# Patient Record
Sex: MALE | Race: WHITE | Employment: OTHER | URBAN - METROPOLITAN AREA
[De-identification: names, ages, dates, MRNs, and addresses within clinical notes are randomized per-mention and may not be internally consistent; named-entity substitution may affect disease eponyms.]

---

## 2017-01-03 ENCOUNTER — OFFICE VISIT (OUTPATIENT)
Dept: CARDIOLOGY CLINIC | Age: 67
End: 2017-01-03

## 2017-01-03 ENCOUNTER — HOSPITAL ENCOUNTER (OUTPATIENT)
Dept: OTHER | Age: 67
Discharge: HOME | End: 2017-01-03
Attending: INTERNAL MEDICINE | Admitting: INTERNAL MEDICINE

## 2017-01-03 VITALS
HEART RATE: 72 BPM | HEIGHT: 71 IN | WEIGHT: 155 LBS | DIASTOLIC BLOOD PRESSURE: 60 MMHG | SYSTOLIC BLOOD PRESSURE: 124 MMHG | BODY MASS INDEX: 21.7 KG/M2

## 2017-01-03 DIAGNOSIS — I10 ESSENTIAL HYPERTENSION: ICD-10-CM

## 2017-01-03 DIAGNOSIS — I42.8 LEFT VENTRICULAR NONCOMPACTION (HCC): ICD-10-CM

## 2017-01-03 DIAGNOSIS — I42.8 NONISCHEMIC CARDIOMYOPATHY (HCC): ICD-10-CM

## 2017-01-03 DIAGNOSIS — I50.22 CHRONIC SYSTOLIC CONGESTIVE HEART FAILURE (HCC): Primary | Chronic | ICD-10-CM

## 2017-01-03 LAB
ANION GAP SERPL CALCULATED.3IONS-SCNC: 12 MMOL/L (ref 3–16)
BUN BLDV-MCNC: 15 MG/DL (ref 7–20)
CALCIUM SERPL-MCNC: 9 MG/DL (ref 8.3–10.6)
CHLORIDE BLD-SCNC: 98 MMOL/L (ref 99–110)
CO2: 28 MMOL/L (ref 21–32)
CREAT SERPL-MCNC: 1 MG/DL (ref 0.8–1.3)
GFR AFRICAN AMERICAN: >60
GFR NON-AFRICAN AMERICAN: >60
GLUCOSE BLD-MCNC: 92 MG/DL (ref 70–99)
POTASSIUM SERPL-SCNC: 4.8 MMOL/L (ref 3.5–5.1)
PRO-BNP: 1976 PG/ML (ref 0–124)
SODIUM BLD-SCNC: 138 MMOL/L (ref 136–145)

## 2017-01-03 PROCEDURE — 99214 OFFICE O/P EST MOD 30 MIN: CPT | Performed by: INTERNAL MEDICINE

## 2017-01-03 RX ORDER — LISINOPRIL 10 MG/1
10 TABLET ORAL DAILY
Qty: 180 TABLET | Refills: 3 | Status: SHIPPED | OUTPATIENT
Start: 2017-01-03 | End: 2017-05-04 | Stop reason: ALTCHOICE

## 2017-01-03 RX ORDER — FUROSEMIDE 20 MG/1
20 TABLET ORAL 2 TIMES DAILY
Qty: 90 TABLET | Refills: 3 | Status: SHIPPED | OUTPATIENT
Start: 2017-01-03 | End: 2017-05-04 | Stop reason: ALTCHOICE

## 2017-01-24 ENCOUNTER — HOSPITAL ENCOUNTER (OUTPATIENT)
Dept: OTHER | Age: 67
Discharge: HOME | End: 2017-01-24
Attending: INTERNAL MEDICINE | Admitting: INTERNAL MEDICINE

## 2017-01-24 ENCOUNTER — OFFICE VISIT (OUTPATIENT)
Dept: CARDIOLOGY CLINIC | Age: 67
End: 2017-01-24

## 2017-01-24 VITALS
DIASTOLIC BLOOD PRESSURE: 72 MMHG | SYSTOLIC BLOOD PRESSURE: 112 MMHG | WEIGHT: 152 LBS | HEART RATE: 68 BPM | BODY MASS INDEX: 21.2 KG/M2 | OXYGEN SATURATION: 94 %

## 2017-01-24 DIAGNOSIS — I10 ESSENTIAL HYPERTENSION: ICD-10-CM

## 2017-01-24 DIAGNOSIS — I42.8 NONISCHEMIC CARDIOMYOPATHY (HCC): ICD-10-CM

## 2017-01-24 DIAGNOSIS — I50.22 CHRONIC SYSTOLIC CONGESTIVE HEART FAILURE (HCC): Primary | Chronic | ICD-10-CM

## 2017-01-24 DIAGNOSIS — I50.22 CHRONIC SYSTOLIC CONGESTIVE HEART FAILURE (HCC): Chronic | ICD-10-CM

## 2017-01-24 LAB
ANION GAP SERPL CALCULATED.3IONS-SCNC: 14 MMOL/L (ref 3–16)
BUN BLDV-MCNC: 14 MG/DL (ref 7–20)
CALCIUM SERPL-MCNC: 8.9 MG/DL (ref 8.3–10.6)
CHLORIDE BLD-SCNC: 91 MMOL/L (ref 99–110)
CO2: 27 MMOL/L (ref 21–32)
CREAT SERPL-MCNC: 1 MG/DL (ref 0.8–1.3)
GFR AFRICAN AMERICAN: >60
GFR NON-AFRICAN AMERICAN: >60
GLUCOSE BLD-MCNC: 83 MG/DL (ref 70–99)
POTASSIUM SERPL-SCNC: 4.8 MMOL/L (ref 3.5–5.1)
PRO-BNP: 1386 PG/ML (ref 0–124)
SODIUM BLD-SCNC: 132 MMOL/L (ref 136–145)

## 2017-01-24 PROCEDURE — 99214 OFFICE O/P EST MOD 30 MIN: CPT | Performed by: INTERNAL MEDICINE

## 2017-01-24 RX ORDER — CARVEDILOL 6.25 MG/1
6.25 TABLET ORAL 2 TIMES DAILY WITH MEALS
Qty: 60 TABLET | Refills: 11 | Status: SHIPPED | OUTPATIENT
Start: 2017-01-24 | End: 2018-04-16 | Stop reason: SDUPTHER

## 2017-01-24 RX ORDER — SILDENAFIL CITRATE 20 MG/1
TABLET ORAL
Qty: 30 TABLET | Refills: 6 | Status: SHIPPED | OUTPATIENT
Start: 2017-01-24 | End: 2017-03-03

## 2017-02-07 ENCOUNTER — TELEPHONE (OUTPATIENT)
Dept: PULMONOLOGY | Age: 67
End: 2017-02-07

## 2017-03-03 ENCOUNTER — HOSPITAL ENCOUNTER (OUTPATIENT)
Dept: NON INVASIVE DIAGNOSTICS | Age: 67
Discharge: HOME | End: 2017-03-03
Attending: INTERNAL MEDICINE | Admitting: INTERNAL MEDICINE

## 2017-03-03 ENCOUNTER — OFFICE VISIT (OUTPATIENT)
Dept: CARDIOLOGY CLINIC | Age: 67
End: 2017-03-03

## 2017-03-03 VITALS
HEART RATE: 60 BPM | WEIGHT: 153 LBS | BODY MASS INDEX: 21.42 KG/M2 | OXYGEN SATURATION: 100 % | DIASTOLIC BLOOD PRESSURE: 50 MMHG | HEIGHT: 71 IN | SYSTOLIC BLOOD PRESSURE: 90 MMHG

## 2017-03-03 DIAGNOSIS — I10 ESSENTIAL HYPERTENSION: ICD-10-CM

## 2017-03-03 DIAGNOSIS — I50.22 CHRONIC SYSTOLIC CONGESTIVE HEART FAILURE (HCC): Primary | Chronic | ICD-10-CM

## 2017-03-03 DIAGNOSIS — I42.8 NONISCHEMIC CARDIOMYOPATHY (HCC): ICD-10-CM

## 2017-03-03 DIAGNOSIS — I50.22 CHRONIC SYSTOLIC CONGESTIVE HEART FAILURE (HCC): ICD-10-CM

## 2017-03-03 PROCEDURE — 99214 OFFICE O/P EST MOD 30 MIN: CPT | Performed by: INTERNAL MEDICINE

## 2017-03-17 ENCOUNTER — HOSPITAL ENCOUNTER (OUTPATIENT)
Dept: OTHER | Age: 67
Discharge: HOME | End: 2017-03-17
Attending: INTERNAL MEDICINE | Admitting: INTERNAL MEDICINE

## 2017-03-17 DIAGNOSIS — I50.22 CHRONIC SYSTOLIC CONGESTIVE HEART FAILURE (HCC): Chronic | ICD-10-CM

## 2017-03-17 DIAGNOSIS — I10 ESSENTIAL HYPERTENSION: ICD-10-CM

## 2017-03-17 DIAGNOSIS — I42.8 NONISCHEMIC CARDIOMYOPATHY (HCC): ICD-10-CM

## 2017-03-17 LAB
ANION GAP SERPL CALCULATED.3IONS-SCNC: 14 MMOL/L (ref 3–16)
BUN BLDV-MCNC: 16 MG/DL (ref 7–20)
CALCIUM SERPL-MCNC: 8.9 MG/DL (ref 8.3–10.6)
CHLORIDE BLD-SCNC: 94 MMOL/L (ref 99–110)
CO2: 26 MMOL/L (ref 21–32)
CREAT SERPL-MCNC: 0.9 MG/DL (ref 0.8–1.3)
GFR AFRICAN AMERICAN: >60
GFR NON-AFRICAN AMERICAN: >60
GLUCOSE BLD-MCNC: 101 MG/DL (ref 70–99)
POTASSIUM SERPL-SCNC: 4.9 MMOL/L (ref 3.5–5.1)
PRO-BNP: 687 PG/ML (ref 0–124)
SODIUM BLD-SCNC: 134 MMOL/L (ref 136–145)

## 2017-04-07 ENCOUNTER — OFFICE VISIT (OUTPATIENT)
Dept: CARDIOLOGY CLINIC | Age: 67
End: 2017-04-07

## 2017-04-07 VITALS
BODY MASS INDEX: 21.42 KG/M2 | HEART RATE: 76 BPM | HEIGHT: 71 IN | DIASTOLIC BLOOD PRESSURE: 64 MMHG | SYSTOLIC BLOOD PRESSURE: 132 MMHG | WEIGHT: 153 LBS

## 2017-04-07 DIAGNOSIS — I42.8 NONISCHEMIC CARDIOMYOPATHY (HCC): Chronic | ICD-10-CM

## 2017-04-07 DIAGNOSIS — I50.22 CHRONIC SYSTOLIC CONGESTIVE HEART FAILURE (HCC): Primary | Chronic | ICD-10-CM

## 2017-04-07 DIAGNOSIS — I42.8 LEFT VENTRICULAR NONCOMPACTION (HCC): ICD-10-CM

## 2017-04-07 PROCEDURE — 99214 OFFICE O/P EST MOD 30 MIN: CPT | Performed by: INTERNAL MEDICINE

## 2017-04-10 ENCOUNTER — TELEPHONE (OUTPATIENT)
Dept: PULMONOLOGY | Age: 67
End: 2017-04-10

## 2017-04-21 ENCOUNTER — HOSPITAL ENCOUNTER (OUTPATIENT)
Dept: OTHER | Age: 67
Discharge: HOME | End: 2017-04-21
Attending: INTERNAL MEDICINE | Admitting: INTERNAL MEDICINE

## 2017-04-21 LAB
ANION GAP SERPL CALCULATED.3IONS-SCNC: 14 MMOL/L (ref 3–16)
BUN BLDV-MCNC: 14 MG/DL (ref 7–20)
CALCIUM SERPL-MCNC: 8.8 MG/DL (ref 8.3–10.6)
CHLORIDE BLD-SCNC: 96 MMOL/L (ref 99–110)
CO2: 25 MMOL/L (ref 21–32)
CREAT SERPL-MCNC: 0.9 MG/DL (ref 0.8–1.3)
GFR AFRICAN AMERICAN: >60
GFR NON-AFRICAN AMERICAN: >60
GLUCOSE BLD-MCNC: 103 MG/DL (ref 70–99)
POTASSIUM SERPL-SCNC: 4.6 MMOL/L (ref 3.5–5.1)
PRO-BNP: 667 PG/ML (ref 0–124)
SODIUM BLD-SCNC: 135 MMOL/L (ref 136–145)

## 2017-05-01 ENCOUNTER — HOSPITAL ENCOUNTER (OUTPATIENT)
Dept: CT IMAGING | Age: 67
Discharge: HOME | End: 2017-05-01
Attending: NURSE PRACTITIONER | Admitting: NURSE PRACTITIONER

## 2017-05-01 ENCOUNTER — TELEPHONE (OUTPATIENT)
Dept: PULMONOLOGY | Age: 67
End: 2017-05-01

## 2017-05-01 DIAGNOSIS — R91.1 PULMONARY NODULE: ICD-10-CM

## 2017-05-01 DIAGNOSIS — Z87.891 PERSONAL HISTORY OF NICOTINE DEPENDENCE: ICD-10-CM

## 2017-05-01 DIAGNOSIS — Z87.891 PERSONAL HISTORY OF TOBACCO USE: ICD-10-CM

## 2017-05-04 ENCOUNTER — TELEPHONE (OUTPATIENT)
Dept: CASE MANAGEMENT | Age: 67
End: 2017-05-04

## 2017-05-04 ENCOUNTER — OFFICE VISIT (OUTPATIENT)
Dept: CARDIOLOGY CLINIC | Age: 67
End: 2017-05-04

## 2017-05-04 VITALS
HEART RATE: 64 BPM | SYSTOLIC BLOOD PRESSURE: 118 MMHG | BODY MASS INDEX: 21.42 KG/M2 | HEIGHT: 71 IN | WEIGHT: 153 LBS | DIASTOLIC BLOOD PRESSURE: 82 MMHG

## 2017-05-04 DIAGNOSIS — I10 ESSENTIAL HYPERTENSION: ICD-10-CM

## 2017-05-04 DIAGNOSIS — I50.22 CHRONIC SYSTOLIC CONGESTIVE HEART FAILURE (HCC): Primary | ICD-10-CM

## 2017-05-04 DIAGNOSIS — I42.8 NONISCHEMIC CARDIOMYOPATHY (HCC): ICD-10-CM

## 2017-05-04 DIAGNOSIS — I42.8 LEFT VENTRICULAR NONCOMPACTION (HCC): ICD-10-CM

## 2017-05-04 PROCEDURE — 99214 OFFICE O/P EST MOD 30 MIN: CPT | Performed by: INTERNAL MEDICINE

## 2017-05-19 ENCOUNTER — OFFICE VISIT (OUTPATIENT)
Dept: PULMONOLOGY | Age: 67
End: 2017-05-19

## 2017-05-19 VITALS
WEIGHT: 151.2 LBS | OXYGEN SATURATION: 97 % | HEART RATE: 67 BPM | BODY MASS INDEX: 21.09 KG/M2 | SYSTOLIC BLOOD PRESSURE: 117 MMHG | RESPIRATION RATE: 18 BRPM | DIASTOLIC BLOOD PRESSURE: 70 MMHG

## 2017-05-19 DIAGNOSIS — Z87.891 HISTORY OF TOBACCO USE: ICD-10-CM

## 2017-05-19 DIAGNOSIS — I42.8 NONISCHEMIC CARDIOMYOPATHY (HCC): ICD-10-CM

## 2017-05-19 DIAGNOSIS — R91.1 PULMONARY NODULE: ICD-10-CM

## 2017-05-19 DIAGNOSIS — J44.9 COPD, SEVERITY TO BE DETERMINED (HCC): Primary | ICD-10-CM

## 2017-05-19 PROCEDURE — 99214 OFFICE O/P EST MOD 30 MIN: CPT | Performed by: NURSE PRACTITIONER

## 2017-05-25 ENCOUNTER — HOSPITAL ENCOUNTER (OUTPATIENT)
Dept: PULMONOLOGY | Age: 67
Discharge: HOME | End: 2017-05-25
Attending: NURSE PRACTITIONER | Admitting: NURSE PRACTITIONER

## 2017-05-25 VITALS — OXYGEN SATURATION: 97 % | HEART RATE: 70 BPM

## 2017-05-25 DIAGNOSIS — J44.9 CHRONIC OBSTRUCTIVE PULMONARY DISEASE (HCC): ICD-10-CM

## 2017-05-25 RX ORDER — ALBUTEROL SULFATE 90 UG/1
4 AEROSOL, METERED RESPIRATORY (INHALATION) ONCE
Status: COMPLETED | OUTPATIENT
Start: 2017-05-25 | End: 2017-05-25

## 2017-05-25 RX ADMIN — ALBUTEROL SULFATE 4 PUFF: 90 AEROSOL, METERED RESPIRATORY (INHALATION) at 17:15

## 2017-06-09 ENCOUNTER — OFFICE VISIT (OUTPATIENT)
Dept: CARDIOLOGY CLINIC | Age: 67
End: 2017-06-09

## 2017-06-09 ENCOUNTER — HOSPITAL ENCOUNTER (OUTPATIENT)
Dept: OTHER | Age: 67
Discharge: HOME | End: 2017-06-09
Attending: NURSE PRACTITIONER | Admitting: NURSE PRACTITIONER

## 2017-06-09 VITALS
BODY MASS INDEX: 22.1 KG/M2 | HEIGHT: 70 IN | WEIGHT: 154.4 LBS | HEART RATE: 65 BPM | OXYGEN SATURATION: 98 % | SYSTOLIC BLOOD PRESSURE: 122 MMHG | RESPIRATION RATE: 16 BRPM | DIASTOLIC BLOOD PRESSURE: 64 MMHG

## 2017-06-09 DIAGNOSIS — I50.22 CHRONIC SYSTOLIC CONGESTIVE HEART FAILURE (HCC): ICD-10-CM

## 2017-06-09 DIAGNOSIS — I42.8 NONISCHEMIC CARDIOMYOPATHY (HCC): ICD-10-CM

## 2017-06-09 DIAGNOSIS — I10 ESSENTIAL HYPERTENSION: ICD-10-CM

## 2017-06-09 LAB
ANION GAP SERPL CALCULATED.3IONS-SCNC: 15 MMOL/L (ref 3–16)
BUN BLDV-MCNC: 14 MG/DL (ref 7–20)
CALCIUM SERPL-MCNC: 8.6 MG/DL (ref 8.3–10.6)
CHLORIDE BLD-SCNC: 94 MMOL/L (ref 99–110)
CO2: 27 MMOL/L (ref 21–32)
CREAT SERPL-MCNC: 0.9 MG/DL (ref 0.8–1.3)
GFR AFRICAN AMERICAN: >60
GFR NON-AFRICAN AMERICAN: >60
GLUCOSE BLD-MCNC: 89 MG/DL (ref 70–99)
POTASSIUM SERPL-SCNC: 4.6 MMOL/L (ref 3.5–5.1)
PRO-BNP: 587 PG/ML (ref 0–124)
SODIUM BLD-SCNC: 136 MMOL/L (ref 136–145)

## 2017-06-09 PROCEDURE — 99214 OFFICE O/P EST MOD 30 MIN: CPT | Performed by: NURSE PRACTITIONER

## 2017-06-09 RX ORDER — NICOTINE 21 MG/24HR
1 PATCH, TRANSDERMAL 24 HOURS TRANSDERMAL PRN
Qty: 30 PATCH | Refills: 0 | COMMUNITY
Start: 2017-06-09 | End: 2017-07-25

## 2017-06-15 ENCOUNTER — OFFICE VISIT (OUTPATIENT)
Dept: PULMONOLOGY | Age: 67
End: 2017-06-15

## 2017-06-15 VITALS
OXYGEN SATURATION: 97 % | RESPIRATION RATE: 20 BRPM | SYSTOLIC BLOOD PRESSURE: 117 MMHG | BODY MASS INDEX: 22.13 KG/M2 | WEIGHT: 154.2 LBS | HEART RATE: 72 BPM | DIASTOLIC BLOOD PRESSURE: 69 MMHG

## 2017-06-15 DIAGNOSIS — J96.01 ACUTE HYPOXEMIC RESPIRATORY FAILURE (HCC): Primary | ICD-10-CM

## 2017-06-15 DIAGNOSIS — J44.9 COPD, VERY SEVERE (HCC): ICD-10-CM

## 2017-06-15 DIAGNOSIS — I42.8 NONISCHEMIC CARDIOMYOPATHY (HCC): ICD-10-CM

## 2017-06-15 PROCEDURE — 99213 OFFICE O/P EST LOW 20 MIN: CPT | Performed by: NURSE PRACTITIONER

## 2017-07-25 ENCOUNTER — OFFICE VISIT (OUTPATIENT)
Dept: CARDIOLOGY CLINIC | Age: 67
End: 2017-07-25

## 2017-07-25 ENCOUNTER — HOSPITAL ENCOUNTER (OUTPATIENT)
Dept: OTHER | Age: 67
Discharge: HOME | End: 2017-07-25
Attending: NURSE PRACTITIONER | Admitting: NURSE PRACTITIONER

## 2017-07-25 ENCOUNTER — HOSPITAL ENCOUNTER (OUTPATIENT)
Dept: OTHER | Age: 67
Discharge: HOME | End: 2017-07-25
Attending: OPHTHALMOLOGY | Admitting: OPHTHALMOLOGY

## 2017-07-25 VITALS
HEART RATE: 72 BPM | RESPIRATION RATE: 16 BRPM | WEIGHT: 155.4 LBS | DIASTOLIC BLOOD PRESSURE: 74 MMHG | HEIGHT: 70 IN | SYSTOLIC BLOOD PRESSURE: 122 MMHG | BODY MASS INDEX: 22.25 KG/M2 | OXYGEN SATURATION: 99 %

## 2017-07-25 DIAGNOSIS — H44.009 EYE INFECTION, UNSPECIFIED LATERALITY: ICD-10-CM

## 2017-07-25 DIAGNOSIS — I50.22 CHRONIC SYSTOLIC HEART FAILURE (HCC): Primary | ICD-10-CM

## 2017-07-25 DIAGNOSIS — I42.8 NICM (NONISCHEMIC CARDIOMYOPATHY) (HCC): ICD-10-CM

## 2017-07-25 DIAGNOSIS — I50.22 CHRONIC SYSTOLIC HEART FAILURE (HCC): ICD-10-CM

## 2017-07-25 DIAGNOSIS — I10 ESSENTIAL HYPERTENSION: ICD-10-CM

## 2017-07-25 LAB
ANION GAP SERPL CALCULATED.3IONS-SCNC: 12 MMOL/L (ref 3–16)
BUN BLDV-MCNC: 13 MG/DL (ref 7–20)
CALCIUM SERPL-MCNC: 9.2 MG/DL (ref 8.3–10.6)
CHLORIDE BLD-SCNC: 96 MMOL/L (ref 99–110)
CO2: 27 MMOL/L (ref 21–32)
CREAT SERPL-MCNC: 0.8 MG/DL (ref 0.8–1.3)
GFR AFRICAN AMERICAN: >60
GFR NON-AFRICAN AMERICAN: >60
GLUCOSE BLD-MCNC: 95 MG/DL (ref 70–99)
HCT VFR BLD CALC: 45.9 % (ref 40.5–52.5)
HEMOGLOBIN: 15.4 G/DL (ref 13.5–17.5)
MCH RBC QN AUTO: 31.8 PG (ref 26–34)
MCHC RBC AUTO-ENTMCNC: 33.5 G/DL (ref 31–36)
MCV RBC AUTO: 94.9 FL (ref 80–100)
PDW BLD-RTO: 13.3 % (ref 12.4–15.4)
PLATELET # BLD: 276 K/UL (ref 135–450)
PMV BLD AUTO: 8 FL (ref 5–10.5)
POTASSIUM SERPL-SCNC: 5.3 MMOL/L (ref 3.5–5.1)
PRO-BNP: 425 PG/ML (ref 0–124)
RBC # BLD: 4.84 M/UL (ref 4.2–5.9)
SODIUM BLD-SCNC: 135 MMOL/L (ref 136–145)
WBC # BLD: 6.9 K/UL (ref 4–11)

## 2017-07-25 PROCEDURE — 99214 OFFICE O/P EST MOD 30 MIN: CPT | Performed by: NURSE PRACTITIONER

## 2017-07-26 DIAGNOSIS — E87.5 HYPERKALEMIA: Primary | ICD-10-CM

## 2017-07-26 LAB
ANA INTERPRETATION: ABNORMAL
ANA TITER: ABNORMAL
ANGIOTENSIN CONVERTING ENZYME: 48 U/L (ref 9–67)
ANTI-NUCLEAR ANTIBODY (ANA): POSITIVE
HLA B27: NEGATIVE
RPR: NORMAL

## 2017-07-27 LAB
LYME (B. BURGDORFERI) AB IGG WB: POSITIVE
LYME AB IGM BY WB:: NEGATIVE
LYSOZYME: 1.86 UG/ML (ref 0–2.75)
TREPONEMA PALLIDUM ANTIBODIES: NON REACTIVE

## 2017-07-28 ENCOUNTER — TELEPHONE (OUTPATIENT)
Dept: CARDIOLOGY CLINIC | Age: 67
End: 2017-07-28

## 2017-08-01 ENCOUNTER — HOSPITAL ENCOUNTER (OUTPATIENT)
Dept: OTHER | Age: 67
Discharge: HOME | End: 2017-08-01
Attending: OPHTHALMOLOGY | Admitting: OPHTHALMOLOGY

## 2017-08-01 ENCOUNTER — HOSPITAL ENCOUNTER (OUTPATIENT)
Dept: OTHER | Age: 67
Discharge: HOME | End: 2017-08-01
Attending: NURSE PRACTITIONER | Admitting: NURSE PRACTITIONER

## 2017-08-01 DIAGNOSIS — E87.5 HYPERKALEMIA: ICD-10-CM

## 2017-08-01 LAB — POTASSIUM SERPL-SCNC: 4.9 MMOL/L (ref 3.5–5.1)

## 2017-08-03 LAB — LYME, EIA: 0.85 LIV (ref 0–1.2)

## 2017-09-11 DIAGNOSIS — I50.22 CHRONIC SYSTOLIC CONGESTIVE HEART FAILURE (HCC): ICD-10-CM

## 2017-09-11 DIAGNOSIS — I42.8 NONISCHEMIC CARDIOMYOPATHY (HCC): ICD-10-CM

## 2017-09-11 DIAGNOSIS — I10 ESSENTIAL HYPERTENSION: ICD-10-CM

## 2017-09-14 ENCOUNTER — HOSPITAL ENCOUNTER (OUTPATIENT)
Dept: NON INVASIVE DIAGNOSTICS | Age: 67
Discharge: HOME | End: 2017-09-14
Attending: NURSE PRACTITIONER | Admitting: NURSE PRACTITIONER

## 2017-09-14 ENCOUNTER — OFFICE VISIT (OUTPATIENT)
Dept: CARDIOLOGY CLINIC | Age: 67
End: 2017-09-14

## 2017-09-14 VITALS
HEIGHT: 70 IN | SYSTOLIC BLOOD PRESSURE: 106 MMHG | HEART RATE: 66 BPM | WEIGHT: 156.12 LBS | DIASTOLIC BLOOD PRESSURE: 68 MMHG | OXYGEN SATURATION: 98 % | BODY MASS INDEX: 22.35 KG/M2

## 2017-09-14 DIAGNOSIS — I42.8 NONISCHEMIC CARDIOMYOPATHY (HCC): Chronic | ICD-10-CM

## 2017-09-14 DIAGNOSIS — I50.22 CHRONIC SYSTOLIC CONGESTIVE HEART FAILURE (HCC): ICD-10-CM

## 2017-09-14 DIAGNOSIS — I50.22 CHRONIC SYSTOLIC CONGESTIVE HEART FAILURE (HCC): Primary | Chronic | ICD-10-CM

## 2017-09-14 LAB
LEFT VENTRICULAR EJECTION FRACTION HIGH VALUE: 40 %
LEFT VENTRICULAR EJECTION FRACTION MODE: NORMAL
LV EF: 35 %
LV EF: 38 %
LVEF MODALITY: NORMAL

## 2017-09-14 PROCEDURE — 99214 OFFICE O/P EST MOD 30 MIN: CPT | Performed by: INTERNAL MEDICINE

## 2017-10-23 RX ORDER — TIOTROPIUM BROMIDE AND OLODATEROL 3.124; 2.736 UG/1; UG/1
SPRAY, METERED RESPIRATORY (INHALATION)
Qty: 1 INHALER | Refills: 2 | Status: SHIPPED | OUTPATIENT
Start: 2017-10-23 | End: 2018-10-05

## 2017-10-31 DIAGNOSIS — I50.22 CHRONIC SYSTOLIC CONGESTIVE HEART FAILURE (HCC): ICD-10-CM

## 2017-10-31 DIAGNOSIS — I10 ESSENTIAL HYPERTENSION: ICD-10-CM

## 2017-10-31 DIAGNOSIS — I42.8 NONISCHEMIC CARDIOMYOPATHY (HCC): ICD-10-CM

## 2017-12-05 ENCOUNTER — HOSPITAL ENCOUNTER (OUTPATIENT)
Dept: OTHER | Age: 67
Discharge: HOME | End: 2017-12-05
Attending: INTERNAL MEDICINE | Admitting: INTERNAL MEDICINE

## 2017-12-05 DIAGNOSIS — I42.8 NONISCHEMIC CARDIOMYOPATHY (HCC): Chronic | ICD-10-CM

## 2017-12-05 DIAGNOSIS — I50.22 CHRONIC SYSTOLIC CONGESTIVE HEART FAILURE (HCC): Chronic | ICD-10-CM

## 2017-12-05 LAB
ANION GAP SERPL CALCULATED.3IONS-SCNC: 12 MMOL/L (ref 3–16)
BUN BLDV-MCNC: 16 MG/DL (ref 7–20)
CALCIUM SERPL-MCNC: 8.8 MG/DL (ref 8.3–10.6)
CHLORIDE BLD-SCNC: 98 MMOL/L (ref 99–110)
CO2: 26 MMOL/L (ref 21–32)
CREAT SERPL-MCNC: 0.8 MG/DL (ref 0.8–1.3)
GFR AFRICAN AMERICAN: >60
GFR NON-AFRICAN AMERICAN: >60
GLUCOSE BLD-MCNC: 99 MG/DL (ref 70–99)
HCT VFR BLD CALC: 44.7 % (ref 40.5–52.5)
HEMOGLOBIN: 15 G/DL (ref 13.5–17.5)
MCH RBC QN AUTO: 31.6 PG (ref 26–34)
MCHC RBC AUTO-ENTMCNC: 33.6 G/DL (ref 31–36)
MCV RBC AUTO: 94 FL (ref 80–100)
PDW BLD-RTO: 13.4 % (ref 12.4–15.4)
PLATELET # BLD: 315 K/UL (ref 135–450)
PMV BLD AUTO: 8.3 FL (ref 5–10.5)
POTASSIUM SERPL-SCNC: 5.1 MMOL/L (ref 3.5–5.1)
PRO-BNP: 351 PG/ML (ref 0–124)
RBC # BLD: 4.76 M/UL (ref 4.2–5.9)
SODIUM BLD-SCNC: 136 MMOL/L (ref 136–145)
WBC # BLD: 9.5 K/UL (ref 4–11)

## 2017-12-18 ENCOUNTER — OFFICE VISIT (OUTPATIENT)
Dept: PULMONOLOGY | Age: 67
End: 2017-12-18

## 2017-12-18 VITALS
HEART RATE: 85 BPM | SYSTOLIC BLOOD PRESSURE: 132 MMHG | WEIGHT: 161 LBS | DIASTOLIC BLOOD PRESSURE: 81 MMHG | BODY MASS INDEX: 23.1 KG/M2

## 2017-12-18 DIAGNOSIS — R91.1 PULMONARY NODULE: ICD-10-CM

## 2017-12-18 DIAGNOSIS — J44.9 COPD, SEVERE (HCC): ICD-10-CM

## 2017-12-18 DIAGNOSIS — R06.02 SOB (SHORTNESS OF BREATH): ICD-10-CM

## 2017-12-18 DIAGNOSIS — I42.8 NONISCHEMIC CARDIOMYOPATHY (HCC): Primary | Chronic | ICD-10-CM

## 2017-12-18 DIAGNOSIS — Z72.0 TOBACCO USE: Chronic | ICD-10-CM

## 2017-12-18 PROCEDURE — 99214 OFFICE O/P EST MOD 30 MIN: CPT | Performed by: INTERNAL MEDICINE

## 2017-12-18 NOTE — LETTER
Pulmonary, Critical Care & Sleep  [address], [address] [facility],[address] [address]  Phone: [phone]  Fax: [phone]          December 18, 2017       Patient: Brennan Caraballo   MR Number: [id number]   YOB: 1950   Date of Visit: 12/18/2017       Dear Dr. Nancy Gonzales: Thank you for the request for consultation. Below are the relevant portions of my assessment and plan of care. If you have questions, please do not hesitate to call me. I look forward to following Tim Gomez along with you.     Sincerely,        Jona Jama MD    CC providers:  No Recipients

## 2018-04-16 ENCOUNTER — OFFICE VISIT (OUTPATIENT)
Dept: CARDIOLOGY CLINIC | Age: 68
End: 2018-04-16

## 2018-04-16 VITALS
WEIGHT: 159 LBS | HEIGHT: 71 IN | SYSTOLIC BLOOD PRESSURE: 94 MMHG | DIASTOLIC BLOOD PRESSURE: 66 MMHG | BODY MASS INDEX: 22.26 KG/M2 | HEART RATE: 72 BPM

## 2018-04-16 DIAGNOSIS — I10 ESSENTIAL HYPERTENSION: Chronic | ICD-10-CM

## 2018-04-16 DIAGNOSIS — I50.22 CHRONIC SYSTOLIC CONGESTIVE HEART FAILURE (HCC): Primary | Chronic | ICD-10-CM

## 2018-04-16 DIAGNOSIS — R06.02 SOB (SHORTNESS OF BREATH): ICD-10-CM

## 2018-04-16 DIAGNOSIS — I42.8 NONISCHEMIC CARDIOMYOPATHY (HCC): Chronic | ICD-10-CM

## 2018-04-16 PROCEDURE — 99214 OFFICE O/P EST MOD 30 MIN: CPT | Performed by: INTERNAL MEDICINE

## 2018-04-16 RX ORDER — CARVEDILOL 6.25 MG/1
6.25 TABLET ORAL 2 TIMES DAILY WITH MEALS
Qty: 180 TABLET | Refills: 3 | Status: SHIPPED | OUTPATIENT
Start: 2018-04-16 | End: 2018-10-05 | Stop reason: SDUPTHER

## 2018-06-21 DIAGNOSIS — I10 ESSENTIAL HYPERTENSION: ICD-10-CM

## 2018-06-21 DIAGNOSIS — I50.22 CHRONIC SYSTOLIC CONGESTIVE HEART FAILURE (HCC): ICD-10-CM

## 2018-06-21 DIAGNOSIS — I42.8 NONISCHEMIC CARDIOMYOPATHY (HCC): ICD-10-CM

## 2018-06-29 NOTE — TELEPHONE ENCOUNTER
From: Adal Lopez  Sent: 6/28/2018 11:28 AM EDT  Subject: Medication Renewal Request    Clara Eleni.  Cassie Redmond would like a refill of the following medications:     umeclidinium-vilanterol (ANORO ELLIPTA) 62.5-25 MCG/INH AEPB inhaler Laura Sarabia MD]    Preferred pharmacy: [facility], [address] [phone] - F [phone]

## 2018-10-05 ENCOUNTER — OFFICE VISIT (OUTPATIENT)
Dept: CARDIOLOGY CLINIC | Age: 68
End: 2018-10-05
Payer: COMMERCIAL

## 2018-10-05 ENCOUNTER — HOSPITAL ENCOUNTER (OUTPATIENT)
Dept: NON INVASIVE DIAGNOSTICS | Age: 68
Discharge: HOME | End: 2018-10-05
Payer: COMMERCIAL

## 2018-10-05 VITALS
BODY MASS INDEX: 21.14 KG/M2 | SYSTOLIC BLOOD PRESSURE: 100 MMHG | RESPIRATION RATE: 16 BRPM | WEIGHT: 151 LBS | HEART RATE: 65 BPM | DIASTOLIC BLOOD PRESSURE: 62 MMHG | OXYGEN SATURATION: 97 % | HEIGHT: 71 IN

## 2018-10-05 DIAGNOSIS — R06.02 SOB (SHORTNESS OF BREATH): ICD-10-CM

## 2018-10-05 DIAGNOSIS — I42.8 NONISCHEMIC CARDIOMYOPATHY (HCC): Chronic | ICD-10-CM

## 2018-10-05 DIAGNOSIS — I50.22 CHRONIC SYSTOLIC CONGESTIVE HEART FAILURE (HCC): ICD-10-CM

## 2018-10-05 DIAGNOSIS — I10 ESSENTIAL HYPERTENSION: Chronic | ICD-10-CM

## 2018-10-05 DIAGNOSIS — I10 ESSENTIAL HYPERTENSION: ICD-10-CM

## 2018-10-05 DIAGNOSIS — I42.8 NONISCHEMIC CARDIOMYOPATHY (HCC): ICD-10-CM

## 2018-10-05 DIAGNOSIS — I50.22 CHRONIC SYSTOLIC CONGESTIVE HEART FAILURE (HCC): Primary | ICD-10-CM

## 2018-10-05 LAB
LEFT VENTRICULAR EJECTION FRACTION HIGH VALUE: 45 %
LEFT VENTRICULAR EJECTION FRACTION MODE: NORMAL
LV EF: 40 %
LV EF: 43 %
LVEF MODALITY: NORMAL

## 2018-10-05 PROCEDURE — 93306 TTE W/DOPPLER COMPLETE: CPT

## 2018-10-05 PROCEDURE — 99214 OFFICE O/P EST MOD 30 MIN: CPT | Performed by: INTERNAL MEDICINE

## 2018-10-05 NOTE — PROGRESS NOTES
12/05/2017    HGB 15.0 12/05/2017    HCT 44.7 12/05/2017    MCV 94.0 12/05/2017     12/05/2017     Lab Results   Component Value Date     12/05/2017    K 5.1 12/05/2017    CL 98 12/05/2017    CO2 26 12/05/2017    BUN 16 12/05/2017    CREATININE 0.8 12/05/2017    GLUCOSE 99 12/05/2017    CALCIUM 8.8 12/05/2017          Diagnostics:    Echo 105/2018   -Left ventricular cavity size is normal.   -Normal left ventricular wall thickness.   -Overall left ventricular systolic function appears mildly to moderately   reduced with an ejection fraction on the 40-45% range.   -There is moderate diffuse global hypokinesis.   -Abnormal (paradoxical) septal motion is present .   -Normal diastolic function. .E/e\"=6.45   -Trivial mitral and tricuspid regurgitation. Echo 9/30/2016:  Normal left ventricular wall thickness. Left ventricular size is increased. The left ventricular systolic function is severely reduced with an ejection fraction of 10-20 %. There are prominent left ventricular trabeculations. This could represent non compaction. There is reversal of E/A inflow velocities across the mitral valve suggesting impaired left ventricular relaxation. Moderate mitral regurgitation is present. The aortic valve appears sclerotic but opens well. There is trivial tricuspid regurgitation with RVSP estimated at 29 mmHg.        [facility] 10/13/2016:  Coronary Findings   Vessel  Ostial  Proximal  Mid  Distal  Special    LM  0%  0%  0%  0%      LAD  0%  0%  0%  0%      RI              Cx  0%  0%  0%  0%      RCA  0%  0%  0%  0%         Ventriculography/Pressure Findings  LVEF %  LVEDP  MR    20%  8mmHg         Lab Results   Component Value Date     12/05/2017     07/25/2017     06/09/2017    K 5.1 12/05/2017    K 4.9 08/01/2017    K 5.3 07/25/2017    BUN 16 12/05/2017    BUN 13 07/25/2017    BUN 14 06/09/2017    CREATININE 0.8 12/05/2017    CREATININE 0.8 07/25/2017    CREATININE 0.9 06/09/2017    GLUCOSE 99

## 2018-10-06 RX ORDER — CARVEDILOL 6.25 MG/1
6.25 TABLET ORAL 2 TIMES DAILY WITH MEALS
Qty: 180 TABLET | Refills: 3 | Status: SHIPPED | OUTPATIENT
Start: 2018-10-06 | End: 2019-05-08 | Stop reason: SDUPTHER

## 2018-11-18 ENCOUNTER — HOSPITAL ENCOUNTER (OUTPATIENT)
Dept: CT IMAGING | Age: 68
Discharge: HOME | End: 2018-11-18
Payer: COMMERCIAL

## 2018-11-18 DIAGNOSIS — Z87.891 FORMER SMOKER: ICD-10-CM

## 2018-11-18 PROCEDURE — G0297 LDCT FOR LUNG CA SCREEN: HCPCS

## 2018-12-19 ENCOUNTER — HOSPITAL ENCOUNTER (OUTPATIENT)
Dept: CT IMAGING | Age: 68
Discharge: HOME | End: 2018-12-19
Payer: COMMERCIAL

## 2018-12-19 DIAGNOSIS — I25.10 ATHEROSCLEROSIS OF NATIVE CORONARY ARTERY OF NATIVE HEART WITHOUT ANGINA PECTORIS: ICD-10-CM

## 2018-12-19 PROCEDURE — 75571 CT HRT W/O DYE W/CA TEST: CPT

## 2018-12-28 ENCOUNTER — OFFICE VISIT (OUTPATIENT)
Dept: PULMONOLOGY | Age: 68
End: 2018-12-28
Payer: COMMERCIAL

## 2018-12-28 VITALS
OXYGEN SATURATION: 95 % | WEIGHT: 157 LBS | DIASTOLIC BLOOD PRESSURE: 79 MMHG | HEART RATE: 72 BPM | BODY MASS INDEX: 21.9 KG/M2 | SYSTOLIC BLOOD PRESSURE: 118 MMHG

## 2018-12-28 DIAGNOSIS — R06.02 SOB (SHORTNESS OF BREATH): Primary | Chronic | ICD-10-CM

## 2018-12-28 DIAGNOSIS — J44.9 COPD, VERY SEVERE (HCC): ICD-10-CM

## 2018-12-28 DIAGNOSIS — R05.3 CHRONIC COUGH: ICD-10-CM

## 2018-12-28 DIAGNOSIS — J43.2 CENTRILOBULAR EMPHYSEMA (HCC): ICD-10-CM

## 2018-12-28 DIAGNOSIS — I42.8 NONISCHEMIC CARDIOMYOPATHY (HCC): Chronic | ICD-10-CM

## 2018-12-28 PROCEDURE — 99214 OFFICE O/P EST MOD 30 MIN: CPT | Performed by: INTERNAL MEDICINE

## 2018-12-28 RX ORDER — ALBUTEROL SULFATE 2.5 MG/3ML
2.5 SOLUTION RESPIRATORY (INHALATION) EVERY 6 HOURS PRN
Qty: 120 EACH | Refills: 11 | Status: SHIPPED | OUTPATIENT
Start: 2018-12-28 | End: 2019-04-26

## 2019-04-05 DIAGNOSIS — I10 ESSENTIAL HYPERTENSION: ICD-10-CM

## 2019-04-05 DIAGNOSIS — I50.22 CHRONIC SYSTOLIC CONGESTIVE HEART FAILURE (HCC): ICD-10-CM

## 2019-04-05 DIAGNOSIS — I42.8 NONISCHEMIC CARDIOMYOPATHY (HCC): ICD-10-CM

## 2019-04-08 NOTE — TELEPHONE ENCOUNTER
Lov 10/5/2018 RTO around April   Labs 9/27/2018  Lrf 10/6/2018 Disp 180+1  Appt No appt scheduled at this time. Please advise thanks.

## 2019-04-26 ENCOUNTER — OFFICE VISIT (OUTPATIENT)
Dept: CARDIOLOGY CLINIC | Age: 69
End: 2019-04-26
Payer: COMMERCIAL

## 2019-04-26 VITALS
HEIGHT: 71 IN | DIASTOLIC BLOOD PRESSURE: 70 MMHG | OXYGEN SATURATION: 98 % | SYSTOLIC BLOOD PRESSURE: 100 MMHG | WEIGHT: 150 LBS | HEART RATE: 70 BPM | BODY MASS INDEX: 21 KG/M2

## 2019-04-26 DIAGNOSIS — I42.8 NONISCHEMIC CARDIOMYOPATHY (HCC): Chronic | ICD-10-CM

## 2019-04-26 DIAGNOSIS — I50.22 CHRONIC SYSTOLIC CONGESTIVE HEART FAILURE (HCC): Primary | ICD-10-CM

## 2019-04-26 DIAGNOSIS — I10 ESSENTIAL HYPERTENSION: Chronic | ICD-10-CM

## 2019-04-26 PROCEDURE — 99213 OFFICE O/P EST LOW 20 MIN: CPT | Performed by: NURSE PRACTITIONER

## 2019-04-26 NOTE — PROGRESS NOTES
rash or new skin lesions. Musculoskeletal: No new muscle or joint pain. Neurological: No lightheadedness, dizziness, syncope or TIA-like symptoms. Psychiatric: No anxiety, insomnia or depression    Physical Exam:  /70   Pulse 70   Ht 5' 11\" (1.803 m)   Wt 150 lb (68 kg)   SpO2 98%   BMI 20.92 kg/m²     General:  Awake, alert, oriented in NAD  Skin:  Warm and dry. No unusual bruising or rash  HEENT: Normocephalic and atraumatic. Oral mucosa moist, no cyanosis. Neck:  Supple. No JVP appreciated  Chest:  Normal effort. Clear to auscultation  Cardiovascular:  RRR, S1/S2, no murmur/gallop/rub  Abdomen:  Soft, nontender, +bowel sounds  Extremities:  No edema  Neurological: No focal deficits  Psychological: Normal mood and affect     Home Medications:  Current Outpatient Medications   Medication Sig Dispense Refill    sacubitril-valsartan (ENTRESTO)  MG per tablet Take 1 tablet by mouth 2 times daily 180 tablet 3    carvedilol (COREG) 6.25 MG tablet Take 1 tablet by mouth 2 times daily (with meals) 180 tablet 3    umeclidinium-vilanterol (ANORO ELLIPTA) 62.5-25 MCG/INH AEPB inhaler Inhale 1 puff into the lungs daily 3 each 3     No current facility-administered medications for this visit.         Labs:   Lab Results   Component Value Date    WBC 9.5 12/05/2017    HGB 15.0 12/05/2017    HCT 44.7 12/05/2017    MCV 94.0 12/05/2017     12/05/2017     Lab Results   Component Value Date     12/05/2017    K 5.1 12/05/2017    CL 98 12/05/2017    CO2 26 12/05/2017    BUN 16 12/05/2017    CREATININE 0.8 12/05/2017    GLUCOSE 99 12/05/2017    CALCIUM 8.8 12/05/2017      No results found for: TRIG, HDL, LDLCALC, LDLDIRECT, LABVLDL    Diagnostics:    Echo 10/5/2018:  Summary   -Left ventricular cavity size is normal.   -Normal left ventricular wall thickness.   -Overall left ventricular systolic function appears mildly to moderately   reduced with an ejection fraction on the 40-45% range.   -There is moderate diffuse global hypokinesis. -Abnormal (paradoxical) septal motion is present .   -Normal diastolic function. .E/e\"=6.45   -Trivial mitral and tricuspid regurgitation. [facility] 10/13/2016:  Coronary Findings   Vessel  Ostial  Proximal  Mid  Distal  Special    LM  0%  0%  0%  0%      LAD  0%  0%  0%  0%      RI              Cx  0%  0%  0%  0%      RCA  0%  0%  0%  0%         Ventriculography/Pressure Findings  LVEF %  LVEDP  MR    20%  8mmHg          Assessment:  1. Chronic systolic congestive heart failure. NYHA class II. Appears stable. 2. Nonischemic cardiomyopathy. EF 40-45%. Normal cors on [facility] (2016). On ARNI and evidence based beta blocker. 3. Essential hypertension. Controlled. With HF, goal < 130/80. Plan:  1. Continue current medications. 2. Next labs with PCP appointment, given slip for BMP. 3. Follow up in 6 months, earlier for worsening. Thank you for allowing me to participate in the care of your patient.     CHELSY Richardson-CNP

## 2019-06-06 ENCOUNTER — HOSPITAL ENCOUNTER (OUTPATIENT)
Age: 69
Discharge: HOME | End: 2019-06-06
Payer: COMMERCIAL

## 2019-06-06 ENCOUNTER — HOSPITAL ENCOUNTER (OUTPATIENT)
Dept: GENERAL RADIOLOGY | Age: 69
Discharge: HOME | End: 2019-06-06
Payer: COMMERCIAL

## 2019-06-06 DIAGNOSIS — J44.9 CHRONIC OBSTRUCTIVE PULMONARY DISEASE, UNSPECIFIED COPD TYPE (HCC): ICD-10-CM

## 2019-06-06 DIAGNOSIS — I50.22 CHRONIC SYSTOLIC CONGESTIVE HEART FAILURE (HCC): ICD-10-CM

## 2019-06-06 LAB
ANION GAP SERPL CALCULATED.3IONS-SCNC: 12 MMOL/L (ref 3–16)
BUN BLDV-MCNC: 11 MG/DL (ref 7–20)
CALCIUM SERPL-MCNC: 9.1 MG/DL (ref 8.3–10.6)
CHLORIDE BLD-SCNC: 94 MMOL/L (ref 99–110)
CO2: 25 MMOL/L (ref 21–32)
CREAT SERPL-MCNC: 0.9 MG/DL (ref 0.8–1.3)
GFR AFRICAN AMERICAN: >60
GFR NON-AFRICAN AMERICAN: >60
GLUCOSE BLD-MCNC: 122 MG/DL (ref 70–99)
POTASSIUM SERPL-SCNC: 4.3 MMOL/L (ref 3.5–5.1)
SODIUM BLD-SCNC: 131 MMOL/L (ref 136–145)

## 2019-06-06 PROCEDURE — 71046 X-RAY EXAM CHEST 2 VIEWS: CPT

## 2019-06-06 PROCEDURE — 36415 COLL VENOUS BLD VENIPUNCTURE: CPT

## 2019-06-06 PROCEDURE — 80048 BASIC METABOLIC PNL TOTAL CA: CPT

## 2019-06-07 ENCOUNTER — TELEPHONE (OUTPATIENT)
Dept: CARDIOLOGY CLINIC | Age: 69
End: 2019-06-07

## 2019-06-07 NOTE — TELEPHONE ENCOUNTER
----- Message from CHELSY Victor CNP sent at 6/7/2019  8:31 AM EDT -----  Notify patient labs show stable kidney numbers. Continue current medications.

## 2019-06-07 NOTE — TELEPHONE ENCOUNTER
Attempted to call and speak with patient regarding lab results. He was not available to speak with. [facility] for message below.

## 2019-08-10 DIAGNOSIS — R06.02 SOB (SHORTNESS OF BREATH): ICD-10-CM

## 2019-08-10 DIAGNOSIS — I50.22 CHRONIC SYSTOLIC CONGESTIVE HEART FAILURE (HCC): Chronic | ICD-10-CM

## 2019-08-10 DIAGNOSIS — I10 ESSENTIAL HYPERTENSION: Chronic | ICD-10-CM

## 2019-08-10 DIAGNOSIS — I42.8 NONISCHEMIC CARDIOMYOPATHY (HCC): Chronic | ICD-10-CM

## 2019-08-12 RX ORDER — CARVEDILOL 6.25 MG/1
TABLET ORAL
Qty: 180 TABLET | Refills: 2 | Status: SHIPPED | OUTPATIENT
Start: 2019-08-12 | End: 2020-05-15

## 2019-10-28 ENCOUNTER — OFFICE VISIT (OUTPATIENT)
Dept: CARDIOLOGY CLINIC | Age: 69
End: 2019-10-28
Payer: COMMERCIAL

## 2019-10-28 VITALS
HEART RATE: 59 BPM | HEIGHT: 71 IN | OXYGEN SATURATION: 99 % | WEIGHT: 151.1 LBS | SYSTOLIC BLOOD PRESSURE: 100 MMHG | BODY MASS INDEX: 21.15 KG/M2 | DIASTOLIC BLOOD PRESSURE: 70 MMHG

## 2019-10-28 DIAGNOSIS — I50.22 CHRONIC SYSTOLIC CONGESTIVE HEART FAILURE (HCC): Chronic | ICD-10-CM

## 2019-10-28 DIAGNOSIS — I42.8 NONISCHEMIC CARDIOMYOPATHY (HCC): Primary | ICD-10-CM

## 2019-10-28 DIAGNOSIS — I10 ESSENTIAL HYPERTENSION: Chronic | ICD-10-CM

## 2019-10-28 PROCEDURE — 99213 OFFICE O/P EST LOW 20 MIN: CPT | Performed by: NURSE PRACTITIONER

## 2019-10-28 ASSESSMENT — ENCOUNTER SYMPTOMS
COUGH: 0
GASTROINTESTINAL NEGATIVE: 1
WHEEZING: 0
SHORTNESS OF BREATH: 1

## 2020-01-11 ENCOUNTER — HOSPITAL ENCOUNTER (OUTPATIENT)
Dept: CT IMAGING | Age: 70
Discharge: HOME | End: 2020-01-11
Payer: COMMERCIAL

## 2020-01-11 PROCEDURE — 71250 CT THORAX DX C-: CPT

## 2020-04-09 RX ORDER — SACUBITRIL AND VALSARTAN 97; 103 MG/1; MG/1
1 TABLET, FILM COATED ORAL 2 TIMES DAILY
Qty: 180 TABLET | Refills: 3 | Status: SHIPPED | OUTPATIENT
Start: 2020-04-09 | End: 2020-04-09

## 2020-04-09 RX ORDER — SACUBITRIL AND VALSARTAN 97; 103 MG/1; MG/1
TABLET, FILM COATED ORAL
Qty: 180 TABLET | Refills: 2 | Status: ON HOLD | OUTPATIENT
Start: 2020-04-09 | End: 2021-05-30 | Stop reason: SDUPTHER

## 2020-11-10 ENCOUNTER — HOSPITAL ENCOUNTER (OUTPATIENT)
Dept: CT IMAGING | Age: 70
Discharge: HOME | End: 2020-11-10
Payer: COMMERCIAL

## 2020-11-10 ENCOUNTER — HOSPITAL ENCOUNTER (OUTPATIENT)
Dept: ULTRASOUND IMAGING | Age: 70
Discharge: HOME | End: 2020-11-10
Payer: COMMERCIAL

## 2020-11-10 LAB
ANION GAP SERPL CALCULATED.3IONS-SCNC: 7 MMOL/L (ref 3–16)
BUN BLDV-MCNC: 13 MG/DL (ref 7–20)
CALCIUM SERPL-MCNC: 8.9 MG/DL (ref 8.3–10.6)
CHLORIDE BLD-SCNC: 97 MMOL/L (ref 99–110)
CO2: 27 MMOL/L (ref 21–32)
CREAT SERPL-MCNC: 1 MG/DL (ref 0.8–1.3)
GFR AFRICAN AMERICAN: >60
GFR NON-AFRICAN AMERICAN: >60
GLUCOSE BLD-MCNC: 123 MG/DL (ref 70–99)
POTASSIUM SERPL-SCNC: 4.2 MMOL/L (ref 3.5–5.1)
SODIUM BLD-SCNC: 131 MMOL/L (ref 136–145)

## 2020-11-10 PROCEDURE — 80048 BASIC METABOLIC PNL TOTAL CA: CPT

## 2020-11-10 PROCEDURE — 71260 CT THORAX DX C+: CPT

## 2020-11-10 PROCEDURE — 76705 ECHO EXAM OF ABDOMEN: CPT

## 2020-11-10 PROCEDURE — 36415 COLL VENOUS BLD VENIPUNCTURE: CPT

## 2020-11-10 PROCEDURE — 6360000004 HC RX CONTRAST MEDICATION: Performed by: INTERNAL MEDICINE

## 2020-11-10 RX ADMIN — IOPAMIDOL 75 ML: 755 INJECTION, SOLUTION INTRAVENOUS at 17:16

## 2021-01-14 ENCOUNTER — OFFICE VISIT (OUTPATIENT)
Dept: SURGERY | Age: 71
End: 2021-01-14
Payer: COMMERCIAL

## 2021-01-14 VITALS
BODY MASS INDEX: 21.06 KG/M2 | DIASTOLIC BLOOD PRESSURE: 72 MMHG | TEMPERATURE: 96.6 F | SYSTOLIC BLOOD PRESSURE: 123 MMHG | WEIGHT: 151 LBS

## 2021-01-14 DIAGNOSIS — K80.20 SYMPTOMATIC CHOLELITHIASIS: Primary | ICD-10-CM

## 2021-01-14 PROCEDURE — 99203 OFFICE O/P NEW LOW 30 MIN: CPT | Performed by: SURGERY

## 2021-01-14 ASSESSMENT — ENCOUNTER SYMPTOMS
ALLERGIC/IMMUNOLOGIC NEGATIVE: 1
EYES NEGATIVE: 1
RESPIRATORY NEGATIVE: 1
GASTROINTESTINAL NEGATIVE: 1

## 2021-01-14 NOTE — PROGRESS NOTES
Constitutional: Negative. HENT: Negative. Eyes: Negative. Respiratory: Negative. Cardiovascular: Negative. Gastrointestinal: Negative. Endocrine: Negative. Genitourinary: Negative. Musculoskeletal: Negative. Skin: Negative. Allergic/Immunologic: Negative. Neurological: Negative. Hematological: Negative. Psychiatric/Behavioral: Negative.         PHYSICAL EXAM:  VITALS:  Temp 96.6 °F (35.9 °C)   Wt 151 lb (68.5 kg)   BMI 21.06 kg/m²     CONSTITUTIONAL:  alert, no apparent distress and normal weight  EYES:  sclera clear  ENT:  normocepalic, without obvious abnormality  NECK:  supple, symmetrical, trachea midline  LUNGS:  clear to auscultation  CARDIOVASCULAR:  regular rate and rhythm  ABDOMEN:  no scars, normal bowel sounds, soft, non-distended, non-tender, voluntary guarding absent, no masses palpated, no hepatosplenomegally and hernia absent  MUSCULOSKELETAL:  0+ pitting edema lower extremities  NEUROLOGIC:  Mental Status Exam:  Level of Alertness:   awake  Orientation:   person, place, time  SKIN:  no bruising or bleeding, normal skin color, texture, turgor and no redness, warmth, or swelling    DATA:      EXAMINATION:   RIGHT UPPER QUADRANT ULTRASOUND       11/10/2020 5:45 pm       COMPARISON:   Chest CT 11/10/2020       HISTORY:   ORDERING SYSTEM PROVIDED HISTORY: Abdominal pain, right upper quadrant   TECHNOLOGIST PROVIDED HISTORY:       Acuity: Acute   Type of Exam: Initial   Additional signs and symptoms: na   Relevant Medical/Surgical History: na       FINDINGS:   LIVER:  The liver demonstrates normal echogenicity without evidence of   intrahepatic biliary ductal dilatation.       BILIARY SYSTEM: Cuco Aldairts is a small mobile gallstone within the gallbladder   neck, without evidence of wall thickening or pericholecystic fluid.  There   was a negative sonographic Urena's sign.       Common bile duct is within normal limits measuring 6.0 mm.       RIGHT KIDNEY: The right kidney is normal in size and echogenicity.  The right   kidney measures 9.2 x 5.2 x 5.2 cm.  There is mild nonspecific prominence in   the region of the right renal pelvis, which appears isoechoic to the right   renal cortex.  These could represent a mildly dilated right extrarenal pelvis   or mild complex right hydronephrosis.  A focal renal pelvic mass cannot be   excluded.       PANCREAS:  Visualized portions of the pancreas are unremarkable.       OTHER: No evidence of right upper quadrant ascites.           Impression   1. Cholelithiasis, without sonographic evidence of cholecystitis. 2. Mild nonspecific prominence of the right renal pelvis, which could   represent a mildly dilated extrarenal pelvis, mildly complicated   hydronephrosis, or less likely a mass.  Consider further characterization   with a follow-up abdominal CT with IV contrast.   3. Unremarkable sonographic appearance of the liver, common bile duct, and   pancreas.                 IMPRESSION/RECOMMENDATIONS:    Symptomatic cholelithiasis    The patient has symptomatic gallbladder disease for which I have recommended a laparoscopic cholecystectomy with cholangiogram.    The plan for surgery, risks, benefits, and alternatives have been reviewed with the patient. Handouts are reviewed and given to him today. Schedule as outpt at [facility].       Edith Baeza

## 2021-02-09 ENCOUNTER — OFFICE VISIT (OUTPATIENT)
Dept: SURGERY | Age: 71
End: 2021-02-09
Payer: COMMERCIAL

## 2021-02-09 VITALS
WEIGHT: 150 LBS | TEMPERATURE: 96.7 F | SYSTOLIC BLOOD PRESSURE: 110 MMHG | DIASTOLIC BLOOD PRESSURE: 68 MMHG | BODY MASS INDEX: 20.92 KG/M2

## 2021-02-09 DIAGNOSIS — K80.20 SYMPTOMATIC CHOLELITHIASIS: Primary | ICD-10-CM

## 2021-02-09 PROCEDURE — 99212 OFFICE O/P EST SF 10 MIN: CPT | Performed by: SURGERY

## 2021-02-09 NOTE — PROGRESS NOTES
[facility] GENERAL AND LAPAROSCOPIC SURGERY          PATIENT NAME: Raquel Gayle     TODAY'S DATE: 2/9/2021    SUBJECTIVE:  CC gallbladder issues  Pt with bloating, and feels worse after eating. Was considering Gb surgery. Sx's are postprandial, no emesis or severe pain at this time.      OBJECTIVE:  VITALS:  Temp 96.7 °F (35.9 °C)   Wt 150 lb (68 kg)   BMI 20.92 kg/m²     CONSTITUTIONAL:  awake and alert  LUNGS:  Some cough, clear  HEART: RRR  ABDOMEN:  normal bowel sounds, soft, non-distended, non-tender     Data:    Radiology Review:  GS in GB neck      ASSESSMENT AND PLAN:  Symptomatic cholelithiasis  Treatment options R/B/A reviewed in detail today  At this point pt has decided to go ahead with scheduling shirin blankenship  Will do as outpt at [facility]    Philip Guthrieing

## 2021-02-16 ENCOUNTER — TELEPHONE (OUTPATIENT)
Dept: SURGERY | Age: 71
End: 2021-02-16

## 2021-03-09 ENCOUNTER — OFFICE VISIT (OUTPATIENT)
Dept: PULMONOLOGY | Age: 71
End: 2021-03-09
Payer: COMMERCIAL

## 2021-03-09 VITALS — OXYGEN SATURATION: 98 % | HEART RATE: 78 BPM

## 2021-03-09 DIAGNOSIS — R91.1 PULMONARY NODULE: ICD-10-CM

## 2021-03-09 DIAGNOSIS — J44.9 COPD, VERY SEVERE (HCC): Primary | ICD-10-CM

## 2021-03-09 DIAGNOSIS — J43.2 CENTRILOBULAR EMPHYSEMA (HCC): ICD-10-CM

## 2021-03-09 DIAGNOSIS — I42.8 NONISCHEMIC CARDIOMYOPATHY (HCC): Chronic | ICD-10-CM

## 2021-03-09 PROCEDURE — 99214 OFFICE O/P EST MOD 30 MIN: CPT | Performed by: INTERNAL MEDICINE

## 2021-03-09 RX ORDER — ALBUTEROL SULFATE 1.25 MG/3ML
1 SOLUTION RESPIRATORY (INHALATION) EVERY 6 HOURS PRN
Qty: 360 ML | Refills: 3 | Status: SHIPPED | OUTPATIENT
Start: 2021-03-09 | End: 2021-05-21

## 2021-03-09 RX ORDER — FLUTICASONE FUROATE, UMECLIDINIUM BROMIDE AND VILANTEROL TRIFENATATE 100; 62.5; 25 UG/1; UG/1; UG/1
1 POWDER RESPIRATORY (INHALATION) DAILY
Qty: 3 EACH | Refills: 3 | Status: SHIPPED | OUTPATIENT
Start: 2021-03-09 | End: 2021-06-10 | Stop reason: SDUPTHER

## 2021-03-09 NOTE — PROGRESS NOTES
[facility]  Progress Note  MD Gio Polanco   YOB: 1950    Date of Visit:  3/9/2021    Assessment/Plan:  1. SOB (shortness of breath) and Cough  Worse  Out of Anoro  Now a nonsmoker    2. COPD, severe (HCC)/Emphysema  PFT 5/17:  Spirometry reveals decreased FVC at 3.4 liters which is 72% predicted. FEV1  is decreased at 1.17 liters which is 33% predicted. FEV1/FVC ratio is reduced  at 35%. Lung volumes reveal increased total lung capacity at 129% predicted. Residual volume is increased at 212% predicted. Diffusion capacity is normal.     IMPRESSION:  Very severe obstructive lung disease with hyperinflation. Anoro ==> Out of this    Start Trelegy daily  Albuterol HHN followed by Acapella (new script)  Mucinex qhs.    3. Nonischemic cardiomyopathy (HCC)  Stable  Follows with Cardiology    4. Tobacco use  Complete nonsmoker! 5. Pulmonary nodule  CT Chest 11/20:        FINDINGS:   Mediastinum: The thyroid is unremarkable. Cecelia Teo is no pathologic   lymphadenopathy within the chest or mediastinum.  The intrathoracic aorta is   unremarkable, without evidence of aneurysm or dissection.  The  pulmonary   arteries are patent, without evidence of filling defect.  No pericardial   abnormality is identified.       Lungs/pleura: There are mild retained mucus secretions within the midthoracic   trachea.  The central airways are otherwise patent. Cecelia Teo is a background of   moderate emphysema. Cecelia Teo is chronic biapical pleural-parenchymal scarring. There is a stable focus of pleural-parenchymal scarring within the anterior   right upper lobe.  No focus of acute airspace consolidation is identified.    There is no evidence of a pneumothorax or pleural effusion.  The previously   described 5 mm nodule within the subpleural left upper lobe, image 33 of   series 2, is stable and unchanged from 01/11/2020.  The aforementioned small   6 mm cystic nodule within the medial left lower lobe is no longer evident and   has resolved.  Scattered calcified granulomata are noted.  No new suspicious   pulmonary nodule or mass is evident.       Upper Abdomen: The adrenal glands are unremarkable bilaterally.  There are   small bilateral renal cysts. He Hoop is a small sliding-type hiatal hernia. The remainder of the upper abdomen is unremarkable.       Soft Tissues/Bones: There is mild degenerative change throughout the   thoracolumbar spine.  No osteolytic or osteoblastic lesion is seen.           Impression   1. No acute intrapulmonary findings. 2. Stable chronic emphysema. 3. Stable 5 mm nodule within the left upper lobe.  The previously identified   6 mm cavitary nodule within the left lower lobe has resolved. Corena Khoa the   patient's high risk status, further follow-up of the 5 mm nodule is as   advised below.  Namely, recommend chest CT follow-up in 2 months, to document   12 month stability of this nodule.       RECOMMENDATIONS:   Fleischner Society guidelines for follow-up and management of incidentally   detected pulmonary nodules:         Recommend repeat CT chest around 11/21  Recommend COVID vaccine    6. Pre-op  He needs gallbladder surgery which is planned for next week  He should tolerate this from the pulmonary point of view    FOLLOW UP: 6 months      Chief Complaint   Patient presents with    Shortness of Breath     last seen 2018 Use to use Anoro ? if this is still good or is there something better       HPI  The patient presents with a chief complaint of shortness of breath with exertion. He is SOB walking up hills. He is not SOB dressing or showering. He ran out of Anoro and his breathing is worse. He was last seen here in 12/18. He is not smoking.        Review of Systems  No Chest pain, Nausea or vomiting reported      Physical Exam:  Well developed, well nourished  Alert and oriented  Sclera is clear  No cervical adenopathy  No JVD.  Chest examination is clear. Cardiac examination reveals regular rate and rhythm without murmur, gallop or rub. The abdomen is soft, nontender and nondistended. There is no clubbing, cyanosis or edema of the extremities. There is no obvious skin rash. No focal neuro deficicts  Normal mood and affect    No Known Allergies  Prior to Visit Medications    Medication Sig Taking? Authorizing Provider   ENTRESTO  MG per tablet TAKE ONE TABLET BY MOUTH TWICE A DAY  Greg Smith MD   carvedilol (COREG) 6.25 MG tablet Take 1 tablet by mouth 2 times daily (with meals)  CHELSY Shelley - CNP   umeclidinium-vilanterol (ANORO ELLIPTA) 62.5-25 MCG/INH AEPB inhaler Inhale 1 puff into the lungs daily  Irina Gomez MD       Vitals:    21 1147   Pulse: 78   SpO2: 98%     There is no height or weight on file to calculate BMI.      Wt Readings from Last 3 Encounters:   21 150 lb (68 kg)   21 151 lb (68.5 kg)   10/28/19 151 lb 1.6 oz (68.5 kg)     BP Readings from Last 3 Encounters:   21 110/68   21 123/72   10/28/19 100/70        Social History     Tobacco Use   Smoking Status Former Smoker    Packs/day: 1.00    Years: 30.00    Pack years: 30.00    Types: Cigarettes    Quit date: 2016    Years since quittin.5   Smokeless Tobacco Former User    Quit date: 2016

## 2021-03-10 ENCOUNTER — TELEPHONE (OUTPATIENT)
Dept: PULMONOLOGY | Age: 71
End: 2021-03-10

## 2021-03-10 NOTE — TELEPHONE ENCOUNTER
Pt called and said he is having a hard time finding the acapella. Please call him to discuss.  [phone]

## 2021-03-10 NOTE — TELEPHONE ENCOUNTER
Pt went to [facility] to obtain an acapella and was told they had no idea what he was talking about. Rupert Jointer with [facility] and she will make sure pt gets one.  She is going to contact him as well

## 2021-03-11 ENCOUNTER — OFFICE VISIT (OUTPATIENT)
Dept: PRIMARY CARE CLINIC | Age: 71
End: 2021-03-11
Payer: COMMERCIAL

## 2021-03-11 DIAGNOSIS — Z01.818 PREOP TESTING: Primary | ICD-10-CM

## 2021-03-11 PROCEDURE — 99211 OFF/OP EST MAY X REQ PHY/QHP: CPT | Performed by: NURSE PRACTITIONER

## 2021-03-12 ENCOUNTER — ANESTHESIA EVENT (OUTPATIENT)
Dept: OPERATING ROOM | Age: 71
End: 2021-03-12
Payer: COMMERCIAL

## 2021-03-12 LAB — SARS-COV-2: NOT DETECTED

## 2021-03-12 NOTE — PROGRESS NOTES
Rocky Elizalde for his upcoming procedure on 03/17/2021. After hearing that he was on Entresto and has not seen a cardiologist (Dr Romayne Dapper) for over a year, I notified Dr Uriel West of this and his COPD. We discovered his ECHO was also in 2018 and he has cardiomyopathy and has not followed up with his cardiologist for over a year. He has followed up with his Pulmonologist and was given clearance for this upcoming procedure from the Pulmonologist on 03/09/2021. Dr Uriel West informed me that he will just need to see his cardiologist before this procedure on 03/17/2021. After talking with Dr Uriel West, I called Dr Kenny Garza office to let them know. No one answered the first time so I tired back five minutes later and someone answered and told me the MA was out, but she was able to leave a message for her for Monday morning. A message was left for the MA on this matter and we will follow up on Monday 03/15/2021.

## 2021-03-12 NOTE — PROGRESS NOTES
Name_______________________________________Printed:____________________  Date and time of surgery__03/17/2021  0730______________________Arrival Time:______0600__________   1. The instructions given regarding when and if a patient needs to stop oral intake prior to surgery varies. Follow the specific instructions you were given                  __x_Nothing to eat or to drink after Midnight the night before.                   ____Carbo loading or ERAS instructions will be given to select patients-if you have been given those instructions -please do the following                           The evening before your surgery after dinner before midnight drink 40 ounces of gatorade. If you are diabetic use sugar free. The morning of surgery drink 40 ounces of water. This needs to be finished 3 hours prior to your surgery start time. 2. Take the following pills with a small sip of water on the morning of surgery_____Carvedilol, Entresto, albuterol, trelegy______________________________________________                  Do not take blood pressure medications ending in pril or sartan the nkechi prior to surgery or the morning of surgery_   3. Aspirin, Ibuprofen, Advil, Naproxen, Vitamin E and other Anti-inflammatory products and supplements should be stopped for 5 -7days before surgery or as directed by your physician. 4. Check with your Doctor regarding stopping Plavix, Coumadin,Eliquis, Lovenox,Effient,Pradaxa,Xarelto, Fragmin or other blood thinners and follow their instructions. 5. Do not smoke, and do not drink any alcoholic beverages 24 hours prior to surgery. This includes NA Beer. Refrain from the usage of any recreational drugs. 6. You may brush your teeth and gargle the morning of surgery. DO NOT SWALLOW WATER   7. You MUST make arrangements for a responsible adult to stay on site while you are here and take you home after your surgery. You will not be allowed to leave alone or drive yourself home.   It is 19.  Visit our web site for additional information:  [email or web address]              20.During flu season no children under the age of 15 are permitted in the hospital for the safety of all patients. 21. If you take a long acting insulin in the evening only  take half of your usual  dose the night  before your procedure              22. If you use a c-pap please bring DOS if staying overnight,             23.For your convenience [facility] has a pharmacy on site to fill your prescriptions. 24. If you use oxygen and have a portable tank please bring it  with you the DOS             25. Bring a complete list of all your medications with name and dose include any supplements. 26. Other__________________________________________   *Please call pre admission testing if you any further questions   Ellen Bryant   [address] [place] [address]. Marjorie Nichols  [phone]   [facility] [address] LALY SMALL   [phone]           COVID TESTING    _x_ Done-  03/11/2021 Where _mff____  __ Scheduled ___ Where ___   __ Other __________      VISITOR POLICY(subject to change)    There is a one visitor policy at [facility] for all surgeries and endoscopies. Whether the visitor can stay or will be asked to wait in the car will depend on the current policy and if social distancing can be maintained. The policy is subject to change at any time. Please make sure the visitor has a cell phone that is on,charged and able to accept calls, as this may be the way that the staff communicates with them. Pain management is NO VISITOR policyThe patients ride is expected to remain in the car with a cell phone for communication. If the ride is leaving the hospital grounds please make sure they are back in time for pickup. Have the patient inform the staff on arrival what their rides plans are while the patient is in the facility. At the MAIN there is one visitor allowed. Please note that the visitor policy is subject to change. All above information reviewed with patient in person or by phone. Patient verbalizes understanding. All questions and concerns addressed.                                                                                                  Patient/Rep____patient________________                                                                                                                                    PRE OP INSTRUCTIONS

## 2021-03-15 ENCOUNTER — OFFICE VISIT (OUTPATIENT)
Dept: CARDIOLOGY CLINIC | Age: 71
End: 2021-03-15
Payer: COMMERCIAL

## 2021-03-15 ENCOUNTER — TELEPHONE (OUTPATIENT)
Dept: SURGERY | Age: 71
End: 2021-03-15

## 2021-03-15 ENCOUNTER — TELEPHONE (OUTPATIENT)
Dept: PULMONOLOGY | Age: 71
End: 2021-03-15

## 2021-03-15 ENCOUNTER — TELEPHONE (OUTPATIENT)
Dept: CARDIOLOGY CLINIC | Age: 71
End: 2021-03-15

## 2021-03-15 VITALS
BODY MASS INDEX: 20.61 KG/M2 | HEIGHT: 71 IN | DIASTOLIC BLOOD PRESSURE: 70 MMHG | WEIGHT: 147.2 LBS | SYSTOLIC BLOOD PRESSURE: 110 MMHG | HEART RATE: 68 BPM

## 2021-03-15 DIAGNOSIS — I50.22 CHRONIC SYSTOLIC CONGESTIVE HEART FAILURE (HCC): Primary | Chronic | ICD-10-CM

## 2021-03-15 DIAGNOSIS — I10 ESSENTIAL HYPERTENSION: Chronic | ICD-10-CM

## 2021-03-15 DIAGNOSIS — I42.8 NONISCHEMIC CARDIOMYOPATHY (HCC): Chronic | ICD-10-CM

## 2021-03-15 PROCEDURE — 93000 ELECTROCARDIOGRAM COMPLETE: CPT | Performed by: NURSE PRACTITIONER

## 2021-03-15 PROCEDURE — 99214 OFFICE O/P EST MOD 30 MIN: CPT | Performed by: NURSE PRACTITIONER

## 2021-03-15 ASSESSMENT — ENCOUNTER SYMPTOMS
WHEEZING: 0
GASTROINTESTINAL NEGATIVE: 1
COUGH: 0
SHORTNESS OF BREATH: 1

## 2021-03-15 NOTE — TELEPHONE ENCOUNTER
Pt called back and was informed that he needs cardiac clearance. I will call Dr. Uzma Martínez office.

## 2021-03-15 NOTE — PROGRESS NOTES
([facility] [address] and COPD (chronic obstructive pulmonary disease) ([facility] [address] Surgical History:   has a past surgical history that includes fracture surgery; Tonsillectomy; and eye surgery. Social History:   reports that he quit smoking about 4 years ago. His smoking use included cigarettes. He has a 30.00 pack-year smoking history. He quit smokeless tobacco use about 4 years ago. He reports current alcohol use of about 1.0 - 3.0 standard drinks of alcohol per week. He reports that he does not use drugs. Family History:   Family History   Problem Relation Age of Onset    Heart Disease Mother     No Known Problems Father        HomeMedications:  Prior to Admission medications    Medication Sig Start Date End Date Taking? Authorizing Provider   Multiple Vitamin (MULTIVITAMIN ADULT PO) Take by mouth    Historical Provider, MD   fluticasone-umeclidin-vilant (TRELEGY ELLIPTA) 100-62.5-25 MCG/INH AEPB Inhale 1 puff into the lungs daily 3/9/21   Cheyenne Waldrop MD   albuterol (ACCUNEB) 1.25 MG/3ML nebulizer solution Inhale 3 mLs into the lungs every 6 hours as needed for Wheezing 3/9/21   Cheyenne Waldrop MD   ENTRESTO  MG per tablet TAKE ONE TABLET BY MOUTH TWICE A DAY 4/9/20   Mo Benitez MD   carvedilol (COREG) 6.25 MG tablet Take 1 tablet by mouth 2 times daily (with meals) 5/8/19   Asher SawyerrCHELSY - CNP        Allergies:  Patient has no known allergies. ROS:   Review of Systems   Constitutional: Negative. Respiratory: Positive for shortness of breath. Negative for cough and wheezing. Cardiovascular: Negative. Gastrointestinal: Negative. Genitourinary: Negative. Musculoskeletal: Negative. Skin: Negative. Neurological: Negative. Hematological: Negative. Psychiatric/Behavioral: Negative.         Physical Examination:    Vitals:    03/15/21 1305   BP: 110/70   Site: Left Upper Arm   Position: Sitting   Cuff Size: Medium Adult   Pulse: 68   Weight: 147 lb 3.2 oz (66.8 kg) Height: 5' 11\" (1.803 m)           Physical Exam  Constitutional:       Appearance: He is well-developed. HENT:      Head: Normocephalic and atraumatic. Eyes:      Pupils: Pupils are equal, round, and reactive to light. Neck:      Musculoskeletal: Normal range of motion. Cardiovascular:      Rate and Rhythm: Normal rate and regular rhythm. Heart sounds: Normal heart sounds. Pulmonary:      Effort: Pulmonary effort is normal.      Breath sounds: Normal breath sounds. Abdominal:      Palpations: Abdomen is soft. Musculoskeletal: Normal range of motion. Skin:     General: Skin is warm and dry. Neurological:      Mental Status: He is alert and oriented to person, place, and time. Psychiatric:         Behavior: Behavior normal.         Thought Content:  Thought content normal.         Judgment: Judgment normal.         Lab Data:    CBC:   Lab Results   Component Value Date    WBC 9.5 12/05/2017    WBC 6.9 07/25/2017    WBC 6.3 10/13/2016    RBC 4.76 12/05/2017    RBC 4.84 07/25/2017    RBC 4.68 09/05/2016    HGB 15.0 12/05/2017    HGB 15.4 07/25/2017    HGB 14.6 09/05/2016    HCT 44.7 12/05/2017    HCT 45.9 07/25/2017    HCT 43.5 09/05/2016    MCV 94.0 12/05/2017    MCV 94.9 07/25/2017    MCV 92.8 09/05/2016    RDW 13.4 12/05/2017    RDW 13.3 07/25/2017    RDW 13.1 09/05/2016     12/05/2017     07/25/2017     10/13/2016     BMP:  Lab Results   Component Value Date     11/10/2020     06/06/2019     12/05/2017    K 4.2 11/10/2020    K 4.3 06/06/2019    K 5.1 12/05/2017    CL 97 11/10/2020    CL 94 06/06/2019    CL 98 12/05/2017    CO2 27 11/10/2020    CO2 25 06/06/2019    CO2 26 12/05/2017    PHOS 3.7 09/19/2016    BUN 13 11/10/2020    BUN 11 06/06/2019    BUN 16 12/05/2017    CREATININE 1.0 11/10/2020    CREATININE 0.9 06/06/2019    CREATININE 0.8 12/05/2017     BNP:   Lab Results   Component Value Date    PROBNP 351 12/05/2017    PROBNP 425 07/25/2017 PROBNP 587 06/09/2017     Iron Studies:  No components found for: FE,  TIBC,  FERRITIN  Iron Deficiency Anemia:  No    IV Iron Therapy:  No  2017 ACC/AHA HF Guidelines:   intravenous iron replacement in patients with New York Heart Association (NYHA) class II and III HF and iron deficiency (ferritin <100 ng/ml or 100-300 ng/ml if transferrin saturation <20%), to improve functional status and QoL. Assessment/Plan:    1. Nonischemic cardiomyopathy (HCC) - stable, on coreg and entresto, ok for surgery, low/mod risk, repeat echo before next OV   2. Essential hypertension - controlled   3. Chronic systolic congestive heart failure ([address] - compensated         Instructions:   1. Medications: continue current medications  2. Labs: due  3. Echo due   4. Follow up: 6 months        Amanda: [phone]      I appreciate the opportunity of cooperating in the care of this individual.    Nasra Rosa CNP, 3/15/2021,10:58 AM    QUALITY MEASURES  1. Tobacco Cessation Counseling: NA  2. Retake of BP if >140/90:   NA  3. Documentation to PCP/referring for new patient:  Sent to PCP at close of office visit  4. CAD patient on anti-platelet: NA  5. CAD patient on STATIN therapy:  NA  6. Patient with CHF and aFib on anticoagulation:  NA   7. Patient Education:Yes   8. BB for LVSD :  Yes   9. ACE/ARB for LVSD:  Yes   10.  Left Ventricular Ejection Fraction (LVEF) Assessment:  Yes

## 2021-03-15 NOTE — TELEPHONE ENCOUNTER
CARDIAC CLEARANCE     What type of procedure are you having? Gall bladder  Which physician is performing your procedure? Dr Yuly Hung    When is your procedure scheduled for?  3.17.21  Where are you having this procedure? [facility]  Are you taking Blood Thinners? no    If so what? (Name/dose/frequesncy)     Does the surgeon want you to stop your blood thinner? If so for how long?     Phone Number and Contact Name for Physicians office: [phone]    Fax number to send information:  [phone]

## 2021-03-15 NOTE — TELEPHONE ENCOUNTER
Order faxed for Acapella to [facility] and informed Mrs Radah Hutchison that it does not matter which one they use. Pt's surgery is this Wednesday and told Mrs Radha Hutchison that pt will probably not get his Acapella prior to this. Asked if he is using the nebulizer and she stated no. Informed her that if pt would use the nebulizer in the mornings first thing and then foloow it with the Acapella it will help him with all the congestion he has. PAT already went over his medications so instructed Mrs Radha Hutchison to call surgeon's office tomorrow and ask if it would be beneficial for him to use nebulizer morning of his surgery. She stated she would call and ask them tomorrow.

## 2021-03-15 NOTE — TELEPHONE ENCOUNTER
Rec'd message this morning that pt needs cardiac clearance 2 days before pt's scheduled surgery 3/17/21. Called pt and left a message to call me back ASAP.

## 2021-03-15 NOTE — LETTER
[facility] CARDIOLOGY[address]  Dept: [phone]  Dept Fax: [phone]      March 15, 2021    Patient:Ricardo Ortiz  :   DOS: 3/15/2021    To Dr. Wang Flores:    John Alexis has been evaluated for cardiac clearance. Based on my exam today, he is considered at a low-moderate cardiac risk for surgery. Please let my office know if you have any questions or concerns.               CHELSY Ramirez CNP               A [facility] healthcare ministry serving [place] and [place]

## 2021-03-15 NOTE — TELEPHONE ENCOUNTER
Mr. Lita Macias last saw Sarah Mejia 2018 and Beth Gatica 10/2019. Per Loco Haley at Dr. Ron Currie office> The choly is under general anesthesia salted for one hour as an outpatient procedure. Beth Gatica is able to see him this afternoon. Dr. Austin Burkett has been updated. Patient has been scheduled for [address] was updated via  registrar at Dr. Ron Currie office. No need to call back if he is able to proceed with surgery from cardiac standpoint.

## 2021-03-15 NOTE — TELEPHONE ENCOUNTER
Patient's wife called to say they are having a hard time getting the Acapella device. She would like the order sent to [address] [place] [address] at F [phone]. She also said there was a blue kind and a green kind and wondered which one they should get. Wife also said patient  Is having gall bladder surgery and is scheduled for the morning which is when he is most congested. She wanted to know if he should use the Acapella before surgery to help break that up. Please call wife at [phone].

## 2021-03-17 ENCOUNTER — HOSPITAL ENCOUNTER (OUTPATIENT)
Age: 71
Setting detail: OUTPATIENT SURGERY
Discharge: HOME | End: 2021-03-17
Attending: SURGERY | Admitting: SURGERY
Payer: COMMERCIAL

## 2021-03-17 ENCOUNTER — ANESTHESIA (OUTPATIENT)
Dept: OPERATING ROOM | Age: 71
End: 2021-03-17
Payer: COMMERCIAL

## 2021-03-17 ENCOUNTER — APPOINTMENT (OUTPATIENT)
Dept: GENERAL RADIOLOGY | Age: 71
End: 2021-03-17
Attending: SURGERY
Payer: COMMERCIAL

## 2021-03-17 VITALS
RESPIRATION RATE: 15 BRPM | DIASTOLIC BLOOD PRESSURE: 88 MMHG | SYSTOLIC BLOOD PRESSURE: 150 MMHG | TEMPERATURE: 95.9 F | OXYGEN SATURATION: 100 %

## 2021-03-17 VITALS
DIASTOLIC BLOOD PRESSURE: 87 MMHG | OXYGEN SATURATION: 100 % | TEMPERATURE: 96.4 F | HEART RATE: 81 BPM | HEIGHT: 71 IN | SYSTOLIC BLOOD PRESSURE: 136 MMHG | RESPIRATION RATE: 16 BRPM | BODY MASS INDEX: 20.52 KG/M2 | WEIGHT: 146.6 LBS

## 2021-03-17 DIAGNOSIS — K80.20 SYMPTOMATIC CHOLELITHIASIS: Primary | ICD-10-CM

## 2021-03-17 LAB
ANION GAP SERPL CALCULATED.3IONS-SCNC: 7 MMOL/L (ref 3–16)
BUN BLDV-MCNC: 12 MG/DL (ref 7–20)
CALCIUM SERPL-MCNC: 9.5 MG/DL (ref 8.3–10.6)
CHLORIDE BLD-SCNC: 99 MMOL/L (ref 99–110)
CO2: 30 MMOL/L (ref 21–32)
CREAT SERPL-MCNC: 0.9 MG/DL (ref 0.8–1.3)
GFR AFRICAN AMERICAN: >60
GFR NON-AFRICAN AMERICAN: >60
GLUCOSE BLD-MCNC: 107 MG/DL (ref 70–99)
HCT VFR BLD CALC: 45 % (ref 40.5–52.5)
HEMOGLOBIN: 15.1 G/DL (ref 13.5–17.5)
MCH RBC QN AUTO: 31.7 PG (ref 26–34)
MCHC RBC AUTO-ENTMCNC: 33.5 G/DL (ref 31–36)
MCV RBC AUTO: 94.7 FL (ref 80–100)
PDW BLD-RTO: 13.2 % (ref 12.4–15.4)
PLATELET # BLD: 289 K/UL (ref 135–450)
PMV BLD AUTO: 7.4 FL (ref 5–10.5)
POTASSIUM SERPL-SCNC: 4.5 MMOL/L (ref 3.5–5.1)
RBC # BLD: 4.75 M/UL (ref 4.2–5.9)
SODIUM BLD-SCNC: 136 MMOL/L (ref 136–145)
WBC # BLD: 8 K/UL (ref 4–11)

## 2021-03-17 PROCEDURE — 2580000003 HC RX 258: Performed by: ANESTHESIOLOGY

## 2021-03-17 PROCEDURE — 88304 TISSUE EXAM BY PATHOLOGIST: CPT

## 2021-03-17 PROCEDURE — 6360000002 HC RX W HCPCS: Performed by: ANESTHESIOLOGY

## 2021-03-17 PROCEDURE — 3700000000 HC ANESTHESIA ATTENDED CARE: Performed by: SURGERY

## 2021-03-17 PROCEDURE — 2500000003 HC RX 250 WO HCPCS: Performed by: NURSE ANESTHETIST, CERTIFIED REGISTERED

## 2021-03-17 PROCEDURE — 47563 LAPARO CHOLECYSTECTOMY/GRAPH: CPT | Performed by: SURGERY

## 2021-03-17 PROCEDURE — 36415 COLL VENOUS BLD VENIPUNCTURE: CPT

## 2021-03-17 PROCEDURE — 7100000000 HC PACU RECOVERY - FIRST 15 MIN: Performed by: SURGERY

## 2021-03-17 PROCEDURE — 3600000004 HC SURGERY LEVEL 4 BASE: Performed by: SURGERY

## 2021-03-17 PROCEDURE — C1894 INTRO/SHEATH, NON-LASER: HCPCS | Performed by: SURGERY

## 2021-03-17 PROCEDURE — 74300 X-RAY BILE DUCTS/PANCREAS: CPT

## 2021-03-17 PROCEDURE — 7100000001 HC PACU RECOVERY - ADDTL 15 MIN: Performed by: SURGERY

## 2021-03-17 PROCEDURE — 3700000001 HC ADD 15 MINUTES (ANESTHESIA): Performed by: SURGERY

## 2021-03-17 PROCEDURE — 85027 COMPLETE CBC AUTOMATED: CPT

## 2021-03-17 PROCEDURE — 2720000010 HC SURG SUPPLY STERILE: Performed by: SURGERY

## 2021-03-17 PROCEDURE — 6360000002 HC RX W HCPCS: Performed by: NURSE ANESTHETIST, CERTIFIED REGISTERED

## 2021-03-17 PROCEDURE — 80048 BASIC METABOLIC PNL TOTAL CA: CPT

## 2021-03-17 PROCEDURE — 6360000004 HC RX CONTRAST MEDICATION: Performed by: SURGERY

## 2021-03-17 PROCEDURE — 6360000002 HC RX W HCPCS: Performed by: SURGERY

## 2021-03-17 PROCEDURE — 7100000011 HC PHASE II RECOVERY - ADDTL 15 MIN: Performed by: SURGERY

## 2021-03-17 PROCEDURE — 2580000003 HC RX 258: Performed by: SURGERY

## 2021-03-17 PROCEDURE — 2709999900 HC NON-CHARGEABLE SUPPLY: Performed by: SURGERY

## 2021-03-17 PROCEDURE — 7100000010 HC PHASE II RECOVERY - FIRST 15 MIN: Performed by: SURGERY

## 2021-03-17 PROCEDURE — 3600000014 HC SURGERY LEVEL 4 ADDTL 15MIN: Performed by: SURGERY

## 2021-03-17 PROCEDURE — 2500000003 HC RX 250 WO HCPCS: Performed by: SURGERY

## 2021-03-17 PROCEDURE — 6370000000 HC RX 637 (ALT 250 FOR IP): Performed by: NURSE ANESTHETIST, CERTIFIED REGISTERED

## 2021-03-17 RX ORDER — HYDROMORPHONE HCL 110MG/55ML
0.25 PATIENT CONTROLLED ANALGESIA SYRINGE INTRAVENOUS EVERY 5 MIN PRN
Status: DISCONTINUED | OUTPATIENT
Start: 2021-03-17 | End: 2021-03-17 | Stop reason: HOSPADM

## 2021-03-17 RX ORDER — SUCCINYLCHOLINE CHLORIDE 20 MG/ML
INJECTION INTRAMUSCULAR; INTRAVENOUS PRN
Status: DISCONTINUED | OUTPATIENT
Start: 2021-03-17 | End: 2021-03-17 | Stop reason: SDUPTHER

## 2021-03-17 RX ORDER — FENTANYL CITRATE 50 UG/ML
INJECTION, SOLUTION INTRAMUSCULAR; INTRAVENOUS PRN
Status: DISCONTINUED | OUTPATIENT
Start: 2021-03-17 | End: 2021-03-17 | Stop reason: SDUPTHER

## 2021-03-17 RX ORDER — ONDANSETRON 2 MG/ML
4 INJECTION INTRAMUSCULAR; INTRAVENOUS
Status: DISCONTINUED | OUTPATIENT
Start: 2021-03-17 | End: 2021-03-17 | Stop reason: HOSPADM

## 2021-03-17 RX ORDER — ROCURONIUM BROMIDE 10 MG/ML
INJECTION, SOLUTION INTRAVENOUS PRN
Status: DISCONTINUED | OUTPATIENT
Start: 2021-03-17 | End: 2021-03-17 | Stop reason: SDUPTHER

## 2021-03-17 RX ORDER — ONDANSETRON 2 MG/ML
INJECTION INTRAMUSCULAR; INTRAVENOUS PRN
Status: DISCONTINUED | OUTPATIENT
Start: 2021-03-17 | End: 2021-03-17 | Stop reason: SDUPTHER

## 2021-03-17 RX ORDER — MAGNESIUM HYDROXIDE 1200 MG/15ML
LIQUID ORAL CONTINUOUS PRN
Status: COMPLETED | OUTPATIENT
Start: 2021-03-17 | End: 2021-03-17

## 2021-03-17 RX ORDER — DEXAMETHASONE SODIUM PHOSPHATE 4 MG/ML
INJECTION, SOLUTION INTRA-ARTICULAR; INTRALESIONAL; INTRAMUSCULAR; INTRAVENOUS; SOFT TISSUE PRN
Status: DISCONTINUED | OUTPATIENT
Start: 2021-03-17 | End: 2021-03-17 | Stop reason: SDUPTHER

## 2021-03-17 RX ORDER — GLYCOPYRROLATE 0.2 MG/ML
INJECTION INTRAMUSCULAR; INTRAVENOUS PRN
Status: DISCONTINUED | OUTPATIENT
Start: 2021-03-17 | End: 2021-03-17 | Stop reason: SDUPTHER

## 2021-03-17 RX ORDER — LIDOCAINE HYDROCHLORIDE 10 MG/ML
1 INJECTION, SOLUTION EPIDURAL; INFILTRATION; INTRACAUDAL; PERINEURAL
Status: DISCONTINUED | OUTPATIENT
Start: 2021-03-17 | End: 2021-03-17 | Stop reason: HOSPADM

## 2021-03-17 RX ORDER — ETOMIDATE 2 MG/ML
INJECTION INTRAVENOUS PRN
Status: DISCONTINUED | OUTPATIENT
Start: 2021-03-17 | End: 2021-03-17 | Stop reason: SDUPTHER

## 2021-03-17 RX ORDER — SODIUM CHLORIDE, SODIUM LACTATE, POTASSIUM CHLORIDE, CALCIUM CHLORIDE 600; 310; 30; 20 MG/100ML; MG/100ML; MG/100ML; MG/100ML
INJECTION, SOLUTION INTRAVENOUS CONTINUOUS PRN
Status: COMPLETED | OUTPATIENT
Start: 2021-03-17 | End: 2021-03-17

## 2021-03-17 RX ORDER — HYDROMORPHONE HCL 110MG/55ML
0.5 PATIENT CONTROLLED ANALGESIA SYRINGE INTRAVENOUS EVERY 5 MIN PRN
Status: DISCONTINUED | OUTPATIENT
Start: 2021-03-17 | End: 2021-03-17 | Stop reason: HOSPADM

## 2021-03-17 RX ORDER — HYDROCODONE BITARTRATE AND ACETAMINOPHEN 5; 325 MG/1; MG/1
1 TABLET ORAL EVERY 4 HOURS PRN
Qty: 25 TABLET | Refills: 0 | Status: SHIPPED | OUTPATIENT
Start: 2021-03-17 | End: 2021-03-24

## 2021-03-17 RX ORDER — HYDROCODONE BITARTRATE AND ACETAMINOPHEN 5; 325 MG/1; MG/1
1 TABLET ORAL
Status: DISCONTINUED | OUTPATIENT
Start: 2021-03-17 | End: 2021-03-17 | Stop reason: HOSPADM

## 2021-03-17 RX ORDER — SODIUM CHLORIDE 9 MG/ML
INJECTION, SOLUTION INTRAVENOUS CONTINUOUS
Status: DISCONTINUED | OUTPATIENT
Start: 2021-03-17 | End: 2021-03-17 | Stop reason: HOSPADM

## 2021-03-17 RX ORDER — EPHEDRINE SULFATE/0.9% NACL/PF 50 MG/5 ML
SYRINGE (ML) INTRAVENOUS PRN
Status: DISCONTINUED | OUTPATIENT
Start: 2021-03-17 | End: 2021-03-17 | Stop reason: SDUPTHER

## 2021-03-17 RX ORDER — BUPIVACAINE HYDROCHLORIDE 5 MG/ML
INJECTION, SOLUTION EPIDURAL; INTRACAUDAL
Status: COMPLETED | OUTPATIENT
Start: 2021-03-17 | End: 2021-03-17

## 2021-03-17 RX ORDER — LIDOCAINE HYDROCHLORIDE 20 MG/ML
INJECTION, SOLUTION INFILTRATION; PERINEURAL PRN
Status: DISCONTINUED | OUTPATIENT
Start: 2021-03-17 | End: 2021-03-17 | Stop reason: SDUPTHER

## 2021-03-17 RX ORDER — LIDOCAINE HYDROCHLORIDE 40 MG/ML
SOLUTION TOPICAL PRN
Status: DISCONTINUED | OUTPATIENT
Start: 2021-03-17 | End: 2021-03-17 | Stop reason: SDUPTHER

## 2021-03-17 RX ADMIN — PHENYLEPHRINE HYDROCHLORIDE 200 MCG: 10 INJECTION INTRAVENOUS at 08:05

## 2021-03-17 RX ADMIN — DEXAMETHASONE SODIUM PHOSPHATE 4 MG: 4 INJECTION, SOLUTION INTRAMUSCULAR; INTRAVENOUS at 07:45

## 2021-03-17 RX ADMIN — CEFAZOLIN SODIUM 2000 MG: 10 INJECTION, POWDER, FOR SOLUTION INTRAVENOUS at 07:28

## 2021-03-17 RX ADMIN — HYDROMORPHONE HYDROCHLORIDE 0.5 MG: 2 INJECTION, SOLUTION INTRAMUSCULAR; INTRAVENOUS; SUBCUTANEOUS at 08:34

## 2021-03-17 RX ADMIN — SUGAMMADEX 100 MG: 100 INJECTION, SOLUTION INTRAVENOUS at 08:15

## 2021-03-17 RX ADMIN — PHENYLEPHRINE HYDROCHLORIDE 100 MCG: 10 INJECTION INTRAVENOUS at 07:51

## 2021-03-17 RX ADMIN — PHENYLEPHRINE HYDROCHLORIDE 100 MCG: 10 INJECTION INTRAVENOUS at 08:15

## 2021-03-17 RX ADMIN — SUCCINYLCHOLINE CHLORIDE 100 MG: 20 INJECTION, SOLUTION INTRAMUSCULAR; INTRAVENOUS at 07:38

## 2021-03-17 RX ADMIN — ONDANSETRON 4 MG: 2 INJECTION INTRAMUSCULAR; INTRAVENOUS at 07:49

## 2021-03-17 RX ADMIN — GLYCOPYRROLATE 0.2 MG: 0.2 INJECTION INTRAMUSCULAR; INTRAVENOUS at 07:33

## 2021-03-17 RX ADMIN — HYDROMORPHONE HYDROCHLORIDE 0.5 MG: 2 INJECTION, SOLUTION INTRAMUSCULAR; INTRAVENOUS; SUBCUTANEOUS at 08:51

## 2021-03-17 RX ADMIN — ROCURONIUM BROMIDE 20 MG: 10 INJECTION, SOLUTION INTRAVENOUS at 07:34

## 2021-03-17 RX ADMIN — FENTANYL CITRATE 50 MCG: 50 INJECTION, SOLUTION INTRAMUSCULAR; INTRAVENOUS at 07:34

## 2021-03-17 RX ADMIN — Medication 5 MG: at 08:06

## 2021-03-17 RX ADMIN — PHENYLEPHRINE HYDROCHLORIDE 50 MCG: 10 INJECTION INTRAVENOUS at 07:33

## 2021-03-17 RX ADMIN — ROCURONIUM BROMIDE 10 MG: 10 INJECTION, SOLUTION INTRAVENOUS at 07:50

## 2021-03-17 RX ADMIN — ETOMIDATE 8 MG: 20 INJECTION, SOLUTION INTRAVENOUS at 07:36

## 2021-03-17 RX ADMIN — LIDOCAINE HYDROCHLORIDE 100 MG: 20 INJECTION, SOLUTION INFILTRATION; PERINEURAL at 07:34

## 2021-03-17 RX ADMIN — FENTANYL CITRATE 50 MCG: 50 INJECTION, SOLUTION INTRAMUSCULAR; INTRAVENOUS at 07:51

## 2021-03-17 RX ADMIN — Medication 5 MG: at 07:50

## 2021-03-17 RX ADMIN — PHENYLEPHRINE HYDROCHLORIDE 100 MCG: 10 INJECTION INTRAVENOUS at 08:01

## 2021-03-17 RX ADMIN — LIDOCAINE HYDROCHLORIDE 4 ML: 40 SOLUTION TOPICAL at 07:40

## 2021-03-17 RX ADMIN — SODIUM CHLORIDE: 9 INJECTION, SOLUTION INTRAVENOUS at 06:55

## 2021-03-17 RX ADMIN — PHENYLEPHRINE HYDROCHLORIDE 200 MCG: 10 INJECTION INTRAVENOUS at 07:55

## 2021-03-17 RX ADMIN — PHENYLEPHRINE HYDROCHLORIDE 100 MCG: 10 INJECTION INTRAVENOUS at 07:47

## 2021-03-17 ASSESSMENT — PAIN DESCRIPTION - FREQUENCY: FREQUENCY: CONTINUOUS

## 2021-03-17 ASSESSMENT — PULMONARY FUNCTION TESTS
PIF_VALUE: 14
PIF_VALUE: 2
PIF_VALUE: 1
PIF_VALUE: 1
PIF_VALUE: 19
PIF_VALUE: 1
PIF_VALUE: 3
PIF_VALUE: 19
PIF_VALUE: 3
PIF_VALUE: 1
PIF_VALUE: 14
PIF_VALUE: 2
PIF_VALUE: 12
PIF_VALUE: 3
PIF_VALUE: 13
PIF_VALUE: 19
PIF_VALUE: 19
PIF_VALUE: 13
PIF_VALUE: 13
PIF_VALUE: 15
PIF_VALUE: 19
PIF_VALUE: 19
PIF_VALUE: 13
PIF_VALUE: 14
PIF_VALUE: 19

## 2021-03-17 ASSESSMENT — PAIN - FUNCTIONAL ASSESSMENT: PAIN_FUNCTIONAL_ASSESSMENT: 0-10

## 2021-03-17 ASSESSMENT — PAIN SCALES - GENERAL
PAINLEVEL_OUTOF10: 7
PAINLEVEL_OUTOF10: 5

## 2021-03-17 ASSESSMENT — ENCOUNTER SYMPTOMS: SHORTNESS OF BREATH: 1

## 2021-03-17 ASSESSMENT — PAIN DESCRIPTION - ONSET: ONSET: ON-GOING

## 2021-03-17 ASSESSMENT — PAIN DESCRIPTION - DESCRIPTORS: DESCRIPTORS: CONSTANT

## 2021-03-17 ASSESSMENT — PAIN DESCRIPTION - PAIN TYPE: TYPE: SURGICAL PAIN

## 2021-03-17 NOTE — PROGRESS NOTES
Discharge instructions completed with patient and wife. IV removed. Prescriptions delivered.  Patient wheeled to car per RN

## 2021-03-17 NOTE — H&P
[facility] GENERAL AND LAPAROSCOPIC SURGERY              PATIENT NAME: Russ Libman      TODAY'S DATE: 3/17/2021     SUBJECTIVE:  CC gallbladder issues  Pt with bloating, and feels worse after eating. Sx's are postprandial, no emesis or severe pain at this time.      OBJECTIVE:  VITALS:  Temp 96.7 °F (35.9 °C)   Wt 150 lb (68 kg)   BMI 20.92 kg/m²      CONSTITUTIONAL:  awake and alert  LUNGS:  Some cough, clear  HEART: RRR  ABDOMEN:  normal bowel sounds, soft, non-distended, non-tender      Data:     Radiology Review:  GS in GB neck        ASSESSMENT AND PLAN:  Symptomatic cholelithiasis  Treatment options R/B/A reviewed in detail today  Lap pattie today, atbx ordered, all questions answered     Jessi Dodd

## 2021-03-17 NOTE — BRIEF OP NOTE
Brief Postoperative Note      Patient: Angelina Grant  YOB: 1950  MRN: [id number]    Date of Procedure: 3/17/2021    Pre-Op Diagnosis: K80.20 SYMPTOMATIC CHOLELITHIASIS    Post-Op Diagnosis: Same       Procedure(s):  LAPAROSCOPIC CHOLECYSTECTOMY WITH INTRAOPERATIVE CHOLANGIOGRAM    Surgeon(s):  Robie Najjar, MD    Assistant:  Surgical Assistant: Marilynn Kahn    Anesthesia: General    Estimated Blood Loss (mL): Minimal    Complications: None    Specimens:   ID Type Source Tests Collected by Time Destination   A : A) GALLBLADDER Tissue Gallbladder SURGICAL PATHOLOGY Robie Najjar, MD 3/17/2021 0809        Implants:  * No implants in log *      Drains: * No LDAs found *    Findings: GB with stones removed, IOC normal    Electronically signed by Robie Najjar, MD on 3/17/2021 at 8:17 AM
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
## 2021-03-17 NOTE — ANESTHESIA PRE PROCEDURE
Department of Anesthesiology  Preprocedure Note       Name:  Nata Chu   Age:  72 y.o.  :  1950                                          MRN:  [id number]         Date:  3/17/2021      Surgeon: Aysha Harris):  Nuha Barry MD    Procedure: Procedure(s):  LAPAROSCOPIC CHOLECYSTECTOMY WITH CHOLANGIOGRAM    Medications prior to admission:   Prior to Admission medications    Medication Sig Start Date End Date Taking?  Authorizing Provider   Multiple Vitamin (MULTIVITAMIN ADULT PO) Take by mouth   Yes Historical Provider, MD   fluticasone-umeclidin-vilant (TRELEGY ELLIPTA) 100-62.5-25 MCG/INH AEPB Inhale 1 puff into the lungs daily 3/9/21  Yes Bhavani Treadwell MD   albuterol (ACCUNEB) 1.25 MG/3ML nebulizer solution Inhale 3 mLs into the lungs every 6 hours as needed for Wheezing 3/9/21  Yes Bhavani Treadwell MD   ENTRESTO  MG per tablet TAKE ONE TABLET BY MOUTH TWICE A DAY 20  Yes Edith Alejo MD   carvedilol (COREG) 6.25 MG tablet Take 1 tablet by mouth 2 times daily (with meals) 19  Yes Aleah Paez, CHELSY - CNP       Current medications:    Current Facility-Administered Medications   Medication Dose Route Frequency Provider Last Rate Last Admin    HYDROcodone-acetaminophen (NORCO) 5-325 MG per tablet 1 tablet  1 tablet Oral Once PRN Rc Smith MD        ondansetron 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    Chronic systolic congestive heart failure (HCC) I50.22    [facility] I42.8    Essential hypertension I10    SOB (shortness of breath) R06.02    COPD, very severe (HCC) J44.9    Pulmonary nodule R91.1    Chronic cough R05    Centrilobular emphysema (HCC) J43.2       Past Medical History:        Diagnosis Date    Bronchitis     CHF (congestive heart failure) (HCC)     COPD (chronic obstructive pulmonary disease) (HCC)        Past Surgical History:        Procedure Laterality Date    EYE SURGERY      bilateral cataracts    FRACTURE SURGERY      LT elbow    TONSILLECTOMY         Social History:    Social History     Tobacco Use    Smoking status: Former Smoker     Packs/day: 1.00     Years: 30.00     Pack years: 30.00     Types: Cigarettes     Quit date: 2016     Years since quittin.5    Smokeless tobacco: Former User     Quit date: 2016   Substance Use Topics    Alcohol use: Yes     Alcohol/week: 1.0 - 3.0 standard drinks     Types: 1 - 3 Cans of beer per week     Comment: daily                                Counseling given: Not Answered      Vital Signs (Current):   Vitals:    21 1443 21 0629   BP:  118/79   Pulse:  80   Resp:  18   Temp:  96.8 °F (36 °C)   TempSrc:  Temporal   SpO2:  99%   Weight: 160 lb (72.6 kg) 146 lb 9.6 oz (66.5 kg)   Height: 5' 11\" (1.803 m)                                               BP Readings from Last 3 Encounters:   21 118/79   03/15/21 110/70   21 110/68       NPO Status: Time of last liquid consumption: 1900                        Time of last solid consumption: 1800                        Date of last liquid consumption: 21                        Date of last solid food consumption: 21    BMI:   Wt Readings from Last 3 Encounters:   21 146 lb 9.6 oz (66.5 kg)   03/15/21 147 lb 3.2 oz (66.8 kg)   21 150 lb (68 kg)     Body mass index is 20.45 kg/m².     CBC:   Lab Results   Component Value Date    WBC 8.0 2021    RBC 4.75 2021    HGB 15.1 2021    HCT range.   -There is moderate diffuse global hypokinesis. -Abnormal (paradoxical) septal motion is present .   -Normal diastolic function. .E/e\"=6.45   -Trivial mitral and tricuspid regurgitation. Neuro/Psych:      (-) seizures, TIA and CVA           GI/Hepatic/Renal:        (-) GERD      ROS comment: No gerd or n/v today. Endo/Other:                     Abdominal:           Vascular:                                    Anesthesia Plan      general     ASA 3       Induction: intravenous. MIPS: Postoperative opioids intended, Prophylactic antiemetics administered and Postoperative trial extubation. Anesthetic plan and risks discussed with patient. Use of blood products discussed with patient whom consented to blood products. Plan discussed with CRNA.                 Duncan Rehman MD   3/17/2021

## 2021-03-17 NOTE — ANESTHESIA POSTPROCEDURE EVALUATION
Department of Anesthesiology  Postprocedure Note    Patient: Ck Hagen  MRN: [id number]  YOB: 1950  Date of evaluation: 3/17/2021  Time:  1:08 PM     Procedure Summary     Date: 03/17/21 Room / Location: [address]    Anesthesia Start: 0730 Anesthesia Stop: 1332    Procedure: LAPAROSCOPIC CHOLECYSTECTOMY WITH INTRAOPERATIVE CHOLANGIOGRAM (N/A Abdomen) Diagnosis: (K80.20 SYMPTOMATIC CHOLELITHIASIS)    Surgeons: Dandre Sky MD Responsible Provider:     Anesthesia Type: general ASA Status: 3          Anesthesia Type: general    Larissa Phase I: Larissa Score: 10    Larissa Phase II: Larissa Score: 10    Last vitals: Reviewed and per EMR flowsheets.        Anesthesia Post Evaluation    Patient location during evaluation: PACU  Patient participation: complete - patient participated  Level of consciousness: awake  Airway patency: patent  Nausea & Vomiting: no vomiting  Complications: no  Cardiovascular status: hemodynamically stable  Respiratory status: acceptable  Hydration status: euvolemic

## 2021-03-17 NOTE — OP NOTE
[facility]\[address]                     [facility], [facility]                                OPERATIVE REPORT    PATIENT NAME: Crystal Hartmann                   :        1950  MED REC NO:   [id number]                          ROOM:  ACCOUNT NO:   [de-identified]                           ADMIT DATE: 2021  PROVIDER:     Cathy Skelton MD    DATE OF PROCEDURE:  2021    PREOPERATIVE DIAGNOSIS:  Symptomatic cholelithiasis. POSTOPERATIVE DIAGNOSIS:  Symptomatic cholelithiasis. PROCEDURE:  Laparoscopic cholecystectomy with intraoperative  cholangiogram.    SURGEON:  Cathy Skelton MD    ANESTHESIA:  General plus local at port sites as well. ESTIMATED BLOOD LOSS:  Minimal.    COMPLICATIONS:  None. SPECIMENS:  Gallbladder. FINDINGS:  Gallbladder with stones present, was removed  laparoscopically. Cholangiogram appeared clear. INDICATIONS FOR SURGERY:  The patient is a 81-year-old gentleman  presenting with recurring gallbladder symptomatology, stones in the  gallbladder neck for cholecystectomy today. The intended procedure was  reviewed with the patient. All questions were answered. Antibiotics  were ordered and administered. He consents to proceed. ADDITIONAL DETAILS OF SURGERY:  The patient was brought to the operating  room, placed on the operative table in supine position. Compression  boots were placed. General anesthetic was administered. The abdomen  was prepped and draped sterilely and time-out was done. Infraumbilical  5-mm direct entry view port was placed and abdominal cavity was  insufflated to 15 mmHg pressure. An epigastric 11-mm port and two right  lateral abdominal 5-mm ports were placed under direct vision. The  gallbladder was identified and grasped superiorly at the fundus. The  infundibulum area was grasped and retracted inferolaterally to the  right. The triangle of Calot was then dissected. The critical angle  view technique was used to visualize cystic artery, cystic duct, and  node of Calot. The cyst duct gallbladder junction was clipped and  partially transected. The intraoperative cholangiogram catheter was  then passed through its Angiocath in the intra-abdominal wall and the  cholangiogram was obtained. This appeared normal.  The cystic duct  catheter was removed and then the cystic duct was clipped with three  clips proximally and transection of the duct was completed. The artery  was clipped with two clips proximally, one clip distally, and it was  transected. Bovie electrocautery was used to remove the gallbladder  from hepatic fossa. One additional vessel was clipped along the way. The gallbladder was then removed out the epigastric 11-mm port site. The perihepatic area was then irrigated and aspirated dry. The  gallbladder bed was hemostatic. The clips were in good location with no  bleeding or bile leak noted. The instruments and ports were removed. The port sites appeared hemostatic. The fascia at the epigastric site  was closed with an 0 Vicryl figure-of-eight suture. Skin at all sites  were closed with 4-0 Monocryl suture. Dermabond glue was placed. The  patient was taken to the recovery room in stable condition postop.         Bhavana Su MD    D: 03/17/2021 8:31:52       T: 03/17/2021 8:39:34     [email or web address]  Job#: [id number]     Doc#: [id number]    CC:  Sukumar Arango Iii, DO

## 2021-03-17 NOTE — PROGRESS NOTES
CLINICAL PHARMACY NOTE: MEDS TO [address] [facility] Patient?: No  Total # of Prescriptions Filled: 1   The following medications were delivered to the patient:  · Hydrocodone 5/325mg  Total # of Interventions Completed: 0  Time Spent (min): 30    Additional Documentation:  Medication delivered- patient signed  Armando Nam

## 2021-04-01 ENCOUNTER — HOSPITAL ENCOUNTER (OUTPATIENT)
Dept: NON INVASIVE DIAGNOSTICS | Age: 71
Discharge: HOME | End: 2021-04-01
Payer: COMMERCIAL

## 2021-04-01 DIAGNOSIS — I50.22 CHRONIC SYSTOLIC CONGESTIVE HEART FAILURE (HCC): Chronic | ICD-10-CM

## 2021-04-01 LAB
LV EF: 40 %
LVEF MODALITY: NORMAL

## 2021-04-01 PROCEDURE — 93306 TTE W/DOPPLER COMPLETE: CPT

## 2021-04-30 ENCOUNTER — APPOINTMENT (OUTPATIENT)
Dept: CT IMAGING | Age: 71
DRG: 300 | End: 2021-04-30
Payer: COMMERCIAL

## 2021-04-30 ENCOUNTER — HOSPITAL ENCOUNTER (INPATIENT)
Age: 71
LOS: 1 days | Discharge: HOME | DRG: 300 | End: 2021-05-01
Attending: EMERGENCY MEDICINE | Admitting: INTERNAL MEDICINE
Payer: COMMERCIAL

## 2021-04-30 DIAGNOSIS — I42.8 NONISCHEMIC CARDIOMYOPATHY (HCC): ICD-10-CM

## 2021-04-30 DIAGNOSIS — I70.201 POPLITEAL ARTERY OCCLUSION, RIGHT (HCC): ICD-10-CM

## 2021-04-30 DIAGNOSIS — I73.9 PAD (PERIPHERAL ARTERY DISEASE) (HCC): ICD-10-CM

## 2021-04-30 DIAGNOSIS — I50.22 CHRONIC SYSTOLIC CONGESTIVE HEART FAILURE (HCC): ICD-10-CM

## 2021-04-30 DIAGNOSIS — R06.02 SOB (SHORTNESS OF BREATH): ICD-10-CM

## 2021-04-30 DIAGNOSIS — I70.209 SUPERFICIAL FEMORAL ARTERY OCCLUSION (HCC): Primary | ICD-10-CM

## 2021-04-30 DIAGNOSIS — I10 ESSENTIAL HYPERTENSION: ICD-10-CM

## 2021-04-30 PROBLEM — E87.1 HYPONATREMIA: Status: ACTIVE | Noted: 2021-04-30

## 2021-04-30 LAB
A/G RATIO: 1.1 (ref 1.1–2.2)
ALBUMIN SERPL-MCNC: 3.9 G/DL (ref 3.4–5)
ALP BLD-CCNC: 64 U/L (ref 40–129)
ALT SERPL-CCNC: 12 U/L (ref 10–40)
ANION GAP SERPL CALCULATED.3IONS-SCNC: 9 MMOL/L (ref 3–16)
APTT: 145.3 SEC (ref 24.2–36.2)
APTT: 33.5 SEC (ref 24.2–36.2)
AST SERPL-CCNC: 19 U/L (ref 15–37)
BASOPHILS ABSOLUTE: 0 K/UL (ref 0–0.2)
BASOPHILS RELATIVE PERCENT: 0.7 %
BILIRUB SERPL-MCNC: <0.2 MG/DL (ref 0–1)
BUN BLDV-MCNC: 12 MG/DL (ref 7–20)
CALCIUM SERPL-MCNC: 9 MG/DL (ref 8.3–10.6)
CHLORIDE BLD-SCNC: 99 MMOL/L (ref 99–110)
CO2: 25 MMOL/L (ref 21–32)
CREAT SERPL-MCNC: 0.9 MG/DL (ref 0.8–1.3)
D DIMER: <200 NG/ML DDU (ref 0–229)
EOSINOPHILS ABSOLUTE: 0.9 K/UL (ref 0–0.6)
EOSINOPHILS RELATIVE PERCENT: 13.3 %
GFR AFRICAN AMERICAN: >60
GFR NON-AFRICAN AMERICAN: >60
GLOBULIN: 3.4 G/DL
GLUCOSE BLD-MCNC: 115 MG/DL (ref 70–99)
HCT VFR BLD CALC: 42.5 % (ref 40.5–52.5)
HEMOGLOBIN: 14.1 G/DL (ref 13.5–17.5)
INR BLD: 1.03 (ref 0.86–1.14)
LYMPHOCYTES ABSOLUTE: 2.2 K/UL (ref 1–5.1)
LYMPHOCYTES RELATIVE PERCENT: 32.6 %
MCH RBC QN AUTO: 31.2 PG (ref 26–34)
MCHC RBC AUTO-ENTMCNC: 33.2 G/DL (ref 31–36)
MCV RBC AUTO: 93.7 FL (ref 80–100)
MONOCYTES ABSOLUTE: 0.9 K/UL (ref 0–1.3)
MONOCYTES RELATIVE PERCENT: 14 %
NEUTROPHILS ABSOLUTE: 2.6 K/UL (ref 1.7–7.7)
NEUTROPHILS RELATIVE PERCENT: 39.4 %
PDW BLD-RTO: 12.7 % (ref 12.4–15.4)
PLATELET # BLD: 386 K/UL (ref 135–450)
PMV BLD AUTO: 7.3 FL (ref 5–10.5)
POTASSIUM SERPL-SCNC: 4.9 MMOL/L (ref 3.5–5.1)
PROTHROMBIN TIME: 12 SEC (ref 10–13.2)
RBC # BLD: 4.53 M/UL (ref 4.2–5.9)
SODIUM BLD-SCNC: 133 MMOL/L (ref 136–145)
TOTAL PROTEIN: 7.3 G/DL (ref 6.4–8.2)
WBC # BLD: 6.6 K/UL (ref 4–11)

## 2021-04-30 PROCEDURE — 6360000002 HC RX W HCPCS: Performed by: PHYSICIAN ASSISTANT

## 2021-04-30 PROCEDURE — G0378 HOSPITAL OBSERVATION PER HR: HCPCS

## 2021-04-30 PROCEDURE — 85379 FIBRIN DEGRADATION QUANT: CPT

## 2021-04-30 PROCEDURE — 36415 COLL VENOUS BLD VENIPUNCTURE: CPT

## 2021-04-30 PROCEDURE — 2580000003 HC RX 258: Performed by: INTERNAL MEDICINE

## 2021-04-30 PROCEDURE — 80053 COMPREHEN METABOLIC PANEL: CPT

## 2021-04-30 PROCEDURE — 2000000000 HC ICU R&B

## 2021-04-30 PROCEDURE — 75635 CT ANGIO ABDOMINAL ARTERIES: CPT

## 2021-04-30 PROCEDURE — 85610 PROTHROMBIN TIME: CPT

## 2021-04-30 PROCEDURE — 85730 THROMBOPLASTIN TIME PARTIAL: CPT

## 2021-04-30 PROCEDURE — 96375 TX/PRO/DX INJ NEW DRUG ADDON: CPT

## 2021-04-30 PROCEDURE — 6360000004 HC RX CONTRAST MEDICATION: Performed by: PHYSICIAN ASSISTANT

## 2021-04-30 PROCEDURE — 96366 THER/PROPH/DIAG IV INF ADDON: CPT

## 2021-04-30 PROCEDURE — 93005 ELECTROCARDIOGRAM TRACING: CPT | Performed by: PHYSICIAN ASSISTANT

## 2021-04-30 PROCEDURE — 6370000000 HC RX 637 (ALT 250 FOR IP): Performed by: INTERNAL MEDICINE

## 2021-04-30 PROCEDURE — 96365 THER/PROPH/DIAG IV INF INIT: CPT

## 2021-04-30 PROCEDURE — 99283 EMERGENCY DEPT VISIT LOW MDM: CPT

## 2021-04-30 PROCEDURE — 85025 COMPLETE CBC W/AUTO DIFF WBC: CPT

## 2021-04-30 RX ORDER — PROMETHAZINE HYDROCHLORIDE 25 MG/1
12.5 TABLET ORAL EVERY 6 HOURS PRN
Status: DISCONTINUED | OUTPATIENT
Start: 2021-04-30 | End: 2021-05-01 | Stop reason: HOSPADM

## 2021-04-30 RX ORDER — ALBUTEROL SULFATE 90 UG/1
1 AEROSOL, METERED RESPIRATORY (INHALATION) EVERY 4 HOURS PRN
Status: DISCONTINUED | OUTPATIENT
Start: 2021-04-30 | End: 2021-05-01 | Stop reason: HOSPADM

## 2021-04-30 RX ORDER — ALBUTEROL SULFATE 90 UG/1
AEROSOL, METERED RESPIRATORY (INHALATION)
COMMUNITY
Start: 2021-04-16 | End: 2022-03-10

## 2021-04-30 RX ORDER — HEPARIN SODIUM 1000 [USP'U]/ML
80 INJECTION, SOLUTION INTRAVENOUS; SUBCUTANEOUS ONCE
Status: COMPLETED | OUTPATIENT
Start: 2021-04-30 | End: 2021-04-30

## 2021-04-30 RX ORDER — HEPARIN SODIUM 1000 [USP'U]/ML
80 INJECTION, SOLUTION INTRAVENOUS; SUBCUTANEOUS PRN
Status: DISCONTINUED | OUTPATIENT
Start: 2021-04-30 | End: 2021-05-01 | Stop reason: HOSPADM

## 2021-04-30 RX ORDER — HEPARIN SODIUM 10000 [USP'U]/100ML
5-30 INJECTION, SOLUTION INTRAVENOUS CONTINUOUS
Status: DISCONTINUED | OUTPATIENT
Start: 2021-04-30 | End: 2021-05-01 | Stop reason: HOSPADM

## 2021-04-30 RX ORDER — CARVEDILOL 6.25 MG/1
6.25 TABLET ORAL 2 TIMES DAILY WITH MEALS
Status: DISCONTINUED | OUTPATIENT
Start: 2021-04-30 | End: 2021-05-01 | Stop reason: HOSPADM

## 2021-04-30 RX ORDER — HEPARIN SODIUM 1000 [USP'U]/ML
80 INJECTION, SOLUTION INTRAVENOUS; SUBCUTANEOUS PRN
Status: DISCONTINUED | OUTPATIENT
Start: 2021-04-30 | End: 2021-04-30 | Stop reason: SDUPTHER

## 2021-04-30 RX ORDER — HEPARIN SODIUM 10000 [USP'U]/100ML
18 INJECTION, SOLUTION INTRAVENOUS CONTINUOUS
Status: DISCONTINUED | OUTPATIENT
Start: 2021-04-30 | End: 2021-04-30 | Stop reason: SDUPTHER

## 2021-04-30 RX ORDER — SODIUM CHLORIDE 9 MG/ML
25 INJECTION, SOLUTION INTRAVENOUS PRN
Status: DISCONTINUED | OUTPATIENT
Start: 2021-04-30 | End: 2021-05-01 | Stop reason: HOSPADM

## 2021-04-30 RX ORDER — ACETAMINOPHEN 325 MG/1
650 TABLET ORAL EVERY 6 HOURS PRN
Status: DISCONTINUED | OUTPATIENT
Start: 2021-04-30 | End: 2021-05-01 | Stop reason: HOSPADM

## 2021-04-30 RX ORDER — POTASSIUM CHLORIDE 20 MEQ/1
40 TABLET, EXTENDED RELEASE ORAL PRN
Status: DISCONTINUED | OUTPATIENT
Start: 2021-04-30 | End: 2021-05-01 | Stop reason: HOSPADM

## 2021-04-30 RX ORDER — 0.9 % SODIUM CHLORIDE 0.9 %
500 INTRAVENOUS SOLUTION INTRAVENOUS PRN
Status: DISCONTINUED | OUTPATIENT
Start: 2021-04-30 | End: 2021-05-01 | Stop reason: HOSPADM

## 2021-04-30 RX ORDER — SODIUM CHLORIDE 0.9 % (FLUSH) 0.9 %
10 SYRINGE (ML) INJECTION PRN
Status: DISCONTINUED | OUTPATIENT
Start: 2021-04-30 | End: 2021-05-01 | Stop reason: HOSPADM

## 2021-04-30 RX ORDER — HYDRALAZINE HYDROCHLORIDE 20 MG/ML
10 INJECTION INTRAMUSCULAR; INTRAVENOUS EVERY 6 HOURS PRN
Status: DISCONTINUED | OUTPATIENT
Start: 2021-04-30 | End: 2021-05-01 | Stop reason: HOSPADM

## 2021-04-30 RX ORDER — MORPHINE SULFATE 4 MG/ML
4 INJECTION, SOLUTION INTRAMUSCULAR; INTRAVENOUS EVERY 30 MIN PRN
Status: DISCONTINUED | OUTPATIENT
Start: 2021-04-30 | End: 2021-05-01 | Stop reason: HOSPADM

## 2021-04-30 RX ORDER — HEPARIN SODIUM 1000 [USP'U]/ML
40 INJECTION, SOLUTION INTRAVENOUS; SUBCUTANEOUS PRN
Status: DISCONTINUED | OUTPATIENT
Start: 2021-04-30 | End: 2021-05-01 | Stop reason: HOSPADM

## 2021-04-30 RX ORDER — SODIUM CHLORIDE 0.9 % (FLUSH) 0.9 %
10 SYRINGE (ML) INJECTION EVERY 12 HOURS SCHEDULED
Status: DISCONTINUED | OUTPATIENT
Start: 2021-04-30 | End: 2021-05-01 | Stop reason: HOSPADM

## 2021-04-30 RX ORDER — ACETAMINOPHEN 650 MG/1
650 SUPPOSITORY RECTAL EVERY 6 HOURS PRN
Status: DISCONTINUED | OUTPATIENT
Start: 2021-04-30 | End: 2021-05-01 | Stop reason: HOSPADM

## 2021-04-30 RX ORDER — POTASSIUM CHLORIDE 7.45 MG/ML
10 INJECTION INTRAVENOUS PRN
Status: DISCONTINUED | OUTPATIENT
Start: 2021-04-30 | End: 2021-05-01 | Stop reason: HOSPADM

## 2021-04-30 RX ORDER — ONDANSETRON 2 MG/ML
4 INJECTION INTRAMUSCULAR; INTRAVENOUS ONCE
Status: DISCONTINUED | OUTPATIENT
Start: 2021-04-30 | End: 2021-05-01 | Stop reason: HOSPADM

## 2021-04-30 RX ORDER — ONDANSETRON 2 MG/ML
4 INJECTION INTRAMUSCULAR; INTRAVENOUS EVERY 6 HOURS PRN
Status: DISCONTINUED | OUTPATIENT
Start: 2021-04-30 | End: 2021-05-01 | Stop reason: HOSPADM

## 2021-04-30 RX ORDER — HEPARIN SODIUM 1000 [USP'U]/ML
80 INJECTION, SOLUTION INTRAVENOUS; SUBCUTANEOUS ONCE
Status: DISCONTINUED | OUTPATIENT
Start: 2021-04-30 | End: 2021-04-30 | Stop reason: SDUPTHER

## 2021-04-30 RX ORDER — SODIUM CHLORIDE 9 MG/ML
INJECTION, SOLUTION INTRAVENOUS CONTINUOUS
Status: DISCONTINUED | OUTPATIENT
Start: 2021-04-30 | End: 2021-05-01 | Stop reason: HOSPADM

## 2021-04-30 RX ORDER — HEPARIN SODIUM 1000 [USP'U]/ML
40 INJECTION, SOLUTION INTRAVENOUS; SUBCUTANEOUS PRN
Status: DISCONTINUED | OUTPATIENT
Start: 2021-04-30 | End: 2021-04-30 | Stop reason: SDUPTHER

## 2021-04-30 RX ADMIN — CARVEDILOL 6.25 MG: 6.25 TABLET, FILM COATED ORAL at 19:28

## 2021-04-30 RX ADMIN — SODIUM CHLORIDE: 9 INJECTION, SOLUTION INTRAVENOUS at 18:43

## 2021-04-30 RX ADMIN — SODIUM CHLORIDE: 9 INJECTION, SOLUTION INTRAVENOUS at 19:12

## 2021-04-30 RX ADMIN — IOPAMIDOL 120 ML: 755 INJECTION, SOLUTION INTRAVENOUS at 13:05

## 2021-04-30 RX ADMIN — HEPARIN SODIUM AND DEXTROSE 18 UNITS/KG/HR: 10000; 5 INJECTION INTRAVENOUS at 14:42

## 2021-04-30 RX ADMIN — Medication 10 ML: at 18:37

## 2021-04-30 RX ADMIN — HEPARIN SODIUM 5440 UNITS: 1000 INJECTION INTRAVENOUS; SUBCUTANEOUS at 14:39

## 2021-04-30 RX ADMIN — HEPARIN SODIUM AND DEXTROSE 17.94 UNITS/KG/HR: 10000; 5 INJECTION INTRAVENOUS at 19:14

## 2021-04-30 RX ADMIN — SACUBITRIL AND VALSARTAN 1 TABLET: 97; 103 TABLET, FILM COATED ORAL at 21:51

## 2021-04-30 ASSESSMENT — ENCOUNTER SYMPTOMS
SHORTNESS OF BREATH: 0
RHINORRHEA: 0
VOMITING: 0
ABDOMINAL PAIN: 0
DIARRHEA: 0
COUGH: 0
NAUSEA: 0

## 2021-04-30 NOTE — ED PROVIDER NOTES
[address] [address]        Pt Name: Lazaro Fuentes  MRN: [id number]  [place] 1950  Date of evaluation: 4/30/2021  Provider: Marco Antonio Lowe PA-C  PCP: Ozzy Dyer III, DO     I have seen and evaluated this patient with my supervising physician Kourtney Gallegos MD    [address] [place] [address]       Chief Complaint   Patient presents with    Leg Pain     Pt reports pain to left calf, sent by urgent care for \"possible DVT\"        HISTORY OF PRESENT ILLNESS   (Location, Timing/Onset, Context/Setting, Quality, Duration, Modifying Factors, Severity, Associated Signs and Symptoms)  Note limiting factors. Lazaro Fuentes is a 80 y.o. male who presents to the emergency department today for evaluation for left leg pain. The patient states that he has been experiencing pain to his left calf, and he states that this actually has been ongoing for the past 3 weeks. The patient states that he will only experience pain to his calf, particularly with walking long distances. The patient states he does not really notice any pain with short distances. The patient states that each day his pain seems to be progressively worse. The patient states that he also get a \"numbness and tingling\" pain to his left leg only, and he states that this occurs when he is on his feet. He states that if he elevates his leg on a pillow, his symptoms resolved. Patient states that he went to urgent care today, he was sent to the emergency room for further care of a possible DVT. Patient states that he does have pain to his left leg since walking from the car to the bed. Patient is currently rating his pain as a 10/10. He has no numbness, tingling or weakness at this time. He has no chest pain. No shortness of breath. No abdominal pain. No back pain. No fall or injury. The patient is a smoker. Patient states he has never had symptoms like this in the past.  Patient otherwise has no complaints at this time. He has no complaints of any pain to the right leg    Nursing Notes were all reviewed and agreed with or any disagreements were addressed in the HPI. REVIEW OF SYSTEMS    (2-9 systems for level 4, 10 or more for level 5)     Review of Systems   Constitutional: Negative for activity change, appetite change, chills and fever. HENT: Negative for congestion and rhinorrhea. Respiratory: Negative for cough and shortness of breath. Cardiovascular: Negative for chest pain. Gastrointestinal: Negative for abdominal pain, diarrhea, nausea and vomiting. Genitourinary: Negative for difficulty urinating, dysuria and hematuria. Musculoskeletal: Positive for arthralgias. Neurological: Negative for weakness and numbness. Positives and Pertinent negatives as per HPI. Except as noted above in the ROS, all other systems were reviewed and negative.        PAST MEDICAL HISTORY     Past Medical History:   Diagnosis Date    Bronchitis     CHF (congestive heart failure) ([facility]     COPD [facility]chronic obstructive pulmonary disease) [facility]          SURGICAL HISTORY     Past Surgical History:   Procedure Laterality Date    CHOLECYSTECTOMY, LAPAROSCOPIC N/A 3/17/2021    LAPAROSCOPIC CHOLECYSTECTOMY WITH INTRAOPERATIVE CHOLANGIOGRAM performed by Kapil Cleary MD at [address] [place] [address]      bilateral cataracts    FRACTURE SURGERY      [facility]       Current Discharge Medication List      CONTINUE these medications which have NOT CHANGED    Details   Multiple Vitamin (MULTIVITAMIN ADULT PO) Take 1 tablet by mouth daily       fluticasone-umeclidin-vilant (TRELEGY ELLIPTA) 100-62.5-25 MCG/INH AEPB Inhale 1 puff into the lungs daily  Qty: 3 each, Refills: 3      albuterol (ACCUNEB) 1.25 MG/3ML nebulizer solution Inhale 3 mLs into the lungs every 6 hours as needed for Wheezing  Qty: 360 mL, Refills: 3      ENTRESTO  MG per tablet TAKE ONE TABLET BY MOUTH TWICE A DAY  Qty: 180 tablet, Refills: 2    Associated Diagnoses: Chronic systolic congestive heart failure ([address] Nonischemic cardiomyopathy ([address] Essential hypertension      carvedilol (COREG) 6.25 MG tablet Take 1 tablet by mouth 2 times daily (with meals)  Qty: 180 tablet, Refills: 0    Comments: Dispense 90 day supply  Associated Diagnoses: Chronic systolic congestive heart failure ([address] Nonischemic cardiomyopathy ([address] Essential hypertension; SOB (shortness of breath)      umeclidinium-vilanterol (ANORO ELLIPTA) 62.5-25 MCG/INH AEPB inhaler Inhale 1 puff into the lungs daily      albuterol sulfate  (90 Base) MCG/ACT inhaler                ALLERGIES     Codeine    FAMILYHISTORY       Family History   Problem Relation Age of Onset    Heart Disease Mother     No Known Problems Father           SOCIAL HISTORY       Social History     Tobacco Use    Smoking status: Former Smoker     Packs/day: 1.00     Years: 30.00     Pack years: 30.00     Types: Cigarettes     Quit date: 2016     Years since quittin.6    Smokeless tobacco: Former User     Quit date: 2016   Substance Use Topics    Alcohol use: Yes     Alcohol/week: 1.0 - 3.0 standard drinks     Types: 1 - 3 Cans of beer per week     Comment: daily    Drug use: No       SCREENINGS             PHYSICAL EXAM    (up to 7 for level 4, 8 or more for level 5)     ED Triage Vitals   BP Temp Temp Source Pulse Resp SpO2 Height Weight   21 1130 21 1801 21 1801 21 1130 21 1130 21 1330 21 1801 21 1130   (!) 173/92 96.6 °F (35.9 °C) Temporal 76 18 100 % 5' 11\" (1.803 m) 150 lb (68 kg)       Physical Exam  Vitals signs and nursing note reviewed. Constitutional:       Appearance: He is well-developed. He is not diaphoretic. HENT:      Head: Normocephalic and atraumatic.       Right Ear: External ear normal.      Left Ear: External ear normal.      Nose: Nose normal.   Eyes:      General:         Right eye: No discharge. Left eye: No discharge. Neck:      Musculoskeletal: Normal range of motion and neck supple. Trachea: No tracheal deviation. Cardiovascular:      Rate and Rhythm: Normal rate and regular rhythm. Heart sounds: No murmur. Comments: To the left foot when compared to the right, does have a purplish hue, no true pallor and/or cyanosis. Delayed cap refill. Cool to the touch when compared to the right. Unable to palpate the dorsalis pedis or posterior tibialis pulse on the left, unable to find with Doppler. No bony tenderness, full range of motion of all joints. Normal sensation to light touch    Patient has a palpable posterior tibialis pulse on the right, and this is able to be found with Doppler, unable to locate the dorsalis pedis pulse. There is no tenderness over the right foot, patient does have a delayed cap refill on the right but improving compared to the left. Pulmonary:      Effort: Pulmonary effort is normal. No respiratory distress. Breath sounds: Normal breath sounds. No wheezing or rales. Abdominal:      General: Bowel sounds are normal. There is no distension. Palpations: Abdomen is soft. Tenderness: There is no abdominal tenderness. Musculoskeletal: Normal range of motion. Skin:     General: Skin is warm and dry. Neurological:      Mental Status: He is alert and oriented to person, place, and time.    Psychiatric:         Behavior: Behavior normal.         DIAGNOSTIC RESULTS   LABS:    Labs Reviewed   CBC WITH AUTO DIFFERENTIAL - Abnormal; Notable for the following components:       Result Value    Eosinophils Absolute 0.9 (*)     All other components within normal limits    Narrative:     Performed at:  [facility] [address], [place], [address]   Phone [phone]   COMPREHENSIVE METABOLIC PANEL - Abnormal; Notable for the following components:    Sodium 133 (*)     Glucose 115 (*)     All other components within normal limits    Narrative:     Performed at:  [facility]  [address] [facility], [address]   Phone [phone]   PROTIME-INR    Narrative:     Performed at:  [facility]  [address] [facility], [address]   Phone [phone]   APTT    Narrative:     Performed at:  [facility]  [address] [facility], [address]   Phone [phone]   CBC   APTT   APTT   APTT   CBC WITH AUTO DIFFERENTIAL   PROTIME-INR   COMPREHENSIVE METABOLIC PANEL W/ REFLEX TO MG FOR LOW K   D-DIMER, QUANTITATIVE   CBC   APTT   APTT   BASIC METABOLIC PANEL   CBC WITH AUTO DIFFERENTIAL   APTT       All other labs were within normal range or not returned as of this dictation. EKG: All EKG's are interpreted by the Emergency Department Physician in the absence of a cardiologist.  Please see their note for interpretation of EKG. RADIOLOGY:   Non-plain film images such as CT, Ultrasound and MRI are read by the radiologist. Plain radiographic images are visualized and preliminarily interpreted by the ED Provider with the below findings:        Interpretation per the Radiologist below, if available at the time of this note:    CTA ABDOMINAL AORTA W BILAT RUNOFF W CONTRAST   Preliminary Result   1. Complete occlusion of the mid right superficial femoral artery, with   continued occlusion of the right popliteal artery and the entirety of the   right lower extremity runoff anatomy, and no evidence of reconstitution. 2. Complete occlusion of the left superficial femoral artery at its origin,   which is reconstituted by collateral flow at the level of the distal left   superficial femoral artery just proximal to the knee. Patent three-vessel   runoff to the left lower extremity. 3. No acute process within the abdomen or pelvis.            Cta Abdominal Aorta W Bilat Runoff W Contrast    Result Date: 4/30/2021  EXAMINATION: CTA OF THE AORTA WITH LOWER EXTREMITY RUNOFF 4/30/2021 1:05 pm TECHNIQUE: CTA of the pelvis and bilateral lower extremities was performed after the administration of intravenous contrast.   Multiplanar reformatted images are provided for review. MIP images are provided for review. Dose modulation, iterative reconstruction, and/or weight based adjustment of the mA/kV was utilized to reduce the radiation dose to as low as reasonably achievable. COMPARISON: No prior abdominal CT study for comparison. Comparison made to prior chest CTs dated 11/10/2020, 01/11/2020, and 09/30/2016. HISTORY: ORDERING SYSTEM PROVIDED HISTORY: L leg pain, left foot cool r/o arterial occlusion TECHNOLOGIST PROVIDED HISTORY: Reason for exam:->L leg pain, left foot cool r/o arterial occlusion Reason for Exam: L leg pain, left foot cool r/o arterial occlusion Acuity: Acute Type of Exam: Initial FINDINGS: Nonvascular Lower Chest: There is bibasilar emphysema. Lung bases are otherwise clear. Organs: Patient is status post cholecystectomy. The liver is unremarkable. There are calcified splenic granulomata. The spleen is otherwise unremarkable. Pancreas normal.  Adrenal glands are unremarkable bilaterally. There a few small bilateral renal cysts. The bilateral kidneys are otherwise unremarkable, without inflammatory change, renal/ureteral calculus, or hydronephrosis. GI/Bowel: Evaluation of the hollow GI tract demonstrates no evidence of abnormal bowel wall thickening, dilatation or obstruction. The appendix is normal. Pelvis: The urinary bladder is normal.  There is mild prostatomegaly. There is no free pelvic fluid or pathologic pelvic lymphadenopathy. Peritoneum/Retroperitoneum: No intraperitoneal free air or free fluid is identified. No pathologic lymphadenopathy is seen. No significant abdominal hernia is evident. Bones/Soft Tissues: There is multilevel degenerative change throughout the lumbar spine.   No osteolytic or osteoblastic lesion is seen. VASCULAR AORTA/INFLOW: There is mild-to-moderate atherosclerotic disease of the abdominal aorta, without evidence of stenosis, aneurysm, or dissection. The celiac axis and its branch vessels are widely patent. The SMA is patent throughout its course. The BECKA is patent. Patent renal arteries are noted bilaterally. There is atherosclerotic disease of the bilateral common iliac arteries, with minimal to mild aneurysmal dilatation of the common iliac arteries, measuring 13 mm on the left, and 11 mm on the right. There is atherosclerotic disease of the bilateral internal and external iliac arteries, without evidence of stenosis or aneurysm. OUTFLOW: On the right, there is atherosclerotic disease of the common femoral artery, without stenosis or aneurysm. Right deep femoral artery is patent throughout its course. There is atherosclerotic disease of the superficial femoral artery, which becomes completely occluded at its mid portion. The remainder of the mid and distal superficial femoral artery as well as the popliteal artery are completely occluded without evidence of reconstitution by collateral flow. On the left, there is mild atherosclerotic disease of the common femoral artery, without evidence of stenosis or aneurysm. The left deep femoral artery is patent throughout its course. There is complete occlusion of the left superficial femoral artery at its origin, which remains occluded throughout its course, until it is reconstituted by collateral flow at its distal aspect, just above the left knee. Collateral flow is likely supplied by branches of the deep femoral artery. There is atherosclerotic disease and a mild approximate 50% stenosis of the left popliteal artery. RUNOFF ANATOMY: On the right, there is complete three-vessel occlusion of the right lower extremity runoff anatomy.  On the left, there appears to be patent three-vessel runoff to the left lower extremity. 1. Complete occlusion of the mid right superficial femoral artery, with continued occlusion of the right popliteal artery and the entirety of the right lower extremity runoff anatomy, and no evidence of reconstitution. 2. Complete occlusion of the left superficial femoral artery at its origin, which is reconstituted by collateral flow at the level of the distal left superficial femoral artery just proximal to the knee. Patent three-vessel runoff to the left lower extremity. 3. No acute process within the abdomen or pelvis. PROCEDURES   Unless otherwise noted below, none     Procedures    CRITICAL CARE TIME   Critical Care  There was a high probability of life-threatening clinical deterioration in the patient's condition requiring my urgent intervention. Total critical care time with the patient was 35 minutes excluding separately reportable procedures. Critical care required due to patients concern for acute arterial occlusion requiring vascular surgery consult, heparin initiation, extensive work-up, and admission.       CONSULTS:  IP CONSULT TO VASCULAR SURGERY  IP CONSULT TO PRIMARY CARE PROVIDER  IP CONSULT TO VASCULAR SURGERY      EMERGENCY DEPARTMENT COURSE and DIFFERENTIAL DIAGNOSIS/MDM:   Vitals:    Vitals:    04/30/21 1700 04/30/21 1715 04/30/21 1730 04/30/21 1801   BP: (!) 141/84  135/82 (!) 140/87   Pulse: 84 83 81 89   Resp: 16 16 17 16   Temp:    96.6 °F (35.9 °C)   TempSrc:    Temporal   SpO2:    98%   Weight:    141 lb 5 oz (64.1 kg)   Height:    5' 11\" (1.803 m)       Patient was given the following medications:  Medications   morphine injection 4 mg (has no administration in time range)   ondansetron (ZOFRAN) injection 4 mg (4 mg Intravenous Not Given 4/30/21 1443)   heparin (porcine) injection 5,440 Units (has no administration in time range)   heparin (porcine) injection 2,720 Units (has no administration in time range)   heparin 25,000 units in dextrose 5% 250 mL (premix) infusion (18 Units/kg/hr × 68 kg Intravenous Rate/Dose Verify 4/30/21 1445)   carvedilol (COREG) tablet 6.25 mg (has no administration in time range)   sacubitril-valsartan (ENTRESTO)  MG per tablet 1 tablet (has no administration in time range)   fluticasone-umeclidin-vilant (TRELEGY ELLIPTA) 100-62.5-25 MCG/INH inhaler 1 puff (1 puff Inhalation Not Given 4/30/21 1815)   umeclidinium-vilanterol (ANORO ELLIPTA) 62.5-25 MCG/INH inhaler 1 puff (1 puff Inhalation Not Given 4/30/21 1814)   sacubitril-valsartan (ENTRESTO)  MG per tablet 1 tablet (has no administration in time range)   albuterol sulfate  (90 Base) MCG/ACT inhaler 1 puff (has no administration in time range)   0.9 % sodium chloride infusion (has no administration in time range)   sodium chloride flush 0.9 % injection 10 mL (has no administration in time range)   sodium chloride flush 0.9 % injection 10 mL (has no administration in time range)   0.9 % sodium chloride infusion (has no administration in time range)   promethazine (PHENERGAN) tablet 12.5 mg (has no administration in time range)     Or   ondansetron (ZOFRAN) injection 4 mg (has no administration in time range)   magnesium hydroxide (MILK OF MAGNESIA) 400 MG/5ML suspension 30 mL (has no administration in time range)   acetaminophen (TYLENOL) tablet 650 mg (has no administration in time range)     Or   acetaminophen (TYLENOL) suppository 650 mg (has no administration in time range)   heparin (porcine) injection 5,440 Units (has no administration in time range)   heparin (porcine) injection 5,440 Units (has no administration in time range)   heparin (porcine) injection 2,720 Units (has no administration in time range)   heparin 25,000 units in dextrose 5% 250 mL (premix) infusion (has no administration in time range)   hydrALAZINE (APRESOLINE) injection 10 mg (has no administration in time range)   0.9 % sodium chloride bolus (has no administration in time range) potassium chloride (KLOR-CON M) extended release tablet 40 mEq (has no administration in time range)     Or   potassium bicarb-citric acid (EFFER-K) effervescent tablet 40 mEq (has no administration in time range)     Or   potassium chloride 10 mEq/100 mL IVPB (Peripheral Line) (has no administration in time range)   iopamidol (ISOVUE-370) 76 % injection 170 mL (120 mLs Intravenous Given 4/30/21 1305)   heparin (porcine) injection 5,440 Units (5,440 Units Intravenous Given 4/30/21 1439)           Briefly, this is a 67-year-old male who presents emergency department today for evaluation for left leg pain. The patient states that he has been experiencing pain to his left calf, and he states that this has been ongoing for the past 3 weeks. He states he notices the pain when he walks long distances. He is a smoker. On physical exam, there is no reproducible bony tenderness of either leg. The left foot is somewhat purplish in hue when compared to the right, some coldness to the left foot when compared to the right, unable to palpate pulses and/or find with Doppler on the left, posterior tibialis pulse on the right is found with Doppler and is able to be palpated. Patient was given morphine for pain and did have improvement of pain especially with rest.    CBC shows no evidence of leukocytosis or anemia. CMP is unremarkable. Coags are unremarkable. CTA was obtained which shows a complete occlusion of the mid right superficial femoral artery with continued occlusion of the right popliteal artery and entirety of the right lower extremity runoff anatomy. He also has complete occlusion of the left superficial femoral artery at its origin reconstituted by collateral flow    I did discuss the case with Dr. Reed Nielsen, vascular surgery, who agrees with heparin, and admission to Dr. Kiran Finn, Dr. Kiran Finn has agreed for admission, patient is updated and agreeable with plan.   Stable for admission    FINAL IMPRESSION      1. Superficial femoral artery occlusion (HCC)    2. PAD (peripheral artery disease) ([address]    3. Popliteal artery occlusion, right [facility])          DISPOSITION/PLAN   DISPOSITION Admitted 04/30/2021 04:31:15 PM      PATIENT REFERREDTO:  No follow-up provider specified.     DISCHARGE MEDICATIONS:  Current Discharge Medication List          DISCONTINUED MEDICATIONS:  Current Discharge Medication List                 (Please note that portions of this note were completed with a voice recognition program.  Efforts were made to edit the dictations but occasionally words are mis-transcribed.)    Susi Linder PA-C (electronically signed)            Susi Linder PA-C  04/30/21 3063

## 2021-04-30 NOTE — ED PROVIDER NOTES
I independently performed a history and physical on Se Hinson. All diagnostic, treatment, and disposition decisions were made by myself in conjunction with the advanced practice provider. Briefly, this is a 80 y.o. male here for Claudication on ambulation for past few weeks, progressing, with discoloration. On exam, absent pulses left lower extremity with intact incision range of motion but dopplerable pulses. Cool to the touch but not cold. Slow but intact cap refill. EKG        Screenings                   MDM  PAD affecting LLE, concern for arterial occlusion. Checking labs/imaging and discussing with vascular after. Patient Referrals:  No follow-up provider specified. Discharge Medications:  New Prescriptions    No medications on file       FINAL IMPRESSION  1. Superficial femoral artery occlusion (HCC)    2. PAD (peripheral artery disease) ([facility]    3. Popliteal artery occlusion, right (HCC)        Blood pressure (!) 173/92, pulse 76, resp. rate 18, weight 150 lb (68 kg). For further details of [address] emergency department encounter, please see documentation by advanced practice provider, Theodore Muniz. [facility] Records        Neno Scruggs MD  04/30/21 8146

## 2021-04-30 NOTE — H&P
History and Physical  Dr. Linda Almeida  4/30/2021    PCP: Ofelia Garcia III, DO    Cc:   Chief Complaint   Patient presents with    Leg Pain     Pt reports pain to left calf, sent by urgent care for \"possible DVT\"        HPI:  Ezzie Cushing is a 80 y.o. male who has a past medical history of Bronchitis, CHF (congestive heart failure) ([address] and COPD (chronic obstructive pulmonary disease) ([address] Patient presents with Superficial occlusion of femoral artery ([address] HPI      80 y.o. male who presents to the emergency department today for evaluation for left leg pain. The patient states that he has been experiencing pain to his left calf, and he states that this actually has been ongoing for the past 3 weeks. The patient states that he will only experience pain to his calf, particularly with walking long distances. The patient states he does not really notice any pain with short distances. The patient states that each day his pain seems to be progressively worse. The patient states that he also get a \"numbness and tingling\" pain to his left leg only, and he states that this occurs when he is on his feet. He states that if he elevates his leg on a pillow, his symptoms resolved. Patient states that he went to urgent care today, he was sent to the emergency room for further care of a possible DVT. Patient states that he does have pain to his left leg since walking from the car to the bed. Patient is currently rating his pain as a 10/10. cta with runoff is reviewed by self   Impression:       1. Complete occlusion of the mid right superficial femoral artery, with   continued occlusion of the right popliteal artery and the entirety of the   right lower extremity runoff anatomy, and no evidence of reconstitution.    2. Complete occlusion of the left superficial femoral artery at its origin,   which is reconstituted by collateral flow at the level of the distal left   superficial femoral artery just proximal to the Smoking Frequency  1 pack/day for 30 years (30 pk yrs) Smoking Tobacco Type  Cigarettes    Smokeless Tobacco Use  Former User Quit date  9/5/2016          Alcohol History     Alcohol Use Status  Yes Drinks/Week  1-3 Cans of beer per week Amount  1.0 - 3.0 standard drinks of alcohol/wk Comment  daily          Drug Use     Drug Use Status  No          Sexual Activity     Sexually Active  Yes Partners  Female                Fam History:   Family History   Problem Relation Age of Onset    Heart Disease Mother     No Known Problems Father        PFSH: The above PMHx, PSHx, SocHx, FamHx has been reviewed by myself. (1 area for detailed, 2-3 for comprehensive)      Code Status: Prior    Meds - following list of home medications fromelectronic chart has been reviewed by myself  Prior to Admission medications    Medication Sig Start Date End Date Taking?  Authorizing Provider   umeclidinium-vilanterol (ANORO ELLIPTA) 62.5-25 MCG/INH AEPB inhaler Inhale 1 puff into the lungs daily   Yes Historical Provider, MD   sacubitril-valsartan (ENTRESTO)  MG per tablet Take 1 tablet by mouth 2 times daily   Yes Historical Provider, MD   albuterol sulfate  (90 Base) MCG/ACT inhaler  4/16/21  Yes Historical Provider, MD   Multiple Vitamin (MULTIVITAMIN ADULT PO) Take by mouth   Yes Historical Provider, MD   fluticasone-umeclidin-vilant (TRELEGY ELLIPTA) 100-62.5-25 MCG/INH AEPB Inhale 1 puff into the lungs daily 3/9/21  Yes Aden Ochoa MD   ENTRESTO  MG per tablet TAKE ONE TABLET BY MOUTH TWICE A DAY 4/9/20  Yes Michele Nicholas MD   carvedilol (COREG) 6.25 MG tablet Take 1 tablet by mouth 2 times daily (with meals) 5/8/19  Yes CHELSY Crouch CNP   albuterol (ACCUNEB) 1.25 MG/3ML nebulizer solution Inhale 3 mLs into the lungs every 6 hours as needed for Wheezing 3/9/21   Aden Ochoa MD         Allergies   Allergen Reactions    Codeine Anxiety and Other (See Comments)     Unknown reaction Laboratory  [address], [address]   Phone [phone]   CBC   APTT   APTT   APTT         IMAGING:  Imaging results from the ER have been reviewed in the computerized chart. Echo Complete 2d W Doppler W Color    Result Date: 4/1/2021  Transthoracic Echocardiography Report (TTE)  Demographics   Patient Name       Jeffrey Fernandes   Date of Study      04/01/2021         Gender              Male   Patient Number     [id number]         Date of Birth       1950   Visit Number       [id number]          Age                 72 year(s)   Accession Number   [id number]         Room Number         OP   Corporate ID       [id number]           Sonographer         Jose Florence, [facility]   Ordering Physician Adore Taylor MD,                     Armand Amaya       Physician           [address] [facility], [address]  Procedure Type of Study   TTE procedure:ECHOCARDIOGRAM COMPLETE 2D W DOPPLER W COLOR. Procedure Date Date: 04/01/2021 Start: 01:16 PM Study Location: [facility] - Echo Lab Technical Quality: Limited visualization due to body habitus. Additional Indications:Chronic systolic congestive heart failure. Patient Status: Routine Height: 71 inches Weight: 146 pounds BSA: 1.84 m2 BMI: 20.36 kg/m2 BP: 136/87 mmHg  Conclusions   Summary  -Technically difficult exam due to body habitus. -Moderately reduced global systolic function with an ejection fraction  estimated at 40%. -Global hypokinesis with abnormal septal motion noted. -Grade II diastolic dysfunction with elevated LV filling pressures. Avg.  E/e'=5.4  -Normal function of all valves.    Signature   ------------------------------------------------------------------  Electronically signed by Laxmi Galvez MD, [address], [address]  (Interpreting physician) on 04/01/2021 at 02:53 PM  ------------------------------------------------------------------   Findings   Left Ventricle  Normal left ventricular cavity size and wall thickness. Moderately reduced global systolic function with an ejection fraction  estimated at 40%. Global hypokinesis with abnormal septal motion noted. Grade II diastolic dysfunction with elevated LV filling pressures. Avg.  E/e'=5.4   Mitral Valve  The mitral valve normal in structure and function. No evidence of mitral regurgitation or stenosis. Left Atrium  The left atrium is normal in size. Aortic Valve  The aortic valve is structurally normal. There is no significant aortic  valve regurgitation or stenosis. Aorta  The aortic root is normal in size. Right Ventricle  The right ventricle is normal in size and function. TAPSE is estimated at  2.17 cm. Tricuspid Valve  The tricuspid valve is normal in structure and function. There is no  significant tricuspid valve regurgitation or stenosis. Right Atrium  The right atrial size is normal.   Pulmonic Valve  The pulmonic valve is not well visualized. Pericardial Effusion  No pericardial effusion noted. Pleural Effusion  No pleural effusion. Miscellaneous  The inferior vena cava appears normal in size with normal respiratory  variation.   M-Mode/2D Measurements (cm)   LV Diastolic Dimension: 8.25 cm LV Systolic Dimension: 5.54 cm  LV Septum Diastolic: 6.93 cm  LV PW Diastolic: 6.57 cm        AO Root Dimension: 2.6 cm                                  LA Dimension: 3 cm                                  LA Area: 10.7 cm2  LVOT: 2.1 cm                    LA volume/Index: 23.7 ml /13 ml/m2  Doppler Measurements   AV Peak Velocity: 108 cm/s    MV Peak E-Wave: 52.7 cm/s  AV Peak Gradient: 4.67 mmHg   MV Peak A-Wave: 60.8 cm/s  AV Mean Gradient: 3 mmHg      MV E/A Ratio: 0.87  LVOT Peak Velocity: 77 cm/s   MV P1/2t: 68 msec  AV Area (Continuity):2.46 cm2                                 MV Deceleration Time: 232 msec  E' Septal Velocity: 9.57 cm/s MV Area (PHT): 3.24 cm2  E' Lateral Velocity: 10 cm/s Aortic Valve   Peak Velocity: 108 cm/s     Mean Velocity: 76 cm/s  Peak Gradient: 4.67 mmHg    Mean Gradient: 3 mmHg  Area (continuity): 2.46 cm2  AV VTI: 25 cm  Aorta   Aortic Root: 2.6 cm  Ascending Aorta: 3 cm  LVOT Diameter: 2.1 cm      Cta Abdominal Aorta W Bilat Runoff W Contrast    Result Date: 4/30/2021  EXAMINATION: CTA OF THE AORTA WITH LOWER EXTREMITY RUNOFF 4/30/2021 1:05 pm TECHNIQUE: CTA of the pelvis and bilateral lower extremities was performed after the administration of intravenous contrast.   Multiplanar reformatted images are provided for review. MIP images are provided for review. Dose modulation, iterative reconstruction, and/or weight based adjustment of the mA/kV was utilized to reduce the radiation dose to as low as reasonably achievable. COMPARISON: No prior abdominal CT study for comparison. Comparison made to prior chest CTs dated 11/10/2020, 01/11/2020, and 09/30/2016. HISTORY: ORDERING SYSTEM PROVIDED HISTORY: L leg pain, left foot cool r/o arterial occlusion TECHNOLOGIST PROVIDED HISTORY: Reason for exam:->L leg pain, left foot cool r/o arterial occlusion Reason for Exam: L leg pain, left foot cool r/o arterial occlusion Acuity: Acute Type of Exam: Initial FINDINGS: Nonvascular Lower Chest: There is bibasilar emphysema. Lung bases are otherwise clear. Organs: Patient is status post cholecystectomy. The liver is unremarkable. There are calcified splenic granulomata. The spleen is otherwise unremarkable. Pancreas normal.  Adrenal glands are unremarkable bilaterally. There a few small bilateral renal cysts. The bilateral kidneys are otherwise unremarkable, without inflammatory change, renal/ureteral calculus, or hydronephrosis. GI/Bowel: Evaluation of the hollow GI tract demonstrates no evidence of abnormal bowel wall thickening, dilatation or obstruction. The appendix is normal. Pelvis: The urinary bladder is normal.  There is mild prostatomegaly.   There is no free pelvic fluid or pathologic pelvic lymphadenopathy. Peritoneum/Retroperitoneum: No intraperitoneal free air or free fluid is identified. No pathologic lymphadenopathy is seen. No significant abdominal hernia is evident. Bones/Soft Tissues: There is multilevel degenerative change throughout the lumbar spine. No osteolytic or osteoblastic lesion is seen. VASCULAR AORTA/INFLOW: There is mild-to-moderate atherosclerotic disease of the abdominal aorta, without evidence of stenosis, aneurysm, or dissection. The celiac axis and its branch vessels are widely patent. The SMA is patent throughout its course. The BECKA is patent. Patent renal arteries are noted bilaterally. There is atherosclerotic disease of the bilateral common iliac arteries, with minimal to mild aneurysmal dilatation of the common iliac arteries, measuring 13 mm on the left, and 11 mm on the right. There is atherosclerotic disease of the bilateral internal and external iliac arteries, without evidence of stenosis or aneurysm. OUTFLOW: On the right, there is atherosclerotic disease of the common femoral artery, without stenosis or aneurysm. Right deep femoral artery is patent throughout its course. There is atherosclerotic disease of the superficial femoral artery, which becomes completely occluded at its mid portion. The remainder of the mid and distal superficial femoral artery as well as the popliteal artery are completely occluded without evidence of reconstitution by collateral flow. On the left, there is mild atherosclerotic disease of the common femoral artery, without evidence of stenosis or aneurysm. The left deep femoral artery is patent throughout its course. There is complete occlusion of the left superficial femoral artery at its origin, which remains occluded throughout its course, until it is reconstituted by collateral flow at its distal aspect, just above the left knee.   Collateral flow is likely supplied by branches of the deep femoral artery. There is atherosclerotic disease and a mild approximate 50% stenosis of the left popliteal artery. RUNOFF ANATOMY: On the right, there is complete three-vessel occlusion of the right lower extremity runoff anatomy. On the left, there appears to be patent three-vessel runoff to the left lower extremity. 1. Complete occlusion of the mid right superficial femoral artery, with continued occlusion of the right popliteal artery and the entirety of the right lower extremity runoff anatomy, and no evidence of reconstitution. 2. Complete occlusion of the left superficial femoral artery at its origin, which is reconstituted by collateral flow at the level of the distal left superficial femoral artery just proximal to the knee. Patent three-vessel runoff to the left lower extremity. 3. No acute process within the abdomen or pelvis. EKG: from ER, interpreted by self, sinus rhythm at 82. No acute st elevation noted. Comparison made to ekg in old chart dated 9/4/16, shows similar form, rate slightly higher at [address]:    Principal Problem:    Superficial occlusion of femoral artery ([address] -New Problem to me. Plan: admit , place on iv heparin drip, consult vascular surgery  Active Problems:    Essential hypertension - Established problem. Stable. 173/92  Plan: Pt home BP meds reviewed and will be continued. IV Hydralazine ordered for control of extremely high blood pressures. Continue narcotics as adequate pain control can assist with adequate BP control. Will monitor labs to assess Creat/K for possible complications of medications. COPD, very severe ([address] -Established problem. Stable. Plan: cont inhaled tx    Hyponatremia -New Problem to me. Plan: ivf ordered, repeat Na in AM          Diagnoses as listed above, designated as new or established and plan outlined for each. Data Reviewed:   (1) Lab tests were reviewed or ordered.    (1) Radiology tests were reviewed or ordered. (1) Medical test (Echo, EKG, PFT/joseph) were ordered. (1)History was not obtained from someone other than patient  (1) Old records  were reviewed - see HPI/MDM for pertinent details if review done. (2) Case wasdiscussed with another health care provider: Ayaka HOSKINS  (2) Imaging was viewed by myself. (2) EKG  was viewed by myself. The patient isbeing placed in inpatient status with the expectation of requiring a hospital stay spanning at least two midnights for care and treatment of the problems noted in the problem list.  This determination is also based on thepatients comorbidities and past medical history, the severity and timing of the signs and symptoms upon presentation.         Electronically signed by: Abdulaziz Zavala 4/30/2021

## 2021-04-30 NOTE — ED NOTES
Bed: 08  Expected date:   Expected time:   Means of arrival:   Comments:  [address] [place] [address] Marycarmen RN  04/30/21 2445

## 2021-05-01 VITALS
BODY MASS INDEX: 19.78 KG/M2 | WEIGHT: 141.31 LBS | DIASTOLIC BLOOD PRESSURE: 91 MMHG | RESPIRATION RATE: 24 BRPM | SYSTOLIC BLOOD PRESSURE: 113 MMHG | OXYGEN SATURATION: 96 % | TEMPERATURE: 97.6 F | HEIGHT: 71 IN | HEART RATE: 92 BPM

## 2021-05-01 LAB
A/G RATIO: 1.2 (ref 1.1–2.2)
ALBUMIN SERPL-MCNC: 3.4 G/DL (ref 3.4–5)
ALP BLD-CCNC: 54 U/L (ref 40–129)
ALT SERPL-CCNC: 11 U/L (ref 10–40)
ANION GAP SERPL CALCULATED.3IONS-SCNC: 7 MMOL/L (ref 3–16)
APTT: 55.8 SEC (ref 24.2–36.2)
AST SERPL-CCNC: 17 U/L (ref 15–37)
BASOPHILS ABSOLUTE: 0.1 K/UL (ref 0–0.2)
BASOPHILS RELATIVE PERCENT: 0.8 %
BILIRUB SERPL-MCNC: <0.2 MG/DL (ref 0–1)
BUN BLDV-MCNC: 13 MG/DL (ref 7–20)
CALCIUM SERPL-MCNC: 8.6 MG/DL (ref 8.3–10.6)
CHLORIDE BLD-SCNC: 100 MMOL/L (ref 99–110)
CO2: 26 MMOL/L (ref 21–32)
CREAT SERPL-MCNC: 0.8 MG/DL (ref 0.8–1.3)
EKG ATRIAL RATE: 82 BPM
EKG DIAGNOSIS: NORMAL
EKG P AXIS: 81 DEGREES
EKG P-R INTERVAL: 154 MS
EKG Q-T INTERVAL: 360 MS
EKG QRS DURATION: 80 MS
EKG QTC CALCULATION (BAZETT): 420 MS
EKG R AXIS: 66 DEGREES
EKG T AXIS: 82 DEGREES
EKG VENTRICULAR RATE: 82 BPM
EOSINOPHILS ABSOLUTE: 1.4 K/UL (ref 0–0.6)
EOSINOPHILS RELATIVE PERCENT: 17.7 %
GFR AFRICAN AMERICAN: >60
GFR NON-AFRICAN AMERICAN: >60
GLOBULIN: 2.9 G/DL
GLUCOSE BLD-MCNC: 102 MG/DL (ref 70–99)
HCT VFR BLD CALC: 39.4 % (ref 40.5–52.5)
HEMOGLOBIN: 13 G/DL (ref 13.5–17.5)
INR BLD: 1.09 (ref 0.86–1.14)
LYMPHOCYTES ABSOLUTE: 2.3 K/UL (ref 1–5.1)
LYMPHOCYTES RELATIVE PERCENT: 29.8 %
MCH RBC QN AUTO: 31 PG (ref 26–34)
MCHC RBC AUTO-ENTMCNC: 33 G/DL (ref 31–36)
MCV RBC AUTO: 94 FL (ref 80–100)
MONOCYTES ABSOLUTE: 0.8 K/UL (ref 0–1.3)
MONOCYTES RELATIVE PERCENT: 10.1 %
NEUTROPHILS ABSOLUTE: 3.3 K/UL (ref 1.7–7.7)
NEUTROPHILS RELATIVE PERCENT: 41.6 %
PDW BLD-RTO: 12.7 % (ref 12.4–15.4)
PLATELET # BLD: 383 K/UL (ref 135–450)
PMV BLD AUTO: 7.2 FL (ref 5–10.5)
POTASSIUM REFLEX MAGNESIUM: 4.8 MMOL/L (ref 3.5–5.1)
PROTHROMBIN TIME: 12.6 SEC (ref 10–13.2)
RBC # BLD: 4.19 M/UL (ref 4.2–5.9)
SODIUM BLD-SCNC: 133 MMOL/L (ref 136–145)
TOTAL PROTEIN: 6.3 G/DL (ref 6.4–8.2)
WBC # BLD: 7.8 K/UL (ref 4–11)

## 2021-05-01 PROCEDURE — 6370000000 HC RX 637 (ALT 250 FOR IP): Performed by: INTERNAL MEDICINE

## 2021-05-01 PROCEDURE — 2580000003 HC RX 258: Performed by: INTERNAL MEDICINE

## 2021-05-01 PROCEDURE — 93010 ELECTROCARDIOGRAM REPORT: CPT | Performed by: INTERNAL MEDICINE

## 2021-05-01 PROCEDURE — 80053 COMPREHEN METABOLIC PANEL: CPT

## 2021-05-01 PROCEDURE — G0378 HOSPITAL OBSERVATION PER HR: HCPCS

## 2021-05-01 PROCEDURE — 99221 1ST HOSP IP/OBS SF/LOW 40: CPT | Performed by: STUDENT IN AN ORGANIZED HEALTH CARE EDUCATION/TRAINING PROGRAM

## 2021-05-01 PROCEDURE — 85610 PROTHROMBIN TIME: CPT

## 2021-05-01 PROCEDURE — 85025 COMPLETE CBC W/AUTO DIFF WBC: CPT

## 2021-05-01 PROCEDURE — 85730 THROMBOPLASTIN TIME PARTIAL: CPT

## 2021-05-01 RX ORDER — ATORVASTATIN CALCIUM 20 MG/1
20 TABLET, FILM COATED ORAL DAILY
Qty: 90 TABLET | Refills: 0 | Status: SHIPPED | OUTPATIENT
Start: 2021-05-01 | End: 2021-09-15

## 2021-05-01 RX ORDER — HYDROCODONE BITARTRATE AND ACETAMINOPHEN 5; 325 MG/1; MG/1
1 TABLET ORAL EVERY 6 HOURS PRN
Qty: 20 TABLET | Refills: 0 | Status: SHIPPED | OUTPATIENT
Start: 2021-05-01 | End: 2021-05-06

## 2021-05-01 RX ORDER — CARVEDILOL 6.25 MG/1
6.25 TABLET ORAL 2 TIMES DAILY WITH MEALS
Qty: 60 TABLET | Refills: 0 | Status: SHIPPED | OUTPATIENT
Start: 2021-05-01 | End: 2022-07-22

## 2021-05-01 RX ORDER — ASPIRIN 325 MG
325 TABLET, DELAYED RELEASE (ENTERIC COATED) ORAL DAILY
Qty: 90 TABLET | Refills: 0 | Status: SHIPPED | OUTPATIENT
Start: 2021-05-01 | End: 2022-03-11 | Stop reason: ALTCHOICE

## 2021-05-01 RX ADMIN — SACUBITRIL AND VALSARTAN 1 TABLET: 97; 103 TABLET, FILM COATED ORAL at 08:18

## 2021-05-01 RX ADMIN — SODIUM CHLORIDE: 9 INJECTION, SOLUTION INTRAVENOUS at 08:41

## 2021-05-01 RX ADMIN — CARVEDILOL 6.25 MG: 6.25 TABLET, FILM COATED ORAL at 08:18

## 2021-05-01 ASSESSMENT — ENCOUNTER SYMPTOMS
COUGH: 0
SHORTNESS OF BREATH: 1
VOMITING: 0
PHOTOPHOBIA: 0
NAUSEA: 0
BACK PAIN: 0
CHOKING: 0
EYE PAIN: 0

## 2021-05-01 ASSESSMENT — PAIN SCALES - GENERAL
PAINLEVEL_OUTOF10: 0
PAINLEVEL_OUTOF10: 0

## 2021-05-01 NOTE — PLAN OF CARE
Problem: Cardiovascular  Goal: Hemodynamic stability  Outcome: Ongoing     Problem: Respiratory  Goal: No pulmonary complications  Outcome: Ongoing  Goal: O2 Sat > 90%  Outcome: Ongoing     Problem: Pain Control  Goal: Maintain pain level at or below patient's acceptable level (or 5 if patient is unable to determine acceptable level)  Outcome: Ongoing     Vitals:    04/30/21 1801   BP: (!) 140/87   Pulse: 89   Resp: 16   Temp: 96.6 °F (35.9 °C)   SpO2: 98%     ED called at 1730 and gave report from [id number]. RN down to ED to bring pt to CVU. Patient admitted from emergency department via wheelchair on CVU's telemetry with wife at bedside. Transferred pt from ED stretcher to W/C. Transferred pt via w/c to CVU bed 8 with all personal belongings. Wife with patient. Transferred pt to bed; pt tolerated well, gait steady. Placed on monitor/hardwire and telemetry given back to monitor tech Jasmene. Vital signs obtained at 1801. Pt denied pain. Orders reviewed and acknowledged. Oriented to room and environment. Questions answered. Bed placed in low position. Call light explained and within reach.

## 2021-05-01 NOTE — CONSULTS
[facility]  Consultation Note    Chief Complaint / Reason for Consultation  Left calf pain with ambulation    History of Present Illness  Patient is a 80 y.o. male presenting with left calf pain with ambulation. His symptoms began roughly 3-4 weeks ago. He does not endorse rest pain or tissue loss. He is an occasional smoker with known COPD. He does occasionally get short of breath with ambulation but no chest pain. He was sent by urgent care to rule out DVT and instead found to have left SFA occlusion on CTA. Otherwise he has no complaints. Review of Systems  Review of Systems   Constitutional: Negative for activity change, chills and fever. Eyes: Negative for visual disturbance. Respiratory: Positive for shortness of breath. Cardiovascular: Negative for chest pain and leg swelling. Musculoskeletal: Positive for myalgias. Negative for back pain. Skin: Negative for wound. Neurological: Negative for weakness and numbness. Hematological: Does not bruise/bleed easily. Psychiatric/Behavioral: Negative for agitation.         Past Medical History:   Diagnosis Date    Bronchitis     [facility]     COPD (chronic obstructive pulmonary disease) ([facility])        Past Surgical History:   Procedure Laterality Date    CHOLECYSTECTOMY, LAPAROSCOPIC N/A 3/17/2021    LAPAROSCOPIC CHOLECYSTECTOMY WITH INTRAOPERATIVE CHOLANGIOGRAM performed by Deloris Kowalski MD at [facility]      bilateral cataracts    FRACTURE SURGERY      LT elbow    TONSILLECTOMY         Allergies   Allergen Reactions    Codeine Anxiety and Other (See Comments)     Unknown reaction       Social History     Socioeconomic History    Marital status:      Spouse name: Bobby Baxter    Number of children: 2    Years of education: 15    Highest education level: Not on file   Occupational History    Not on file   Social Needs    Financial resource strain: Not on file    Food insecurity     Worry: Not on file     Inability: Not on file    Transportation needs     Medical: Not on file     Non-medical: Not on file   Tobacco Use    Smoking status: Former Smoker     Packs/day: 1.00     Years: 30.00     Pack years: 30.00     Types: Cigarettes     Quit date: 2016     Years since quittin.6    Smokeless tobacco: Former User     Quit date: 2016   Substance and Sexual Activity    Alcohol use:  Yes     Alcohol/week: 1.0 - 3.0 standard drinks     Types: 1 - 3 Cans of beer per week     Comment: daily    Drug use: No    Sexual activity: Yes     Partners: Female     Comment: monogamous   Lifestyle    Physical activity     Days per week: Not on file     Minutes per session: Not on file    Stress: Not on file   Relationships    Social connections     Talks on phone: Not on file     Gets together: Not on file     Attends Rastafarian service: Not on file     Active member of club or organization: Not on file     Attends meetings of clubs or organizations: Not on file     Relationship status: Not on file    Intimate partner violence     Fear of current or ex partner: Not on file     Emotionally abused: Not on file     Physically abused: Not on file     Forced sexual activity: Not on file   Other Topics Concern    Not on file   Social History Narrative    Not on file       Family History   Problem Relation Age of Onset    Heart Disease Mother     No Known Problems Father      - No history of bleeding or clotting disorders    Vital Signs  Vitals:    21 0434 21 0435 21 0715 21 0716   BP: (!) 113/91      Pulse: 92      Resp: 24      Temp: 97 °F (36.1 °C)  97.6 °F (36.4 °C)    TempSrc: Temporal  Temporal    SpO2: 95%   96%   Weight:  141 lb 5 oz (64.1 kg)     Height:           Physical Examination  General: No acute distress  Psychiatric: affect appropriate  Head/Eyes/Ears/Nose/Throat:  Atraumatic, vision and hearing intact, face symmetric  Neck: supple  Chest/Lungs: no tachypnea, retractions or cyanosis noted  Cardiac:  Regular rate and rhythm  Abdomen: soft, nontender  Extremities: warm and well perfused  - bilateral upper extremity motorsensory intact  - bilateral lower extremity motorsensory intact  Vascular exam:  - R femoral: palp  - L femoral: palp  - R DP: non palp  - L DP: non palp  - R PT: palp  - L PT: non palp  - Radial: palp  Skin: no wounds    Labs  Lab Results   Component Value Date    WBC 7.8 05/01/2021    HGB 13.0 05/01/2021    HCT 39.4 05/01/2021    MCV 94.0 05/01/2021     05/01/2021     Lab Results   Component Value Date     05/01/2021    K 4.8 05/01/2021     05/01/2021    CO2 26 05/01/2021    PHOS 3.7 09/19/2016    BUN 13 05/01/2021    CREATININE 0.8 05/01/2021      No components found for: GLU    Imaging: CTA - personally reviewed, no flow in right leg beyond SFA likely timing issue, left SFA occlusion with recon    Assessment:   Claudication      Plan:  Patient is a 80 y.o. male presenting with claudication symptoms without evidence of acute limb threat. Would recommend outpatient follow up for this as this is not likely to evolve acutely. Would ensure maximal medical therapy with aspirin, statin prior to discharge. Please arrange follow up with Dr. Soumya Sprague in 1-2 weeks. Please call with any questions or concerns. Thank you for the consultation.     Dong Santos DO, [facility]  5/1/2021  8:38 AM

## 2021-05-01 NOTE — DISCHARGE SUMMARY
[facility] -- Physician Discharge Summary     Marquita Leyva  [birth year]  MRN: [id number]    Admit Date: 4/30/2021  Discharge Date: No discharge date for patient encounter. Attending MD: Kala Peters MD  Discharging MD: Kala Peters MD  PCP: DO Leigh Ann Beckham / Solange Gillespie [id number] [phone]    Admission Diagnosis: Superficial occlusion of femoral artery ([address] [I70.209]  DISCHARGE DIAGNOSIS: same    Full Hospital Problem List:  Active Hospital Problems    Diagnosis Date Noted    Superficial occlusion of femoral artery ([address] [I70.209] 04/30/2021    Hyponatremia [E87.1] 04/30/2021    COPD, very severe ([address] [J44.9] 12/18/2017    Essential hypertension [I10] 11/24/2016           Hospital Course:   66 y. o. male who presents to the emergency department today for evaluation for left leg pain.  The patient states that he has been experiencing pain to his left calf, and he states that this actually has been ongoing for the past 3 weeks.  The patient states that he will only experience pain to his calf, particularly with walking long distances.  The patient states he does not really notice any pain with short distances.  The patient states that each day his pain seems to be progressively worse.  The patient states that he also get a \"numbness and tingling\" pain to his left leg only, and he states that this occurs when he is on his feet.  He states that if he elevates his leg on a pillow, his symptoms resolved.  Patient states that he went to urgent care today, he was sent to the emergency room for further care of a possible DVT.  Patient states that he does have pain to his left leg since walking from the car to the bed.  Patient is currently rating his pain as a 10/10.     cta with runoff is reviewed by self    Impression:       1.  Complete occlusion of the mid right superficial femoral artery, with   continued occlusion of the right popliteal artery and the entirety of the   right lower extremity runoff anatomy, and no evidence of reconstitution. 2. Complete occlusion of the left superficial femoral artery at its origin,   which is reconstituted by collateral flow at the level of the distal left   superficial femoral artery just proximal to the knee.  Patent three-vessel   runoff to the left lower extremity. 3. No acute process within the abdomen or pelvis.          Vasc surgery is consulted  They recommend heparin drip, pain control      Per Vasc surg eval:  Plan:  Patient is a 80 y.o. male presenting with claudication symptoms without evidence of acute limb threat. Would recommend outpatient follow up for this as this is not likely to evolve acutely. Would ensure maximal medical therapy with aspirin, statin prior to discharge. Please arrange follow up with Dr. Whitley Horner in 1-2 weeks. Pt to be given rx for asa, atorvastatin, norco.  To see PCP/Grabielni in 1-2 wk    Consults made during Hospitalization:  IP CONSULT TO VASCULAR SURGERY  IP CONSULT TO PRIMARY CARE PROVIDER  IP CONSULT TO VASCULAR SURGERY    Treatment team at time of Discharge: Treatment Team: Attending Provider: Vidya Davis MD; Consulting Physician: Vidya Davis MD; Consulting Physician: Nirmala Hampton MD; Registered Nurse: Cass Roberts RN; Respiratory Therapist (Day): Benoit Russo RCP;  Registered Nurse: Parul Greco RN; Registered Nurse: Barbara Bradford RN; Utilization Reviewer: Petrona Childers RN    Imaging Results:  Echo Complete 2d W Doppler W Color    Result Date: 4/1/2021  Transthoracic Echocardiography Report (TTE)  Demographics   Patient Name       Grant Sales   Date of Study      04/01/2021         Gender              Male   Patient Number     [id number]         Date of Birth       1950   Visit Number       [id number]          Age                 72 year(s)   Accession Number   [id number]         Room Number         OP   Corporate ID       [id number]           [id number] Wendi Lennox, RD, RVT   Ordering Physician Laura Bates MD,                     Elsi Yates, [address] [facility], [address]  Procedure Type of Study   TTE procedure:ECHOCARDIOGRAM COMPLETE 2D W DOPPLER W COLOR. Procedure Date Date: 04/01/2021 Start: 01:16 PM Study Location: [facility] - Echo Lab Technical Quality: Limited visualization due to body habitus. Additional Indications:Chronic systolic congestive heart failure. Patient Status: Routine Height: 71 inches Weight: 146 pounds BSA: 1.84 m2 BMI: 20.36 kg/m2 BP: 136/87 mmHg  Conclusions   Summary  -Technically difficult exam due to body habitus. -Moderately reduced global systolic function with an ejection fraction  estimated at 40%. -Global hypokinesis with abnormal septal motion noted. -Grade II diastolic dysfunction with elevated LV filling pressures. Avg.  E/e'=5.4  -Normal function of all valves. Signature   ------------------------------------------------------------------  Electronically signed by Matty Renee MD, [facility], [address]  (Interpreting physician) on 04/01/2021 at 02:53 PM  ------------------------------------------------------------------   Findings   Left Ventricle  Normal left ventricular cavity size and wall thickness. Moderately reduced global systolic function with an ejection fraction  estimated at 40%. Global hypokinesis with abnormal septal motion noted. Grade II diastolic dysfunction with elevated LV filling pressures. Avg.  E/e'=5.4   Mitral Valve  The mitral valve normal in structure and function. No evidence of mitral regurgitation or stenosis. Left Atrium  The left atrium is normal in size. Aortic Valve  The aortic valve is structurally normal. There is no significant aortic  valve regurgitation or stenosis. Aorta  The aortic root is normal in size.    Right Ventricle  The right ventricle is normal in size and function. TAPSE is estimated at  2.17 cm. Tricuspid Valve  The tricuspid valve is normal in structure and function. There is no  significant tricuspid valve regurgitation or stenosis. Right Atrium  The right atrial size is normal.   Pulmonic Valve  The pulmonic valve is not well visualized. Pericardial Effusion  No pericardial effusion noted. Pleural Effusion  No pleural effusion. Miscellaneous  The inferior vena cava appears normal in size with normal respiratory  variation. M-Mode/2D Measurements (cm)   LV Diastolic Dimension: 3.13 cm LV Systolic Dimension: 1.36 cm  LV Septum Diastolic: 4.05 cm  LV PW Diastolic: 7.04 cm        AO Root Dimension: 2.6 cm                                  LA Dimension: 3 cm                                  LA Area: 10.7 cm2  LVOT: 2.1 cm                    LA volume/Index: 23.7 ml /13 ml/m2  Doppler Measurements   AV Peak Velocity: 108 cm/s    MV Peak E-Wave: 52.7 cm/s  AV Peak Gradient: 4.67 mmHg   MV Peak A-Wave: 60.8 cm/s  AV Mean Gradient: 3 mmHg      MV E/A Ratio: 0.87  LVOT Peak Velocity: 77 cm/s   MV P1/2t: 68 msec  AV Area (Continuity):2.46 cm2                                 MV Deceleration Time: 232 msec  E' Septal Velocity: 9.57 cm/s MV Area (PHT): 3.24 cm2  E' Lateral Velocity: 10 cm/s   Aortic Valve   Peak Velocity: 108 cm/s     Mean Velocity: 76 cm/s  Peak Gradient: 4.67 mmHg    Mean Gradient: 3 mmHg  Area (continuity): 2.46 cm2  AV VTI: 25 cm  Aorta   Aortic Root: 2.6 cm  Ascending Aorta: 3 cm  LVOT Diameter: 2.1 cm      Cta Abdominal Aorta W Bilat Runoff W Contrast    Result Date: 4/30/2021  EXAMINATION: CTA OF THE AORTA WITH LOWER EXTREMITY RUNOFF 4/30/2021 1:05 pm TECHNIQUE: CTA of the pelvis and bilateral lower extremities was performed after the administration of intravenous contrast.   Multiplanar reformatted images are provided for review. MIP images are provided for review.  Dose modulation, iterative reconstruction, and/or weight based distal left superficial femoral artery just proximal to the knee. Patent three-vessel runoff to the left lower extremity. 3. No acute process within the abdomen or pelvis. Discharge Exam:  BP (!) 113/91   Pulse 92   Temp 97.6 °F (36.4 °C) (Temporal)   Resp 24   Ht 5' 11\" (1.803 m)   Wt 141 lb 5 oz (64.1 kg)   SpO2 96%   BMI 19.71 kg/m²   General appearance: alert, appears stated age and cooperative  Head: Normocephalic, without obvious abnormality, atraumatic  Lungs: clear to auscultation bilaterally  Heart: regular rate and rhythm, S1, S2 normal, no murmur, click, rub or gallop  Abdomen: soft, non-tender; bowel sounds normal; no masses,  no organomegaly  Extremities: extremities normal, atraumatic, no cyanosis or edema    Disposition: home    Condition: stable    Discharge Medications:   Darral Route   Home Medication Instructions SCK:[id number]    Printed on:05/01/21 0291   Medication Information                      albuterol (ACCUNEB) 1.25 MG/3ML nebulizer solution  Inhale 3 mLs into the lungs every 6 hours as needed for Wheezing             albuterol sulfate  (90 Base) MCG/ACT inhaler               aspirin 325 MG EC tablet  Take 1 tablet by mouth daily             atorvastatin (LIPITOR) 20 MG tablet  Take 1 tablet by mouth daily             carvedilol (COREG) 6.25 MG tablet  Take 1 tablet by mouth 2 times daily (with meals)             ENTRESTO  MG per tablet  TAKE ONE TABLET BY MOUTH TWICE A DAY             fluticasone-umeclidin-vilant (TRELEGY ELLIPTA) 100-62.5-25 MCG/INH AEPB  Inhale 1 puff into the lungs daily             HYDROcodone-acetaminophen (NORCO) 5-325 MG per tablet  Take 1 tablet by mouth every 6 hours as needed for Pain for up to 5 days.              Multiple Vitamin (MULTIVITAMIN ADULT PO)  Take 1 tablet by mouth daily              umeclidinium-vilanterol (ANORO ELLIPTA) 62.5-25 MCG/INH AEPB inhaler  Inhale 1 puff into the lungs daily Allergies: Allergies   Allergen Reactions    Codeine Anxiety and Other (See Comments)     Unknown reaction       Follow up Instructions: Follow-up with PCP: Chester Rosenthal III,  in 2 wk .       Total time spent on day of discharge including face-to-face visit, examination, documentation, counseling, preparation of discharge plans and followup, and discharge medicine reconciliation and presciptions is 36 minutes    Signed:  Jeremy Bro MD  5/1/2021

## 2021-05-01 NOTE — FLOWSHEET NOTE
Lab called now with .3 from blood drawn at 2019. Heparin stopped now at 2152; d/w pt and will restart in 1 hour at new infusion rate per protocol.

## 2021-05-01 NOTE — PROGRESS NOTES
Discharge instructions reviewed with patient and family member. Patient and family verbalized understanding. All home medications have been reviewed, questions answered and patient voiced understanding. All medication side effects reviewed and patient and family verbalized understanding. Informed to call Dr. Redd Maurice office on Monday to set an appointment within the next 2 weeks. Patient given prescriptions, discharge instructions, and appointment times. Patient discharged to home with family via private car. Taken to lobby via wheelchair.

## 2021-05-01 NOTE — FLOWSHEET NOTE
05/01/21 0439   RLE Neurovascular Assessment   Capillary Refill Less than/equal to 3 seconds   Color Appropriate for ethnicity   Temperature Warm   Sensation RLE Full sensation; No numbness; No pain; No tingling   R Post Tibial Pulse +2   R Pedal Pulse +1   LLE Neurovascular Assessment   Capillary Refill Greater than 3 seconds   Color Pale   Temperature Cool   Sensation LLE Tingling;Numbness; Full sensation   L Post Tibial Pulse Doppler   L Pedal Pulse Absent   Change in PV status. LLE cool and pale compared to earlier. Pt states L foot is now numb and tingling. Absent pulse verified by 2 RNs.

## 2021-05-17 NOTE — PATIENT INSTRUCTIONS
1. Continue current medications. 2. Next labs with PCP appointment, given slip for BMP. 3. Follow up in 6 months, earlier for worsening.
No

## 2021-05-19 ENCOUNTER — OFFICE VISIT (OUTPATIENT)
Dept: VASCULAR SURGERY | Age: 71
End: 2021-05-19
Payer: COMMERCIAL

## 2021-05-19 VITALS
BODY MASS INDEX: 20.55 KG/M2 | WEIGHT: 146.8 LBS | HEIGHT: 71 IN | SYSTOLIC BLOOD PRESSURE: 136 MMHG | DIASTOLIC BLOOD PRESSURE: 90 MMHG

## 2021-05-19 DIAGNOSIS — I70.229 REST PAIN OF LOWER EXTREMITY DUE TO ATHEROSCLEROSIS (HCC): ICD-10-CM

## 2021-05-19 DIAGNOSIS — I70.209 SUPERFICIAL OCCLUSION OF FEMORAL ARTERY (HCC): Primary | ICD-10-CM

## 2021-05-19 PROCEDURE — 99205 OFFICE O/P NEW HI 60 MIN: CPT | Performed by: SURGERY

## 2021-05-19 RX ORDER — PENTOXIFYLLINE 400 MG/1
400 TABLET, EXTENDED RELEASE ORAL DAILY
Status: ON HOLD | COMMUNITY
Start: 2021-05-12 | End: 2021-06-03 | Stop reason: HOSPADM

## 2021-05-19 NOTE — PROGRESS NOTES
Subjective:      Patient ID: Tarik Orta is a 80 y.o. male. HPI Referral from [place] [address] and [facility] for evaluation of the left calf claudication at less than 1 block as well as left foot rest pain that is described as tightness and numbness that awakens him from sleep after 2 hours and is relieved with getting up and walking. Symptoms initially precipitated a visit to a local urgent care facility where he was then referred to the [facility] emergency room for evaluation in early May. CT angiogram was performed which demonstrated a left superficial femoral artery occlusion. No h/o L foot gangrene or ulceration. Original mild claudication began several months ago and have definitely worsened. No previous vascular interventions.     Past Medical History:   Diagnosis Date    Bronchitis     CHF (congestive heart failure) ([address]     COPD (chronic obstructive pulmonary disease) ([facility])      Past Surgical History:   Procedure Laterality Date    CHOLECYSTECTOMY, LAPAROSCOPIC N/A 3/17/2021    LAPAROSCOPIC CHOLECYSTECTOMY WITH INTRAOPERATIVE CHOLANGIOGRAM performed by Rober Powell MD at [address]      bilateral cataracts    FRACTURE SURGERY      LT elbow    TONSILLECTOMY       Allergies   Allergen Reactions    Codeine Anxiety and Other (See Comments)     Unknown reaction     Current Outpatient Medications   Medication Sig Dispense Refill    atorvastatin (LIPITOR) 20 MG tablet Take 1 tablet by mouth daily 90 tablet 0    aspirin 325 MG EC tablet Take 1 tablet by mouth daily 90 tablet 0    carvedilol (COREG) 6.25 MG tablet Take 1 tablet by mouth 2 times daily (with meals) 60 tablet 0    umeclidinium-vilanterol (ANORO ELLIPTA) 62.5-25 MCG/INH AEPB inhaler Inhale 1 puff into the lungs daily      albuterol sulfate  (90 Base) MCG/ACT inhaler       Multiple Vitamin (MULTIVITAMIN ADULT PO) Take 1 tablet by mouth daily       fluticasone-umeclidin-vilant (TRELEGY ELLIPTA) 100-62.5-25 MCG/INH AEPB Inhale 1 puff into the lungs daily 3 each 3    albuterol (ACCUNEB) 1.25 MG/3ML nebulizer solution Inhale 3 mLs into the lungs every 6 hours as needed for Wheezing 360 mL 3    ENTRESTO  MG per tablet TAKE ONE TABLET BY MOUTH TWICE A  tablet 2    pentoxifylline (TRENTAL) 400 MG extended release tablet        No current facility-administered medications for this visit. Social History     Socioeconomic History    Marital status:      Spouse name: Kayla Gunter    Number of children: 2    Years of education: 15    Highest education level: Not on file   Occupational History    Not on file   Tobacco Use    Smoking status: Former Smoker     Packs/day: 1.00     Years: 30.00     Pack years: 30.00     Types: Cigarettes     Quit date: 2016     Years since quittin.7    Smokeless tobacco: Former User     Quit date: 2016   Vaping Use    Vaping Use: Never used   Substance and Sexual Activity    Alcohol use: Yes     Alcohol/week: 1.0 - 3.0 standard drinks     Types: 1 - 3 Cans of beer per week     Comment: daily    Drug use: No    Sexual activity: Yes     Partners: Female     Comment: monogamous   Other Topics Concern    Not on file   Social History Narrative    Not on file     Social Determinants of Health     Financial Resource Strain:     Difficulty of Paying Living Expenses:    Food Insecurity:     Worried About Running Out of Food in the Last Year:     Ran Out of Food in the Last Year:    Transportation Needs:     Lack of Transportation (Medical):      Lack of Transportation (Non-Medical):    Physical Activity:     Days of Exercise per Week:     Minutes of Exercise per Session:    Stress:     Feeling of Stress :    Social Connections:     Frequency of Communication with Friends and Family:     Frequency of Social Gatherings with Friends and Family:     Attends [employer] Services:     Active Member of Clubs or Organizations:     Attends [employer] popliteal 0    2   posterior tibial 0    0   dorsalis pedis 0    na   bypass graft na      CTA abd/runoff 4/30/2021 - personally reviewed and interpretated: agree with rad interpretation - area in the L popliteal behind the knee and at TPT are not well visualized  Impression   1. Complete occlusion of the mid right superficial femoral artery, with   continued occlusion of the right popliteal artery and the entirety of the   right lower extremity runoff anatomy, and no evidence of reconstitution. 2. Complete occlusion of the left superficial femoral artery at its origin,   which is reconstituted by collateral flow at the level of the distal left   superficial femoral artery just proximal to the knee.  Patent three-vessel   runoff to the left lower extremity. 3. No acute process within the abdomen or pelvis. Assessment:      1) L SFA occlusion with ischemic rest pain L foot & claudication  2) No evidence of significant R leg arterial disease by clinical exam  3) Cardiomyopathy - nonischemic (EF 40%)  4) COPD      Plan:      Angiogram to better define target vessels L leg as some concern re: popliteal and TPT. Length of L SFA occlusion would argue against endovascular intervention. After angio have suggested left leg bypass for revascularization. Risks, benefits and long term patency were discussed n detail. Pt agreeable to this approach. Will arrange for MFF with vein mapping. Will also speak with Mehran Naqvi re: cardiomyopathy.

## 2021-05-19 NOTE — LETTER
[facility]  [address] [facility]  Phone: [phone]  Fax: [phone]    Galen Quan MD    May 19, 2021     [address]    Patient: Xiomara Godinez   MR Number: [id number]   YOB: 1950   Date of Visit: 5/19/2021       Dear Dr. Nilda Burton:    I saw Mr. Anne-Marie Banks in the office today for evaluation of his left leg symptoms of claudication and left foot rest pain. Below are the relevant portions of my assessment and plan of care. 1) L SFA occlusion with ischemic rest pain L foot & claudication  2) No evidence of significant R leg arterial disease by clinical exam  3) Cardiomyopathy - nonischemic (EF 40%)  4) COPD    Angiogram to better define target vessels L leg as some concern re: popliteal and TPT. Length of L SFA occlusion would argue against endovascular intervention. After angio have suggested left leg bypass for revascularization. Risks, benefits and long term patency were discussed n detail. Pt agreeable to this approach. Will arrange for MFF with vein mapping. Will also speak with [employer] re: cardiomyopathy. If you have questions, please do not hesitate to call me. I look forward to following Carolyne Hernandez along with you. Also let me know if I can help you with any of your other patients in the future.     Sincerely,        Galen Quan MD

## 2021-05-20 ENCOUNTER — PREP FOR PROCEDURE (OUTPATIENT)
Dept: VASCULAR SURGERY | Age: 71
End: 2021-05-20

## 2021-05-20 DIAGNOSIS — I73.9 PERIPHERAL VASCULAR DISEASE (HCC): ICD-10-CM

## 2021-05-20 DIAGNOSIS — Z01.818 PRE-OP TESTING: Primary | ICD-10-CM

## 2021-05-21 ENCOUNTER — OFFICE VISIT (OUTPATIENT)
Dept: CARDIOLOGY CLINIC | Age: 71
End: 2021-05-21
Payer: COMMERCIAL

## 2021-05-21 VITALS
DIASTOLIC BLOOD PRESSURE: 58 MMHG | SYSTOLIC BLOOD PRESSURE: 94 MMHG | OXYGEN SATURATION: 94 % | HEIGHT: 71 IN | WEIGHT: 145 LBS | HEART RATE: 74 BPM | BODY MASS INDEX: 20.3 KG/M2

## 2021-05-21 DIAGNOSIS — Z01.810 PRE-OPERATIVE CARDIOVASCULAR EXAMINATION: Primary | ICD-10-CM

## 2021-05-21 DIAGNOSIS — I10 ESSENTIAL HYPERTENSION: ICD-10-CM

## 2021-05-21 DIAGNOSIS — R06.02 SOB (SHORTNESS OF BREATH): ICD-10-CM

## 2021-05-21 DIAGNOSIS — I50.22 CHRONIC SYSTOLIC CONGESTIVE HEART FAILURE (HCC): ICD-10-CM

## 2021-05-21 DIAGNOSIS — I42.8 NONISCHEMIC CARDIOMYOPATHY (HCC): ICD-10-CM

## 2021-05-21 PROCEDURE — 99215 OFFICE O/P EST HI 40 MIN: CPT | Performed by: INTERNAL MEDICINE

## 2021-05-21 PROCEDURE — 93000 ELECTROCARDIOGRAM COMPLETE: CPT | Performed by: INTERNAL MEDICINE

## 2021-05-21 RX ORDER — SODIUM CHLORIDE 0.9 % (FLUSH) 0.9 %
5-40 SYRINGE (ML) INJECTION EVERY 12 HOURS SCHEDULED
Status: CANCELLED | OUTPATIENT
Start: 2021-05-21

## 2021-05-21 RX ORDER — SODIUM CHLORIDE 0.9 % (FLUSH) 0.9 %
5-40 SYRINGE (ML) INJECTION PRN
Status: CANCELLED | OUTPATIENT
Start: 2021-05-21

## 2021-05-21 RX ORDER — SODIUM CHLORIDE 9 MG/ML
25 INJECTION, SOLUTION INTRAVENOUS PRN
Status: CANCELLED | OUTPATIENT
Start: 2021-05-21

## 2021-05-21 NOTE — PROGRESS NOTES
[facility]   Advanced Heart Failure/Pulmonary Hypertension  Cardiac Follow up       Darrian Soelr  YOB: 1950    Date of Visit:  21    Chief Complaint   Patient presents with    Cardiac Clearance    Congestive Heart Failure     History of present illness: Darrian Soler is a 80 y.o. male with past medical history of NICM (EF 10-20%), COPD, former tobacco abuse. He was hospitalized (-16 and readmitted 10/13/16) after presenting with shortness of breath and was treated for exacerbation of COPD. An echocardiogram was subsequently performed (16) which revealed EF 10-20% and noncompaction. Patient underwent LHC (10/13/16) which demonstrated normal cors with LVEDP 8. Patient was started on GDMT including ACE-I and evidence based beta blocker. Most recent Echo  showed improved EF 40% (on Entresto). Patient has not smoked since Sept hospitalization. Today, he is here for cardiac clearance. He is having a left fem-pop bypass with (Dr. Geena Jeffrey surgeon); he has been having claudication with walking and calf pain at night. He feels fairly well overall. He denies exertional chest pain, ANDRE/PND, palpitations, light-headedness, edema. He has received both Covid vaccines.     Past Medical History:   Diagnosis Date    Bronchitis     CHF (congestive heart failure) ([address]     COPD [facility]ch[facility] obstructive pulmonary disease) ([facility])      Past Surgical History:   Procedure Laterality Date    CHOLECYSTECTOMY, LAPAROSCOPIC N/A 3/17/2021    LAPAROSCOPIC CHOLECYSTECTOMY WITH INTRAOPERATIVE CHOLANGIOGRAM performed by Drusilla Boeck, MD at [address] [place] [address]      bilateral cataracts    FRACTURE SURGERY      LT elbow    TONSILLECTOMY       Social History     Tobacco Use    Smoking status: Former Smoker     Packs/day: 1.00     Years: 30.00     Pack years: 30.00     Types: Cigarettes     Quit date: 2016     Years since quittin.7    Smokeless tobacco: Former User Value Date    PROBNP 351 (H) 12/05/2017    PROBNP 425 (H) 07/25/2017    PROBNP 587 (H) 06/09/2017     Lab Results   Component Value Date    ALT 11 05/01/2021    ALT 12 04/30/2021    AST 17 05/01/2021    AST 19 04/30/2021     Lab Results   Component Value Date    HGB 13.0 05/01/2021    HGB 14.1 04/30/2021    HCT 39.4 05/01/2021    HCT 42.5 04/30/2021     05/01/2021     04/30/2021     No results found for: TRIG, HDL, LDLCALC, LDLDIRECT    ECHO 4/1/2021  -Technically difficult exam due to body habitus. -Moderately reduced global systolic function with an ejection fraction estimated at 40%. -Global hypokinesis with abnormal septal motion noted. -Grade II diastolic dysfunction with elevated LV filling pressures. Avg. E/e'=5.4   -Normal function of all valves. ECHO limited 9/14/17   Summary   Limited echo for ejection fraction. Left ventricular function is reduced with EF around 35-40%    ECHO limited 3/2/17  Limited study to evaluate left ventricular function. Left ventricular function is reduced with ejection fraction estimated at 25%. Assessment:   Diagnosis   1. Pre-operative cardiovascular examination    2. Chronic systolic congestive heart failure ([address]    3. Nonischemic cardiomyopathy ([address]    4. Essential hypertension    5. SOB (shortness of breath)      Preop CV Exam  Left fem-pop bypass 6/1/2021 (Dr. Whitley Horner)  He is having a peripheral angiogram 5/25/2021  EKG today 5/21/2021 (read & interpreted by me)> NSR 75    He is a low to moderate cardiac risk for surgery based on reduced EF. Chronic systolic heart failure/NICM  Stable, compensated. No c/o SOB today  Remains on Entresto 97-103mg bid  ECHO 4/1/2021> EF 40%  ECHO 9/2017> EF 35-40%    Hypertension, essential  Stable  Blood pressure (!) 94/58, pulse 74, height 5' 11\" (1.803 m), weight 145 lb (65.8 kg), SpO2 94 %. PLAN:  1. No med changes  2. Recent labs reviewed  3.  He has appointment with my CNP Lata Kate 9/15/2021    Time Based Itemization  A total of 40 minutes was spent on today's patient encounter. If applicable, non-patient-facing activities:  ( x)Preparing to see the patient and reviewing records  ( ) Individual interpretation of results  ( ) Discussion or coordination of care with other health care professionals  ( x) Ordering of unique tests, medications, or procedures  ( x) Documentation within the EHR      Thank you for allowing me to participate in the care of your patient. Aubrie López M.D., [address] [place] [address] Avenue attestation: This note was scribed in the presence of Dr. Konstantin Oseguera MD, by Paul Arriaga RN. The scribe's documentation has been prepared under my direction and personally reviewed by me in its entirety. I confirm that the note above accurately reflects all work, treatment, procedures, and medical decision making performed by me.

## 2021-05-24 NOTE — PROGRESS NOTES
Name_______________________________________Printed:____________________  Date and time of surgery_6/1/2021__1200_____________________Arrival Time:__1000  main______________   1. The instructions given regarding when and if a patient needs to stop oral intake prior to surgery varies. Follow the specific instructions you were given                  _x__Nothing to eat or to drink after Midnight the night before.                   ____Carbo loading or ERAS instructions will be given to select patients-if you have been given those instructions -please do the following                           The evening before your surgery after dinner before midnight drink 40 ounces of gatorade. If you are diabetic use sugar free. The morning of surgery drink 40 ounces of water. This needs to be finished 3 hours prior to your surgery start time. 2. Take the following pills with a small sip of water on the morning of surgery__coreg, inhalers, hold entresto dos only per anesthesia,  call Dr Keshia Booker re: trental and asa instructions______________________________________________                  Do not take blood pressure medications ending in pril or sartan the nkechi prior to surgery or the morning of surgery_   3. Aspirin, Ibuprofen, Advil, Naproxen, Vitamin E and other Anti-inflammatory products and supplements should be stopped for 5 -7days before surgery or as directed by your physician. 4. Check with your Doctor regarding stopping Plavix, Coumadin,Eliquis, Lovenox,Effient,Pradaxa,Xarelto, Fragmin or other blood thinners and follow their instructions. 5. Do not smoke, and do not drink any alcoholic beverages 24 hours prior to surgery. This includes NA Beer. Refrain from the usage of any recreational drugs. 6. You may brush your teeth and gargle the morning of surgery. DO NOT SWALLOW WATER   7. You MUST make arrangements for a responsible adult to stay on site while you are here and take you home after your surgery.  You will not be allowed to leave alone or drive yourself home. It is strongly suggested someone stay with you the first 24 hrs. Your surgery will be cancelled if you do not have a ride home. 8. A parent/legal guardian must accompany a child scheduled for surgery and plan to stay at the hospital until the child is discharged. Please do not bring other children with you. 9. Please wear simple, loose fitting clothing to the hospital.  Piter Dose not bring valuables (money, credit cards, checkbooks, etc.) Do not wear any makeup (including no eye makeup) or nail polish on your fingers or toes. 10. DO NOT wear any jewelry or piercings on day of surgery. All body piercing jewelry must be removed. 11. If you have ___dentures, they will be removed before going to the OR; we will provide you a container. If you wear ___contact lenses or _x__glasses, they will be removed; please bring a case for them. 12. Please see your family doctor/pediatrician for a history & physical and/or concerning medications. Bring any test results/reports from your physician's office. PCP__________________Phone___________H&P Appt. Date________             13 If you  have a Living Will and Durable Power of  for Healthcare, please bring in a copy. 15. Notify your Surgeon if you develop any illness between now and surgery  time, cough, cold, fever, sore throat, nausea, vomiting, etc.  Please notify your surgeon if you experience dizziness, shortness of breath or blurred vision between now & the time of your surgery             15. DO NOT shave your operative site 96 hours prior to surgery. For face & neck surgery, men may use an electric razor 48 hours prior to surgery. 16. Shower the night before or morning of surgery using an antibacterial soap or as you have been instructed. 17. To provide excellent care visitors will be limited to one in the room at any given time. 18.  Please bring picture ID and insurance card. 19.  Visit our web site for additional information:  [email or web address]              20.During flu season no children under the age of 15 are permitted in the hospital for the safety of all patients. 21. If you take a long acting insulin in the evening only  take half of your usual  dose the night  before your procedure              22. If you use a c-pap please bring DOS if staying overnight,             23.For your convenience [id number] [facility] has a pharmacy on site to fill your prescriptions. 24. If you use oxygen and have a portable tank please bring it  with you the DOS             25. Bring a complete list of all your medications with name and dose include any supplements. 26. Other__________________________________________   *Please call pre admission testing if you any further questions   [address]    [place] [address]. Margarita Clay  [phone]   [facility]    __ Done-Where _____  __ Scheduled ___ Where ___   __ Other __________  _x_ VACCINATED-instructed to bring card DOS      VISITOR POLICY(subject to change)    There is a one visitor policy at [facility] for all surgeries and endoscopies. Whether the visitor can stay or will be asked to wait in the car will depend on the current policy and if social distancing can be maintained. The policy is subject to change at any time. Please make sure the visitor has a cell phone that is on,charged and able to accept calls, as this may be the way that the staff communicates with them. Pain management is NO VISITOR policyThe patients ride is expected to remain in the car with a cell phone for communication. If the ride is leaving the hospital grounds please make sure they are back in time for pickup.  Have the patient inform the staff on arrival what their rides plans are while the patient is in the facility. At the MAIN there is one visitor allowed. Please note that the visitor policy is subject to change. All above information reviewed with patient in person or by phone. Patient verbalizes understanding. All questions and concerns addressed.                                                                                                  Patient/Rep____________________                                                                                                                                    PRE OP INSTRUCTIONS

## 2021-05-25 ENCOUNTER — HOSPITAL ENCOUNTER (OUTPATIENT)
Dept: CARDIAC CATH/INVASIVE PROCEDURES | Age: 71
Discharge: HOME | End: 2021-05-25
Attending: SURGERY | Admitting: SURGERY
Payer: COMMERCIAL

## 2021-05-25 ENCOUNTER — HOSPITAL ENCOUNTER (OUTPATIENT)
Age: 71
Discharge: HOME | End: 2021-05-25
Attending: SURGERY
Payer: COMMERCIAL

## 2021-05-25 VITALS
DIASTOLIC BLOOD PRESSURE: 83 MMHG | HEART RATE: 75 BPM | OXYGEN SATURATION: 100 % | TEMPERATURE: 98 F | HEIGHT: 71 IN | RESPIRATION RATE: 16 BRPM | SYSTOLIC BLOOD PRESSURE: 152 MMHG | WEIGHT: 146 LBS | BODY MASS INDEX: 20.44 KG/M2

## 2021-05-25 DIAGNOSIS — Z01.818 PREOP TESTING: ICD-10-CM

## 2021-05-25 DIAGNOSIS — Z01.818 PRE-OP TESTING: ICD-10-CM

## 2021-05-25 DIAGNOSIS — I73.9 PERIPHERAL VASCULAR DISEASE (HCC): ICD-10-CM

## 2021-05-25 LAB
ABO/RH: NORMAL
ANION GAP SERPL CALCULATED.3IONS-SCNC: 10 MMOL/L (ref 3–16)
ANION GAP SERPL CALCULATED.3IONS-SCNC: 11 MMOL/L (ref 3–16)
ANTIBODY SCREEN: NORMAL
APTT: 29.9 SEC (ref 24.2–36.2)
BILIRUBIN URINE: ABNORMAL
BLOOD, URINE: ABNORMAL
BUN BLDV-MCNC: 11 MG/DL (ref 7–20)
BUN BLDV-MCNC: 12 MG/DL (ref 7–20)
CALCIUM SERPL-MCNC: 8.7 MG/DL (ref 8.3–10.6)
CALCIUM SERPL-MCNC: 9 MG/DL (ref 8.3–10.6)
CHLORIDE BLD-SCNC: 96 MMOL/L (ref 99–110)
CHLORIDE BLD-SCNC: 99 MMOL/L (ref 99–110)
CLARITY: ABNORMAL
CO2: 24 MMOL/L (ref 21–32)
CO2: 26 MMOL/L (ref 21–32)
COLOR: ABNORMAL
COMMENT UA: ABNORMAL
CREAT SERPL-MCNC: 0.8 MG/DL (ref 0.8–1.3)
CREAT SERPL-MCNC: 0.9 MG/DL (ref 0.8–1.3)
EPITHELIAL CELLS, UA: 1 /HPF (ref 0–5)
GFR AFRICAN AMERICAN: >60
GFR AFRICAN AMERICAN: >60
GFR NON-AFRICAN AMERICAN: >60
GFR NON-AFRICAN AMERICAN: >60
GLUCOSE BLD-MCNC: 100 MG/DL (ref 70–99)
GLUCOSE BLD-MCNC: 93 MG/DL (ref 70–99)
GLUCOSE URINE: NEGATIVE MG/DL
HCT VFR BLD CALC: 41.3 % (ref 40.5–52.5)
HCT VFR BLD CALC: 41.4 % (ref 40.5–52.5)
HEMOGLOBIN: 14 G/DL (ref 13.5–17.5)
HEMOGLOBIN: 14.3 G/DL (ref 13.5–17.5)
HYALINE CASTS: 0 /LPF (ref 0–8)
INR BLD: 1.02 (ref 0.86–1.14)
KETONES, URINE: ABNORMAL MG/DL
LEUKOCYTE ESTERASE, URINE: NEGATIVE
MCH RBC QN AUTO: 31.6 PG (ref 26–34)
MCH RBC QN AUTO: 31.9 PG (ref 26–34)
MCHC RBC AUTO-ENTMCNC: 33.9 G/DL (ref 31–36)
MCHC RBC AUTO-ENTMCNC: 34.5 G/DL (ref 31–36)
MCV RBC AUTO: 92.5 FL (ref 80–100)
MCV RBC AUTO: 93.2 FL (ref 80–100)
MICROSCOPIC EXAMINATION: YES
NITRITE, URINE: NEGATIVE
PDW BLD-RTO: 12.7 % (ref 12.4–15.4)
PDW BLD-RTO: 12.8 % (ref 12.4–15.4)
PH UA: 6 (ref 5–8)
PLATELET # BLD: 268 K/UL (ref 135–450)
PLATELET # BLD: 273 K/UL (ref 135–450)
PMV BLD AUTO: 7.9 FL (ref 5–10.5)
PMV BLD AUTO: 8.2 FL (ref 5–10.5)
POTASSIUM SERPL-SCNC: 4.7 MMOL/L (ref 3.5–5.1)
POTASSIUM SERPL-SCNC: 4.9 MMOL/L (ref 3.5–5.1)
PROTEIN UA: 30 MG/DL
PROTHROMBIN TIME: 11.8 SEC (ref 10–13.2)
RBC # BLD: 4.43 M/UL (ref 4.2–5.9)
RBC # BLD: 4.47 M/UL (ref 4.2–5.9)
RBC UA: 11 /HPF (ref 0–4)
SODIUM BLD-SCNC: 131 MMOL/L (ref 136–145)
SODIUM BLD-SCNC: 135 MMOL/L (ref 136–145)
SPECIFIC GRAVITY UA: 1.02 (ref 1–1.03)
URINE TYPE: ABNORMAL
UROBILINOGEN, URINE: 1 E.U./DL
WBC # BLD: 8.1 K/UL (ref 4–11)
WBC # BLD: 8.1 K/UL (ref 4–11)
WBC UA: 2 /HPF (ref 0–5)

## 2021-05-25 PROCEDURE — C1725 CATH, TRANSLUMIN NON-LASER: HCPCS

## 2021-05-25 PROCEDURE — 99153 MOD SED SAME PHYS/QHP EA: CPT

## 2021-05-25 PROCEDURE — 86901 BLOOD TYPING SEROLOGIC RH(D): CPT

## 2021-05-25 PROCEDURE — 36200 PLACE CATHETER IN AORTA: CPT | Performed by: SURGERY

## 2021-05-25 PROCEDURE — 81001 URINALYSIS AUTO W/SCOPE: CPT

## 2021-05-25 PROCEDURE — 93971 EXTREMITY STUDY: CPT

## 2021-05-25 PROCEDURE — 80048 BASIC METABOLIC PNL TOTAL CA: CPT

## 2021-05-25 PROCEDURE — 85027 COMPLETE CBC AUTOMATED: CPT

## 2021-05-25 PROCEDURE — 75625 CONTRAST EXAM ABDOMINL AORTA: CPT

## 2021-05-25 PROCEDURE — 75625 CONTRAST EXAM ABDOMINL AORTA: CPT | Performed by: SURGERY

## 2021-05-25 PROCEDURE — 85730 THROMBOPLASTIN TIME PARTIAL: CPT

## 2021-05-25 PROCEDURE — C1894 INTRO/SHEATH, NON-LASER: HCPCS

## 2021-05-25 PROCEDURE — 6360000002 HC RX W HCPCS

## 2021-05-25 PROCEDURE — C1769 GUIDE WIRE: HCPCS

## 2021-05-25 PROCEDURE — 75716 ARTERY X-RAYS ARMS/LEGS: CPT

## 2021-05-25 PROCEDURE — 85610 PROTHROMBIN TIME: CPT

## 2021-05-25 PROCEDURE — 2500000003 HC RX 250 WO HCPCS

## 2021-05-25 PROCEDURE — 6360000004 HC RX CONTRAST MEDICATION: Performed by: SURGERY

## 2021-05-25 PROCEDURE — 99152 MOD SED SAME PHYS/QHP 5/>YRS: CPT

## 2021-05-25 PROCEDURE — 86900 BLOOD TYPING SEROLOGIC ABO: CPT

## 2021-05-25 PROCEDURE — 36415 COLL VENOUS BLD VENIPUNCTURE: CPT

## 2021-05-25 PROCEDURE — 36200 PLACE CATHETER IN AORTA: CPT

## 2021-05-25 PROCEDURE — 75716 ARTERY X-RAYS ARMS/LEGS: CPT | Performed by: SURGERY

## 2021-05-25 PROCEDURE — 86850 RBC ANTIBODY SCREEN: CPT

## 2021-05-25 RX ORDER — IODIXANOL 320 MG/ML
106 INJECTION, SOLUTION INTRAVASCULAR ONCE
Status: COMPLETED | OUTPATIENT
Start: 2021-05-25 | End: 2021-05-25

## 2021-05-25 RX ADMIN — IODIXANOL 106 ML: 320 INJECTION, SOLUTION INTRAVASCULAR at 12:42

## 2021-05-25 NOTE — PRE SEDATION
Sedation Pre-Procedure Note    Patient Name: Darrian Soler   YOB: 1950  Room/Bed: Cath/NONE  Medical Record Number: [id number]  Date: 5/25/2021   Time: 11:10 AM       Indication:  L calf claudication and rest pain L foot    Consent: I have discussed with the patient and/or the patient representative the indication, alternatives, and the possible risks and/or complications of the planned procedure and the anesthesia methods. The patient and/or patient representative appear to understand and agree to proceed. Vital Signs:   Vitals:    05/25/21 1035   BP: (!) 152/83   Pulse: 75   Resp: 16   Temp: 98 °F (36.7 °C)   SpO2: 100%       Past Medical History:   has a past medical history of Bronchitis, CHF (congestive heart failure) ([facility] [address] and COPD (chronic obstructive pulmonary disease) ([facility] [address] Past Surgical History:   has a past surgical history that includes fracture surgery; Tonsillectomy; eye surgery; and Cholecystectomy, laparoscopic (N/A, 3/17/2021). Medications:   Scheduled Meds:   Continuous Infusions:   PRN Meds:   Home Meds:   Prior to Admission medications    Medication Sig Start Date End Date Taking?  Authorizing Provider   pentoxifylline (TRENTAL) 400 MG extended release tablet Take 400 mg by mouth daily  5/12/21  Yes Historical Provider, MD   atorvastatin (LIPITOR) 20 MG tablet Take 1 tablet by mouth daily  Patient taking differently: Take 20 mg by mouth daily Not started yet 5/1/21  Yes Mike London MD   aspirin 325 MG EC tablet Take 1 tablet by mouth daily 5/1/21 7/30/21 Yes Mike London MD   carvedilol (COREG) 6.25 MG tablet Take 1 tablet by mouth 2 times daily (with meals) 5/1/21  Yes Mike London MD   Multiple Vitamin (MULTIVITAMIN ADULT PO) Take 1 tablet by mouth daily    Yes Historical Provider, MD   ENTRESTO  MG per tablet TAKE ONE TABLET BY MOUTH TWICE A DAY 4/9/20  Yes Monica Burt MD   albuterol sulfate  (90 Base) MCG/ACT inhaler  4/16/21

## 2021-05-25 NOTE — OP NOTE
single pass. Under fluoroscopic guidance, Glidewire was advanced proximally into the  aorta followed by a 5-Ivorian sheath. Over the wire was then placed a  5-Ivorian pigtail catheter, which was positioned above the renal  arteries. Flush aortogram was then performed which adequately imaged  the entire aorta and the bilateral iliacs. Catheter was withdrawn to  just above the aortic bifurcation and a bilateral infrainguinal runoff  series was performed. After completion of this and reviewing of the  images, the catheter was straightened over wire and removed. The sheath  was removed from the right groin and pressure was applied manually. The  patient tolerated the procedure well. FINDINGS:  1. No evidence of bilateral renal artery stenotic disease. 2.  No evidence of aortoiliac stenotic, occlusive or aneurysmal disease. 3.  Patent right superficial femoral artery with irregularity, but no  stenosis with runoff maintained through the popliteal and tibial vessels  to the foot. 4.  Left superficial femoral artery occlusion at its origin flush with  reconstitution of the above knee popliteal artery at the adductor canal.  Runoff is maintained to an un-diseased popliteal artery with  three-vessel runoff to the foot.         Vanessa Siddiqi MD    D: 05/25/2021 13:09:50       T: 05/25/2021 15:02:42     [id number]  Job#: [id number]     Doc#: [id number]    CC:

## 2021-05-25 NOTE — BRIEF OP NOTE
Brief Postoperative Note      Patient: Mary Atkinson  YOB: 1950  MRN: [id number]    Date of Procedure:  5/25/2021    Pre-Op Diagnosis: L leg claudication with rest pain    Post-Op Diagnosis: Same       Procedure: Aortogram with runoff    Anesthesia: local with sedation    Estimated Blood Loss (mL): Minimal    Complications: None    Specimens:   * Cannot find log *    Implants:  * No surgical log found *      Drains: * No LDAs found *    Findings: No aortoiliac or renal artery disease; irregular R SFA with out stenoses + 3 vessel runoff; L SFA occlusion at its origin, L AK popliteal reconstitution at adductor canal with 3 vessel runoff (small vessels.) Planned L fem-AKpop bypass next week.     Electronically signed by Vanessa Rubio MD on 5/25/2021 at 12:51 PM

## 2021-05-30 DIAGNOSIS — I42.8 NONISCHEMIC CARDIOMYOPATHY (HCC): ICD-10-CM

## 2021-05-30 DIAGNOSIS — I50.22 CHRONIC SYSTOLIC CONGESTIVE HEART FAILURE (HCC): ICD-10-CM

## 2021-05-30 DIAGNOSIS — I10 ESSENTIAL HYPERTENSION: ICD-10-CM

## 2021-06-01 ENCOUNTER — ANESTHESIA (OUTPATIENT)
Dept: OPERATING ROOM | Age: 71
DRG: 253 | End: 2021-06-01
Payer: COMMERCIAL

## 2021-06-01 ENCOUNTER — ANESTHESIA EVENT (OUTPATIENT)
Dept: OPERATING ROOM | Age: 71
DRG: 253 | End: 2021-06-01
Payer: COMMERCIAL

## 2021-06-01 ENCOUNTER — HOSPITAL ENCOUNTER (INPATIENT)
Age: 71
LOS: 2 days | Discharge: HOME | DRG: 253 | End: 2021-06-03
Attending: SURGERY | Admitting: SURGERY
Payer: COMMERCIAL

## 2021-06-01 ENCOUNTER — APPOINTMENT (OUTPATIENT)
Dept: GENERAL RADIOLOGY | Age: 71
DRG: 253 | End: 2021-06-01
Attending: SURGERY
Payer: COMMERCIAL

## 2021-06-01 VITALS
RESPIRATION RATE: 2 BRPM | DIASTOLIC BLOOD PRESSURE: 61 MMHG | SYSTOLIC BLOOD PRESSURE: 118 MMHG | OXYGEN SATURATION: 100 % | TEMPERATURE: 99.3 F

## 2021-06-01 DIAGNOSIS — Z01.818 PREOP TESTING: Primary | ICD-10-CM

## 2021-06-01 DIAGNOSIS — G89.18 POSTOPERATIVE PAIN OF EXTREMITY: ICD-10-CM

## 2021-06-01 DIAGNOSIS — M79.609 POSTOPERATIVE PAIN OF EXTREMITY: ICD-10-CM

## 2021-06-01 PROBLEM — I73.9 PVD (PERIPHERAL VASCULAR DISEASE) WITH CLAUDICATION (HCC): Status: ACTIVE | Noted: 2021-06-01

## 2021-06-01 LAB
ABO/RH: NORMAL
ANTIBODY SCREEN: NORMAL
HCT VFR BLD CALC: 37.2 % (ref 40.5–52.5)
HEMOGLOBIN: 12.4 G/DL (ref 13.5–17.5)
MCH RBC QN AUTO: 31 PG (ref 26–34)
MCHC RBC AUTO-ENTMCNC: 33.2 G/DL (ref 31–36)
MCV RBC AUTO: 93.5 FL (ref 80–100)
PDW BLD-RTO: 12.8 % (ref 12.4–15.4)
PLATELET # BLD: 269 K/UL (ref 135–450)
PMV BLD AUTO: 8.3 FL (ref 5–10.5)
RBC # BLD: 3.98 M/UL (ref 4.2–5.9)
WBC # BLD: 13.4 K/UL (ref 4–11)

## 2021-06-01 PROCEDURE — 73552 X-RAY EXAM OF FEMUR 2/>: CPT

## 2021-06-01 PROCEDURE — 6370000000 HC RX 637 (ALT 250 FOR IP): Performed by: SURGERY

## 2021-06-01 PROCEDURE — 2580000003 HC RX 258: Performed by: NURSE ANESTHETIST, CERTIFIED REGISTERED

## 2021-06-01 PROCEDURE — 6360000002 HC RX W HCPCS: Performed by: SURGERY

## 2021-06-01 PROCEDURE — 2500000003 HC RX 250 WO HCPCS: Performed by: SURGERY

## 2021-06-01 PROCEDURE — 6360000002 HC RX W HCPCS: Performed by: NURSE ANESTHETIST, CERTIFIED REGISTERED

## 2021-06-01 PROCEDURE — 7100000000 HC PACU RECOVERY - FIRST 15 MIN: Performed by: SURGERY

## 2021-06-01 PROCEDURE — 6360000004 HC RX CONTRAST MEDICATION: Performed by: SURGERY

## 2021-06-01 PROCEDURE — 2700000000 HC OXYGEN THERAPY PER DAY

## 2021-06-01 PROCEDURE — 86900 BLOOD TYPING SEROLOGIC ABO: CPT

## 2021-06-01 PROCEDURE — 2580000003 HC RX 258: Performed by: SURGERY

## 2021-06-01 PROCEDURE — 6360000002 HC RX W HCPCS: Performed by: ANESTHESIOLOGY

## 2021-06-01 PROCEDURE — 36415 COLL VENOUS BLD VENIPUNCTURE: CPT

## 2021-06-01 PROCEDURE — 86850 RBC ANTIBODY SCREEN: CPT

## 2021-06-01 PROCEDURE — 2000000000 HC ICU R&B

## 2021-06-01 PROCEDURE — 2580000003 HC RX 258: Performed by: ANESTHESIOLOGY

## 2021-06-01 PROCEDURE — 3700000001 HC ADD 15 MINUTES (ANESTHESIA): Performed by: SURGERY

## 2021-06-01 PROCEDURE — 3700000000 HC ANESTHESIA ATTENDED CARE: Performed by: SURGERY

## 2021-06-01 PROCEDURE — 35583 VEIN BYP GRFT FEM-POPLITEAL: CPT | Performed by: SURGERY

## 2021-06-01 PROCEDURE — 7100000001 HC PACU RECOVERY - ADDTL 15 MIN: Performed by: SURGERY

## 2021-06-01 PROCEDURE — P9045 ALBUMIN (HUMAN), 5%, 250 ML: HCPCS | Performed by: NURSE ANESTHETIST, CERTIFIED REGISTERED

## 2021-06-01 PROCEDURE — 2500000003 HC RX 250 WO HCPCS: Performed by: NURSE ANESTHETIST, CERTIFIED REGISTERED

## 2021-06-01 PROCEDURE — 3600000014 HC SURGERY LEVEL 4 ADDTL 15MIN: Performed by: SURGERY

## 2021-06-01 PROCEDURE — 3600000004 HC SURGERY LEVEL 4 BASE: Performed by: SURGERY

## 2021-06-01 PROCEDURE — 94640 AIRWAY INHALATION TREATMENT: CPT

## 2021-06-01 PROCEDURE — 86901 BLOOD TYPING SEROLOGIC RH(D): CPT

## 2021-06-01 PROCEDURE — 2709999900 HC NON-CHARGEABLE SUPPLY: Performed by: SURGERY

## 2021-06-01 PROCEDURE — 85027 COMPLETE CBC AUTOMATED: CPT

## 2021-06-01 RX ORDER — SODIUM CHLORIDE 9 MG/ML
INJECTION, SOLUTION INTRAVENOUS CONTINUOUS
Status: DISCONTINUED | OUTPATIENT
Start: 2021-06-01 | End: 2021-06-02

## 2021-06-01 RX ORDER — LIDOCAINE HYDROCHLORIDE 10 MG/ML
1 INJECTION, SOLUTION EPIDURAL; INFILTRATION; INTRACAUDAL; PERINEURAL
Status: ACTIVE | OUTPATIENT
Start: 2021-06-01 | End: 2021-06-01

## 2021-06-01 RX ORDER — PROPOFOL 10 MG/ML
INJECTION, EMULSION INTRAVENOUS PRN
Status: DISCONTINUED | OUTPATIENT
Start: 2021-06-01 | End: 2021-06-01 | Stop reason: SDUPTHER

## 2021-06-01 RX ORDER — ONDANSETRON 2 MG/ML
4 INJECTION INTRAMUSCULAR; INTRAVENOUS
Status: DISCONTINUED | OUTPATIENT
Start: 2021-06-01 | End: 2021-06-01 | Stop reason: HOSPADM

## 2021-06-01 RX ORDER — ROCURONIUM BROMIDE 10 MG/ML
INJECTION, SOLUTION INTRAVENOUS PRN
Status: DISCONTINUED | OUTPATIENT
Start: 2021-06-01 | End: 2021-06-01 | Stop reason: SDUPTHER

## 2021-06-01 RX ORDER — PROTAMINE SULFATE 10 MG/ML
INJECTION, SOLUTION INTRAVENOUS PRN
Status: DISCONTINUED | OUTPATIENT
Start: 2021-06-01 | End: 2021-06-01 | Stop reason: SDUPTHER

## 2021-06-01 RX ORDER — IODIXANOL 320 MG/ML
INJECTION, SOLUTION INTRAVASCULAR
Status: COMPLETED | OUTPATIENT
Start: 2021-06-01 | End: 2021-06-01

## 2021-06-01 RX ORDER — CARVEDILOL 6.25 MG/1
6.25 TABLET ORAL 2 TIMES DAILY WITH MEALS
Status: DISCONTINUED | OUTPATIENT
Start: 2021-06-01 | End: 2021-06-03 | Stop reason: HOSPADM

## 2021-06-01 RX ORDER — FENTANYL CITRATE 50 UG/ML
INJECTION, SOLUTION INTRAMUSCULAR; INTRAVENOUS PRN
Status: DISCONTINUED | OUTPATIENT
Start: 2021-06-01 | End: 2021-06-01 | Stop reason: SDUPTHER

## 2021-06-01 RX ORDER — DEXAMETHASONE SODIUM PHOSPHATE 4 MG/ML
INJECTION, SOLUTION INTRA-ARTICULAR; INTRALESIONAL; INTRAMUSCULAR; INTRAVENOUS; SOFT TISSUE PRN
Status: DISCONTINUED | OUTPATIENT
Start: 2021-06-01 | End: 2021-06-01 | Stop reason: SDUPTHER

## 2021-06-01 RX ORDER — ALBUMIN, HUMAN INJ 5% 5 %
SOLUTION INTRAVENOUS PRN
Status: DISCONTINUED | OUTPATIENT
Start: 2021-06-01 | End: 2021-06-01 | Stop reason: SDUPTHER

## 2021-06-01 RX ORDER — 0.9 % SODIUM CHLORIDE 0.9 %
250 INTRAVENOUS SOLUTION INTRAVENOUS ONCE
Status: DISCONTINUED | OUTPATIENT
Start: 2021-06-01 | End: 2021-06-01 | Stop reason: HOSPADM

## 2021-06-01 RX ORDER — EPHEDRINE SULFATE 50 MG/ML
INJECTION INTRAVENOUS PRN
Status: DISCONTINUED | OUTPATIENT
Start: 2021-06-01 | End: 2021-06-01 | Stop reason: SDUPTHER

## 2021-06-01 RX ORDER — MIDAZOLAM HYDROCHLORIDE 1 MG/ML
INJECTION INTRAMUSCULAR; INTRAVENOUS PRN
Status: DISCONTINUED | OUTPATIENT
Start: 2021-06-01 | End: 2021-06-01 | Stop reason: SDUPTHER

## 2021-06-01 RX ORDER — OXYCODONE HYDROCHLORIDE 5 MG/1
5 TABLET ORAL EVERY 4 HOURS PRN
Status: DISCONTINUED | OUTPATIENT
Start: 2021-06-01 | End: 2021-06-03 | Stop reason: HOSPADM

## 2021-06-01 RX ORDER — HYDROMORPHONE HCL 110MG/55ML
0.5 PATIENT CONTROLLED ANALGESIA SYRINGE INTRAVENOUS EVERY 5 MIN PRN
Status: DISCONTINUED | OUTPATIENT
Start: 2021-06-01 | End: 2021-06-01 | Stop reason: HOSPADM

## 2021-06-01 RX ORDER — SODIUM CHLORIDE 9 MG/ML
25 INJECTION, SOLUTION INTRAVENOUS PRN
Status: DISCONTINUED | OUTPATIENT
Start: 2021-06-01 | End: 2021-06-01 | Stop reason: HOSPADM

## 2021-06-01 RX ORDER — HEPARIN SODIUM 1000 [USP'U]/ML
INJECTION, SOLUTION INTRAVENOUS; SUBCUTANEOUS PRN
Status: DISCONTINUED | OUTPATIENT
Start: 2021-06-01 | End: 2021-06-01 | Stop reason: SDUPTHER

## 2021-06-01 RX ORDER — ESMOLOL HYDROCHLORIDE 10 MG/ML
INJECTION INTRAVENOUS PRN
Status: DISCONTINUED | OUTPATIENT
Start: 2021-06-01 | End: 2021-06-01 | Stop reason: SDUPTHER

## 2021-06-01 RX ORDER — ALBUTEROL SULFATE 90 UG/1
2 AEROSOL, METERED RESPIRATORY (INHALATION) 4 TIMES DAILY
Status: DISCONTINUED | OUTPATIENT
Start: 2021-06-01 | End: 2021-06-03 | Stop reason: HOSPADM

## 2021-06-01 RX ORDER — MORPHINE SULFATE 2 MG/ML
2 INJECTION, SOLUTION INTRAMUSCULAR; INTRAVENOUS
Status: DISCONTINUED | OUTPATIENT
Start: 2021-06-01 | End: 2021-06-03

## 2021-06-01 RX ORDER — LIDOCAINE HYDROCHLORIDE 20 MG/ML
INJECTION, SOLUTION EPIDURAL; INFILTRATION; INTRACAUDAL; PERINEURAL PRN
Status: DISCONTINUED | OUTPATIENT
Start: 2021-06-01 | End: 2021-06-01 | Stop reason: SDUPTHER

## 2021-06-01 RX ORDER — SODIUM CHLORIDE 9 MG/ML
INJECTION, SOLUTION INTRAVENOUS CONTINUOUS PRN
Status: DISCONTINUED | OUTPATIENT
Start: 2021-06-01 | End: 2021-06-01 | Stop reason: SDUPTHER

## 2021-06-01 RX ORDER — ONDANSETRON 2 MG/ML
4 INJECTION INTRAMUSCULAR; INTRAVENOUS EVERY 6 HOURS PRN
Status: DISCONTINUED | OUTPATIENT
Start: 2021-06-01 | End: 2021-06-03 | Stop reason: HOSPADM

## 2021-06-01 RX ORDER — SODIUM CHLORIDE 9 MG/ML
25 INJECTION, SOLUTION INTRAVENOUS PRN
Status: DISCONTINUED | OUTPATIENT
Start: 2021-06-01 | End: 2021-06-03 | Stop reason: HOSPADM

## 2021-06-01 RX ORDER — SODIUM CHLORIDE 0.9 % (FLUSH) 0.9 %
5-40 SYRINGE (ML) INJECTION EVERY 12 HOURS SCHEDULED
Status: DISCONTINUED | OUTPATIENT
Start: 2021-06-01 | End: 2021-06-01 | Stop reason: HOSPADM

## 2021-06-01 RX ORDER — PHENYLEPHRINE HCL IN 0.9% NACL 1 MG/10 ML
SYRINGE (ML) INTRAVENOUS PRN
Status: DISCONTINUED | OUTPATIENT
Start: 2021-06-01 | End: 2021-06-01 | Stop reason: SDUPTHER

## 2021-06-01 RX ORDER — SUCCINYLCHOLINE/SOD CL,ISO/PF 200MG/10ML
SYRINGE (ML) INTRAVENOUS PRN
Status: DISCONTINUED | OUTPATIENT
Start: 2021-06-01 | End: 2021-06-01 | Stop reason: SDUPTHER

## 2021-06-01 RX ORDER — SODIUM CHLORIDE 0.9 % (FLUSH) 0.9 %
5-40 SYRINGE (ML) INJECTION PRN
Status: DISCONTINUED | OUTPATIENT
Start: 2021-06-01 | End: 2021-06-03 | Stop reason: HOSPADM

## 2021-06-01 RX ORDER — OXYCODONE HYDROCHLORIDE 5 MG/1
10 TABLET ORAL EVERY 4 HOURS PRN
Status: DISCONTINUED | OUTPATIENT
Start: 2021-06-01 | End: 2021-06-03 | Stop reason: HOSPADM

## 2021-06-01 RX ORDER — HYDROMORPHONE HCL 110MG/55ML
PATIENT CONTROLLED ANALGESIA SYRINGE INTRAVENOUS PRN
Status: DISCONTINUED | OUTPATIENT
Start: 2021-06-01 | End: 2021-06-01 | Stop reason: SDUPTHER

## 2021-06-01 RX ORDER — SODIUM CHLORIDE 0.9 % (FLUSH) 0.9 %
5-40 SYRINGE (ML) INJECTION PRN
Status: DISCONTINUED | OUTPATIENT
Start: 2021-06-01 | End: 2021-06-01 | Stop reason: HOSPADM

## 2021-06-01 RX ORDER — ATORVASTATIN CALCIUM 20 MG/1
20 TABLET, FILM COATED ORAL NIGHTLY
Status: DISCONTINUED | OUTPATIENT
Start: 2021-06-01 | End: 2021-06-03 | Stop reason: HOSPADM

## 2021-06-01 RX ORDER — ONDANSETRON 2 MG/ML
INJECTION INTRAMUSCULAR; INTRAVENOUS PRN
Status: DISCONTINUED | OUTPATIENT
Start: 2021-06-01 | End: 2021-06-01 | Stop reason: SDUPTHER

## 2021-06-01 RX ORDER — HYDROMORPHONE HCL 110MG/55ML
0.25 PATIENT CONTROLLED ANALGESIA SYRINGE INTRAVENOUS EVERY 5 MIN PRN
Status: DISCONTINUED | OUTPATIENT
Start: 2021-06-01 | End: 2021-06-01 | Stop reason: HOSPADM

## 2021-06-01 RX ORDER — MORPHINE SULFATE 2 MG/ML
4 INJECTION, SOLUTION INTRAMUSCULAR; INTRAVENOUS
Status: DISCONTINUED | OUTPATIENT
Start: 2021-06-01 | End: 2021-06-03

## 2021-06-01 RX ORDER — PROMETHAZINE HYDROCHLORIDE 25 MG/1
12.5 TABLET ORAL EVERY 6 HOURS PRN
Status: DISCONTINUED | OUTPATIENT
Start: 2021-06-01 | End: 2021-06-03 | Stop reason: HOSPADM

## 2021-06-01 RX ORDER — SACUBITRIL AND VALSARTAN 97; 103 MG/1; MG/1
1 TABLET, FILM COATED ORAL 2 TIMES DAILY
Qty: 180 TABLET | Refills: 3 | Status: SHIPPED | OUTPATIENT
Start: 2021-06-01 | End: 2022-06-21

## 2021-06-01 RX ORDER — GLYCOPYRROLATE 1 MG/5 ML
SYRINGE (ML) INTRAVENOUS PRN
Status: DISCONTINUED | OUTPATIENT
Start: 2021-06-01 | End: 2021-06-01 | Stop reason: SDUPTHER

## 2021-06-01 RX ORDER — SODIUM CHLORIDE 0.9 % (FLUSH) 0.9 %
5-40 SYRINGE (ML) INJECTION EVERY 12 HOURS SCHEDULED
Status: DISCONTINUED | OUTPATIENT
Start: 2021-06-01 | End: 2021-06-03 | Stop reason: HOSPADM

## 2021-06-01 RX ADMIN — SODIUM CHLORIDE: 9 INJECTION, SOLUTION INTRAVENOUS at 17:17

## 2021-06-01 RX ADMIN — HYDROMORPHONE HYDROCHLORIDE 0.5 MG: 2 INJECTION, SOLUTION INTRAMUSCULAR; INTRAVENOUS; SUBCUTANEOUS at 16:38

## 2021-06-01 RX ADMIN — SUGAMMADEX 150 MG: 100 INJECTION, SOLUTION INTRAVENOUS at 15:11

## 2021-06-01 RX ADMIN — OXYCODONE HYDROCHLORIDE 10 MG: 5 TABLET ORAL at 21:56

## 2021-06-01 RX ADMIN — FENTANYL CITRATE 25 MCG: 50 INJECTION, SOLUTION INTRAMUSCULAR; INTRAVENOUS at 13:42

## 2021-06-01 RX ADMIN — ESMOLOL HYDROCHLORIDE 20 MG: 10 INJECTION, SOLUTION INTRAVENOUS at 12:32

## 2021-06-01 RX ADMIN — LIDOCAINE HYDROCHLORIDE 80 MG: 20 INJECTION, SOLUTION EPIDURAL; INFILTRATION; INTRACAUDAL; PERINEURAL at 12:13

## 2021-06-01 RX ADMIN — PHENYLEPHRINE HYDROCHLORIDE 20 MCG/MIN: 10 INJECTION INTRAVENOUS at 12:44

## 2021-06-01 RX ADMIN — Medication 2 PUFF: at 21:13

## 2021-06-01 RX ADMIN — ROCURONIUM BROMIDE 20 MG: 10 INJECTION, SOLUTION INTRAVENOUS at 13:48

## 2021-06-01 RX ADMIN — PROTAMINE SULFATE 15 MG: 10 INJECTION, SOLUTION INTRAVENOUS at 14:58

## 2021-06-01 RX ADMIN — MIDAZOLAM 2 MG: 1 INJECTION INTRAMUSCULAR; INTRAVENOUS at 12:00

## 2021-06-01 RX ADMIN — Medication 0.2 MG: at 12:29

## 2021-06-01 RX ADMIN — ASPIRIN 325 MG: 325 TABLET, COATED ORAL at 20:07

## 2021-06-01 RX ADMIN — FENTANYL CITRATE 50 MCG: 50 INJECTION, SOLUTION INTRAMUSCULAR; INTRAVENOUS at 12:11

## 2021-06-01 RX ADMIN — HEPARIN SODIUM 3000 UNITS: 1000 INJECTION INTRAVENOUS; SUBCUTANEOUS at 13:31

## 2021-06-01 RX ADMIN — HYDROMORPHONE HYDROCHLORIDE 0.5 MG: 2 INJECTION, SOLUTION INTRAMUSCULAR; INTRAVENOUS; SUBCUTANEOUS at 16:32

## 2021-06-01 RX ADMIN — PROPOFOL 20 MG: 10 INJECTION, EMULSION INTRAVENOUS at 15:35

## 2021-06-01 RX ADMIN — DEXTROSE MONOHYDRATE 2 MCG/MIN: 50 INJECTION, SOLUTION INTRAVENOUS at 16:26

## 2021-06-01 RX ADMIN — ROCURONIUM BROMIDE 10 MG: 10 INJECTION, SOLUTION INTRAVENOUS at 14:43

## 2021-06-01 RX ADMIN — PROPOFOL 30 MG: 10 INJECTION, EMULSION INTRAVENOUS at 12:31

## 2021-06-01 RX ADMIN — ALBUMIN (HUMAN) 250 ML: 12.5 INJECTION, SOLUTION INTRAVENOUS at 12:26

## 2021-06-01 RX ADMIN — Medication 100 MCG: at 12:23

## 2021-06-01 RX ADMIN — DEXAMETHASONE SODIUM PHOSPHATE 8 MG: 4 INJECTION, SOLUTION INTRAMUSCULAR; INTRAVENOUS at 12:20

## 2021-06-01 RX ADMIN — Medication 100 MCG: at 15:15

## 2021-06-01 RX ADMIN — SODIUM CHLORIDE: 9 INJECTION, SOLUTION INTRAVENOUS at 15:10

## 2021-06-01 RX ADMIN — Medication 100 MCG: at 15:09

## 2021-06-01 RX ADMIN — ROCURONIUM BROMIDE 20 MG: 10 INJECTION, SOLUTION INTRAVENOUS at 13:17

## 2021-06-01 RX ADMIN — PROPOFOL 80 MG: 10 INJECTION, EMULSION INTRAVENOUS at 12:13

## 2021-06-01 RX ADMIN — SODIUM CHLORIDE: 9 INJECTION, SOLUTION INTRAVENOUS at 12:04

## 2021-06-01 RX ADMIN — SODIUM CHLORIDE 25 ML: 9 INJECTION, SOLUTION INTRAVENOUS at 11:07

## 2021-06-01 RX ADMIN — CEFAZOLIN 2000 MG: 10 INJECTION, POWDER, FOR SOLUTION INTRAVENOUS at 20:12

## 2021-06-01 RX ADMIN — Medication 120 MG: at 12:13

## 2021-06-01 RX ADMIN — HYDROMORPHONE HYDROCHLORIDE 0.2 MG: 2 INJECTION, SOLUTION INTRAMUSCULAR; INTRAVENOUS; SUBCUTANEOUS at 15:35

## 2021-06-01 RX ADMIN — FENTANYL CITRATE 25 MCG: 50 INJECTION, SOLUTION INTRAMUSCULAR; INTRAVENOUS at 13:27

## 2021-06-01 RX ADMIN — ATORVASTATIN CALCIUM 20 MG: 20 TABLET, FILM COATED ORAL at 20:07

## 2021-06-01 RX ADMIN — ROCURONIUM BROMIDE 10 MG: 10 INJECTION, SOLUTION INTRAVENOUS at 14:25

## 2021-06-01 RX ADMIN — CEFAZOLIN SODIUM 2000 MG: 10 INJECTION, POWDER, FOR SOLUTION INTRAVENOUS at 12:10

## 2021-06-01 RX ADMIN — DEXTROSE MONOHYDRATE 4 MCG/MIN: 50 INJECTION, SOLUTION INTRAVENOUS at 20:18

## 2021-06-01 RX ADMIN — HYDROMORPHONE HYDROCHLORIDE 0.4 MG: 2 INJECTION, SOLUTION INTRAMUSCULAR; INTRAVENOUS; SUBCUTANEOUS at 15:10

## 2021-06-01 RX ADMIN — EPHEDRINE SULFATE 10 MG: 50 INJECTION, SOLUTION INTRAVENOUS at 12:24

## 2021-06-01 RX ADMIN — Medication 10 ML: at 20:07

## 2021-06-01 RX ADMIN — ROCURONIUM BROMIDE 20 MG: 10 INJECTION, SOLUTION INTRAVENOUS at 12:52

## 2021-06-01 RX ADMIN — Medication 100 MCG: at 12:22

## 2021-06-01 RX ADMIN — SODIUM CHLORIDE: 9 INJECTION, SOLUTION INTRAVENOUS at 20:11

## 2021-06-01 RX ADMIN — Medication 200 MCG: at 12:26

## 2021-06-01 RX ADMIN — HEPARIN SODIUM 1000 UNITS: 1000 INJECTION INTRAVENOUS; SUBCUTANEOUS at 14:20

## 2021-06-01 RX ADMIN — ROCURONIUM BROMIDE 30 MG: 10 INJECTION, SOLUTION INTRAVENOUS at 12:20

## 2021-06-01 RX ADMIN — ONDANSETRON 4 MG: 2 INJECTION INTRAMUSCULAR; INTRAVENOUS at 15:11

## 2021-06-01 RX ADMIN — SACUBITRIL AND VALSARTAN 1 TABLET: 97; 103 TABLET, FILM COATED ORAL at 20:07

## 2021-06-01 ASSESSMENT — PULMONARY FUNCTION TESTS
PIF_VALUE: 10
PIF_VALUE: 14
PIF_VALUE: 5
PIF_VALUE: 14
PIF_VALUE: 2
PIF_VALUE: 1
PIF_VALUE: 14
PIF_VALUE: 13
PIF_VALUE: 19
PIF_VALUE: 14
PIF_VALUE: 5
PIF_VALUE: 14
PIF_VALUE: 14
PIF_VALUE: 11
PIF_VALUE: 16
PIF_VALUE: 14
PIF_VALUE: 15
PIF_VALUE: 14
PIF_VALUE: 15
PIF_VALUE: 16
PIF_VALUE: 14
PIF_VALUE: 1
PIF_VALUE: 14
PIF_VALUE: 13
PIF_VALUE: 14
PIF_VALUE: 16
PIF_VALUE: 16
PIF_VALUE: 14
PIF_VALUE: 14
PIF_VALUE: 0
PIF_VALUE: 14
PIF_VALUE: 16
PIF_VALUE: 14
PIF_VALUE: 20
PIF_VALUE: 15
PIF_VALUE: 16
PIF_VALUE: 14
PIF_VALUE: 14
PIF_VALUE: 17
PIF_VALUE: 15
PIF_VALUE: 15
PIF_VALUE: 14
PIF_VALUE: 16
PIF_VALUE: 14
PIF_VALUE: 14
PIF_VALUE: 13
PIF_VALUE: 15
PIF_VALUE: 14
PIF_VALUE: 1
PIF_VALUE: 14
PIF_VALUE: 14
PIF_VALUE: 17
PIF_VALUE: 14
PIF_VALUE: 16
PIF_VALUE: 14
PIF_VALUE: 20
PIF_VALUE: 2
PIF_VALUE: 14
PIF_VALUE: 5
PIF_VALUE: 14
PIF_VALUE: 2
PIF_VALUE: 14
PIF_VALUE: 3
PIF_VALUE: 14
PIF_VALUE: 18
PIF_VALUE: 16
PIF_VALUE: 13
PIF_VALUE: 14
PIF_VALUE: 2
PIF_VALUE: 17
PIF_VALUE: 15
PIF_VALUE: 2
PIF_VALUE: 17
PIF_VALUE: 14
PIF_VALUE: 2
PIF_VALUE: 2
PIF_VALUE: 14
PIF_VALUE: 16
PIF_VALUE: 17
PIF_VALUE: 14
PIF_VALUE: 17
PIF_VALUE: 16
PIF_VALUE: 14
PIF_VALUE: 17
PIF_VALUE: 14
PIF_VALUE: 15
PIF_VALUE: 14
PIF_VALUE: 16
PIF_VALUE: 15
PIF_VALUE: 14
PIF_VALUE: 14
PIF_VALUE: 2
PIF_VALUE: 17
PIF_VALUE: 2
PIF_VALUE: 3
PIF_VALUE: 14
PIF_VALUE: 14
PIF_VALUE: 1
PIF_VALUE: 14
PIF_VALUE: 16
PIF_VALUE: 13
PIF_VALUE: 13
PIF_VALUE: 14
PIF_VALUE: 17
PIF_VALUE: 14
PIF_VALUE: 12
PIF_VALUE: 14
PIF_VALUE: 1
PIF_VALUE: 14
PIF_VALUE: 14
PIF_VALUE: 1
PIF_VALUE: 14
PIF_VALUE: 1
PIF_VALUE: 14
PIF_VALUE: 14
PIF_VALUE: 15
PIF_VALUE: 14
PIF_VALUE: 15
PIF_VALUE: 14
PIF_VALUE: 12
PIF_VALUE: 14
PIF_VALUE: 2
PIF_VALUE: 1
PIF_VALUE: 16
PIF_VALUE: 16
PIF_VALUE: 1
PIF_VALUE: 14

## 2021-06-01 ASSESSMENT — PAIN SCALES - GENERAL
PAINLEVEL_OUTOF10: 7
PAINLEVEL_OUTOF10: 7
PAINLEVEL_OUTOF10: 6
PAINLEVEL_OUTOF10: 7

## 2021-06-01 ASSESSMENT — ENCOUNTER SYMPTOMS: SHORTNESS OF BREATH: 1

## 2021-06-01 ASSESSMENT — PAIN DESCRIPTION - LOCATION: LOCATION: LEG

## 2021-06-01 ASSESSMENT — PAIN DESCRIPTION - DESCRIPTORS: DESCRIPTORS: ACHING;NUMBNESS

## 2021-06-01 ASSESSMENT — PAIN - FUNCTIONAL ASSESSMENT: PAIN_FUNCTIONAL_ASSESSMENT: 0-10

## 2021-06-01 NOTE — ANESTHESIA PRE PROCEDURE
Department of Anesthesiology  Preprocedure Note       Name:  Tarik Orta   Age:  80 y.o.  :  1950                                          MRN:  [id number]         Date:  2021      Surgeon: Peng Muniz):  Winsome Holguin MD    Procedure: Procedure(s):  LEFT FEMORAL TO POPLITEAL BYPASS GRAFT ()    Medications prior to admission:   Prior to Admission medications    Medication Sig Start Date End Date Taking?  Authorizing Provider   pentoxifylline (TRENTAL) 400 MG extended release tablet Take 400 mg by mouth daily  21  Yes Historical Provider, MD   aspirin 325 MG EC tablet Take 1 tablet by mouth daily 21 Yes Veda Rich MD   carvedilol (COREG) 6.25 MG tablet Take 1 tablet by mouth 2 times daily (with meals) 21  Yes Veda Rich MD   albuterol sulfate  (90 Base) MCG/ACT inhaler  21  Yes Historical Provider, MD   Multiple Vitamin (MULTIVITAMIN ADULT PO) Take 1 tablet by mouth daily    Yes Historical Provider, MD   fluticasone-umeclidin-vilant (TRELEGY ELLIPTA) 100-62.5-25 MCG/INH AEPB Inhale 1 puff into the lungs daily 3/9/21  Yes Jackey Duverney, MD   ENTRESTO  MG per tablet TAKE ONE TABLET BY MOUTH TWICE A DAY 20  Yes Monica Jauregui MD   atorvastatin (LIPITOR) 20 MG tablet Take 1 tablet by mouth daily  Patient taking differently: Take 20 mg by mouth daily Not started yet 21   Veda Rich MD       Current medications:    Current Facility-Administered Medications   Medication Dose Route Frequency Provider Last Rate Last Admin    0.9 % sodium chloride infusion  25 mL Intravenous PRN Winsome Holguin  mL/hr at 21 1107 25 mL at 21 1107    ceFAZolin (ANCEF) 2000 mg in dextrose 5 % 50 mL IVPB  2,000 mg Intravenous On Call to [address], MD        sodium chloride flush 0.9 % injection 5-40 mL  5-40 mL Intravenous 2 times per day Winsome Holguin MD        sodium chloride flush 0.9 % injection 5-40 mL  5-40 mL Intravenous PRN Marlene Guan MD        ondansetron [facility]) injection 4 mg  4 mg Intravenous Once PRN Catrachito Humphries MD        HYDROmorphone (DILAUDID) injection 0.25 mg  0.25 mg Intravenous Q5 Min PRN Catrachito Humphries MD        HYDROmorphone (DILAUDID) injection 0.5 mg  0.5 mg Intravenous Q5 Min PRN Catrachito Humphries MD           Allergies:     Allergies   Allergen Reactions    Codeine Anxiety and Other (See Comments)     Unknown reaction       Problem List:    Patient Active Problem List   Diagnosis Code    COPD exacerbation (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 [address] J44.9    Pulmonary nodule R91.1    Chronic cough R05    Centrilobular emphysema (HCC) J43.2    Symptomatic cholelithiasis K80.20    Superficial occlusion of femoral artery ([facility]) I70.209    Hyponatremia E87.1    Atherosclerosis of artery of extremity with rest pain ([facility]) I70.229    PVD (peripheral vascular disease) with claudication ([facility]) I73.9       Past Medical History:        Diagnosis Date    Bronchitis     CHF (congestive heart failure) (HCC)     COPD (chronic obstructive pulmonary disease) (HCC)        Past Surgical History:        Procedure Laterality Date    CHOLECYSTECTOMY, LAPAROSCOPIC N/A 3/17/2021    LAPAROSCOPIC CHOLECYSTECTOMY WITH INTRAOPERATIVE CHOLANGIOGRAM performed by Gayle Romo MD at [address] [place] [address]      bilateral cataracts    FRACTURE SURGERY      LT elbow    TONSILLECTOMY         Social History:    Social History     Tobacco Use    Smoking status: Former Smoker     Packs/day: 1.00     Years: 30.00     Pack years: 30.00     Types: Cigarettes     Quit date: 2016     Years since quittin.7    Smokeless tobacco: Former User     Quit date: 9/5/2016   Substance Use Topics    Alcohol use: Yes     Alcohol/week: 1.0 - 3.0 standard drinks     Types: 1 - 3 Cans of beer per week     Comment: daily                                Counseling given: Not Answered      Vital Signs (Current):   Vitals:    05/24/21 1155 06/01/21 1038   BP:  132/73   Pulse:  72   Resp:  16   Temp:  97.4 °F (36.3 °C)   TempSrc:  Temporal   SpO2:  97%   Weight: 155 lb (70.3 kg) 143 lb 1.6 oz (64.9 kg)   Height: 5' 11\" (1.803 m) 5' 10\" (1.778 m)                                              BP Readings from Last 3 Encounters:   06/01/21 132/73   05/25/21 (!) 152/83   05/21/21 (!) 94/58       NPO Status: Time of last liquid consumption: 2200                        Time of last solid consumption: 2200                        Date of last liquid consumption: 05/31/21                        Date of last solid food consumption: 05/31/21    BMI:   Wt Readings from Last 3 Encounters:   06/01/21 143 lb 1.6 oz (64.9 kg)   05/25/21 146 lb (66.2 kg)   05/21/21 145 lb (65.8 kg)     Body mass index is 20.53 kg/m². CBC:   Lab Results   Component Value Date    WBC 8.1 05/25/2021    RBC 4.47 05/25/2021    HGB 14.3 05/25/2021    HCT 41.4 05/25/2021    MCV 92.5 05/25/2021    RDW 12.7 05/25/2021     05/25/2021       CMP:   Lab Results   Component Value Date     05/25/2021    K 4.9 05/25/2021    K 4.8 05/01/2021    CL 99 05/25/2021    CO2 26 05/25/2021    BUN 11 05/25/2021    CREATININE 0.8 05/25/2021    GFRAA >60 05/25/2021    AGRATIO 1.2 05/01/2021    LABGLOM >60 05/25/2021    GLUCOSE 100 05/25/2021    PROT 6.3 05/01/2021    CALCIUM 9.0 05/25/2021    BILITOT <0.2 05/01/2021    ALKPHOS 54 05/01/2021    AST 17 05/01/2021    ALT 11 05/01/2021       POC Tests: No results for input(s): POCGLU, POCNA, POCK, POCCL, POCBUN, POCHEMO, POCHCT in the last 72 hours.     Coags:   Lab Results   Component Value Date    PROTIME 11.8 05/25/2021    INR 1.02 05/25/2021    APTT 29.9 05/25/2021       HCG (If Applicable): No results found for: PREGTESTUR, PREGSERUM, HCG, HCGQUANT     ABGs:   Lab Results   Component Value Date    PHART 7.438 09/04/2016    PO2ART 82.6 09/04/2016    WGT5ECP 31.6 09/04/2016    SYT7PVW 20.9 09/04/2016    BEART -2.1 09/04/2016    Q7GTZHIQ 96.8 09/04/2016        Type & Screen (If Applicable):  No results found for: LABABO, LABRH    Drug/Infectious Status (If Applicable):  No results found for: HIV, HEPCAB    COVID-19 Screening (If Applicable):   Lab Results   Component Value Date    COVID19 Not Detected 03/11/2021           Anesthesia Evaluation  Patient summary reviewed no history of anesthetic complications:   Airway: Mallampati: II  TM distance: >3 FB   Neck ROM: full  Mouth opening: > = 3 FB Dental:      Comment: Chipped top front tooth    Pulmonary: breath sounds clear to auscultation  (+) COPD:  shortness of breath:      (-) rhonchi and wheezes                          ROS comment: Albuterol used TID -taken 9am today. No recent exacerbations  Former smoker   Cardiovascular:    (+) hypertension:, CHF:,         Rhythm: regular  Rate: normal                 ROS comment: Procedure Date  Date: 04/01/2021 Start: 01:16 PM     Study Location: [facility] - Echo Lab  Technical Quality: Limited visualization due to body habitus.     Additional Indications:Chronic systolic congestive heart failure.     Patient Status: Routine     Height: 71 inches Weight: 146 pounds BSA: 1.84 m2 BMI: 20.36 kg/m2     BP: 136/87 mmHg      Conclusions      Summary   -Technically difficult exam due to body habitus. -Moderately reduced global systolic function with an ejection fraction   estimated at 40%. -Global hypokinesis with abnormal septal motion noted. -Grade II diastolic dysfunction with elevated LV filling pressures. Avg.   E/e'=5.4   -Normal function of all valves.       Signature    Denies h/o cp     Neuro/Psych:      (-) seizures, TIA and CVA GI/Hepatic/Renal:        (-) GERD       Endo/Other:                     Abdominal:           Vascular:   + PVD, aortic or cerebral, . Anesthesia Plan      general     ASA 3       Induction: intravenous. MIPS: Postoperative opioids intended, Prophylactic antiemetics administered and Postoperative trial extubation. Anesthetic plan and risks discussed with patient. Use of blood products discussed with patient whom consented to blood products. Plan discussed with CRNA.                   Crow Perea MD   6/1/2021

## 2021-06-01 NOTE — PROGRESS NOTES
4 Eyes Skin Assessment     NAME:  Malia Navas OF BIRTH:  1950  MEDICAL RECORD NUMBER:  [id number]    The patient is being assess for  Admission    I agree that 2 RN's have performed a thorough Head to Toe Skin Assessment on the patient. ALL assessment sites listed below have been assessed. Areas assessed by both nurses:    Head, Face, Ears, Shoulders, Back, Chest, Arms, Elbows, Hands, Sacrum. Buttock, Coccyx, Ischium and Legs. Feet and Heels        Does the Patient have a Wound?  Other LLE surgical incisions       Alli Prevention initiated:  No   Wound Care Orders initiated:  No    Pressure Injury (Stage 3,4, Unstageable, DTI, NWPT, and Complex wounds) if present place consult order under [de-identified] No    New and Established Ostomies if present place consult order under : No      Nurse 1 eSignature: Electronically signed by Jacob Horta RN on 6/1/21 at 6:47 PM EDT    **SHARE this note so that the co-signing nurse is able to place an eSignature**    Nurse 2 eSignature: Electronically signed by Frankie Merino RN on 6/1/21 at 6:55 PM EDT

## 2021-06-01 NOTE — PROGRESS NOTES
Teaching / education initiated regarding perioperative experience, expectations, and pain management during stay. Patient verbalized understanding. Pt has picture of Covid vaccine card on phone, this nurse visualized the card and pt has had both doses. Pt reports left elbow started swelling and redness 2 days ago. States no drainage noted, no fevers. Pt has had surgery on this elbow,after a fall, approx 20 years ago . Notified Dr Car Mcfarlane, no further orders at this time.

## 2021-06-01 NOTE — PROGRESS NOTES
Patient received to PACU in stable condition. VSS. No signs of distress. Dressing to left leg and groin clean dry and intact. Will continue to monitor.

## 2021-06-01 NOTE — PLAN OF CARE
Problem: Infection:  Goal: Will remain free from infection  Description: Will remain free from infection  6/1/2021 1907 by Chelsey Gillespie RN  Outcome: Ongoing  Note: No signs of infection at this time. Will monitor for pus or drainage, bad smell coming from the wound, fever, chills, hot to touch, redness, pain or sore to touch. Problem: Pain:  Goal: Patient's pain/discomfort is manageable  Description: Patient's pain/discomfort is manageable  6/1/2021 1907 by Chelsey Gillespie RN  Outcome: Ongoing  Note: Pt alert and oriented. Pt able to communicate present pain and use the pain scale appropriately. Nonpharmacological pain reducers and pain medication offered as needed. Will cont to monitor. Problem: Falls - Risk of:  Goal: Will remain free from falls  Description: Will remain free from falls  Outcome: Ongoing  Note: Pt free from falls this shift, bed side table next to bed, call light in reach, bed in lowest position, wheels locked.  Encouraged to call for any assistance

## 2021-06-01 NOTE — PLAN OF CARE
Problem: Infection:  Goal: Will remain free from infection  Description: Will remain free from infection  Outcome: Ongoing     Problem: Safety:  Goal: Free from accidental physical injury  Description: Free from accidental physical injury  Outcome: Ongoing  Goal: Free from intentional harm  Description: Free from intentional harm  Outcome: Ongoing     Problem: Daily Care:  Goal: Daily care needs are met  Description: Daily care needs are met  Outcome: Ongoing     Problem: Pain:  Goal: Patient's pain/discomfort is manageable  Description: Patient's pain/discomfort is manageable  Outcome: Ongoing     Problem: Discharge Planning:  Goal: Patients continuum of care needs are met  Description: Patients continuum of care needs are met  Outcome: Ongoing

## 2021-06-01 NOTE — BRIEF OP NOTE
Brief Postoperative Note      Patient: Adrienne Hannah  YOB: 1950  MRN: [id number]    Date of Procedure: 6/1/2021    Pre-Op Diagnosis: L SFA occlusion with rest pain    Post-Op Diagnosis: Same       Procedure(s):  LEFT FEMORAL TO POPLITEAL BYPASS GRAFT (22329)    Surgeon(s):  Sridhar Masterson MD    Assistant:  First Assistant: Tono Garcia    Anesthesia: General    Estimated Blood Loss (mL): less than 717     Complications: None    Specimens:   * No specimens in log *    Implants:  * No implants in log *      Drains:   Urethral Catheter Temperature probe 16 fr (Active)       Findings: as above    Electronically signed by Sridhar Masterson MD on 6/1/2021 at 3:32 PM

## 2021-06-02 LAB
ANION GAP SERPL CALCULATED.3IONS-SCNC: 5 MMOL/L (ref 3–16)
BUN BLDV-MCNC: 14 MG/DL (ref 7–20)
CALCIUM SERPL-MCNC: 8.1 MG/DL (ref 8.3–10.6)
CHLORIDE BLD-SCNC: 101 MMOL/L (ref 99–110)
CO2: 23 MMOL/L (ref 21–32)
CREAT SERPL-MCNC: 0.8 MG/DL (ref 0.8–1.3)
EKG ATRIAL RATE: 75 BPM
EKG DIAGNOSIS: NORMAL
EKG P AXIS: 82 DEGREES
EKG P-R INTERVAL: 170 MS
EKG Q-T INTERVAL: 372 MS
EKG QRS DURATION: 88 MS
EKG QTC CALCULATION (BAZETT): 415 MS
EKG R AXIS: 68 DEGREES
EKG T AXIS: 83 DEGREES
EKG VENTRICULAR RATE: 75 BPM
GFR AFRICAN AMERICAN: >60
GFR NON-AFRICAN AMERICAN: >60
GLUCOSE BLD-MCNC: 193 MG/DL (ref 70–99)
HCT VFR BLD CALC: 35.4 % (ref 40.5–52.5)
HEMOGLOBIN: 11.8 G/DL (ref 13.5–17.5)
MCH RBC QN AUTO: 31.2 PG (ref 26–34)
MCHC RBC AUTO-ENTMCNC: 33.4 G/DL (ref 31–36)
MCV RBC AUTO: 93.2 FL (ref 80–100)
PDW BLD-RTO: 12.6 % (ref 12.4–15.4)
PLATELET # BLD: 289 K/UL (ref 135–450)
PMV BLD AUTO: 8 FL (ref 5–10.5)
POTASSIUM SERPL-SCNC: 4.5 MMOL/L (ref 3.5–5.1)
RBC # BLD: 3.8 M/UL (ref 4.2–5.9)
SODIUM BLD-SCNC: 129 MMOL/L (ref 136–145)
TROPONIN: <0.01 NG/ML
WBC # BLD: 14.5 K/UL (ref 4–11)

## 2021-06-02 PROCEDURE — 99024 POSTOP FOLLOW-UP VISIT: CPT | Performed by: SURGERY

## 2021-06-02 PROCEDURE — 93010 ELECTROCARDIOGRAM REPORT: CPT | Performed by: INTERNAL MEDICINE

## 2021-06-02 PROCEDURE — 6370000000 HC RX 637 (ALT 250 FOR IP): Performed by: SURGERY

## 2021-06-02 PROCEDURE — 041N0ZL BYPASS LEFT POPLITEAL ARTERY TO POPLITEAL ARTERY, OPEN APPROACH: ICD-10-PCS | Performed by: SURGERY

## 2021-06-02 PROCEDURE — 85027 COMPLETE CBC AUTOMATED: CPT

## 2021-06-02 PROCEDURE — 93005 ELECTROCARDIOGRAM TRACING: CPT | Performed by: SURGERY

## 2021-06-02 PROCEDURE — 2580000003 HC RX 258: Performed by: SURGERY

## 2021-06-02 PROCEDURE — 80048 BASIC METABOLIC PNL TOTAL CA: CPT

## 2021-06-02 PROCEDURE — 2000000000 HC ICU R&B

## 2021-06-02 PROCEDURE — 6360000002 HC RX W HCPCS: Performed by: SURGERY

## 2021-06-02 PROCEDURE — 84484 ASSAY OF TROPONIN QUANT: CPT

## 2021-06-02 PROCEDURE — 94761 N-INVAS EAR/PLS OXIMETRY MLT: CPT

## 2021-06-02 PROCEDURE — APPNB30 APP NON BILLABLE TIME 0-30 MINS: Performed by: NURSE PRACTITIONER

## 2021-06-02 PROCEDURE — 36415 COLL VENOUS BLD VENIPUNCTURE: CPT

## 2021-06-02 PROCEDURE — 94640 AIRWAY INHALATION TREATMENT: CPT

## 2021-06-02 RX ORDER — CALCIUM CARBONATE 200(500)MG
500 TABLET,CHEWABLE ORAL 3 TIMES DAILY PRN
Status: DISCONTINUED | OUTPATIENT
Start: 2021-06-02 | End: 2021-06-03 | Stop reason: HOSPADM

## 2021-06-02 RX ADMIN — ANTACID TABLETS 500 MG: 500 TABLET, CHEWABLE ORAL at 08:15

## 2021-06-02 RX ADMIN — OXYCODONE HYDROCHLORIDE 10 MG: 5 TABLET ORAL at 23:36

## 2021-06-02 RX ADMIN — Medication 10 ML: at 20:07

## 2021-06-02 RX ADMIN — CEFAZOLIN 2000 MG: 10 INJECTION, POWDER, FOR SOLUTION INTRAVENOUS at 04:09

## 2021-06-02 RX ADMIN — MORPHINE SULFATE 2 MG: 2 INJECTION, SOLUTION INTRAMUSCULAR; INTRAVENOUS at 08:45

## 2021-06-02 RX ADMIN — CARVEDILOL 6.25 MG: 6.25 TABLET, FILM COATED ORAL at 11:34

## 2021-06-02 RX ADMIN — Medication 2 PUFF: at 08:49

## 2021-06-02 RX ADMIN — CARVEDILOL 6.25 MG: 6.25 TABLET, FILM COATED ORAL at 18:24

## 2021-06-02 RX ADMIN — Medication 2 PUFF: at 12:49

## 2021-06-02 RX ADMIN — ASPIRIN 325 MG: 325 TABLET, COATED ORAL at 11:35

## 2021-06-02 RX ADMIN — OXYCODONE HYDROCHLORIDE 5 MG: 5 TABLET ORAL at 11:41

## 2021-06-02 RX ADMIN — ONDANSETRON 4 MG: 2 INJECTION INTRAMUSCULAR; INTRAVENOUS at 08:16

## 2021-06-02 RX ADMIN — SACUBITRIL AND VALSARTAN: 97; 103 TABLET, FILM COATED ORAL at 20:07

## 2021-06-02 RX ADMIN — ATORVASTATIN CALCIUM 20 MG: 20 TABLET, FILM COATED ORAL at 20:07

## 2021-06-02 RX ADMIN — Medication 2 PUFF: at 19:55

## 2021-06-02 RX ADMIN — SACUBITRIL AND VALSARTAN 1 TABLET: 97; 103 TABLET, FILM COATED ORAL at 11:40

## 2021-06-02 ASSESSMENT — PAIN DESCRIPTION - PAIN TYPE
TYPE: SURGICAL PAIN
TYPE_2: SURGICAL
TYPE: SURGICAL PAIN
TYPE: ACUTE PAIN

## 2021-06-02 ASSESSMENT — PAIN SCALES - GENERAL
PAINLEVEL_OUTOF10: 0
PAINLEVEL_OUTOF10: 6
PAINLEVEL_OUTOF10: 10
PAINLEVEL_OUTOF10: 8
PAINLEVEL_OUTOF10: 5
PAINLEVEL_OUTOF10: 6

## 2021-06-02 ASSESSMENT — PAIN DESCRIPTION - DESCRIPTORS
DESCRIPTORS: ACHING
DESCRIPTORS: CONSTANT
DESCRIPTORS: BURNING

## 2021-06-02 ASSESSMENT — PAIN DESCRIPTION - ORIENTATION
ORIENTATION: LEFT
ORIENTATION: LEFT

## 2021-06-02 ASSESSMENT — PAIN DESCRIPTION - LOCATION
LOCATION: GROIN
LOCATION: GROIN
LOCATION_2: LEG
LOCATION: OTHER (COMMENT)

## 2021-06-02 ASSESSMENT — PAIN DESCRIPTION - INTENSITY: RATING_2: 6

## 2021-06-02 ASSESSMENT — PAIN DESCRIPTION - FREQUENCY: FREQUENCY: CONTINUOUS

## 2021-06-02 NOTE — FLOWSHEET NOTE
Patient complains of level 10 pain pointing to epigastric area. Patient holding chest and complaining of nausea also. BP within normal. No changes on monitor. Tums and zofran given.

## 2021-06-02 NOTE — OP NOTE
[facility]                     [address] [facility], [facility]                                OPERATIVE REPORT    PATIENT NAME: Xochitl Peñaloza                   :        1950  MED REC NO:   [id number]                          ROOM:       7792  ACCOUNT NO:   [de-identified]                           ADMIT DATE: 2021  PROVIDER:     Brayan Tucker MD    DATE OF PROCEDURE:  2021    PREOPERATIVE DIAGNOSES:  Left superficial femoral artery occlusion with  left foot rest pain. POSTOPERATIVE DIAGNOSES:  Left superficial femoral artery occlusion with  left foot rest pain. OPERATION PERFORMED:  Left femoral to above-knee popliteal in situ  bypass with intraoperative angiogram.    SURGEON:  Brayan Tucker MD    ANESTHESIA:  General endotracheal.    HISTORY:  The patient is a 81-year-old gentleman with history of smoking  presented to the office complaining of very short distance claudication  and nocturnal rest pain in his foot relieved with ambulation. He was  found on examination to have evidence of significant infrainguinal  arterial disease and angiography demonstrated occlusion of his  superficial femoral artery throughout its course beginning at its origin  with reconstitution of the above-knee popliteal artery and three-vessel  runoff to the foot. It was suggested that he undergo surgical bypass  for limb salvage and he agreed understanding the risks, benefits and  other options. TECHNIQUE:  The patient was brought to the operating room and placed on  the operating table in the supine position. Left groin and leg were  prepped and draped in a sterile fashion. A 5 cm oblique incision was  made in the left groin crease overlying the femoral pulse and the  subcutaneous tissue was divided down to the common femoral artery. Crossing vein was divided between clips.   The common femoral artery was  then dissected proximally and controlled with a vessel loop. It was  dissected down to its bifurcation and the deep femoral artery was  controlled with a vessel loop knowing that the superficial femoral  artery is occluded at its origin. Attention was directed to the greater  saphenous vein. The saphenofemoral junction was mobilized by dividing  side branches between 2-0 silks and clips. The vein was then dissected  inferiorly underneath a skin flap as far as visibly safe dividing side  branch between 2-0 silks and clips. Wound was packed with antibiotic  soaked gauze and attention was directed to the above-knee popliteal  location. A longitudinal incision was made measuring 10 cm in length  along the groove posterior to the sartorius muscle. Subcutaneous tissue  was divided and the greater saphenous vein was identified was identified  in the posterior flap. It was protected. Electrocautery was used to  incise the fascia of the thigh and the popliteal fossa was entered. The  popliteal vascular bundle was identified in the depths of the wound just  distal to its exit from the adductor canal.  The artery was sharply  dissected away from the adjacent vein. It was mobilized for a distance  of 3 cm and controlled with vessel loops. It was noted to be soft  without significant disease. The greater saphenous vein was identified  in the wound again and was mobilized distally and proximally. Side  branches were divided between 2-0 silks and ligatures. Underneath the  superior flap, the vein was mobilized for a significant distance under  direct vision. Side branches were clipped. A small 3 cm  counterincision was made in the mid thigh and between the two incisions  vein was identified and two large branches in this area were divided  between ligatures or clipped. At this point, the patient was given 3000  units of heparin. After adequate circulation time, clamps were applied  to the proximal common femoral artery and the deep femoral artery.   This  was done after dividing the saphenofemoral junction over a clamp and  oversewing it with a 5-0 Prolene. A longitudinal arteriotomy was made  in the distal common femoral artery. The vein was stretched to an  appropriate length and spatulated. A standard end-to-side anastomosis  was created using a single running 5-0 Prolene. After completion of  this, flow was restored and there was noted to be a good pulse in the  vein down to its first competent valve cusp. The vein was then  mobilized further distally in the above knee incision and divided over  two medium clips. It was distended with heparin papaverine solution and  found to be of excellent quality as suspected throughout its length. A  Shayne-Incise valvulotome with a 3-mm head was then inserted from the  distal end and advanced up to the anastomosis. It was withdrawn two  times cutting all valves and resulting in excellent pulsatile flow. There was noted to be one bleeding site where a ligature was dislodged  from the graft and this was oversewn with a 7-0 Prolene. The graft was  heparinized and evaluated with handheld Doppler and demonstrated no  evidence of fistulas. Additional 1000 units of heparin was given and  the popliteal artery was clamped. A longitudinal arteriotomy measuring  1 cm in length was made. The graft was spatulated to fit this and a  standard end-to-side anastomosis was created using a single 6-0 Prolene  in running fashion. Prior to completion of this, the arteries and the  graft were forward and back bled and flushed with heparinized saline. Anastomosis was completed and flow was restored. There was noted to be  a good pulse distally. Proximal graft was then punctured with a  21-gauge butterfly and an angiogram was performed which demonstrated no  fistulas and a good runoff as anticipated. There were no technical  abnormalities. The puncture site was repaired with a single 6-0  Prolene.   At this point, the wounds were made hemostatic and closed with  3-0 Vicryl deep in several layers and a subcuticular stitch at each side  of 4-0 Monocryl with Dermabond. Clean sterile dry compressive dressing  was applied and the patient was transferred to recovery room in stable  condition having tolerated the procedure well.         Nilam Fowler MD    D: 06/01/2021 15:58:55       T: 06/02/2021 2:01:57     [id number]  Job#: [id number]     Doc#: [id number]    CC:

## 2021-06-02 NOTE — PROGRESS NOTES
VASCULAR  POD#1    Comfortable except for heartburn. Foot warm and feels good. VSS (Low dose Levo for SBP<100) afeb  Good UO  Lungs clear  Wrap in place without staining  Thigh soft, nontender  Palpable L popliteal pulse with good doppler signals  L foot hyperemic and warm    Labs reviewed  Na 129    A/P: S/P L fem-AKpop bypass. Patent with well perfused foot. DC matthew and May. TUMs for heartburn. Ambulate. Up in chair. Home tomorrow unless issues.      Lakeisha Argueta

## 2021-06-02 NOTE — CARE COORDINATION
Discharge Planning Assessment  Readmission risk score 14%  RN discharge planner met with patient/ (and family member) to discuss reason for admission, current living situation, and potential needs at the time of discharge    Demographics/Insurance verified Yes    Current type of dwelling: tri level home, no difficulty getting around    Patient from ECF/SW confirmed with:n/a    Living arrangements:with wife    Level of function/Support:independent    PCP: Wilbert Page    Last Visit to PCP: in past month    DME:none    Active with any community resources/agencies/skilled home care: none    Medication compliance issues:no concerns, uses the [employer] [place] [employer] issues that could impact healthcare: not identified      Tentative discharge plan: home    *Discussed and provided facilities of choice if transition to a skilled nursing facility is required at the time of discharge      *Discussed with patient and/or family that on the day of discharge home tentative time of discharge will be between 10 AM and noon.     Transportation at the time of discharge: family  MICHELLE FlowersN, CCM, RN  [facility]  [phone]

## 2021-06-03 VITALS
HEART RATE: 67 BPM | WEIGHT: 146.16 LBS | DIASTOLIC BLOOD PRESSURE: 76 MMHG | OXYGEN SATURATION: 96 % | TEMPERATURE: 97.1 F | SYSTOLIC BLOOD PRESSURE: 128 MMHG | HEIGHT: 70 IN | BODY MASS INDEX: 20.93 KG/M2 | RESPIRATION RATE: 16 BRPM

## 2021-06-03 LAB
ANION GAP SERPL CALCULATED.3IONS-SCNC: 5 MMOL/L (ref 3–16)
BUN BLDV-MCNC: 10 MG/DL (ref 7–20)
CALCIUM SERPL-MCNC: 8.4 MG/DL (ref 8.3–10.6)
CHLORIDE BLD-SCNC: 101 MMOL/L (ref 99–110)
CO2: 27 MMOL/L (ref 21–32)
CREAT SERPL-MCNC: 0.8 MG/DL (ref 0.8–1.3)
GFR AFRICAN AMERICAN: >60
GFR NON-AFRICAN AMERICAN: >60
GLUCOSE BLD-MCNC: 105 MG/DL (ref 70–99)
POTASSIUM SERPL-SCNC: 4.7 MMOL/L (ref 3.5–5.1)
SODIUM BLD-SCNC: 133 MMOL/L (ref 136–145)

## 2021-06-03 PROCEDURE — 6370000000 HC RX 637 (ALT 250 FOR IP): Performed by: SURGERY

## 2021-06-03 PROCEDURE — 80048 BASIC METABOLIC PNL TOTAL CA: CPT

## 2021-06-03 PROCEDURE — 94640 AIRWAY INHALATION TREATMENT: CPT

## 2021-06-03 PROCEDURE — 94761 N-INVAS EAR/PLS OXIMETRY MLT: CPT

## 2021-06-03 PROCEDURE — APPNB30 APP NON BILLABLE TIME 0-30 MINS: Performed by: NURSE PRACTITIONER

## 2021-06-03 PROCEDURE — 2580000003 HC RX 258: Performed by: SURGERY

## 2021-06-03 RX ORDER — OXYCODONE HYDROCHLORIDE 10 MG/1
10 TABLET ORAL EVERY 6 HOURS PRN
Qty: 25 TABLET | Refills: 0 | Status: SHIPPED | OUTPATIENT
Start: 2021-06-03 | End: 2021-06-17

## 2021-06-03 RX ADMIN — ASPIRIN 325 MG: 325 TABLET, COATED ORAL at 08:05

## 2021-06-03 RX ADMIN — CARVEDILOL 6.25 MG: 6.25 TABLET, FILM COATED ORAL at 08:05

## 2021-06-03 RX ADMIN — Medication 10 ML: at 08:05

## 2021-06-03 RX ADMIN — SACUBITRIL AND VALSARTAN 1 TABLET: 97; 103 TABLET, FILM COATED ORAL at 08:05

## 2021-06-03 ASSESSMENT — PAIN DESCRIPTION - ORIENTATION: ORIENTATION: LEFT

## 2021-06-03 ASSESSMENT — PAIN SCALES - GENERAL: PAINLEVEL_OUTOF10: 2

## 2021-06-03 ASSESSMENT — PAIN DESCRIPTION - LOCATION: LOCATION: GROIN

## 2021-06-03 ASSESSMENT — PAIN DESCRIPTION - PAIN TYPE: TYPE: SURGICAL PAIN

## 2021-06-03 NOTE — PROGRESS NOTES
Physician Progress Note      PATIENT:               Andrew Sellers  CSN #:                  [id number]  :                       1950  ADMIT DATE:       2021 10:02 AM  [address] [place] DATE:  RESPONDING  PROVIDER #:        Alpa Cifuentes MD          QUERY TEXT:    Pt admitted with  femoral artery occlusion and had a bypass. Pt noted to have   a HGB prior to admission 14.3- 12.4 after procedure, now 11.8. If possible,   please document in the progress notes and discharge summary if you are   evaluating and/or treating any of the following: The medical record reflects the following:  Risk Factors: Left femoral to above-knee popliteal in situ  bypass with intraoperative angiogram.  Clinical Indicators: Pt noted to have a HGB prior to admission 14.3- 12.4   after procedure, now 11.8. Treatment: Serial labs and monitoring  Options provided:  -- Acute blood loss anemia  -- Postoperative acute blood loss anemia  -- Dilutional anemia  -- Other - I will add my own diagnosis  -- Disagree - Not applicable / Not valid  -- Disagree - Clinically unable to determine / Unknown  -- Refer to Clinical Documentation Reviewer    PROVIDER RESPONSE TEXT:    This patient has acute blood loss anemia.     Query created by: Price Hutchison on 2021 10:39 AM      Electronically signed by:  Alpa Cifuentes MD 6/3/2021 7:09 AM

## 2021-06-03 NOTE — PLAN OF CARE
Problem: Infection:  Goal: Will remain free from infection  Description: Will remain free from infection  Outcome: Completed     Problem: Safety:  Goal: Free from accidental physical injury  Description: Free from accidental physical injury  Outcome: Completed     Problem: Safety:  Goal: Free from intentional harm  Description: Free from intentional harm  Outcome: Completed     Problem: Pain:  Goal: Patient's pain/discomfort is manageable  Description: Patient's pain/discomfort is manageable  Outcome: Completed     Problem: Daily Care:  Goal: Daily care needs are met  Description: Daily care needs are met  Outcome: Completed     Problem: Skin Integrity:  Goal: Skin integrity will stabilize  Description: Skin integrity will stabilize  Outcome: Completed        Pt discharge order placed per Dr. Ingrid Ray        Electronically signed by Jerald Bolanos RN on 6/3/2021 at 8:28 AM

## 2021-06-03 NOTE — DISCHARGE SUMMARY
DISCHARGE SUMMARY      Patient ID:  Vesna Stout  [id number] 80 y.o. 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 *    Patient Active Problem List   Diagnosis    COPD exacerbation ([address]    Acute hypoxemic respiratory failure (HCC)    Left ventricular noncompaction (HCC)    Chronic systolic congestive heart failure (HCC)    Nonischemic cardiomyopathy (HCC)    Essential hypertension    SOB (shortness of breath)    COPD, very severe (HCC)    Pulmonary nodule    Chronic cough    Centrilobular emphysema (HCC)    Symptomatic cholelithiasis    Superficial occlusion of femoral artery (HCC)    Hyponatremia    Atherosclerosis of artery of extremity with rest pain ([address]    PVD (peripheral vascular disease) with claudication ([address]    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 male with history of COPD, CHF and PVD. Hospital Course: The patient underwent left femoral to above the knee popliteal in situ bypass on 6/1/2021 10:02 AM.  Their post-operative course was uncomplicated. Pain was controlled with combination of oral and IV medications. They were able to ambulate without difficulty and tolerate a regular diet. The patient was continued on ASA and statin therapy. The patient was discharged on 6/3/2021.     Disposition:  home in stable condition    Patient Instructions:   Vick Weller [address] [place] [address] Medication Instructions XPH:[id number]    Printed on:06/03/21 6349 Medication Information                      albuterol sulfate  (90 Base) MCG/ACT inhaler               aspirin 325 MG EC tablet  Take 1 tablet by mouth daily             atorvastatin (LIPITOR) 20 MG tablet  Take 1 tablet by mouth daily             carvedilol (COREG) 6.25 MG tablet  Take 1 tablet by mouth 2 times daily (with meals)             fluticasone-umeclidin-vilant (TRELEGY ELLIPTA) 100-62.5-25 MCG/INH AEPB  Inhale 1 puff into the lungs daily             Multiple Vitamin (MULTIVITAMIN ADULT PO)  Take 1 tablet by mouth daily              oxyCODONE (OXY-IR) 10 MG immediate release tablet  Take 1 tablet by mouth every 6 hours as needed for Pain for up to 14 days. sacubitril-valsartan (ENTRESTO)  MG per tablet  Take 1 tablet by mouth 2 times daily               Activity:  No heavy lifting or strenuous exercise for 2 weeks. Walk as tolerated. No prolonged standing. If not walking pt to sit and elevate foot. MORGAN hose to be worn from AM to HS - may remove at HS. May drive beginning next weekend. May resume riding lawn mower in one week. Diet:  regular diet  Wound Care:  keep incisions clean and dry. Can use soap and water to clean, keep open to air. May shower on Sunday. No tub bathes or hot tub or swimming until seen in the office. Resume all preadmission medications except Trental.    Follow up with Dr. Neftaly Moon on 6/16. Please call the office at [phone] to make an appointment.       Signed:  CHELSY Cheng CNP, 6/3/2021, 8:06 AM

## 2021-06-10 RX ORDER — FLUTICASONE FUROATE, UMECLIDINIUM BROMIDE AND VILANTEROL TRIFENATATE 100; 62.5; 25 UG/1; UG/1; UG/1
1 POWDER RESPIRATORY (INHALATION) DAILY
Qty: 3 EACH | Refills: 3 | Status: SHIPPED | OUTPATIENT
Start: 2021-06-10 | End: 2022-01-11

## 2021-06-16 ENCOUNTER — OFFICE VISIT (OUTPATIENT)
Dept: VASCULAR SURGERY | Age: 71
End: 2021-06-16

## 2021-06-16 VITALS — HEIGHT: 71 IN | WEIGHT: 145 LBS | BODY MASS INDEX: 20.3 KG/M2

## 2021-06-16 DIAGNOSIS — I70.229 ATHEROSCLEROSIS OF ARTERY OF EXTREMITY WITH REST PAIN (HCC): Primary | ICD-10-CM

## 2021-06-16 PROCEDURE — 99024 POSTOP FOLLOW-UP VISIT: CPT | Performed by: SURGERY

## 2021-06-16 RX ORDER — CIPROFLOXACIN 500 MG/1
500 TABLET, FILM COATED ORAL 2 TIMES DAILY
Qty: 14 TABLET | Refills: 0 | Status: SHIPPED | OUTPATIENT
Start: 2021-06-16 | End: 2021-06-26

## 2021-06-16 NOTE — PROGRESS NOTES
First OV S/P L fem-AKpop IS bypass 2 weeks ago. L foot feels good. No rest pain or claudication. Reports no drainage or fever/chills. EXAM:  Incisions intact with mild erythema distal incision    Palpable graft, popliteal & PT pulses. No bruits. Soft, pitting 1+ edema    A/P: S/P L fem-pop. Patent. Elevate foot. MORGAN as postop. Cipro 500 BID x 7 days for mild ellulitis. Increase activities to no restriction next week. F/U 2 weeks.

## 2021-06-16 NOTE — LETTER
[facility]  [address] [facility]  Phone: [phone]  Fax: [phone]    Carson Larios MD    June 16, 2021     [address] [address]    Patient: Ben Peña   MR Number: [id number]   YOB: 1950   Date of Visit: 6/16/2021       Dear Dr. Dylon Hatfield:    I saw Mr. Marshall Riding back in the office today approximately 2 weeks since he underwent uneventful left femoral to popliteal bypass for rest pain and very short distance claudication. His graft is widely patent and he is doing quite well. He will increase his activity to normal activities with the next 2 weeks and I will see him in longer-term follow-up. If you have questions, please do not hesitate to call me. I look forward to following [place] Loge along with you. Thanks for asking me to see him and please let me know if I can help you with any of your other patients in the future.     Sincerely,        Carson Larios MD

## 2021-07-07 ENCOUNTER — OFFICE VISIT (OUTPATIENT)
Dept: VASCULAR SURGERY | Age: 71
End: 2021-07-07

## 2021-07-07 VITALS — WEIGHT: 145 LBS | BODY MASS INDEX: 20.3 KG/M2 | HEIGHT: 71 IN

## 2021-07-07 DIAGNOSIS — I70.229 ATHEROSCLEROSIS OF ARTERY OF EXTREMITY WITH REST PAIN (HCC): Primary | ICD-10-CM

## 2021-07-07 PROCEDURE — 99024 POSTOP FOLLOW-UP VISIT: CPT | Performed by: SURGERY

## 2021-09-14 ASSESSMENT — ENCOUNTER SYMPTOMS
COUGH: 0
WHEEZING: 0
SHORTNESS OF BREATH: 1
GASTROINTESTINAL NEGATIVE: 1

## 2021-09-14 NOTE — PROGRESS NOTES
[address]   Congestive Heart Failure    PrimaryCare Doctor:  Jess Smoker III, DO    Chief Complaint:  SOB    History of Present Illness:  Anup Vega is a 80 y.o. male with PMH NICM, HFrEF (20% recovered to 40%), COPD,  PVD s/p fem/pop bypass in June who presents today for CHF f/u. At the last OV 6months ago, no changes in meds   Today he c/o dyspnea that is unchanged and feels well in general.  He denies chest pain, palpitations, orthopnea, PND, exertional chest pressure/discomfort, fatigue, early saiety, edema, syncope. ER Visit: No  Recent Hospitalization: No    Baseline Weight: 145  Wt Readings from Last 3 Encounters:   09/15/21 140 lb 12.8 oz (63.9 kg)   07/07/21 145 lb (65.8 kg)   06/16/21 145 lb (65.8 kg)     EF: 40-45%  Cardiac Imaging: Echo 4/1/21:   Summary   -Technically difficult exam due to body habitus. -Moderately reduced global systolic function with an ejection fraction   estimated at 40%. -Global hypokinesis with abnormal septal motion noted. -Grade II diastolic dysfunction with elevated LV filling pressures. Avg.   E/e'=5.4   -Normal function of all valves. Echo 10/5/2018:  Summary   -Left ventricular cavity size is normal.   -Normal left ventricular wall thickness.   -Overall left ventricular systolic function appears mildly to moderately   reduced with an ejection fraction on the 40-45% range.   -There is moderate diffuse global hypokinesis.   -Abnormal (paradoxical) septal motion is present .   -Normal diastolic function. .E/e\"=6.45   -Trivial mitral and tricuspid regurgitation.      [facility] 10/13/2016:  Coronary Findings   Vessel  Ostial  Proximal  Mid  Distal  Special    LM  0%  0%  0%  0%      LAD  0%  0%  0%  0%      RI              Cx  0%  0%  0%  0%      RCA  0%  0%  0%  0%         Ventriculography/Pressure Findings  LVEF %  LVEDP  MR    20%  8mmHg         Device: No     Activity: at baseline  Can you walk 1-2 blocks or do a moderate amount of house/yard work?No    NYHA Class: II     Sodium Restrictions: 3g  Fluid Restrictions: 48-64 oz/day  Sodium and fluid restriction compliance: fair    Pt Education: The patient has received education on the following topics: dietary sodium restriction, heart failure medications, the importance of physical activity, symptom management and weight monitoring     FLY No     Past Medical History:   has a past medical history of Bronchitis, CHF (congestive heart failure) ([facility] and COPD (chronic obstructive pulmonary disease) ([facility] Surgical History:   has a past surgical history that includes fracture surgery; Tonsillectomy; eye surgery; Cholecystectomy, laparoscopic (N/A, 3/17/2021); and femoral bypass (Left, 6/1/2021). Social History:   reports that he quit smoking about 5 years ago. His smoking use included cigarettes. He has a 30.00 pack-year smoking history. He quit smokeless tobacco use about 5 years ago. He reports current alcohol use of about 1.0 - 3.0 standard drinks of alcohol per week. He reports that he does not use drugs. Family History:   Family History   Problem Relation Age of Onset    Heart Disease Mother     No Known Problems Father        HomeMedications:  Prior to Admission medications    Medication Sig Start Date End Date Taking?  Authorizing Provider   fluticasone-umeclidin-vilant (TRELEGY ELLIPTA) 100-62.5-25 MCG/INH AEPB Inhale 1 puff into the lungs daily 6/10/21   Yelena Gasca MD   sacubitril-valsartan [facility])  MG per tablet Take 1 tablet by mouth 2 times daily 6/1/21   Elva Verdin MD   atorvastatin (LIPITOR) 20 MG tablet Take 1 tablet by mouth daily  Patient taking differently: Take 20 mg by mouth daily Not started yet 5/1/21   Sarthak Camp MD   aspirin 325 MG EC tablet Take 1 tablet by mouth daily 5/1/21 7/30/21  Sarthak Camp MD   carvedilol (COREG) 6.25 MG tablet Take 1 tablet by mouth 2 times daily (with meals) 5/1/21   Sarthak Camp MD   albuterol sulfate  (90 Base) MCG/ACT inhaler  4/16/21   Historical Provider, MD   Multiple Vitamin (MULTIVITAMIN ADULT PO) Take 1 tablet by mouth daily     Historical Provider, MD        Allergies:  Codeine     ROS:   Review of Systems   Constitutional: Negative. Respiratory: Positive for shortness of breath. Negative for cough and wheezing. Cardiovascular: Negative. Gastrointestinal: Negative. Genitourinary: Negative. Musculoskeletal: Negative. Skin: Negative. Neurological: Negative. Hematological: Negative. Psychiatric/Behavioral: Negative. Physical Examination:    Vitals:    09/15/21 1305   BP: 108/76   Site: Left Upper Arm   Position: Sitting   Cuff Size: Medium Adult   Pulse: 69   SpO2: 95%   Weight: 140 lb 12.8 oz (63.9 kg)   Height: 5' 11\" (1.803 m)           Physical Exam  Constitutional:       Appearance: He is well-developed. HENT:      Head: Normocephalic and atraumatic. Eyes:      Pupils: Pupils are equal, round, and reactive to light. Cardiovascular:      Rate and Rhythm: Normal rate and regular rhythm. Heart sounds: Normal heart sounds. Pulmonary:      Effort: Pulmonary effort is normal.      Breath sounds: Normal breath sounds. Abdominal:      Palpations: Abdomen is soft. Musculoskeletal:         General: Normal range of motion. Cervical back: Normal range of motion. Skin:     General: Skin is warm and dry. Neurological:      Mental Status: He is alert and oriented to person, place, and time. Psychiatric:         Behavior: Behavior normal.         Thought Content:  Thought content normal.         Judgment: Judgment normal.         Lab Data:    CBC:   Lab Results   Component Value Date    WBC 14.5 06/02/2021    WBC 13.4 06/01/2021    WBC 8.1 05/25/2021    RBC 3.80 06/02/2021    RBC 3.98 06/01/2021    RBC 4.47 05/25/2021    HGB 11.8 06/02/2021    HGB 12.4 06/01/2021    HGB 14.3 05/25/2021    HCT 35.4 06/02/2021    HCT 37.2 06/01/2021    HCT 41.4 05/25/2021    MCV 93.2 06/02/2021    MCV 93.5 06/01/2021    MCV 92.5 05/25/2021    RDW 12.6 06/02/2021    RDW 12.8 06/01/2021    RDW 12.7 05/25/2021     06/02/2021     06/01/2021     05/25/2021     BMP:  Lab Results   Component Value Date     06/03/2021     06/02/2021     05/25/2021    K 4.7 06/03/2021    K 4.5 06/02/2021    K 4.9 05/25/2021    K 4.8 05/01/2021     06/03/2021     06/02/2021    CL 99 05/25/2021    CO2 27 06/03/2021    CO2 23 06/02/2021    CO2 26 05/25/2021    PHOS 3.7 09/19/2016    BUN 10 06/03/2021    BUN 14 06/02/2021    BUN 11 05/25/2021    CREATININE 0.8 06/03/2021    CREATININE 0.8 06/02/2021    CREATININE 0.8 05/25/2021     BNP:   Lab Results   Component Value Date    PROBNP 351 12/05/2017    PROBNP 425 07/25/2017    PROBNP 587 06/09/2017     Iron Studies:  No components found for: FE,  TIBC,  FERRITIN  Iron Deficiency Anemia:  No    IV Iron Therapy:  No  2017 ACC/AHA HF Guidelines:   intravenous iron replacement in patients with New York Heart Association (NYHA) class II and III HF and iron deficiency (ferritin <100 ng/ml or 100-300 ng/ml if transferrin saturation <20%), to improve functional status and QoL. Assessment/Plan:    1. Nonischemic cardiomyopathy (HCC) - stable, on coreg and entresto   2. Essential hypertension - controlled   3. Chronic systolic congestive heart failure (HCC) - compensated   4. SOB - stable      Instructions:   1. Medications: continue current medications  2. Labs before next office visit  3. Follow up: 6 months        Amanda: [phone]      I appreciate the opportunity of cooperating in the care of this individual.    CHELSY Borrego - CNP, CNP, 9/14/2021,3:01 PM    QUALITY MEASURES  1. Tobacco Cessation Counseling: NA  2. Retake of BP if >140/90:   NA  3. Documentation to PCP/referring for new patient:  Sent to PCP at close of office visit  4. CAD patient on anti-platelet: NA  5.  CAD patient on STATIN therapy:  NA  6. Patient with CHF and aFib on anticoagulation:  NA   7. Patient Education:Yes   8. BB for LVSD :  Yes   9. ACE/ARB for LVSD:  Yes   10.  Left Ventricular Ejection Fraction (LVEF) Assessment:  Yes

## 2021-09-15 ENCOUNTER — OFFICE VISIT (OUTPATIENT)
Dept: CARDIOLOGY CLINIC | Age: 71
End: 2021-09-15
Payer: COMMERCIAL

## 2021-09-15 VITALS
SYSTOLIC BLOOD PRESSURE: 108 MMHG | HEIGHT: 71 IN | DIASTOLIC BLOOD PRESSURE: 76 MMHG | WEIGHT: 140.8 LBS | HEART RATE: 69 BPM | OXYGEN SATURATION: 95 % | BODY MASS INDEX: 19.71 KG/M2

## 2021-09-15 DIAGNOSIS — I42.8 NONISCHEMIC CARDIOMYOPATHY (HCC): Chronic | ICD-10-CM

## 2021-09-15 DIAGNOSIS — R06.02 SOB (SHORTNESS OF BREATH): Chronic | ICD-10-CM

## 2021-09-15 DIAGNOSIS — I50.22 CHRONIC SYSTOLIC CONGESTIVE HEART FAILURE (HCC): Primary | ICD-10-CM

## 2021-09-15 DIAGNOSIS — I10 ESSENTIAL HYPERTENSION: Chronic | ICD-10-CM

## 2021-09-15 PROCEDURE — 99214 OFFICE O/P EST MOD 30 MIN: CPT | Performed by: NURSE PRACTITIONER

## 2021-09-15 NOTE — PATIENT INSTRUCTIONS
Instructions:   1. Medications: continue current medications  2. Labs before next office visit  3.  Follow up: 6 months

## 2021-09-21 DIAGNOSIS — I70.209 SUPERFICIAL OCCLUSION OF FEMORAL ARTERY (HCC): Primary | ICD-10-CM

## 2021-09-21 DIAGNOSIS — I70.229 ATHEROSCLEROSIS OF ARTERY OF EXTREMITY WITH REST PAIN (HCC): ICD-10-CM

## 2021-09-22 ENCOUNTER — PROCEDURE VISIT (OUTPATIENT)
Dept: SURGERY | Age: 71
End: 2021-09-22
Payer: COMMERCIAL

## 2021-09-22 ENCOUNTER — OFFICE VISIT (OUTPATIENT)
Dept: VASCULAR SURGERY | Age: 71
End: 2021-09-22
Payer: COMMERCIAL

## 2021-09-22 VITALS — WEIGHT: 140 LBS | HEIGHT: 71 IN | BODY MASS INDEX: 19.6 KG/M2

## 2021-09-22 DIAGNOSIS — I70.209 SUPERFICIAL OCCLUSION OF FEMORAL ARTERY (HCC): ICD-10-CM

## 2021-09-22 DIAGNOSIS — I70.229 ATHEROSCLEROSIS OF ARTERY OF EXTREMITY WITH REST PAIN (HCC): Primary | ICD-10-CM

## 2021-09-22 PROCEDURE — 99212 OFFICE O/P EST SF 10 MIN: CPT | Performed by: SURGERY

## 2021-09-22 PROCEDURE — 93926 LOWER EXTREMITY STUDY: CPT | Performed by: SURGERY

## 2021-09-22 NOTE — PROGRESS NOTES
Seen for f/u of L SFA-pop 3 months ago. Walking well. Some mild L great toe numbness. No swelling or incisonal wounds. EXAM:  Incisions well healed     Palpable L PT pulses      GSS nl BEKA Good caliber graft with normal velocities    A/P: S/P L fem-pop. Patent. Follow up for GSS 3 months - will call results and see in office for 9 month scan.

## 2021-10-22 ENCOUNTER — HOSPITAL ENCOUNTER (OUTPATIENT)
Dept: CT IMAGING | Age: 71
Discharge: HOME | End: 2021-10-22
Payer: COMMERCIAL

## 2021-10-22 DIAGNOSIS — I50.9 CONGESTIVE HEART FAILURE, UNSPECIFIED HF CHRONICITY, UNSPECIFIED HEART FAILURE TYPE (HCC): ICD-10-CM

## 2021-10-22 DIAGNOSIS — J44.9 CHRONIC OBSTRUCTIVE BRONCHITIS (HCC): ICD-10-CM

## 2021-10-22 PROCEDURE — 71250 CT THORAX DX C-: CPT

## 2021-12-21 DIAGNOSIS — I70.229 ATHEROSCLEROSIS OF ARTERY OF EXTREMITY WITH REST PAIN (HCC): ICD-10-CM

## 2021-12-21 DIAGNOSIS — I73.9 PERIPHERAL VASCULAR DISEASE (HCC): Primary | ICD-10-CM

## 2021-12-22 ENCOUNTER — PROCEDURE VISIT (OUTPATIENT)
Dept: SURGERY | Age: 71
End: 2021-12-22
Payer: COMMERCIAL

## 2021-12-22 DIAGNOSIS — I73.9 PERIPHERAL VASCULAR DISEASE (HCC): ICD-10-CM

## 2021-12-22 PROCEDURE — 93926 LOWER EXTREMITY STUDY: CPT | Performed by: SURGERY

## 2022-01-03 ENCOUNTER — TELEPHONE (OUTPATIENT)
Dept: VASCULAR SURGERY | Age: 72
End: 2022-01-03

## 2022-01-03 DIAGNOSIS — I73.9 PERIPHERAL VASCULAR DISEASE, UNSPECIFIED (HCC): Primary | ICD-10-CM

## 2022-01-03 NOTE — TELEPHONE ENCOUNTER
LM on VM that his graft scan looked good and we would just like to have him follow up with another scan in 3 months. We will call him then with results.

## 2022-01-03 NOTE — TELEPHONE ENCOUNTER
----- Message from Cephas Hashimoto, MD sent at 1/2/2022 12:57 PM EST -----  Graft scan looks very good. Needs another surveillance scan in 3 months - will call results then.     Me  ----- Message -----  FromBurke Romero Incoming Cardiovascular Orders From [facility]  Sent: 12/22/2021   7:23 PM EST  To: Cephas Hashimoto, MD

## 2022-01-07 ASSESSMENT — ENCOUNTER SYMPTOMS
WHEEZING: 0
COUGH: 0
SHORTNESS OF BREATH: 1
GASTROINTESTINAL NEGATIVE: 1

## 2022-01-07 NOTE — PROGRESS NOTES
[facility]   Congestive Heart Failure    PrimaryCare Doctor:  Ulysses Nixon III, DO    Chief Complaint:  SOB    History of Present Illness:  Memo Toledo is a 80 y.o. male with PMH NICM, HFrEF (20% recovered to 40%), COPD,  PVD s/p fem/pop bypass in June who presents today for CHF f/u. At the last OV 6months ago, no changes in meds   Today he c/o dyspnea that is unchanged and feels well in general.  He denies chest pain, palpitations, orthopnea, PND, exertional chest pressure/discomfort, fatigue, early saiety, edema, syncope. Labs reviewed and , given PAD rec starting statin, pt agrees    ER Visit: No  Recent Hospitalization: No    Baseline Weight: 145  Wt Readings from Last 3 Encounters:   01/11/22 143 lb (64.9 kg)   09/22/21 140 lb (63.5 kg)   09/15/21 140 lb 12.8 oz (63.9 kg)     EF: 40-45%  Cardiac Imaging: Echo 4/1/21:   Summary   -Technically difficult exam due to body habitus. -Moderately reduced global systolic function with an ejection fraction   estimated at 40%. -Global hypokinesis with abnormal septal motion noted. -Grade II diastolic dysfunction with elevated LV filling pressures. Avg.   E/e'=5.4   -Normal function of all valves. Echo 10/5/2018:  Summary   -Left ventricular cavity size is normal.   -Normal left ventricular wall thickness.   -Overall left ventricular systolic function appears mildly to moderately   reduced with an ejection fraction on the 40-45% range.   -There is moderate diffuse global hypokinesis.   -Abnormal (paradoxical) septal motion is present .   -Normal diastolic function. .E/e\"=6.45   -Trivial mitral and tricuspid regurgitation.      [facility] 10/13/2016:  Coronary Findings   Vessel  Ostial  Proximal  Mid  Distal  Special    LM  0%  0%  0%  0%      LAD  0%  0%  0%  0%      RI              Cx  0%  0%  0%  0%      RCA  0%  0%  0%  0%         Ventriculography/Pressure Findings  LVEF %  LVEDP  MR    20%  8mmHg         Device: No     Activity: at baseline  Can you walk 1-2 blocks or do a moderate amount of house/yard work? No    NYHA Class: II     Sodium Restrictions: 3g  Fluid Restrictions: 48-64 oz/day  Sodium and fluid restriction compliance: fair    Pt Education: The patient has received education on the following topics: dietary sodium restriction, heart failure medications, the importance of physical activity, symptom management and weight monitoring     FLY No     Past Medical History:   has a past medical history of Bronchitis, CHF (congestive heart failure) ([facility] [address] and COPD (chronic obstructive pulmonary disease) ([facility] [address] Surgical History:   has a past surgical history that includes fracture surgery; Tonsillectomy; eye surgery; Cholecystectomy, laparoscopic (N/A, 3/17/2021); and femoral bypass (Left, 6/1/2021). Social History:   reports that he quit smoking about 5 years ago. His smoking use included cigarettes. He has a 30.00 pack-year smoking history. He quit smokeless tobacco use about 5 years ago. He reports current alcohol use of about 1.0 - 3.0 standard drink of alcohol per week. He reports that he does not use drugs. Family History:   Family History   Problem Relation Age of Onset    Heart Disease Mother     No Known Problems Father        HomeMedications:  Prior to Admission medications    Medication Sig Start Date End Date Taking?  Authorizing Provider   Donwanda Band 160-9-4.8 MCG/ACT AERO  1/4/22  Yes Historical Provider, MD   sildenafil (REVATIO) 20 MG tablet Take 20 mg by mouth as needed   Yes Historical Provider, MD   rosuvastatin (CRESTOR) 5 MG tablet Take 1 tablet by mouth daily 1/11/22  Yes Duke Tidwell APRN - CNP   sacubitril-valsartan (ENTRESTO)  MG per tablet Take 1 tablet by mouth 2 times daily 6/1/21  Yes Cary Rock MD   carvedilol (COREG) 6.25 MG tablet Take 1 tablet by mouth 2 times daily (with meals) 5/1/21  Yes Boris Valentin MD   albuterol sulfate  (90 Base) MCG/ACT inhaler  4/16/21  Yes Historical Provider, MD   Multiple Vitamin (MULTIVITAMIN ADULT PO) Take 1 tablet by mouth daily    Yes Historical Provider, MD   aspirin 325 MG EC tablet Take 1 tablet by mouth daily 5/1/21 7/30/21  Tam Antoine MD        Allergies:  Codeine     ROS:   Review of Systems   Constitutional: Negative. Respiratory: Positive for shortness of breath. Negative for cough and wheezing. Cardiovascular: Negative. Gastrointestinal: Negative. Genitourinary: Negative. Musculoskeletal: Negative. Skin: Negative. Neurological: Negative. Hematological: Negative. Psychiatric/Behavioral: Negative. Physical Examination:    Vitals:    01/11/22 1416   BP: 92/60   Site: Left Upper Arm   Position: Sitting   Cuff Size: Medium Adult   Pulse: 51   SpO2: 98%   Weight: 143 lb (64.9 kg)   Height: 5' 11\" (1.803 m)           Physical Exam  Constitutional:       Appearance: He is well-developed. HENT:      Head: Normocephalic and atraumatic. Eyes:      Pupils: Pupils are equal, round, and reactive to light. Cardiovascular:      Rate and Rhythm: Normal rate and regular rhythm. Heart sounds: Normal heart sounds. Pulmonary:      Effort: Pulmonary effort is normal.      Breath sounds: Normal breath sounds. Abdominal:      Palpations: Abdomen is soft. Musculoskeletal:         General: Normal range of motion. Cervical back: Normal range of motion. Skin:     General: Skin is warm and dry. Neurological:      Mental Status: He is alert and oriented to person, place, and time. Psychiatric:         Behavior: Behavior normal.         Thought Content:  Thought content normal.         Judgment: Judgment normal.         Lab Data:    CBC:   Lab Results   Component Value Date    WBC 14.5 06/02/2021    WBC 13.4 06/01/2021    WBC 8.1 05/25/2021    RBC 3.80 06/02/2021    RBC 3.98 06/01/2021    RBC 4.47 05/25/2021    HGB 11.8 06/02/2021    HGB 12.4 06/01/2021    HGB 14.3 05/25/2021    HCT 35.4 06/02/2021 HCT 37.2 06/01/2021    HCT 41.4 05/25/2021    MCV 93.2 06/02/2021    MCV 93.5 06/01/2021    MCV 92.5 05/25/2021    RDW 12.6 06/02/2021    RDW 12.8 06/01/2021    RDW 12.7 05/25/2021     06/02/2021     06/01/2021     05/25/2021     BMP:  Lab Results   Component Value Date     06/03/2021     06/02/2021     05/25/2021    K 4.7 06/03/2021    K 4.5 06/02/2021    K 4.9 05/25/2021    K 4.8 05/01/2021     06/03/2021     06/02/2021    CL 99 05/25/2021    CO2 27 06/03/2021    CO2 23 06/02/2021    CO2 26 05/25/2021    PHOS 3.7 09/19/2016    BUN 10 06/03/2021    BUN 14 06/02/2021    BUN 11 05/25/2021    CREATININE 0.8 06/03/2021    CREATININE 0.8 06/02/2021    CREATININE 0.8 05/25/2021     BNP:   Lab Results   Component Value Date    PROBNP 351 12/05/2017    PROBNP 425 07/25/2017    PROBNP 587 06/09/2017     Iron Studies:  No components found for: FE,  TIBC,  FERRITIN  Iron Deficiency Anemia:  No    IV Iron Therapy:  No  2017 ACC/AHA HF Guidelines:   intravenous iron replacement in patients with New York Heart Association (NYHA) class II and III HF and iron deficiency (ferritin <100 ng/ml or 100-300 ng/ml if transferrin saturation <20%), to improve functional status and QoL. Assessment/Plan:    1. Nonischemic cardiomyopathy (HCC) - stable, on coreg and entresto   2. Essential hypertension - controlled   3. Chronic systolic congestive heart failure (HCC) - compensated   4. HLD - start crestor, recheck lipids at next OV      Instructions:   1. Medications: start low dose crestor 5mg once a day  2. Labs before next office visit  3. Follow up: 6 months        [facility]: [phone]      I appreciate the opportunity of cooperating in the care of this individual.    CHELSY العراقي - CNP, CNP, 1/7/2022,11:25 AM    QUALITY MEASURES  1. Tobacco Cessation Counseling: NA  2. Retake of BP if >140/90:   NA  3.  Documentation to PCP/referring for new patient:  Sent to PCP at close of office visit  4. CAD patient on anti-platelet: NA  5. CAD patient on STATIN therapy:  NA  6. Patient with CHF and aFib on anticoagulation:  NA   7. Patient Education:Yes   8. BB for LVSD :  Yes   9. ACE/ARB for LVSD:  Yes   10.  Left Ventricular Ejection Fraction (LVEF) Assessment:  Yes

## 2022-01-11 ENCOUNTER — OFFICE VISIT (OUTPATIENT)
Dept: CARDIOLOGY CLINIC | Age: 72
End: 2022-01-11
Payer: COMMERCIAL

## 2022-01-11 VITALS
DIASTOLIC BLOOD PRESSURE: 60 MMHG | OXYGEN SATURATION: 98 % | SYSTOLIC BLOOD PRESSURE: 92 MMHG | WEIGHT: 143 LBS | BODY MASS INDEX: 20.02 KG/M2 | HEART RATE: 51 BPM | HEIGHT: 71 IN

## 2022-01-11 DIAGNOSIS — E78.00 PURE HYPERCHOLESTEROLEMIA: ICD-10-CM

## 2022-01-11 DIAGNOSIS — I10 ESSENTIAL HYPERTENSION: Chronic | ICD-10-CM

## 2022-01-11 DIAGNOSIS — I42.8 NONISCHEMIC CARDIOMYOPATHY (HCC): Chronic | ICD-10-CM

## 2022-01-11 DIAGNOSIS — I50.22 CHRONIC SYSTOLIC CONGESTIVE HEART FAILURE (HCC): Primary | Chronic | ICD-10-CM

## 2022-01-11 PROCEDURE — 99214 OFFICE O/P EST MOD 30 MIN: CPT | Performed by: NURSE PRACTITIONER

## 2022-01-11 RX ORDER — SILDENAFIL CITRATE 20 MG/1
20 TABLET ORAL PRN
COMMUNITY
End: 2022-05-18

## 2022-01-11 RX ORDER — BUDESONIDE, GLYCOPYRROLATE, AND FORMOTEROL FUMARATE 160; 9; 4.8 UG/1; UG/1; UG/1
2 AEROSOL, METERED RESPIRATORY (INHALATION) 2 TIMES DAILY
COMMUNITY
Start: 2022-01-04 | End: 2022-05-18

## 2022-01-11 RX ORDER — ROSUVASTATIN CALCIUM 5 MG/1
5 TABLET, COATED ORAL DAILY
Qty: 90 TABLET | Refills: 3 | Status: SHIPPED | OUTPATIENT
Start: 2022-01-11 | End: 2022-05-18

## 2022-01-11 NOTE — PATIENT INSTRUCTIONS
Instructions:   1. Medications: start low dose crestor 5mg once a day  2. Labs before next office visit  3.  Follow up: 6 months        Amanda: [phone]

## 2022-03-10 ENCOUNTER — APPOINTMENT (OUTPATIENT)
Dept: CT IMAGING | Age: 72
DRG: 191 | End: 2022-03-10
Payer: COMMERCIAL

## 2022-03-10 ENCOUNTER — APPOINTMENT (OUTPATIENT)
Dept: GENERAL RADIOLOGY | Age: 72
DRG: 191 | End: 2022-03-10
Payer: COMMERCIAL

## 2022-03-10 ENCOUNTER — HOSPITAL ENCOUNTER (EMERGENCY)
Age: 72
Discharge: HOME | DRG: 191 | End: 2022-03-10
Attending: EMERGENCY MEDICINE
Payer: COMMERCIAL

## 2022-03-10 VITALS
RESPIRATION RATE: 20 BRPM | SYSTOLIC BLOOD PRESSURE: 135 MMHG | OXYGEN SATURATION: 96 % | TEMPERATURE: 97.4 F | WEIGHT: 141 LBS | HEIGHT: 71 IN | HEART RATE: 89 BPM | BODY MASS INDEX: 19.74 KG/M2 | DIASTOLIC BLOOD PRESSURE: 96 MMHG

## 2022-03-10 DIAGNOSIS — Z20.822 PERSON UNDER INVESTIGATION FOR COVID-19: ICD-10-CM

## 2022-03-10 DIAGNOSIS — J44.1 COPD WITH ACUTE EXACERBATION (HCC): Primary | ICD-10-CM

## 2022-03-10 LAB
A/G RATIO: 1.7 (ref 1.1–2.2)
ALBUMIN SERPL-MCNC: 4.3 G/DL (ref 3.4–5)
ALP BLD-CCNC: 64 U/L (ref 40–129)
ALT SERPL-CCNC: 20 U/L (ref 10–40)
ANION GAP SERPL CALCULATED.3IONS-SCNC: 8 MMOL/L (ref 3–16)
AST SERPL-CCNC: 22 U/L (ref 15–37)
BASOPHILS ABSOLUTE: 0 K/UL (ref 0–0.2)
BASOPHILS RELATIVE PERCENT: 0.4 %
BILIRUB SERPL-MCNC: 0.4 MG/DL (ref 0–1)
BUN BLDV-MCNC: 10 MG/DL (ref 7–20)
CALCIUM SERPL-MCNC: 9 MG/DL (ref 8.3–10.6)
CHLORIDE BLD-SCNC: 97 MMOL/L (ref 99–110)
CO2: 28 MMOL/L (ref 21–32)
CREAT SERPL-MCNC: 0.8 MG/DL (ref 0.8–1.3)
EKG ATRIAL RATE: 90 BPM
EKG DIAGNOSIS: NORMAL
EKG P AXIS: 87 DEGREES
EKG P-R INTERVAL: 156 MS
EKG Q-T INTERVAL: 332 MS
EKG QRS DURATION: 86 MS
EKG QTC CALCULATION (BAZETT): 406 MS
EKG R AXIS: 79 DEGREES
EKG T AXIS: 85 DEGREES
EKG VENTRICULAR RATE: 90 BPM
EOSINOPHILS ABSOLUTE: 0.6 K/UL (ref 0–0.6)
EOSINOPHILS RELATIVE PERCENT: 5.9 %
GFR AFRICAN AMERICAN: >60
GFR NON-AFRICAN AMERICAN: >60
GLUCOSE BLD-MCNC: 143 MG/DL (ref 70–99)
HCT VFR BLD CALC: 44.8 % (ref 40.5–52.5)
HEMOGLOBIN: 15.1 G/DL (ref 13.5–17.5)
LYMPHOCYTES ABSOLUTE: 1.2 K/UL (ref 1–5.1)
LYMPHOCYTES RELATIVE PERCENT: 12.2 %
MCH RBC QN AUTO: 31.6 PG (ref 26–34)
MCHC RBC AUTO-ENTMCNC: 33.7 G/DL (ref 31–36)
MCV RBC AUTO: 93.7 FL (ref 80–100)
MONOCYTES ABSOLUTE: 0.7 K/UL (ref 0–1.3)
MONOCYTES RELATIVE PERCENT: 6.7 %
NEUTROPHILS ABSOLUTE: 7.5 K/UL (ref 1.7–7.7)
NEUTROPHILS RELATIVE PERCENT: 74.8 %
PDW BLD-RTO: 12.9 % (ref 12.4–15.4)
PLATELET # BLD: 321 K/UL (ref 135–450)
PMV BLD AUTO: 7.7 FL (ref 5–10.5)
POTASSIUM SERPL-SCNC: 4.8 MMOL/L (ref 3.5–5.1)
PRO-BNP: 331 PG/ML (ref 0–124)
RBC # BLD: 4.79 M/UL (ref 4.2–5.9)
SODIUM BLD-SCNC: 133 MMOL/L (ref 136–145)
TOTAL PROTEIN: 6.9 G/DL (ref 6.4–8.2)
TROPONIN: <0.01 NG/ML
WBC # BLD: 10 K/UL (ref 4–11)

## 2022-03-10 PROCEDURE — 93005 ELECTROCARDIOGRAM TRACING: CPT | Performed by: EMERGENCY MEDICINE

## 2022-03-10 PROCEDURE — U0005 INFEC AGEN DETEC AMPLI PROBE: HCPCS

## 2022-03-10 PROCEDURE — 71046 X-RAY EXAM CHEST 2 VIEWS: CPT

## 2022-03-10 PROCEDURE — 85025 COMPLETE CBC W/AUTO DIFF WBC: CPT

## 2022-03-10 PROCEDURE — 80053 COMPREHEN METABOLIC PANEL: CPT

## 2022-03-10 PROCEDURE — 36415 COLL VENOUS BLD VENIPUNCTURE: CPT

## 2022-03-10 PROCEDURE — 6370000000 HC RX 637 (ALT 250 FOR IP): Performed by: PHYSICIAN ASSISTANT

## 2022-03-10 PROCEDURE — 83880 ASSAY OF NATRIURETIC PEPTIDE: CPT

## 2022-03-10 PROCEDURE — U0003 INFECTIOUS AGENT DETECTION BY NUCLEIC ACID (DNA OR RNA); SEVERE ACUTE RESPIRATORY SYNDROME CORONAVIRUS 2 (SARS-COV-2) (CORONAVIRUS DISEASE [COVID-19]), AMPLIFIED PROBE TECHNIQUE, MAKING USE OF HIGH THROUGHPUT TECHNOLOGIES AS DESCRIBED BY CMS-2020-01-R: HCPCS

## 2022-03-10 PROCEDURE — 99283 EMERGENCY DEPT VISIT LOW MDM: CPT

## 2022-03-10 PROCEDURE — 71260 CT THORAX DX C+: CPT

## 2022-03-10 PROCEDURE — 84484 ASSAY OF TROPONIN QUANT: CPT

## 2022-03-10 PROCEDURE — 93010 ELECTROCARDIOGRAM REPORT: CPT | Performed by: INTERNAL MEDICINE

## 2022-03-10 PROCEDURE — 6360000004 HC RX CONTRAST MEDICATION: Performed by: PHYSICIAN ASSISTANT

## 2022-03-10 PROCEDURE — 94760 N-INVAS EAR/PLS OXIMETRY 1: CPT

## 2022-03-10 PROCEDURE — 94640 AIRWAY INHALATION TREATMENT: CPT

## 2022-03-10 RX ORDER — ALBUTEROL SULFATE 90 UG/1
2 AEROSOL, METERED RESPIRATORY (INHALATION) EVERY 4 HOURS PRN
Qty: 18 G | Refills: 0 | Status: SHIPPED | OUTPATIENT
Start: 2022-03-10 | End: 2022-05-18

## 2022-03-10 RX ORDER — PREDNISONE 10 MG/1
60 TABLET ORAL DAILY
Qty: 30 TABLET | Refills: 0 | Status: ON HOLD
Start: 2022-03-10 | End: 2022-03-12 | Stop reason: HOSPADM

## 2022-03-10 RX ORDER — DOXYCYCLINE 100 MG/1
100 TABLET ORAL 2 TIMES DAILY
Qty: 20 TABLET | Refills: 0 | Status: ON HOLD
Start: 2022-03-10 | End: 2022-03-12 | Stop reason: HOSPADM

## 2022-03-10 RX ORDER — PREDNISONE 20 MG/1
60 TABLET ORAL ONCE
Status: COMPLETED | OUTPATIENT
Start: 2022-03-10 | End: 2022-03-10

## 2022-03-10 RX ORDER — IPRATROPIUM BROMIDE AND ALBUTEROL SULFATE 2.5; .5 MG/3ML; MG/3ML
1 SOLUTION RESPIRATORY (INHALATION) ONCE
Status: COMPLETED | OUTPATIENT
Start: 2022-03-10 | End: 2022-03-10

## 2022-03-10 RX ADMIN — IOPAMIDOL 75 ML: 755 INJECTION, SOLUTION INTRAVENOUS at 10:53

## 2022-03-10 RX ADMIN — IPRATROPIUM BROMIDE AND ALBUTEROL SULFATE 1 AMPULE: .5; 3 SOLUTION RESPIRATORY (INHALATION) at 10:49

## 2022-03-10 RX ADMIN — PREDNISONE 60 MG: 20 TABLET ORAL at 10:20

## 2022-03-10 ASSESSMENT — ENCOUNTER SYMPTOMS
NAUSEA: 0
SHORTNESS OF BREATH: 1
SORE THROAT: 0
WHEEZING: 1
STRIDOR: 0
ABDOMINAL PAIN: 0
BACK PAIN: 0
COLOR CHANGE: 0
DIARRHEA: 0
COUGH: 1
CONSTIPATION: 0
CHEST TIGHTNESS: 1
TROUBLE SWALLOWING: 0
VOICE CHANGE: 0
VOMITING: 0

## 2022-03-10 NOTE — ED NOTES
Pt sititng in room no complaints of voiced no s/s of distress noted      Ирина Camacho RN  03/10/22 [address], RN  03/10/22 4452

## 2022-03-10 NOTE — ED PROVIDER NOTES
[facility]        Pt Name: Hazel Strickland  MRN: [id number]  [place] 1950  Date of evaluation: 3/10/2022  Provider: Dennise Croft PA-C  PCP: Raquel Roland III, DO  Note Started: 10:17 AM EST        I have seen and evaluated this patient with my supervising physician Renetta Sabillon MD.    [facility]       Chief Complaint   Patient presents with    Shortness of Breath     pt states he feels congested, sob that started yesterday. wet cough noted       HISTORY OF PRESENT ILLNESS   (Location, Timing/Onset, Context/Setting, Quality, Duration, Modifying Factors, Severity, Associated Signs and Symptoms)  Note limiting factors. Chief Complaint: Chest pressure and shortness of breath    Hazel Strickland is a 80 y.o. male who presents to the emergency department complaining of chest pressure/tightness and shortness of breath starting yesterday. He has history of COPD and feels like he is having a COPD exacerbation. He continues to smoke cigarettes occasionally because his wife smokes cigarettes. He denies sharp stabbing pleuritic pain. He feels like he cannot take a deep enough breath of his lungs are restricted. Dr. Reynaldo Stevens is his pulmonologist but he has not seen him in years. He does use a rescue inhaler which does help some. Denies hemoptysis but does have a productive cough. Denies fever or chills. Denies pain or swelling in extremities. He is Covid vaccinated and has never had COVID to his knowledge. Nursing Notes were all reviewed and agreed with or any disagreements were addressed in the HPI. REVIEW OF SYSTEMS    (2-9 systems for level 4, 10 or more for level 5)     Review of Systems   Constitutional: Positive for fatigue. Negative for chills and fever. HENT: Positive for congestion. Negative for sore throat, tinnitus, trouble swallowing and voice change.     Eyes: Negative for visual disturbance. Respiratory: Positive for cough, chest tightness, shortness of breath and wheezing. Negative for stridor. Cardiovascular: Positive for chest pain. Negative for palpitations and leg swelling. Gastrointestinal: Negative for abdominal pain, constipation, diarrhea, nausea and vomiting. Genitourinary: Negative. Musculoskeletal: Negative for back pain, neck pain and neck stiffness. Skin: Negative for color change, rash and wound. Neurological: Negative. Psychiatric/Behavioral: Negative for confusion. All other systems reviewed and are negative. Positives and Pertinent negatives as per HPI. Except as noted above in the ROS, all other systems were reviewed and negative.        PAST MEDICAL HISTORY     Past Medical History:   Diagnosis Date    Bronchitis     [facility]congest[facility] heart failure) ([facility] [address]     COPD (chronic obstructive pulmonary disease) ([facility]          SURGICAL HISTORY     Past Surgical History:   Procedure Laterality Date    CHOLECYSTECTOMY, LAPAROSCOPIC N/A 3/17/2021    LAPAROSCOPIC CHOLECYSTECTOMY WITH INTRAOPERATIVE CHOLANGIOGRAM performed by Alina Thurman MD at [address] [place] [address]      bilateral cataracts    FEMORAL BYPASS Left 6/1/2021    LEFT FEMORAL TO POPLITEAL BYPASS GRAFT (08527) performed by Tru Jackson MD at [address] elbow    TONSILLECTOMY           CURRENTMEDICATIONS       Previous Medications    ASPIRIN 325 MG EC TABLET    Take 1 tablet by mouth daily    BREZTRI AEROSPHERE 160-9-4.8 MCG/ACT AERO        CARVEDILOL (COREG) 6.25 MG TABLET    Take 1 tablet by mouth 2 times daily (with meals)    MULTIPLE VITAMIN (MULTIVITAMIN ADULT PO)    Take 1 tablet by mouth daily     ROSUVASTATIN (CRESTOR) 5 MG TABLET    Take 1 tablet by mouth daily    SACUBITRIL-VALSARTAN (ENTRESTO)  MG PER TABLET    Take 1 tablet by mouth 2 times daily    SILDENAFIL (REVATIO) 20 MG TABLET    Take 20 mg by mouth as needed         ALLERGIES Codeine    FAMILYHISTORY       Family History   Problem Relation Age of Onset    Heart Disease Mother     No Known Problems Father           SOCIAL HISTORY       Social History     Tobacco Use    Smoking status: Former Smoker     Packs/day: 1.00     Years: 30.00     Pack years: 30.00     Types: Cigarettes     Quit date: 2016     Years since quittin.5    Smokeless tobacco: Former User     Quit date: 2016   Vaping Use    Vaping Use: Never used   Substance Use Topics    Alcohol use: Yes     Alcohol/week: 1.0 - 3.0 standard drink     Types: 1 - 3 Cans of beer per week     Comment: daily    Drug use: No       SCREENINGS             PHYSICAL EXAM    (up to 7 for level 4, 8 or more for level 5)     ED Triage Vitals [03/10/22 1006]   BP Temp Temp src Pulse Resp SpO2 Height Weight   (!) 146/54 -- -- 89 22 96 % 5' 11\" (1.803 m) 141 lb (64 kg)       Physical Exam  Vitals and nursing note reviewed. Constitutional:       Appearance: Normal appearance. He is well-developed. He is not toxic-appearing or diaphoretic. HENT:      Head: Normocephalic and atraumatic. Jaw: There is normal jaw occlusion. Right Ear: Hearing, tympanic membrane, ear canal and external ear normal.      Left Ear: Hearing, tympanic membrane, ear canal and external ear normal.      Nose: Congestion present. Mouth/Throat:      Lips: Pink. Mouth: Mucous membranes are moist.      Dentition: No dental tenderness. Pharynx: Oropharynx is clear. Uvula midline. No uvula swelling. Eyes:      General: No scleral icterus. Right eye: No discharge. Left eye: No discharge. Extraocular Movements: Extraocular movements intact. Conjunctiva/sclera: Conjunctivae normal.      Pupils: Pupils are equal, round, and reactive to light. Cardiovascular:      Rate and Rhythm: Normal rate. Pulmonary:      Effort: Pulmonary effort is normal.      Breath sounds: No stridor. Wheezing present. No rales. Abdominal:      General: Bowel sounds are normal.      Palpations: Abdomen is soft. Tenderness: There is no abdominal tenderness. There is no right CVA tenderness or left CVA tenderness. Musculoskeletal:         General: Normal range of motion. Cervical back: Normal range of motion. Comments: No extremity edema, posterior calf or thigh tenderness, palpable cord, discoloration. Negative homans. Skin:     General: Skin is warm and dry. Capillary Refill: Capillary refill takes less than 2 seconds. Coloration: Skin is not jaundiced or pale. Findings: No bruising, erythema, lesion or rash. Neurological:      General: No focal deficit present. Mental Status: He is alert and oriented to person, place, and time. Motor: No weakness. Psychiatric:         Mood and Affect: Mood normal.         Behavior: Behavior normal.         DIAGNOSTIC RESULTS   LABS:    Labs Reviewed   COMPREHENSIVE METABOLIC PANEL - Abnormal; Notable for the following components:       Result Value    Sodium 133 (*)     Chloride 97 (*)     Glucose 143 (*)     All other components within normal limits   BRAIN NATRIURETIC PEPTIDE - Abnormal; Notable for the following components:    Pro- (*)     All other components within normal limits   CBC WITH AUTO DIFFERENTIAL   TROPONIN   COVID-19       When ordered only abnormal lab results are displayed. All other labs were within normal range or not returned as of this dictation. EKG: When ordered, EKG's are interpreted by the Emergency Department Physician in the absence of a cardiologist.  Please see their note for interpretation of EKG.     RADIOLOGY:   Non-plain film images such as CT, Ultrasound and MRI are read by the radiologist. Plain radiographic images are visualized and preliminarily interpreted by the ED Provider with the below findings:        Interpretation per the Radiologist below, if available at the time of this note:    CT CHEST PULMONARY EMBOLISM W CONTRAST   Final Result   No evidence of pulmonary embolism      Some retained secretions are seen within the distal trachea. Airways are   patent. Emphysematous changes and chronic bronchiectasis again seen, similar in   appearance. XR CHEST (2 VW)    (Results Pending)     No results found. PROCEDURES   Unless otherwise noted below, none     Procedures    CRITICAL CARE TIME   n/a    CONSULTS:  None      EMERGENCY DEPARTMENT COURSE and DIFFERENTIAL DIAGNOSIS/MDM:   Vitals:    Vitals:    03/10/22 1006 03/10/22 1100 03/10/22 1115 03/10/22 1125   BP: (!) 146/54 (!) 168/69 (!) 135/96 (!) 135/96   Pulse: 89      Resp: 22   20   Temp: 97.4 °F (36.3 °C)      SpO2: 96%   96%   Weight: 141 lb (64 kg)      Height: 5' 11\" (1.803 m)          Patient was given the following medications:  Medications   ipratropium-albuterol (DUONEB) nebulizer solution 1 ampule (1 ampule Inhalation Given 3/10/22 1049)   predniSONE (DELTASONE) tablet 60 mg (60 mg Oral Given 3/10/22 1020)   iopamidol (ISOVUE-370) 76 % injection 75 mL (75 mLs IntraVENous Given 3/10/22 1053)         This patient presents to the emergency department with complaints of cough, shortness of breath and chest tightness. He does much better after receiving breathing treatment and steroid. Lung sounds improved. CT scan of the chest shows no evidence of PE but does show evidence of increased secretions, chronic bronchiectasis and emphysema. Airway is patent. He is not febrile and has no leukocytosis. Troponin is negative. BNP is within his normal range. No evidence of fluid overload at this time. He was counseled on smoking cessation for at least 3 minutes. He has no ambulatory hypoxia, tachypnea or tachycardia. Will be sent home with medicine to address his symptoms. Advised to follow-up with PCP and his pulmonologist for recheck and may return to ED per discharge instructions.   My suspicion is low for ACS, PE, myocarditis, pericarditis, endocarditis, acute pulmonary edema, pleural effusion, pericardial effusion, cardiac tamponade, cardiomyopathy, CHF exacerbation, thoracic aortic dissection, esophageal rupture, other life-threatening arrhythmia, hypertensive urgency or emergency, hemothorax, pulmonary contusion, subcutaneous emphysema, flail chest, pneumo mediastinum, rib fracture, pneumonia, pneumothorax,  pertussis, RSV, influenza, ARDS, severe asthma exacerbation,  or other concerning pathology. He was given isolation precautions for COVID19. Swab results are pending. FINAL IMPRESSION      1. COPD with acute exacerbation ([address]    2. Person under investigation for COVID-19          DISPOSITION/PLAN   DISPOSITION Decision To Discharge 03/10/2022 11:47:32 AM      PATIENT REFERRED TO:  [address] [place] [address], [address]  [phone]    In 3 days      [facility] Emergency Department  [facility]  [phone]    If symptoms worsen      DISCHARGE MEDICATIONS:  New Prescriptions    ALBUTEROL SULFATE HFA (PROVENTIL HFA) 108 (90 BASE) MCG/ACT INHALER    Inhale 2 puffs into the lungs every 4 hours as needed for Wheezing or Shortness of Breath (Space out to every 6 hours as symptoms improve) Space out to every 6 hours as symptoms improve.     DOXYCYCLINE MONOHYDRATE (ADOXA) 100 MG TABLET    Take 1 tablet by mouth 2 times daily for 10 days    PREDNISONE (DELTASONE) 10 MG TABLET    Take 6 tablets by mouth daily for 5 doses       DISCONTINUED MEDICATIONS:  Discontinued Medications    ALBUTEROL SULFATE  (90 BASE) MCG/ACT INHALER                  (Please note that portions of this note were completed with a voice recognition program.  Efforts were made to edit the dictations but occasionally words are mis-transcribed.)    Gabe Block PA-C (electronically signed)            Gabe Block PA-C  03/10/22 1142

## 2022-03-10 NOTE — ED PROVIDER NOTES
In addition to the advanced practice provider, I personally saw Vesna Estevezt and performed a substantive portion of the visit including all aspects of the medical decision making. Briefly, this is a 80 y.o. male here for for evaluation of pleuritic chest pain history of underlying known COPD positive increasing cough congestion shortness of breath and posttussive chest pain. Afebrile. No hemoptysis. No syncope. Poor air exchange positive Speier Tory wheezing. Normal mental status. No hypoxia. .        EKG  EKG was reviewed by emergency department physician in the absence of a cardiologist    Narrow complex sinus rhythm, rate  normal axis, normal TN and QRS intervals, normal Qtc, no ST elevations or depressions, normal t-wave morphology, impression NSR, no STEMI    Chest: No PE, no pneumonia.  : Troponin negative COVID-19 negative                 MDM  Patient presents to the ER for evaluation of underlying COPD exacerbation with no evidence of pneumonia no evidence of Covid no troponin leak no evidence of decompensated congestive heart failure and no PE or dissection or pneumonia. Treatment for underlying COPD exacerbation outpatient follow-up with PCP, continue long-acting inhalers and T cholinergic medication, steroids and doxycycline      Patient Referrals:  [address]  [phone]    In 3 days      [facility] Emergency Department  [facility] [address] [phone]    If symptoms worsen      Discharge Medications:  New Prescriptions    ALBUTEROL SULFATE HFA (PROVENTIL HFA) 108 (90 BASE) MCG/ACT INHALER    Inhale 2 puffs into the lungs every 4 hours as needed for Wheezing or Shortness of Breath (Space out to every 6 hours as symptoms improve) Space out to every 6 hours as symptoms improve.     DOXYCYCLINE MONOHYDRATE (ADOXA) 100 MG TABLET    Take 1 tablet by mouth 2 times daily for 10 days    PREDNISONE (Kailey Ishihara) 10 MG TABLET    Take 6 tablets by mouth daily for 5 doses       FINAL IMPRESSION  1. COPD with acute exacerbation ([address]    2. Person under investigation for COVID-19        Blood pressure (!) 135/96, pulse 89, temperature 97.4 °F (36.3 °C), resp. rate 20, height 5' 11\" (1.803 m), weight 141 lb (64 kg), SpO2 96 %.      For further details of [address] [place] [address],[address] emergency department encounter, please see documentation by advanced practice provider,     Bonifacio Walton MD (electronically signed)  Attending Emergency Physician     Jessy Mckeon MD  71/98/19 [phone]

## 2022-03-11 ENCOUNTER — APPOINTMENT (OUTPATIENT)
Dept: GENERAL RADIOLOGY | Age: 72
DRG: 191 | End: 2022-03-11
Payer: COMMERCIAL

## 2022-03-11 ENCOUNTER — HOSPITAL ENCOUNTER (INPATIENT)
Age: 72
LOS: 1 days | Discharge: HOME | DRG: 191 | End: 2022-03-12
Attending: EMERGENCY MEDICINE | Admitting: FAMILY MEDICINE
Payer: COMMERCIAL

## 2022-03-11 DIAGNOSIS — J44.1 COPD EXACERBATION (HCC): Primary | ICD-10-CM

## 2022-03-11 LAB
A/G RATIO: 1.7 (ref 1.1–2.2)
ALBUMIN SERPL-MCNC: 4.5 G/DL (ref 3.4–5)
ALP BLD-CCNC: 64 U/L (ref 40–129)
ALT SERPL-CCNC: 17 U/L (ref 10–40)
ANION GAP SERPL CALCULATED.3IONS-SCNC: 10 MMOL/L (ref 3–16)
AST SERPL-CCNC: 20 U/L (ref 15–37)
BASE EXCESS VENOUS: 3.3 MMOL/L (ref -3–3)
BASOPHILS ABSOLUTE: 0 K/UL (ref 0–0.2)
BASOPHILS RELATIVE PERCENT: 0.3 %
BILIRUB SERPL-MCNC: 0.4 MG/DL (ref 0–1)
BUN BLDV-MCNC: 10 MG/DL (ref 7–20)
CALCIUM SERPL-MCNC: 9.5 MG/DL (ref 8.3–10.6)
CARBOXYHEMOGLOBIN: 2.4 % (ref 0–1.5)
CHLORIDE BLD-SCNC: 95 MMOL/L (ref 99–110)
CO2: 28 MMOL/L (ref 21–32)
CREAT SERPL-MCNC: 0.8 MG/DL (ref 0.8–1.3)
EKG ATRIAL RATE: 101 BPM
EKG DIAGNOSIS: NORMAL
EKG P AXIS: 85 DEGREES
EKG P-R INTERVAL: 138 MS
EKG Q-T INTERVAL: 344 MS
EKG QRS DURATION: 88 MS
EKG QTC CALCULATION (BAZETT): 446 MS
EKG R AXIS: 79 DEGREES
EKG T AXIS: 84 DEGREES
EKG VENTRICULAR RATE: 101 BPM
EOSINOPHILS ABSOLUTE: 0 K/UL (ref 0–0.6)
EOSINOPHILS RELATIVE PERCENT: 0.1 %
GFR AFRICAN AMERICAN: >60
GFR NON-AFRICAN AMERICAN: >60
GLUCOSE BLD-MCNC: 105 MG/DL (ref 70–99)
HCO3 VENOUS: 31.3 MMOL/L (ref 23–29)
HCT VFR BLD CALC: 44.5 % (ref 40.5–52.5)
HEMOGLOBIN, VEN, REDUCED: 24 %
HEMOGLOBIN: 15.2 G/DL (ref 13.5–17.5)
LV EF: 48 %
LVEF MODALITY: NORMAL
LYMPHOCYTES ABSOLUTE: 1.6 K/UL (ref 1–5.1)
LYMPHOCYTES RELATIVE PERCENT: 17.1 %
MCH RBC QN AUTO: 32 PG (ref 26–34)
MCHC RBC AUTO-ENTMCNC: 34 G/DL (ref 31–36)
MCV RBC AUTO: 94.1 FL (ref 80–100)
METHEMOGLOBIN VENOUS: 0.1 %
MONOCYTES ABSOLUTE: 0.9 K/UL (ref 0–1.3)
MONOCYTES RELATIVE PERCENT: 9.3 %
NEUTROPHILS ABSOLUTE: 7 K/UL (ref 1.7–7.7)
NEUTROPHILS RELATIVE PERCENT: 73.2 %
O2 CONTENT, VEN: 16 VOL %
O2 SAT, VEN: 76 %
O2 THERAPY: ABNORMAL
PCO2, VEN: 60.3 MMHG (ref 40–50)
PDW BLD-RTO: 13.1 % (ref 12.4–15.4)
PH VENOUS: 7.32 (ref 7.35–7.45)
PLATELET # BLD: 294 K/UL (ref 135–450)
PMV BLD AUTO: 7.8 FL (ref 5–10.5)
PO2, VEN: 42.6 MMHG (ref 25–40)
POTASSIUM REFLEX MAGNESIUM: 4.7 MMOL/L (ref 3.5–5.1)
PRO-BNP: 1067 PG/ML (ref 0–124)
RAPID INFLUENZA  B AGN: NEGATIVE
RAPID INFLUENZA A AGN: NEGATIVE
RBC # BLD: 4.73 M/UL (ref 4.2–5.9)
SARS-COV-2: NOT DETECTED
SODIUM BLD-SCNC: 133 MMOL/L (ref 136–145)
TCO2 CALC VENOUS: 74 MMOL/L
TOTAL PROTEIN: 7.1 G/DL (ref 6.4–8.2)
TROPONIN: <0.01 NG/ML
WBC # BLD: 9.6 K/UL (ref 4–11)

## 2022-03-11 PROCEDURE — 96372 THER/PROPH/DIAG INJ SC/IM: CPT

## 2022-03-11 PROCEDURE — G0378 HOSPITAL OBSERVATION PER HR: HCPCS

## 2022-03-11 PROCEDURE — 6370000000 HC RX 637 (ALT 250 FOR IP): Performed by: FAMILY MEDICINE

## 2022-03-11 PROCEDURE — 6370000000 HC RX 637 (ALT 250 FOR IP): Performed by: EMERGENCY MEDICINE

## 2022-03-11 PROCEDURE — 93306 TTE W/DOPPLER COMPLETE: CPT

## 2022-03-11 PROCEDURE — 94640 AIRWAY INHALATION TREATMENT: CPT

## 2022-03-11 PROCEDURE — 94760 N-INVAS EAR/PLS OXIMETRY 1: CPT

## 2022-03-11 PROCEDURE — 6360000002 HC RX W HCPCS: Performed by: FAMILY MEDICINE

## 2022-03-11 PROCEDURE — 83880 ASSAY OF NATRIURETIC PEPTIDE: CPT

## 2022-03-11 PROCEDURE — 92526 ORAL FUNCTION THERAPY: CPT

## 2022-03-11 PROCEDURE — 82803 BLOOD GASES ANY COMBINATION: CPT

## 2022-03-11 PROCEDURE — 92610 EVALUATE SWALLOWING FUNCTION: CPT

## 2022-03-11 PROCEDURE — 94669 MECHANICAL CHEST WALL OSCILL: CPT

## 2022-03-11 PROCEDURE — 36415 COLL VENOUS BLD VENIPUNCTURE: CPT

## 2022-03-11 PROCEDURE — 1200000000 HC SEMI PRIVATE

## 2022-03-11 PROCEDURE — 93010 ELECTROCARDIOGRAM REPORT: CPT | Performed by: INTERNAL MEDICINE

## 2022-03-11 PROCEDURE — 87804 INFLUENZA ASSAY W/OPTIC: CPT

## 2022-03-11 PROCEDURE — 84484 ASSAY OF TROPONIN QUANT: CPT

## 2022-03-11 PROCEDURE — 2580000003 HC RX 258: Performed by: FAMILY MEDICINE

## 2022-03-11 PROCEDURE — 2700000000 HC OXYGEN THERAPY PER DAY

## 2022-03-11 PROCEDURE — 93005 ELECTROCARDIOGRAM TRACING: CPT | Performed by: EMERGENCY MEDICINE

## 2022-03-11 PROCEDURE — 6360000002 HC RX W HCPCS: Performed by: EMERGENCY MEDICINE

## 2022-03-11 PROCEDURE — 71045 X-RAY EXAM CHEST 1 VIEW: CPT

## 2022-03-11 PROCEDURE — 96374 THER/PROPH/DIAG INJ IV PUSH: CPT

## 2022-03-11 PROCEDURE — 80053 COMPREHEN METABOLIC PANEL: CPT

## 2022-03-11 PROCEDURE — 85025 COMPLETE CBC W/AUTO DIFF WBC: CPT

## 2022-03-11 RX ORDER — ONDANSETRON 2 MG/ML
4 INJECTION INTRAMUSCULAR; INTRAVENOUS EVERY 6 HOURS PRN
Status: DISCONTINUED | OUTPATIENT
Start: 2022-03-11 | End: 2022-03-12 | Stop reason: HOSPADM

## 2022-03-11 RX ORDER — ONDANSETRON 4 MG/1
4 TABLET, ORALLY DISINTEGRATING ORAL EVERY 8 HOURS PRN
Status: DISCONTINUED | OUTPATIENT
Start: 2022-03-11 | End: 2022-03-12 | Stop reason: HOSPADM

## 2022-03-11 RX ORDER — IPRATROPIUM BROMIDE AND ALBUTEROL SULFATE 2.5; .5 MG/3ML; MG/3ML
1 SOLUTION RESPIRATORY (INHALATION) EVERY 4 HOURS PRN
Status: DISCONTINUED | OUTPATIENT
Start: 2022-03-11 | End: 2022-03-12 | Stop reason: HOSPADM

## 2022-03-11 RX ORDER — SODIUM CHLORIDE 0.9 % (FLUSH) 0.9 %
5-40 SYRINGE (ML) INJECTION EVERY 12 HOURS SCHEDULED
Status: DISCONTINUED | OUTPATIENT
Start: 2022-03-11 | End: 2022-03-12 | Stop reason: HOSPADM

## 2022-03-11 RX ORDER — DOXYCYCLINE HYCLATE 100 MG
100 TABLET ORAL EVERY 12 HOURS
Status: DISCONTINUED | OUTPATIENT
Start: 2022-03-11 | End: 2022-03-11

## 2022-03-11 RX ORDER — SODIUM CHLORIDE 9 MG/ML
25 INJECTION, SOLUTION INTRAVENOUS PRN
Status: DISCONTINUED | OUTPATIENT
Start: 2022-03-11 | End: 2022-03-12 | Stop reason: HOSPADM

## 2022-03-11 RX ORDER — ACETAMINOPHEN 650 MG/1
650 SUPPOSITORY RECTAL EVERY 6 HOURS PRN
Status: DISCONTINUED | OUTPATIENT
Start: 2022-03-11 | End: 2022-03-12 | Stop reason: HOSPADM

## 2022-03-11 RX ORDER — POLYETHYLENE GLYCOL 3350 17 G/17G
17 POWDER, FOR SOLUTION ORAL DAILY PRN
Status: DISCONTINUED | OUTPATIENT
Start: 2022-03-11 | End: 2022-03-12 | Stop reason: HOSPADM

## 2022-03-11 RX ORDER — GUAIFENESIN 600 MG/1
1200 TABLET, EXTENDED RELEASE ORAL 2 TIMES DAILY
Status: DISCONTINUED | OUTPATIENT
Start: 2022-03-11 | End: 2022-03-12 | Stop reason: HOSPADM

## 2022-03-11 RX ORDER — CARVEDILOL 6.25 MG/1
6.25 TABLET ORAL 2 TIMES DAILY WITH MEALS
Status: DISCONTINUED | OUTPATIENT
Start: 2022-03-11 | End: 2022-03-12 | Stop reason: HOSPADM

## 2022-03-11 RX ORDER — GUAIFENESIN 600 MG/1
600 TABLET, EXTENDED RELEASE ORAL 2 TIMES DAILY
Status: DISCONTINUED | OUTPATIENT
Start: 2022-03-11 | End: 2022-03-11

## 2022-03-11 RX ORDER — ACETAMINOPHEN 325 MG/1
650 TABLET ORAL EVERY 6 HOURS PRN
Status: DISCONTINUED | OUTPATIENT
Start: 2022-03-11 | End: 2022-03-12 | Stop reason: HOSPADM

## 2022-03-11 RX ORDER — IPRATROPIUM BROMIDE AND ALBUTEROL SULFATE 2.5; .5 MG/3ML; MG/3ML
1 SOLUTION RESPIRATORY (INHALATION) EVERY 4 HOURS
Status: DISCONTINUED | OUTPATIENT
Start: 2022-03-11 | End: 2022-03-12 | Stop reason: HOSPADM

## 2022-03-11 RX ORDER — IPRATROPIUM BROMIDE AND ALBUTEROL SULFATE 2.5; .5 MG/3ML; MG/3ML
1 SOLUTION RESPIRATORY (INHALATION)
Status: DISCONTINUED | OUTPATIENT
Start: 2022-03-11 | End: 2022-03-11

## 2022-03-11 RX ORDER — ALBUTEROL SULFATE 2.5 MG/3ML
5 SOLUTION RESPIRATORY (INHALATION) ONCE
Status: COMPLETED | OUTPATIENT
Start: 2022-03-11 | End: 2022-03-11

## 2022-03-11 RX ORDER — METHYLPREDNISOLONE SODIUM SUCCINATE 40 MG/ML
40 INJECTION, POWDER, LYOPHILIZED, FOR SOLUTION INTRAMUSCULAR; INTRAVENOUS EVERY 6 HOURS
Status: DISCONTINUED | OUTPATIENT
Start: 2022-03-12 | End: 2022-03-12 | Stop reason: HOSPADM

## 2022-03-11 RX ORDER — IPRATROPIUM BROMIDE AND ALBUTEROL SULFATE 2.5; .5 MG/3ML; MG/3ML
1 SOLUTION RESPIRATORY (INHALATION) ONCE
Status: COMPLETED | OUTPATIENT
Start: 2022-03-11 | End: 2022-03-11

## 2022-03-11 RX ORDER — ROSUVASTATIN CALCIUM 10 MG/1
5 TABLET, COATED ORAL DAILY
Status: DISCONTINUED | OUTPATIENT
Start: 2022-03-11 | End: 2022-03-12 | Stop reason: HOSPADM

## 2022-03-11 RX ORDER — LACTOBACILLUS RHAMNOSUS GG 10B CELL
1 CAPSULE ORAL 2 TIMES DAILY WITH MEALS
Status: DISCONTINUED | OUTPATIENT
Start: 2022-03-11 | End: 2022-03-12 | Stop reason: HOSPADM

## 2022-03-11 RX ORDER — SODIUM CHLORIDE 0.9 % (FLUSH) 0.9 %
5-40 SYRINGE (ML) INJECTION PRN
Status: DISCONTINUED | OUTPATIENT
Start: 2022-03-11 | End: 2022-03-12 | Stop reason: HOSPADM

## 2022-03-11 RX ORDER — PREDNISONE 20 MG/1
40 TABLET ORAL DAILY
Status: DISCONTINUED | OUTPATIENT
Start: 2022-03-14 | End: 2022-03-12 | Stop reason: HOSPADM

## 2022-03-11 RX ORDER — BUDESONIDE 0.5 MG/2ML
0.5 INHALANT ORAL 2 TIMES DAILY
Status: DISCONTINUED | OUTPATIENT
Start: 2022-03-11 | End: 2022-03-12 | Stop reason: HOSPADM

## 2022-03-11 RX ORDER — AMOXICILLIN AND CLAVULANATE POTASSIUM 875; 125 MG/1; MG/1
1 TABLET, FILM COATED ORAL EVERY 12 HOURS SCHEDULED
Status: DISCONTINUED | OUTPATIENT
Start: 2022-03-11 | End: 2022-03-12 | Stop reason: HOSPADM

## 2022-03-11 RX ORDER — METHYLPREDNISOLONE SODIUM SUCCINATE 125 MG/2ML
125 INJECTION, POWDER, LYOPHILIZED, FOR SOLUTION INTRAMUSCULAR; INTRAVENOUS ONCE
Status: COMPLETED | OUTPATIENT
Start: 2022-03-11 | End: 2022-03-11

## 2022-03-11 RX ORDER — ASPIRIN 81 MG/1
81 TABLET ORAL DAILY
Status: DISCONTINUED | OUTPATIENT
Start: 2022-03-11 | End: 2022-03-12 | Stop reason: HOSPADM

## 2022-03-11 RX ADMIN — BUDESONIDE 500 MCG: 0.5 SUSPENSION RESPIRATORY (INHALATION) at 13:36

## 2022-03-11 RX ADMIN — ASPIRIN 81 MG: 81 TABLET, COATED ORAL at 16:40

## 2022-03-11 RX ADMIN — SACUBITRIL AND VALSARTAN 1 TABLET: 97; 103 TABLET, FILM COATED ORAL at 20:42

## 2022-03-11 RX ADMIN — TIOTROPIUM BROMIDE AND OLODATEROL 2 PUFF: 3.124; 2.736 SPRAY, METERED RESPIRATORY (INHALATION) at 13:37

## 2022-03-11 RX ADMIN — Medication 1 CAPSULE: at 16:40

## 2022-03-11 RX ADMIN — ENOXAPARIN SODIUM 40 MG: 40 INJECTION SUBCUTANEOUS at 16:40

## 2022-03-11 RX ADMIN — CARVEDILOL 6.25 MG: 6.25 TABLET, FILM COATED ORAL at 16:40

## 2022-03-11 RX ADMIN — IPRATROPIUM BROMIDE AND ALBUTEROL SULFATE 1 AMPULE: 2.5; .5 SOLUTION RESPIRATORY (INHALATION) at 20:57

## 2022-03-11 RX ADMIN — METHYLPREDNISOLONE SODIUM SUCCINATE 125 MG: 125 INJECTION, POWDER, FOR SOLUTION INTRAMUSCULAR; INTRAVENOUS at 09:36

## 2022-03-11 RX ADMIN — IPRATROPIUM BROMIDE AND ALBUTEROL SULFATE 1 AMPULE: 2.5; .5 SOLUTION RESPIRATORY (INHALATION) at 16:03

## 2022-03-11 RX ADMIN — IPRATROPIUM BROMIDE AND ALBUTEROL SULFATE 1 AMPULE: 2.5; .5 SOLUTION RESPIRATORY (INHALATION) at 23:45

## 2022-03-11 RX ADMIN — SODIUM CHLORIDE, PRESERVATIVE FREE 10 ML: 5 INJECTION INTRAVENOUS at 20:42

## 2022-03-11 RX ADMIN — GUAIFENESIN 1200 MG: 600 TABLET, EXTENDED RELEASE ORAL at 20:42

## 2022-03-11 RX ADMIN — ALBUTEROL SULFATE 5 MG: 2.5 SOLUTION RESPIRATORY (INHALATION) at 09:34

## 2022-03-11 RX ADMIN — IPRATROPIUM BROMIDE AND ALBUTEROL SULFATE 1 AMPULE: .5; 3 SOLUTION RESPIRATORY (INHALATION) at 09:34

## 2022-03-11 RX ADMIN — IPRATROPIUM BROMIDE AND ALBUTEROL SULFATE 1 AMPULE: 2.5; .5 SOLUTION RESPIRATORY (INHALATION) at 12:37

## 2022-03-11 RX ADMIN — BUDESONIDE 500 MCG: 0.5 SUSPENSION RESPIRATORY (INHALATION) at 20:57

## 2022-03-11 RX ADMIN — ROSUVASTATIN CALCIUM 5 MG: 10 TABLET, COATED ORAL at 16:39

## 2022-03-11 RX ADMIN — AMOXICILLIN AND CLAVULANATE POTASSIUM 1 TABLET: 875; 125 TABLET, FILM COATED ORAL at 16:40

## 2022-03-11 ASSESSMENT — PAIN SCALES - GENERAL
PAINLEVEL_OUTOF10: 0

## 2022-03-11 NOTE — H&P
Hospital Medicine History and Physical    3/11/2022    Date of Admission: 3/11/2022    Date of Service: Pt seen/examined on 3/11/2022 and admitted to inpatient. Chief complaint:  Chief Complaint   Patient presents with    Shortness of Breath     Pt in today with increased SOB was here yesterday, sent home with scripts, but no relief. Hx COPD and asthma       History of Presenting Illness: This is a pleasant 80 y.o. male who presented to the emergency room with a 3 to 4-day history of worsening shortness of breath, productive cough. The shortness of breath is worse with exertion. He has a pmh of severe COPD, chronic systolic and diastolic congestive heart failure, history of tobacco abuse, peripheral vascular disease. He was seen in the emergency room yesterday and was given steroids, breathing treatments and started on doxycycline and improved. He denies chest pain, fever, chills, nausea, vomiting, diarrhea, abdominal pain, dysuria. Past Medical History:      Diagnosis Date    Bronchitis     CHF (congestive heart failure) ([address]     COPD [facility]chronic obstructive pulmonary disease) ([facility])        Past Surgical History:      Procedure Laterality Date    CHOLECYSTECTOMY, LAPAROSCOPIC N/A 3/17/2021    LAPAROSCOPIC CHOLECYSTECTOMY WITH INTRAOPERATIVE CHOLANGIOGRAM performed by Laura Miranda MD at [address] [place] [address]      bilateral cataracts    FEMORAL BYPASS Left 6/1/2021    LEFT FEMORAL TO POPLITEAL BYPASS GRAFT ([id number]) performed by Belinda Greene MD at [address] elbow    TONSILLECTOMY         Medications (prior to admission):  Prior to Admission medications    Medication Sig Start Date End Date Taking?  Authorizing Provider   doxycycline monohydrate (ADOXA) 100 MG tablet Take 1 tablet by mouth 2 times daily for 10 days 3/10/22 3/20/22 Yes Cary Mosley PA-C   albuterol sulfate HFA (PROVENTIL HFA) 108 (90 Base) MCG/ACT inhaler Inhale 2 puffs into the lungs every 4 hours as needed for Wheezing or Shortness of Breath (Space out to every 6 hours as symptoms improve) Space out to every 6 hours as symptoms improve. 3/10/22  Yes Malia Dumont PA-C   predniSONE (DELTASONE) 10 MG tablet Take 6 tablets by mouth daily for 5 doses 3/10/22 3/15/22 Yes Amador Siddiqui NICHOLAS Sy   BREZTRI AEROSPHERE 160-9-4.8 MCG/ACT AERO Inhale 2 puffs into the lungs in the morning and at bedtime  1/4/22  Yes Historical Provider, MD   rosuvastatin (CRESTOR) 5 MG tablet Take 1 tablet by mouth daily 1/11/22  Yes CHELSY Akins - CNP   sacubitril-valsartan (ENTRESTO)  MG per tablet Take 1 tablet by mouth 2 times daily 6/1/21  Yes Deepa Pulido MD   carvedilol (COREG) 6.25 MG tablet Take 1 tablet by mouth 2 times daily (with meals) 5/1/21  Yes Kyle Lewis MD   sildenafil (REVATIO) 20 MG tablet Take 20 mg by mouth as needed    Historical Provider, MD       Allergy(ies):  Codeine    Social History:  TOBACCO:  reports that he quit smoking about 5 years ago. His smoking use included cigarettes. He has a 30.00 pack-year smoking history. He quit smokeless tobacco use about 5 years ago. ETOH:  reports current alcohol use of about 1.0 - 3.0 standard drink of alcohol per week. Family History:      Problem Relation Age of Onset    Heart Disease Mother     No Known Problems Father        Review of Systems:  All other systems are reviewed, positive and negative are noted in HPI. Vitals and physical examination:  BP (!) 146/81   Pulse 96   Temp 96.7 °F (35.9 °C) (Temporal)   Resp 20   Ht 5' 11\" (1.803 m)   Wt 136 lb 3.2 oz (61.8 kg)   SpO2 95%   BMI 19.00 kg/m²   Gen/overall appearance: Not in acute distress. Alert. Head: Normocephalic, atraumatic  Eyes: EOMI, good acuity  ENT:- Oral mucosa moist  Neck: No JVD, thyromegaly  CVS: Nml S1S2, no MRG, RRR  Pulm: diminished BS B/L, scattered rhonchi  Gastrointestinal: Soft, NT/ND, +BS  Musculoskeletal: No edema. Warm  Neuro: No focal deficit. Moves extremity spontaneously. Psychiatry: Appropriate affect. Not agitated. Skin: Warm, dry with normal turgor. No rash    Labs/imaging/EKG:  CBC:   Recent Labs     03/10/22  1009 03/11/22  0920   WBC 10.0 9.6   HGB 15.1 15.2    294     BMP:    Recent Labs     03/10/22  1009 03/11/22  0920   * 133*   K 4.8 4.7   CL 97* 95*   CO2 28 28   BUN 10 10   CREATININE 0.8 0.8   GLUCOSE 143* 105*     Hepatic:   Recent Labs     03/10/22  1009 03/11/22  0920   AST 22 20   ALT 20 17   BILITOT 0.4 0.4   ALKPHOS 64 64       XR CHEST (2 VW)    Result Date: 3/10/2022  EXAMINATION: TWO XRAY VIEWS OF THE CHEST 3/10/2022 10:28 am COMPARISON: 2019 HISTORY: ORDERING SYSTEM PROVIDED HISTORY: SOB TECHNOLOGIST PROVIDED HISTORY: Reason for exam:->SOB FINDINGS: Normal cardiac size. COPD changes. Bilateral nipples of prominent. No new consolidations. Minimal basilar atelectasis. No change from priors. Significant osteopenic changes and degenerative changes noted in the bony structures. COPD changes. No definite acute infiltrate. Minimal basilar atelectasis. No significant change from priors. XR CHEST PORTABLE    Result Date: 3/11/2022  EXAMINATION: ONE XRAY VIEW OF THE CHEST 3/11/2022 9:42 am COMPARISON: Chest radiograph 03/10/2022, chest CT 03/10/2022 HISTORY: ORDERING SYSTEM PROVIDED HISTORY: CP TECHNOLOGIST PROVIDED HISTORY: Reason for exam:->CP Reason for Exam: chest pain, shortness of breath FINDINGS: Lungs are hyperexpanded and otherwise unchanged since prior. Right basilar granuloma again seen. No pleural fluid or discrete pneumothorax. Cardiomediastinal silhouette is within normal limits. No acute interval osseous change     Findings of COPD without acute superimposed changed.      CT CHEST PULMONARY EMBOLISM W CONTRAST    Result Date: 3/10/2022  EXAMINATION: CTA OF THE CHEST 3/10/2022 10:43 am TECHNIQUE: CTA of the chest was performed after the administration of intravenous contrast.  Multiplanar reformatted images are provided for review. MIP images are provided for review. Dose modulation, iterative reconstruction, and/or weight based adjustment of the mA/kV was utilized to reduce the radiation dose to as low as reasonably achievable. COMPARISON: CT chest October 22, 2021 and priors. HISTORY: ORDERING SYSTEM PROVIDED HISTORY: sob TECHNOLOGIST PROVIDED HISTORY: Reason for exam:->sob Decision Support Exception - unselect if not a suspected or confirmed emergency medical condition->Emergency Medical Condition (MA) Reason for Exam: Shortness of Breath (pt states he feels congested, sob that started yesterday. wet cough noted) FINDINGS: Pulmonary Arteries: Pulmonary arteries are adequately opacified for evaluation. No evidence of intraluminal filling defect to suggest pulmonary embolism. Main pulmonary artery is normal in caliber. Mediastinum: No evidence of mediastinal lymphadenopathy. The heart and pericardium demonstrate no acute abnormality. There is no acute abnormality of the thoracic aorta. Lungs/pleura: The central airways are patent. Retained secretions are seen within the lower trachea just above the bifurcation. Moderate to severe emphysematous changes are again seen within the lungs with biapical pleural scarring, right greater than left. There is mild diffuse bronchiectasis, also similar in appearance. There is a calcified granuloma in the right lower lobe. No suspicious nodules or masses are identified. There is no focal airspace consolidation. No pneumothorax or pleural effusion identified. Upper Abdomen: Limited visualization of the upper abdomen is without acute process. Soft Tissues/Bones: No acute bone or soft tissue abnormality. No evidence of pulmonary embolism Some retained secretions are seen within the distal trachea. Airways are patent. Emphysematous changes and chronic bronchiectasis again seen, similar in appearance. Discussed with Patient, Family and ER provider. 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 *      Plan:  Acute COPD exacerbation/bronchiectasis exacerbation  -Start steroids, duo nebs, inhalers, Mucinex, Acapella, antibiotics  -Check strep/Legionella, sputum culture        Chronic combined systolic and diastolic congestive heart failure/nonischemic cardiomyopathy  -Continue aspirin, statin, Entresto, beta-blocker, continue to monitor      Essential hypertension  -BP stable, continue Coreg 6.25 mg twice daily, Entresto, continue to monitor        Peripheral vascular disease  -Continue aspirin and statin        History of tobacco abuse in remission        Activities: Up with assist  Prophylaxis: lovenox  Code status: Full    ==========================================================          Thank you Elsie Pena III, DO for the opportunity to be involved in this patient's care.  If you have any questions or concerns please feel free to contact me at [phone].  -----------------------------  Michel Linton MD  [facility] hospitalist

## 2022-03-11 NOTE — PROGRESS NOTES
Pharmacy Home Medication Reconciliation Note    A medication reconciliation has been completed for Hazel Strickland 1950    Pharmacy: [address] [place] [address] provided by: patient    The patient's home medication list is as follows:  Prior to Admission medications    Medication Sig Start Date End Date Taking? Authorizing Provider   doxycycline monohydrate (ADOXA) 100 MG tablet Take 1 tablet by mouth 2 times daily for 10 days 3/10/22 3/20/22 Yes Dennise Croft PA-C   albuterol sulfate HFA (PROVENTIL HFA) 108 (90 Base) MCG/ACT inhaler Inhale 2 puffs into the lungs every 4 hours as needed for Wheezing or Shortness of Breath (Space out to every 6 hours as symptoms improve) Space out to every 6 hours as symptoms improve. 3/10/22  Yes Dennise Croft PA-C   predniSONE (DELTASONE) 10 MG tablet Take 6 tablets by mouth daily for 5 doses 3/10/22 3/15/22 Yes Mihaie Settler NICHOLAS Sy   BREZTRI AEROSPHERE 160-9-4.8 MCG/ACT AERO Inhale 2 puffs into the lungs in the morning and at bedtime  1/4/22  Yes Historical Provider, MD   rosuvastatin (CRESTOR) 5 MG tablet Take 1 tablet by mouth daily 1/11/22  Yes Charon Boast, APRN - CNP   sacubitril-valsartan (ENTRESTO)  MG per tablet Take 1 tablet by mouth 2 times daily 6/1/21  Yes Sonal Carrillo MD   carvedilol (COREG) 6.25 MG tablet Take 1 tablet by mouth 2 times daily (with meals) 5/1/21  Yes Portillo Dunn MD   sildenafil (REVATIO) 20 MG tablet Take 20 mg by mouth as needed    Historical Provider, MD   aspirin 325 MG EC tablet Take 1 tablet by mouth daily 5/1/21 3/11/22  Portillo Dunn MD   Multiple Vitamin (MULTIVITAMIN ADULT PO) Take 1 tablet by mouth daily   3/11/22  Historical Provider, MD        Patient is no longer taking aspirin 325mg or his multivitamin    Of note, he was seen in the ED yesterday and has only had one dose of his prednisone and one dose of his doxycycline. Timing of last doses updated.     Thank you,  Osmany Brito, PharmD, BCCCP

## 2022-03-11 NOTE — ED PROVIDER NOTES
[facility] Emergency Department      Pt Name: Hinda Schlatter  MRN: [id number]  [place] 1950  Date of evaluation: 3/11/2022  Provider: Nkechi Smiley MD  CHIEF COMPLAINT  Chief Complaint   Patient presents with    Shortness of Breath     Pt in today with increased SOB was here yesterday, sent home with scripts, but no relief. Hx COPD and asthma     HPI  Hinda Schlatter is a 80 y.o. male who presents because of difficulty breathing. He has a history of COPD and congestive heart failure. He has noticed increased symptoms since Tuesday of this week. He has a cough which is minimally productive. He was seen here yesterday and prescribed doxycycline, albuterol, steroid. Despite the medications, he continues to struggle to breathe. He can even walk anywhere without becoming extremely winded as he is short of breath even at rest.  He had evaluation yesterday which included EKG, chest x-ray, blood work, CT chest.  CT chest did show some secretions but no discrete pneumonia and was negative for PE. He denies any leg swelling or leg pain. REVIEW OF SYSTEMS:  No fever, cough, sob, no cp, no swelling Pertinent positives and negatives as per the HPI. All other review of systems reviewed and negative. Nursing notes reviewed.     PAST MEDICAL HISTORY  Past Medical History:   Diagnosis Date    Bronchitis     [facility]     COPD (chronic obstructive pulmonary disease) [facility]      SURGICAL HISTORY  Past Surgical History:   Procedure Laterality Date    CHOLECYSTECTOMY, LAPAROSCOPIC N/A 3/17/2021    LAPAROSCOPIC CHOLECYSTECTOMY WITH INTRAOPERATIVE CHOLANGIOGRAM performed by Mariah Nam MD at [address] [place] [address]      bilateral cataracts    FEMORAL BYPASS Left 6/1/2021    LEFT FEMORAL TO POPLITEAL BYPASS GRAFT ([id number]) performed by Rolly Spicer MD at [address] elbow    TONSILLECTOMY       MEDICATIONS:  No current facility-administered lb (64 kg)   SpO2 95%   BMI 19.67 kg/m²   Constitutional:  80 y.o. male alert, respiratory difficulty noted  HENT:  Atraumatic, mucous membranes moist  Eyes:   Conjunctiva clear, no discharge, no icterus  Neck:  Supple, no adenopathy, no visible JVD, no stridor  Cardiovascular:  Regular, no rubs  Thorax & Lungs:  Accessory muscle usage, diffuse wheezing  Abdomen:  Soft, non distended, bowel sounds present, nontender  Back:  No deformity  Genitalia:  Deferred  Rectal:  Deferred  Extremities:  No cyanosis, no tenderness, edema negative  Skin:  Warm, dry  Neurologic:  Alert, mentation normal  Psychiatric:  Affect appropriate    DIAGNOSTIC RESULTS:  Labs resulted at the time of this note reviewed.   Labs Reviewed   COMPREHENSIVE METABOLIC PANEL W/ REFLEX TO MG FOR LOW K - Abnormal; Notable for the following components:       Result Value    Sodium 133 (*)     Chloride 95 (*)     Glucose 105 (*)     All other components within normal limits   BLOOD GAS, VENOUS - Abnormal; Notable for the following components:    pH, Shun 7.324 (*)     pCO2, Shun 60.3 (*)     pO2, Shun 42.6 (*)     HCO3, Venous 31.3 (*)     Base Excess, Shun 3.3 (*)     Carboxyhemoglobin 2.4 (*)     All other components within normal limits   BRAIN NATRIURETIC PEPTIDE - Abnormal; Notable for the following components:    Pro-BNP 1,067 (*)     All other components within normal limits   CULTURE, RESPIRATORY   LEGIONELLA ANTIGEN, URINE   STREP PNEUMONIAE ANTIGEN   RAPID INFLUENZA A/B ANTIGENS   CBC WITH AUTO DIFFERENTIAL   TROPONIN   BASIC METABOLIC PANEL W/ REFLEX TO MG FOR LOW K   PROCALCITONIN   CBC WITH AUTO DIFFERENTIAL     EKG:  Read by me in the absence of a cardiologist shows: Sinus tachycardia, PVCs, motion artifact, biatrial enlargement, nonspecific ST-T wave abnormality, QRS duration normal, axis 79 degrees, overall similar to prior study    RADIOLOGY:    Plain x-rays were viewed by me:   XR CHEST PORTABLE   Final Result   Findings of COPD without acute superimposed changed. ED COURSE:  Continued wheezing though oxygen saturation adequate and mentation good  Medications administered:  Medications   methylPREDNISolone sodium (SOLU-MEDROL) injection 125 mg (has no administration in time range)   ipratropium-albuterol (DUONEB) nebulizer solution 1 ampule (has no administration in time range)   albuterol (PROVENTIL) nebulizer solution 5 mg (has no administration in time range)   albuterol (PROVENTIL) (5 MG/ML) 0.5% nebulizer solution (has no administration in time range)     PROCEDURES:  None    CRITICAL CARE:  Total critical care time of 31 minutes (excludes any time for procedures). This includes but not limited to vital sign monitoring, medication, clinical response to medications, interpretation of diagnostics, review of nursing notes, pertinent record review, discussions about patient condition, consultation time, documentation time. Critical care due to the patient's respiratory distress. CONSULTATIONS:  Hospitalist     MEDICAL DECISION MAKING: Jeanette Treviño is a 80 y.o. male who presented because of breathing difficulty. He has increased wob, respiratory acidosis, repeat visit from yesterday's attempt at outpatient management. I discussed the case in full with the admitting physician. Differential Diagnosis: pneumonia, pneumothorax, PE, ACS, CHF, aspiration, other    FINAL IMPRESSION:    1. COPD exacerbation ([address]        (Please note that I used voice recognition software to generate this note.   Occasionally words are mistranscribed despite my efforts to edit errors.)       Holley Gallegos MD  03/11/22 2032

## 2022-03-11 NOTE — PROGRESS NOTES
03/11/22 1608   RT Protocol   History Pulmonary Disease 2   Respiratory pattern 0   Breath sounds 6   Cough 0   Bronchodilator Assessment Score 8

## 2022-03-11 NOTE — PROGRESS NOTES
Facility/Department: [address]  Initial Assessment  DYSPHAGIA BEDSIDE SWALLOW EVALUATION     Patient: Tatum Fields   :    MRN: [id number]      Evaluation Date: 3/11/2022   Admitting Diagnosis: COPD exacerbation ([address] [J44.1]  Pain: Denied                         H&P: No H&P listed in chart at time of evaluation. Imaging:  Chest X-ray: 3/11/22  Impression   Findings of COPD without acute superimposed changed. History/Prior Level of Function:   Living Status: Pt resides at home with wife. Prior Dysphagia History: Per chart review and pt report, no prior history of dysphagia. Dysphagia Impressions/Diagnosis: Oropharyngeal Dysphagia   Pt alert and upright in bed, currently on RA. Pt denies swallowing difficulty and reported consuming a regular diet with thin liquids at home. A baseline cough was noted prior to, during and after trials. Oral motor exam was within functional limits. Various textures provided to assess swallow function. Pt presents with a mild oropharyngeal dysphagia characterized by mildly prolonged mastication, suspected premature spillage to pharynx and a mildly delayed swallow initiation. Pt exhibited episodes of coughing but was not consistent with each texture trial, suspect related to baseline cough however unable to rule out aspiration at bedside. RR increased intermittently across consistencies and education was provided regarding use of energy conservation techniques and aspiration precautions to maximize swallowing safety; pt verbalized understanding. Pt remains at risk of aspiration due to deconditioning and respiratory status therefore aspiration precautions should be followed with current diet recommendations. Recommended Diet and Intervention 3/11/2022:  Diet Solids Recommendation:  Regular textures  Liquid Consistency Recommendation:   Thin liquids  Recommended form of Meds:   As tolerated     Compensatory Swallowing Strategies:  Upright as possible with all PO intake , Small bites/sips , Eat/feed slowly    SHORT TERM DYSPHAGIA GOALS/PLAN OF CARE: Speech therapy for dysphagia tx 1-2 times to ensure diet tolerance.   Pt will functionally tolerate recommended diet with no overt clinical s/s of aspiration  Pt will functionally tolerate ongoing assessment of swallow function with diet to be determined as indicated   Pt will demonstrate understanding of aspiration risk and precautions via education/demonstration with occasional prompting  If clinical s/s of aspiration/penetration continue to be noted, Pt will participate in Modified Barium Swallow Study     Dysphagia Therapeutic Intervention:  Oral Care, Diet Tolerance Monitoring , Patient/Family Education , Therapeutic Trials with SLP     Discharge Recommendations: Discharge recommendations to be determined pending ongoing follow-up during acute care stay    Patient Positioning: Upright in bed     Current Diet Level (prior to evaluation): Regular texture diet  Thin liquids      Respiratory Status:   [x]Room Air   []O2 via nasal cannula   []Other:    Dentition:  [x]Adequate  []Dentures   []Missing Many Teeth  []Edentulous  []Other:    Baseline Vocal Quality:  [x]Normal  []Dysphonic   []Aphonic   []Hoarse  []Wet  []Weak  []Other:    Volitional Cough:  [x]Strong  []Weak  []Wet  []Absent  []Congested  []Other:    Volitional Swallow:   []Absent   []Delayed     [x]Adequate     []Required use of drink     Oral Mechanism Exam:  [x]WFL []Mild   [] Moderate  []Severe  []To be assessed  Impaired:   []Left side      []Right side    []Labial ROM/Coordination    []Labial Symmetry   []Lingual ROM/Coordination   []Lingual Symmetry  []Gag  []Other:     Oral Phase: []WFL [x]Mild   [] Moderate  []Severe  []To be assessed   [x]Impaired/Prolonged Mastication:   []Spillage Left:   []Spillage Right:  []Pocketing Left:   []Pocketing Right:   []Decreased Anterior to Posterior Transit:   [x]Suspected Premature Bolus Loss:   []Lingual/Palatal Residue:   []Other:     Pharyngeal Phase: []WFL []Mild   [] Moderate  []Severe  []To be assessed   [x]Delayed Swallow:   []Suspected Pharyngeal Pooling:   []Decreased Laryngeal Elevation:   []Absent Swallow:  []Wet Vocal Quality:   [x]Throat Clearing-Immediate: inconsistent with all textures throughout trials    []Throat Clearing-Delayed:   []Cough-Immediate:   []Cough-Delayed:  []Change in Vital Signs:   [x]Suspected Delayed Pharyngeal Clearing: increase RR intermittent throughout trials   []Other:       Eating Assistance:  [x]Independent  []Setup or clean-up assistance   [] Supervision or touching assistance   [] Partial or moderate assistance   [] Substantial or maximal assistance  [] Dependent       EDUCATION:   Provided education regarding role of SLP, results of assessment, recommendations and general speech pathology plan of care. [x] Pt verbalized understanding and agreement   [] Pt requires ongoing learning   [] No evidence of comprehension     If patient discharges prior to next visit, this note will serve as discharge.      Timed Code Minutes: 0 minutes  Total Treatment Minutes: 24 minutes    Electronically signed by:    Pelon Larson M.A., [address] [place], [place] [id number]  Speech-Language Pathologist  3/11/2022 1:01 PM

## 2022-03-11 NOTE — RT PROTOCOL NOTE
equivalent RT Bronchodilator order with Frequency of every 4 hours PRN for wheezing or increased work of breathing using Per Protocol order mode. 4-6 - enter or revise RT Bronchodilator order(s) to two equivalent RT bronchodilator orders with one order with BID Frequency and one order with Frequency of every 4 hours PRN wheezing or increased work of breathing using Per Protocol order mode. 7-10 - enter or revise RT Bronchodilator order(s) to two equivalent RT bronchodilator orders with one order with TID Frequency and one order with Frequency of every 4 hours PRN wheezing or increased work of breathing using Per Protocol order mode. 11-13 - enter or revise RT Bronchodilator order(s) to one equivalent RT bronchodilator order with QID Frequency and an Albuterol order with Frequency of every 4 hours PRN wheezing or increased work of breathing using Per Protocol order mode. Greater than 13 - enter or revise RT Bronchodilator order(s) to one equivalent RT bronchodilator order with every 4 hours Frequency and an Albuterol order with Frequency of every 2 hours PRN wheezing or increased work of breathing using Per Protocol order mode. RT to enter RT Home Evaluation for COPD & MDI Assessment order using Per Protocol order mode.     Electronically signed by John Daley RCP on 3/11/2022 at 4:09 PM

## 2022-03-12 VITALS
WEIGHT: 136.6 LBS | RESPIRATION RATE: 20 BRPM | TEMPERATURE: 98 F | SYSTOLIC BLOOD PRESSURE: 129 MMHG | OXYGEN SATURATION: 94 % | HEIGHT: 71 IN | HEART RATE: 84 BPM | BODY MASS INDEX: 19.12 KG/M2 | DIASTOLIC BLOOD PRESSURE: 82 MMHG

## 2022-03-12 LAB
ANION GAP SERPL CALCULATED.3IONS-SCNC: 10 MMOL/L (ref 3–16)
BASOPHILS ABSOLUTE: 0 K/UL (ref 0–0.2)
BASOPHILS RELATIVE PERCENT: 0.2 %
BUN BLDV-MCNC: 22 MG/DL (ref 7–20)
CALCIUM SERPL-MCNC: 8.9 MG/DL (ref 8.3–10.6)
CHLORIDE BLD-SCNC: 98 MMOL/L (ref 99–110)
CO2: 27 MMOL/L (ref 21–32)
CREAT SERPL-MCNC: 0.9 MG/DL (ref 0.8–1.3)
EOSINOPHILS ABSOLUTE: 0 K/UL (ref 0–0.6)
EOSINOPHILS RELATIVE PERCENT: 0 %
GFR AFRICAN AMERICAN: >60
GFR NON-AFRICAN AMERICAN: >60
GLUCOSE BLD-MCNC: 125 MG/DL (ref 70–99)
HCT VFR BLD CALC: 39.2 % (ref 40.5–52.5)
HEMOGLOBIN: 13.4 G/DL (ref 13.5–17.5)
LYMPHOCYTES ABSOLUTE: 1.4 K/UL (ref 1–5.1)
LYMPHOCYTES RELATIVE PERCENT: 16.8 %
MCH RBC QN AUTO: 32.6 PG (ref 26–34)
MCHC RBC AUTO-ENTMCNC: 34.2 G/DL (ref 31–36)
MCV RBC AUTO: 95.4 FL (ref 80–100)
MONOCYTES ABSOLUTE: 0.7 K/UL (ref 0–1.3)
MONOCYTES RELATIVE PERCENT: 8.7 %
NEUTROPHILS ABSOLUTE: 6.4 K/UL (ref 1.7–7.7)
NEUTROPHILS RELATIVE PERCENT: 74.3 %
PDW BLD-RTO: 13.4 % (ref 12.4–15.4)
PLATELET # BLD: 244 K/UL (ref 135–450)
PMV BLD AUTO: 8.1 FL (ref 5–10.5)
POTASSIUM REFLEX MAGNESIUM: 4.5 MMOL/L (ref 3.5–5.1)
PROCALCITONIN: 0.07 NG/ML (ref 0–0.15)
RBC # BLD: 4.11 M/UL (ref 4.2–5.9)
SODIUM BLD-SCNC: 135 MMOL/L (ref 136–145)
WBC # BLD: 8.5 K/UL (ref 4–11)

## 2022-03-12 PROCEDURE — 94669 MECHANICAL CHEST WALL OSCILL: CPT

## 2022-03-12 PROCEDURE — 6370000000 HC RX 637 (ALT 250 FOR IP): Performed by: FAMILY MEDICINE

## 2022-03-12 PROCEDURE — 94761 N-INVAS EAR/PLS OXIMETRY MLT: CPT

## 2022-03-12 PROCEDURE — 84145 PROCALCITONIN (PCT): CPT

## 2022-03-12 PROCEDURE — 80048 BASIC METABOLIC PNL TOTAL CA: CPT

## 2022-03-12 PROCEDURE — 6360000002 HC RX W HCPCS: Performed by: FAMILY MEDICINE

## 2022-03-12 PROCEDURE — 2580000003 HC RX 258: Performed by: FAMILY MEDICINE

## 2022-03-12 PROCEDURE — 94640 AIRWAY INHALATION TREATMENT: CPT

## 2022-03-12 PROCEDURE — 96376 TX/PRO/DX INJ SAME DRUG ADON: CPT

## 2022-03-12 PROCEDURE — 85025 COMPLETE CBC W/AUTO DIFF WBC: CPT

## 2022-03-12 PROCEDURE — 87449 NOS EACH ORGANISM AG IA: CPT

## 2022-03-12 PROCEDURE — G0378 HOSPITAL OBSERVATION PER HR: HCPCS

## 2022-03-12 PROCEDURE — 36415 COLL VENOUS BLD VENIPUNCTURE: CPT

## 2022-03-12 PROCEDURE — 96372 THER/PROPH/DIAG INJ SC/IM: CPT

## 2022-03-12 RX ORDER — AMOXICILLIN AND CLAVULANATE POTASSIUM 875; 125 MG/1; MG/1
1 TABLET, FILM COATED ORAL EVERY 12 HOURS SCHEDULED
Qty: 14 TABLET | Refills: 0 | Status: SHIPPED | OUTPATIENT
Start: 2022-03-12 | End: 2022-03-19

## 2022-03-12 RX ORDER — LACTOBACILLUS RHAMNOSUS GG 10B CELL
1 CAPSULE ORAL 2 TIMES DAILY WITH MEALS
Qty: 20 CAPSULE | Refills: 0 | Status: SHIPPED | OUTPATIENT
Start: 2022-03-12 | End: 2022-03-22

## 2022-03-12 RX ORDER — IPRATROPIUM BROMIDE AND ALBUTEROL SULFATE 2.5; .5 MG/3ML; MG/3ML
SOLUTION RESPIRATORY (INHALATION)
Qty: 360 ML | Refills: 1 | Status: SHIPPED | OUTPATIENT
Start: 2022-03-12

## 2022-03-12 RX ORDER — ASPIRIN 81 MG/1
81 TABLET ORAL DAILY
Qty: 30 TABLET | Refills: 0 | Status: SHIPPED | OUTPATIENT
Start: 2022-03-13 | End: 2022-05-18

## 2022-03-12 RX ORDER — PREDNISONE 10 MG/1
TABLET ORAL
Qty: 30 TABLET | Refills: 0 | Status: ON HOLD
Start: 2022-03-12 | End: 2022-10-17 | Stop reason: HOSPADM

## 2022-03-12 RX ADMIN — ROSUVASTATIN CALCIUM 5 MG: 10 TABLET, COATED ORAL at 07:41

## 2022-03-12 RX ADMIN — SACUBITRIL AND VALSARTAN 1 TABLET: 97; 103 TABLET, FILM COATED ORAL at 09:54

## 2022-03-12 RX ADMIN — BUDESONIDE 500 MCG: 0.5 SUSPENSION RESPIRATORY (INHALATION) at 09:46

## 2022-03-12 RX ADMIN — ENOXAPARIN SODIUM 40 MG: 40 INJECTION SUBCUTANEOUS at 07:42

## 2022-03-12 RX ADMIN — Medication 1 CAPSULE: at 07:42

## 2022-03-12 RX ADMIN — ASPIRIN 81 MG: 81 TABLET, COATED ORAL at 07:42

## 2022-03-12 RX ADMIN — TIOTROPIUM BROMIDE AND OLODATEROL 2 PUFF: 3.124; 2.736 SPRAY, METERED RESPIRATORY (INHALATION) at 09:46

## 2022-03-12 RX ADMIN — GUAIFENESIN 1200 MG: 600 TABLET, EXTENDED RELEASE ORAL at 07:41

## 2022-03-12 RX ADMIN — CARVEDILOL 6.25 MG: 6.25 TABLET, FILM COATED ORAL at 07:41

## 2022-03-12 RX ADMIN — METHYLPREDNISOLONE SODIUM SUCCINATE 40 MG: 40 INJECTION, POWDER, FOR SOLUTION INTRAMUSCULAR; INTRAVENOUS at 07:42

## 2022-03-12 RX ADMIN — IPRATROPIUM BROMIDE AND ALBUTEROL SULFATE 1 AMPULE: 2.5; .5 SOLUTION RESPIRATORY (INHALATION) at 03:51

## 2022-03-12 RX ADMIN — SODIUM CHLORIDE, PRESERVATIVE FREE 10 ML: 5 INJECTION INTRAVENOUS at 07:43

## 2022-03-12 RX ADMIN — AMOXICILLIN AND CLAVULANATE POTASSIUM 1 TABLET: 875; 125 TABLET, FILM COATED ORAL at 07:41

## 2022-03-12 RX ADMIN — IPRATROPIUM BROMIDE AND ALBUTEROL SULFATE 1 AMPULE: 2.5; .5 SOLUTION RESPIRATORY (INHALATION) at 09:46

## 2022-03-12 ASSESSMENT — PAIN SCALES - GENERAL
PAINLEVEL_OUTOF10: 0
PAINLEVEL_OUTOF10: 0

## 2022-03-12 NOTE — DISCHARGE SUMMARY
Hospital Discharge Summary    Patient's PCP: Mitali Bardales DO  Admit Date: 3/11/2022   Discharge Date: 3/12/2022    Admitting Physician: Dr. Brady Kelly MD  Discharge Physician: Dr. Brady Kelly MD     Consults:   None    Brief HPI:   This is a pleasant 80 y.o. male who presented to the emergency room with a 3 to 4-day history of worsening shortness of breath, productive cough. The shortness of breath is worse with exertion. He has a pmh of severe COPD, chronic systolic and diastolic congestive heart failure, history of tobacco abuse, peripheral vascular disease. He was seen in the emergency room yesterday and was given steroids, breathing treatments and started on doxycycline and improved. He denies chest pain, fever, chills, nausea, vomiting, diarrhea, abdominal pain, dysuria. Brief hospital course:   Acute COPD exacerbation/bronchiectasis exacerbation  -Start steroids, duo nebs, inhalers, Mucinex, Acapella, antibiotics  -strep/Legionella neg           Chronic combined systolic and diastolic congestive heart failure/nonischemic cardiomyopathy  -Continue aspirin, statin, Entresto, beta-blocker, continue to monitor        Essential hypertension  -BP stable, continue Coreg 6.25 mg twice daily, Entresto, continue to monitor           Peripheral vascular disease  -Continue aspirin and statin           History of tobacco abuse in remission    Patient feels better, will complete a course of Augmentin and prednisone taper, follow-up with PCP in 1 week.         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 *      Physical Exam: BP (!) 117/53   Pulse 89   Temp 97.8 °F (36.6 °C) (Temporal)   Resp 16   Ht 5' 11\" (1.803 m)   Wt 136 lb 9.6 oz (62 kg)   SpO2 99%   BMI 19.05 kg/m²   Gen/overall appearance: Not in acute distress. Alert. Head: Normocephalic, atraumatic  Eyes: EOMI, good acuity  ENT:- Oral mucosa moist  Neck: No JVD, thyromegaly  CVS: Nml S1S2, no MRG, RRR  Pulm: Diminished breath sounds bilaterally, mild scattered rhonchi  Gastrointestinal: Soft, NT/ND, +BS  Musculoskeletal: No edema. Warm  Neuro: No focal deficit. Moves extremity spontaneously. Psychiatry: Appropriate affect. Not agitated. Skin: Warm, dry with normal turgor. No rash        Significant diagnostic studies that may require follow up:  Echo Complete    Result Date: 3/11/2022  Transthoracic Echocardiography Report (TTE)  Demographics   Patient Name      Mac Briones   Date of Study     03/11/2022           Gender             Male   Patient Number    [id number]           Date of Birth      1950   Visit Number      [id number]            Age                80 year(s)   Accession Number  [id number]           Room Number        8982   Corporate ID      [id number]             Lee Tong, RVT   Ordering          Judy Greco MD  Physician         Davina Keenan MD    Physician  Procedure Type of Study   TTE procedure:ECHOCARDIOGRAM COMPLETE 2D W DOPPLER W COLOR. Procedure Date Date: 03/11/2022 Start: 02:23 PM Study Location: [facility] - Echo Lab Technical Quality: Limited visualization due to body habitus. Additional Indications: Worsening Shortness of Breath on Exertion.  Patient Status: Routine Height: 71 inches Weight: 136 pounds BSA: 1.79 m2 BMI: 18.97 kg/m2 BP: 146/81 mmHg  Conclusions   Summary  Technically limited study due to patient body habitus and lung interference  as well as frequent PVCs. Estimated ejection fraction of 45-50%. Mildly diminished left ventricular systolic function. Image quality inadequate to rule out regional wall motion abnormalities. Mild septal left ventricular hypertrophy. Normal left ventricle size. Grade I diastolic dysfunction with normal LV filling pressures. The aortic valve appears sclerotic but opens well. Mild to moderate tricuspid valve regurgitation. The inferior vena cava is dilated with respiratory variation greater than  50% consistent with CVP 10-15 mmHg   Signature   ------------------------------------------------------------------  Electronically signed by Jasmin Tovar MD (Interpreting  physician) on 03/11/2022 at 03:40 PM  ------------------------------------------------------------------   Findings   Left Ventricle  Estimated ejection fraction of 45-50%. Mildly diminished left ventricular  systolic function. Image quality inadequate to rule out regional wall motion abnormalities. Mild septal left ventricular hypertrophy. Normal left ventricle size. Grade  I diastolic dysfunction with normal LV filling pressures. Avg. E/e' is  estimated at 10.2. Mitral Valve  The mitral valve normal in structure and function. Trivial mitral valve  regurgitation. No evidence of mitral valve stenosis. Left Atrium  The left atrium is normal in size. Aortic Valve  The aortic valve appears sclerotic but opens well. No evidence of aortic  valve regurgitation or stenosis. Aorta  The aortic root is normal in size. Right Ventricle  The right ventricle is normal in size and function. TAPSE is estimated at  2.07 cm. Tricuspid Valve  The tricuspid valve is normal in structure and function. Mild to moderate  tricuspid valve regurgitation. No evidence of tricuspid valve stenosis. PASP  is estimated at 39 mmHg. Right Atrium  The right atrial size is normal.   Pulmonic Valve  The pulmonic valve is not well visualized.  No evidence Lungs are hyperexpanded and otherwise unchanged since prior. Right basilar granuloma again seen. No pleural fluid or discrete pneumothorax. Cardiomediastinal silhouette is within normal limits. No acute interval osseous change     Findings of COPD without acute superimposed changed. CT CHEST PULMONARY EMBOLISM W CONTRAST    Result Date: 3/10/2022  EXAMINATION: CTA OF THE CHEST 3/10/2022 10:43 am TECHNIQUE: CTA of the chest was performed after the administration of intravenous contrast.  Multiplanar reformatted images are provided for review. MIP images are provided for review. Dose modulation, iterative reconstruction, and/or weight based adjustment of the mA/kV was utilized to reduce the radiation dose to as low as reasonably achievable. COMPARISON: CT chest October 22, 2021 and priors. HISTORY: ORDERING SYSTEM PROVIDED HISTORY: sob TECHNOLOGIST PROVIDED HISTORY: Reason for exam:->sob Decision Support Exception - unselect if not a suspected or confirmed emergency medical condition->Emergency Medical Condition (MA) Reason for Exam: Shortness of Breath (pt states he feels congested, sob that started yesterday. wet cough noted) FINDINGS: Pulmonary Arteries: Pulmonary arteries are adequately opacified for evaluation. No evidence of intraluminal filling defect to suggest pulmonary embolism. Main pulmonary artery is normal in caliber. Mediastinum: No evidence of mediastinal lymphadenopathy. The heart and pericardium demonstrate no acute abnormality. There is no acute abnormality of the thoracic aorta. Lungs/pleura: The central airways are patent. Retained secretions are seen within the lower trachea just above the bifurcation. Moderate to severe emphysematous changes are again seen within the lungs with biapical pleural scarring, right greater than left. There is mild diffuse bronchiectasis, also similar in appearance. There is a calcified granuloma in the right lower lobe.   No suspicious nodules or masses are identified. There is no focal airspace consolidation. No pneumothorax or pleural effusion identified. Upper Abdomen: Limited visualization of the upper abdomen is without acute process. Soft Tissues/Bones: No acute bone or soft tissue abnormality. No evidence of pulmonary embolism Some retained secretions are seen within the distal trachea. Airways are patent. Emphysematous changes and chronic bronchiectasis again seen, similar in appearance. Treatments: As above. Discharge Medications:     Medication List      START taking these medications    amoxicillin-clavulanate 875-125 MG per tablet  Commonly known as: AUGMENTIN  Take 1 tablet by mouth every 12 hours for 7 days     ipratropium-albuterol 0.5-2.5 (3) MG/3ML Soln nebulizer solution  Commonly known as: DUONEB  1 vial inh Q6hrs for next 10 days and then 1 vial inh Q4hrs PRN SOB or wheezing     lactobacillus capsule  Take 1 capsule by mouth 2 times daily (with meals) for 10 days        CHANGE how you take these medications    aspirin 81 MG EC tablet  Take 1 tablet by mouth daily  Start taking on: March 13, 2022  What changed:   · medication strength  · how much to take     predniSONE 10 MG tablet  Commonly known as: DELTASONE  Take 4 tabs PO QD for 3 days, then 3 tabs PO QD for 3 days, then 2 tabs PO QD for 3 days, then 1 tab PO QD for 3 days  What changed:   · how much to take  · how to take this  · when to take this  · additional instructions        CONTINUE taking these medications    albuterol sulfate  (90 Base) MCG/ACT inhaler  Commonly known as: Proventil HFA  Inhale 2 puffs into the lungs every 4 hours as needed for Wheezing or Shortness of Breath (Space out to every 6 hours as symptoms improve) Space out to every 6 hours as symptoms improve.   Notes to patient: Use: Treatment or prevention of bronchospasm  Side effects: tremors, nervousness, increased blood sugar     Breztri Aerosphere 160-9-4.8 MCG/ACT Aero  Generic drug: Budeson-Glycopyrrol-Formoterol  Notes to patient: Use: Treatment or prevention of bronchospasm  Side effects: tremors, nervousness, increased blood sugar     carvedilol 6.25 MG tablet  Commonly known as: COREG  Take 1 tablet by mouth 2 times daily (with meals)  Notes to patient: Use: Lower blood pressure  Side effects: dizziness, light headedness, nausea     Entresto  MG per tablet  Generic drug: sacubitril-valsartan  Take 1 tablet by mouth 2 times daily  Notes to patient: Use: for heart failure - lowers blood pressure  Side effects: too low blood pressure (hypotension), high potassium     rosuvastatin 5 MG tablet  Commonly known as: CRESTOR  Take 1 tablet by mouth daily  Notes to patient: Use: lower cholesterol  Side effects: diarrhea, stuffy nose, joint pain     sildenafil 20 MG tablet  Commonly known as: REVATIO  Notes to patient: Use: improve activity tolerance, delay worsening of breathing related  Side effects: flushing, upset stomach, headache        STOP taking these medications    doxycycline monohydrate 100 MG tablet  Commonly known as: ADOXA           Where to Get Your Medications      These medications were sent to [facility] [address], [address] [phone] [facility] [phone]  [address], [address] [facility]    Phone: [phone]   · amoxicillin-clavulanate 875-125 MG per tablet  · aspirin 81 MG EC tablet  · ipratropium-albuterol 0.5-2.5 (3) MG/3ML Soln nebulizer solution  · lactobacillus capsule  · predniSONE 10 MG tablet         Activity: activity as tolerated  Diet: ADULT DIET;  Regular      Disposition: home  Discharged Condition: Stable  Follow Up:   [address] [place] [address], [address]  [phone]    Call in 3 days  post-hospitalization follow-up        Code status:  Full Code         Total time spent on discharge, finalizing medications, referrals and arranging outpatient follow up was more than 30 minutes      Thank you  Chino Barber III, DO for the opportunity to be involved in this patients care.

## 2022-03-12 NOTE — FLOWSHEET NOTE
1100 am: Order noted for discharge. 1200 pm: Discharge and education instructions reviewed with patient and wife (at bedside). Teach-back successful. Pt verbalized understanding and signed d/c papers. Pt denied questions at this time. No distress noted. Pt instructed to follow-up with PCP and cardiology as documented. Pt instructed to report to emergency department if acute symptoms develop. Patient verbalized understanding. 1215 pm: Discharged per MD with discharge instructions. Pt discharged per wheelchair to private vehicle with wife. Patient stable upon departure.     Electronically signed by MARY Meneses RN

## 2022-03-13 LAB
L. PNEUMOPHILA SEROGP 1 UR AG: NORMAL
STREP PNEUMONIAE ANTIGEN, URINE: NORMAL

## 2022-03-15 ENCOUNTER — TELEPHONE (OUTPATIENT)
Dept: VASCULAR SURGERY | Age: 72
End: 2022-03-15

## 2022-03-15 NOTE — TELEPHONE ENCOUNTER
LM on  for PT to call back to schedule his 3 mos F/U VL DUP LOWER EXTREMITY ARTERIES LEFT.   PT is due 4/3 or after

## 2022-04-07 ENCOUNTER — PROCEDURE VISIT (OUTPATIENT)
Dept: VASCULAR SURGERY | Age: 72
End: 2022-04-07

## 2022-04-07 DIAGNOSIS — I73.9 PERIPHERAL VASCULAR DISEASE, UNSPECIFIED (HCC): ICD-10-CM

## 2022-04-12 ENCOUNTER — TELEPHONE (OUTPATIENT)
Dept: VASCULAR SURGERY | Age: 72
End: 2022-04-12

## 2022-04-12 NOTE — TELEPHONE ENCOUNTER
LM on PT VM that his scan looked good and we would repeat scan and OV in 6 months. He can give me a call closer to then to schedule.

## 2022-05-17 ASSESSMENT — ENCOUNTER SYMPTOMS
GASTROINTESTINAL NEGATIVE: 1
SHORTNESS OF BREATH: 1

## 2022-05-17 NOTE — PROGRESS NOTES
quit smokeless tobacco use about 5 years ago. He reports current alcohol use of about 1.0 - 3.0 standard drink of alcohol per week. He reports that he does not use drugs. Family History:   Family History   Problem Relation Age of Onset    Heart Disease Mother     No Known Problems Father        HomeMedications:  Prior to Admission medications    Medication Sig Start Date End Date Taking? Authorizing Provider   aspirin 81 MG EC tablet Take 1 tablet by mouth daily 3/13/22   Roxana Ortiz MD   ipratropium-albuterol (DUONEB) 0.5-2.5 (3) MG/3ML SOLN nebulizer solution 1 vial inh Q6hrs for next 10 days and then 1 vial inh Q4hrs PRN SOB or wheezing 3/12/22   Roxana Ortiz MD   predniSONE (DELTASONE) 10 MG tablet Take 4 tabs PO QD for 3 days, then 3 tabs PO QD for 3 days, then 2 tabs PO QD for 3 days, then 1 tab PO QD for 3 days 3/12/22   Roxana Ortiz MD   albuterol sulfate HFA (PROVENTIL HFA) 108 (90 Base) MCG/ACT inhaler Inhale 2 puffs into the lungs every 4 hours as needed for Wheezing or Shortness of Breath (Space out to every 6 hours as symptoms improve) Space out to every 6 hours as symptoms improve. 3/10/22   Jamilyn Alphonso Spatz, PA-C BREZTRI AEROSPHERE 160-9-4.8 MCG/ACT AERO Inhale 2 puffs into the lungs in the morning and at bedtime  1/4/22   Historical Provider, MD   sildenafil (REVATIO) 20 MG tablet Take 20 mg by mouth as needed    Historical Provider, MD   rosuvastatin (CRESTOR) 5 MG tablet Take 1 tablet by mouth daily 1/11/22   Bijal Alvarez APRN - CNP   sacubitril-valsartan (ENTRESTO)  MG per tablet Take 1 tablet by mouth 2 times daily 6/1/21   Nishi Almeida MD   carvedilol (COREG) 6.25 MG tablet Take 1 tablet by mouth 2 times daily (with meals) 5/1/21   Herbert Campbell MD        Allergies:  Codeine     ROS:   Review of Systems   Constitutional: Negative. Respiratory: Positive for shortness of breath. Cardiovascular: Positive for leg swelling.    Gastrointestinal: Negative. Genitourinary: Negative. Musculoskeletal: Negative. Skin: Negative. Neurological: Negative. Hematological: Negative. Psychiatric/Behavioral: Negative. Physical Examination:    Vitals:    05/18/22 1530   BP: (!) 84/46   Pulse: 60   Weight: 133 lb (60.3 kg)   Height: 5' 11\" (1.803 m)           Physical Exam  Constitutional:       Appearance: He is well-developed. He is ill-appearing. HENT:      Head: Normocephalic and atraumatic. Eyes:      Extraocular Movements: Extraocular movements intact. Pupils: Pupils are equal, round, and reactive to light. Cardiovascular:      Rate and Rhythm: Normal rate and regular rhythm. Heart sounds: Normal heart sounds. Pulmonary:      Effort: Pulmonary effort is normal.      Breath sounds: Normal breath sounds. Abdominal:      Palpations: Abdomen is soft. Musculoskeletal:         General: Normal range of motion. Cervical back: Normal range of motion and neck supple. Right lower leg: Edema present. Left lower leg: Edema present. Comments: 1+ around ankles   Skin:     General: Skin is warm and dry. Coloration: Skin is pale. Neurological:      General: No focal deficit present. Mental Status: He is alert and oriented to person, place, and time. Mental status is at baseline. Psychiatric:         Mood and Affect: Mood normal.         Behavior: Behavior normal.         Thought Content:  Thought content normal.         Judgment: Judgment normal.         Lab Data:    CBC:   Lab Results   Component Value Date    WBC 8.5 03/12/2022    WBC 9.6 03/11/2022    WBC 10.0 03/10/2022    RBC 4.11 03/12/2022    RBC 4.73 03/11/2022    RBC 4.79 03/10/2022    HGB 13.4 03/12/2022    HGB 15.2 03/11/2022    HGB 15.1 03/10/2022    HCT 39.2 03/12/2022    HCT 44.5 03/11/2022    HCT 44.8 03/10/2022    MCV 95.4 03/12/2022    MCV 94.1 03/11/2022    MCV 93.7 03/10/2022    RDW 13.4 03/12/2022    RDW 13.1 03/11/2022    RDW 12.9 03/10/2022     03/12/2022     03/11/2022     03/10/2022     BMP:  Lab Results   Component Value Date     03/12/2022     03/11/2022     03/10/2022    K 4.5 03/12/2022    K 4.7 03/11/2022    K 4.8 03/10/2022    K 4.7 06/03/2021    K 4.5 06/02/2021    K 4.8 05/01/2021    CL 98 03/12/2022    CL 95 03/11/2022    CL 97 03/10/2022    CO2 27 03/12/2022    CO2 28 03/11/2022    CO2 28 03/10/2022    PHOS 3.7 09/19/2016    BUN 22 03/12/2022    BUN 10 03/11/2022    BUN 10 03/10/2022    CREATININE 0.9 03/12/2022    CREATININE 0.8 03/11/2022    CREATININE 0.8 03/10/2022     BNP:   Lab Results   Component Value Date    PROBNP 1,067 03/11/2022    PROBNP 331 03/10/2022    PROBNP 351 12/05/2017     Iron Studies:  No components found for: FE,  TIBC,  FERRITIN  Iron Deficiency Anemia:  No    IV Iron Therapy:  No  2017 ACC/AHA HF Guidelines:   intravenous iron replacement in patients with New York Heart Association (NYHA) class II and III HF and iron deficiency (ferritin <100 ng/ml or 100-300 ng/ml if transferrin saturation <20%), to improve functional status and QoL. Assessment/Plan:    Encounter Diagnoses        Chronic systolic congestive heart failure (HCC) Still volume overloaded, continue lasix    Nonischemic cardiomyopathy ([address] Continue entresto and coreg, recheck echo    Essential hypertension Monitor for hypotension at home    SOB (shortness of breath) Continued, severe COPD, has Hospice care at home     atrial fibrillation [facility]) S/p dccv, continue amio, Bb, AC, EP f/u to discuss Amio given COPD         Instructions:   1. Medications: restart eliquis 5mg twice a day, continue other meds and when swelling is better decrease lasix to 20mg, if you blood pressure continues to be low then decrease entresto to 1/2 pill twice a day  2. Echo  3. Labs with echo  4.  Follow up: July as scheduled        Amanda: [phone]      I appreciate the opportunity of cooperating in the care of this individual.    CHELSY Tran - CNP, CNP, 5/17/2022,9:16 AM    QUALITY MEASURES  1. Tobacco Cessation Counseling: NA  2. Retake of BP if >140/90:   NA  3. Documentation to PCP/referring for new patient:  Sent to PCP at close of office visit  4. CAD patient on anti-platelet: NA  5. CAD patient on STATIN therapy:  NA  6. Patient with CHF and aFib on anticoagulation:  NA   7. Patient Education:Yes   8. BB for LVSD :  Yes   9. ACE/ARB for LVSD:  Yes   10.  Left Ventricular Ejection Fraction (LVEF) Assessment:  Yes

## 2022-05-18 ENCOUNTER — OFFICE VISIT (OUTPATIENT)
Dept: CARDIOLOGY CLINIC | Age: 72
End: 2022-05-18
Payer: COMMERCIAL

## 2022-05-18 VITALS
BODY MASS INDEX: 18.62 KG/M2 | WEIGHT: 133 LBS | HEIGHT: 71 IN | HEART RATE: 60 BPM | DIASTOLIC BLOOD PRESSURE: 46 MMHG | SYSTOLIC BLOOD PRESSURE: 84 MMHG

## 2022-05-18 DIAGNOSIS — I10 ESSENTIAL HYPERTENSION: Chronic | ICD-10-CM

## 2022-05-18 DIAGNOSIS — I48.19 PERSISTENT ATRIAL FIBRILLATION (HCC): ICD-10-CM

## 2022-05-18 DIAGNOSIS — R06.02 SOB (SHORTNESS OF BREATH): Chronic | ICD-10-CM

## 2022-05-18 DIAGNOSIS — I42.8 NONISCHEMIC CARDIOMYOPATHY (HCC): Chronic | ICD-10-CM

## 2022-05-18 DIAGNOSIS — I50.22 CHRONIC SYSTOLIC CONGESTIVE HEART FAILURE (HCC): Primary | ICD-10-CM

## 2022-05-18 PROCEDURE — 99214 OFFICE O/P EST MOD 30 MIN: CPT | Performed by: NURSE PRACTITIONER

## 2022-05-18 RX ORDER — FUROSEMIDE 20 MG/1
20 TABLET ORAL EVERY EVENING
COMMUNITY
End: 2022-07-14

## 2022-05-18 RX ORDER — AMIODARONE HYDROCHLORIDE 200 MG/1
100 TABLET ORAL 2 TIMES DAILY
COMMUNITY

## 2022-05-18 RX ORDER — LORAZEPAM 0.5 MG/1
0.5 TABLET ORAL EVERY 6 HOURS PRN
Status: ON HOLD | COMMUNITY
End: 2022-10-17 | Stop reason: HOSPADM

## 2022-05-18 RX ORDER — FUROSEMIDE 40 MG/1
40 TABLET ORAL EVERY MORNING
COMMUNITY
End: 2022-07-14

## 2022-05-18 NOTE — PATIENT INSTRUCTIONS
Instructions:   1. Medications: restart eliquis 5mg twice a day, continue other meds and when swelling is better decrease lasix to 20mg, if you blood pressure continues to be low then decrease entresto to 1/2 pill twice a day  2. Echo  3. Labs with echo  4.  Follow up: July as scheduled        Amanda: [phone]

## 2022-05-19 PROBLEM — I48.19 PERSISTENT ATRIAL FIBRILLATION (HCC): Status: ACTIVE | Noted: 2022-05-19

## 2022-06-08 ENCOUNTER — OFFICE VISIT (OUTPATIENT)
Dept: PULMONOLOGY | Age: 72
End: 2022-06-08
Payer: MEDICARE

## 2022-06-08 VITALS — HEART RATE: 76 BPM | OXYGEN SATURATION: 98 %

## 2022-06-08 DIAGNOSIS — J44.9 COPD, VERY SEVERE (HCC): ICD-10-CM

## 2022-06-08 DIAGNOSIS — J43.2 CENTRILOBULAR EMPHYSEMA (HCC): ICD-10-CM

## 2022-06-08 DIAGNOSIS — R06.02 SOB (SHORTNESS OF BREATH): Primary | Chronic | ICD-10-CM

## 2022-06-08 DIAGNOSIS — I50.22 CHRONIC SYSTOLIC CONGESTIVE HEART FAILURE (HCC): Chronic | ICD-10-CM

## 2022-06-08 DIAGNOSIS — R91.1 PULMONARY NODULE: ICD-10-CM

## 2022-06-08 PROCEDURE — 99214 OFFICE O/P EST MOD 30 MIN: CPT | Performed by: INTERNAL MEDICINE

## 2022-06-08 PROCEDURE — 1123F ACP DISCUSS/DSCN MKR DOCD: CPT | Performed by: INTERNAL MEDICINE

## 2022-06-08 NOTE — PROGRESS NOTES
[facility]  Progress Note  Arias Alvarado MD       Lauren Mcknight   YOB: 1950    Date of Visit:  6/8/2022    Assessment/Plan:  1. SOB (shortness of breath) and Cough  Worse  Out of Anoro  Now a nonsmoker    2. COPD, severe (HCC)/Emphysema  PFT 5/17:  Spirometry reveals decreased FVC at 3.4 liters which is 72% predicted. FEV1 is decreased at 1.17 liters which is 33% predicted. FEV1/FVC ratio is reduced at 35%. Lung volumes reveal increased total lung capacity at 129% predicted. Residual volume is increased at 212% predicted. Diffusion capacity is normal.     IMPRESSION:  Very severe obstructive lung disease with hyperinflation. Anoro ==> Out of this    Now only on HHN  In Hospice  Also on O2  He is reassessing hospice care at this time. 3. Nonischemic cardiomyopathy ([address]  Stable  Follows with Cardiology    4. Tobacco use  Complete nonsmoker! 5. Pulmonary nodule  CT Chest 11/20:        FINDINGS:   Mediastinum: The thyroid is unremarkable. Juris Jamila is no pathologic   lymphadenopathy within the chest or mediastinum.  The intrathoracic aorta is   unremarkable, without evidence of aneurysm or dissection.  The  pulmonary   arteries are patent, without evidence of filling defect.  No pericardial   abnormality is identified.       Lungs/pleura: There are mild retained mucus secretions within the midthoracic   trachea.  The central airways are otherwise patent. Juris Jamila is a background of   moderate emphysema. Juris Jamila is chronic biapical pleural-parenchymal scarring. There is a stable focus of pleural-parenchymal scarring within the anterior   right upper lobe.  No focus of acute airspace consolidation is identified.    There is no evidence of a pneumothorax or pleural effusion.  The previously   described 5 mm nodule within the subpleural left upper lobe, image 33 of   series 2, is stable and unchanged from 01/11/2020.  The aforementioned small   6 mm cystic nodule within the medial left lower lobe is no longer evident and   has resolved.  Scattered calcified granulomata are noted.  No new suspicious   pulmonary nodule or mass is evident.       Upper Abdomen: The adrenal glands are unremarkable bilaterally.  There are   small bilateral renal cysts. Lucetta Record is a small sliding-type hiatal hernia. The remainder of the upper abdomen is unremarkable.       Soft Tissues/Bones: There is mild degenerative change throughout the   thoracolumbar spine.  No osteolytic or osteoblastic lesion is seen.           Impression   1. No acute intrapulmonary findings. 2. Stable chronic emphysema. 3. Stable 5 mm nodule within the left upper lobe.  The previously identified   6 mm cavitary nodule within the left lower lobe has resolved. Melissa Pinto the   patient's high risk status, further follow-up of the 5 mm nodule is as   advised below.  Namely, recommend chest CT follow-up in 2 months, to document   12 month stability of this nodule.       RECOMMENDATIONS:   Fleischner Society guidelines for follow-up and management of incidentally   detected pulmonary nodules:         Recommend repeat CT chest around 11/21  Recommend COVID vaccine    FOLLOW UP: 6 months      Chief Complaint   Patient presents with    Shortness of Breath     last seen 3/2021 Was in [place] and ended in hospital and set up with O2 this past April  Was treated for A-fib and in patient 10 days        HPI  The patient presents with a chief complaint of moderate shortness of breath related to mild COPD of many years duration. He has mild associated cough. Exertion is a modifying factor. He was hospitalized in [place] and ended up on O2. Now in hospice. As a result, he has only compressed gas tanks. He is taking HHN only. Trelegy was too expensive.       Review of Systems  No Chest pain, Nausea or vomiting reported      Physical Exam:  Well developed, well nourished  Alert and oriented  Sclera is clear  No cervical adenopathy  No JVD. Chest examination is clear. Cardiac examination reveals regular rate and rhythm without murmur, gallop or rub. The abdomen is soft, nontender and nondistended. There is no clubbing, cyanosis or edema of the extremities. There is no obvious skin rash. No focal neuro deficicts  Normal mood and affect    Allergies   Allergen Reactions    Codeine Anxiety and Other (See Comments)     Unknown reaction     Prior to Visit Medications    Medication Sig Taking? Authorizing Provider   LORazepam (ATIVAN) 0.5 MG tablet Take 0.5 mg by mouth every 6 hours as needed for Anxiety. Historical Provider, MD   furosemide (LASIX) 40 MG tablet Take 40 mg by mouth every morning  Historical Provider, MD   furosemide (LASIX) 20 MG tablet Take 20 mg by mouth every evening  Historical Provider, MD   amiodarone (CORDARONE) 200 MG tablet Take 200 mg by mouth 2 times daily  Historical Provider, MD   Morphine Sulfate (MORPHINE 20 MG/ML ORAL SOLN 0.25 ML, SMALL, CMPD) Take 1 drop by mouth every 3-4 hours as needed.   Historical Provider, MD   OXYGEN Inhale 3 L into the lungs  Historical Provider, MD   apixaban (ELIQUIS) 5 MG TABS tablet Take 1 tablet by mouth 2 times daily  CHELSY [employer] - BHAVESH   ipratropium-albuterol (DUONEB) 0.5-2.5 (3) MG/3ML SOLN nebulizer solution 1 vial inh Q6hrs for next 10 days and then 1 vial inh Q4hrs PRN SOB or wheezing  Alexandrea Haque MD   predniSONE (DELTASONE) 10 MG tablet Take 4 tabs PO QD for 3 days, then 3 tabs PO QD for 3 days, then 2 tabs PO QD for 3 days, then 1 tab PO QD for 3 days  Patient taking differently: Take 5 mg by mouth daily   Alexandrea Haque MD   sacubitril-valsartan (ENTRESTO)  MG per tablet Take 1 tablet by mouth 2 times daily  Curly Mejia MD   carvedilol (COREG) 6.25 MG tablet Take 1 tablet by mouth 2 times daily (with meals)  Chasity De León MD       Vitals:    06/08/22 1550   Pulse: 76   SpO2: 98%     There is no height or weight on file to calculate BMI.      Wt Readings from Last 3 Encounters:   22 133 lb (60.3 kg)   22 136 lb 9.6 oz (62 kg)   03/10/22 141 lb (64 kg)     BP Readings from Last 3 Encounters:   22 (!) 84/46   22 129/82   03/10/22 (!) 135/96        Social History     Tobacco Use   Smoking Status Former Smoker    Packs/day: 1.00    Years: 30.00    Pack years: 30.00    Types: Cigarettes    Quit date: 2016    Years since quittin.7   Smokeless Tobacco Former User    Quit date: 2016

## 2022-06-14 ENCOUNTER — HOSPITAL ENCOUNTER (OUTPATIENT)
Dept: NON INVASIVE DIAGNOSTICS | Age: 72
Discharge: HOME | End: 2022-06-14
Payer: MEDICARE

## 2022-06-14 DIAGNOSIS — I50.22 CHRONIC SYSTOLIC CONGESTIVE HEART FAILURE (HCC): ICD-10-CM

## 2022-06-14 LAB
LV EF: 63 %
LVEF MODALITY: NORMAL

## 2022-06-14 PROCEDURE — 93306 TTE W/DOPPLER COMPLETE: CPT

## 2022-06-15 ENCOUNTER — TELEPHONE (OUTPATIENT)
Dept: CARDIOLOGY CLINIC | Age: 72
End: 2022-06-15

## 2022-06-15 DIAGNOSIS — I50.22 CHRONIC SYSTOLIC CONGESTIVE HEART FAILURE (HCC): Primary | ICD-10-CM

## 2022-06-15 NOTE — TELEPHONE ENCOUNTER
I spoke with pt. I relayed message per Jose Leon. He stated his weight is 135 lb. He says his swelling is a little more than normal. He says he always has SOB because of COPD. He said that he does not weigh himself every day.

## 2022-06-15 NOTE — TELEPHONE ENCOUNTER
----- Message from CHELSY Soils CNP sent at 6/15/2022  8:58 AM EDT -----  His ejection fraction is normal on this echo, it looks like he might still have extra fluid - could he please get labs done. How is he feeling?  Jewel Acevedo

## 2022-06-19 PROCEDURE — 99284 EMERGENCY DEPT VISIT MOD MDM: CPT

## 2022-06-19 PROCEDURE — 93005 ELECTROCARDIOGRAM TRACING: CPT | Performed by: EMERGENCY MEDICINE

## 2022-06-20 ENCOUNTER — APPOINTMENT (OUTPATIENT)
Dept: CT IMAGING | Age: 72
End: 2022-06-20
Payer: MEDICARE

## 2022-06-20 ENCOUNTER — HOSPITAL ENCOUNTER (EMERGENCY)
Age: 72
Discharge: HOME | End: 2022-06-20
Attending: EMERGENCY MEDICINE
Payer: MEDICARE

## 2022-06-20 VITALS
OXYGEN SATURATION: 94 % | HEART RATE: 69 BPM | TEMPERATURE: 98.5 F | DIASTOLIC BLOOD PRESSURE: 75 MMHG | SYSTOLIC BLOOD PRESSURE: 138 MMHG | RESPIRATION RATE: 19 BRPM

## 2022-06-20 DIAGNOSIS — I10 ESSENTIAL HYPERTENSION: ICD-10-CM

## 2022-06-20 DIAGNOSIS — I50.22 CHRONIC SYSTOLIC CONGESTIVE HEART FAILURE (HCC): ICD-10-CM

## 2022-06-20 DIAGNOSIS — I42.8 NONISCHEMIC CARDIOMYOPATHY (HCC): ICD-10-CM

## 2022-06-20 DIAGNOSIS — R10.10 PAIN OF UPPER ABDOMEN: Primary | ICD-10-CM

## 2022-06-20 DIAGNOSIS — J44.9 COPD WITH ASTHMA (HCC): ICD-10-CM

## 2022-06-20 LAB
ALBUMIN SERPL-MCNC: 3.8 G/DL (ref 3.4–5)
ALP BLD-CCNC: 76 U/L (ref 40–129)
ALT SERPL-CCNC: 25 U/L (ref 10–40)
ANION GAP SERPL CALCULATED.3IONS-SCNC: 9 MMOL/L (ref 3–16)
AST SERPL-CCNC: 33 U/L (ref 15–37)
BASOPHILS ABSOLUTE: 0.1 K/UL (ref 0–0.2)
BASOPHILS RELATIVE PERCENT: 0.9 %
BILIRUB SERPL-MCNC: <0.2 MG/DL (ref 0–1)
BILIRUBIN DIRECT: <0.2 MG/DL (ref 0–0.3)
BILIRUBIN, INDIRECT: NORMAL MG/DL (ref 0–1)
BUN BLDV-MCNC: 12 MG/DL (ref 7–20)
CALCIUM SERPL-MCNC: 9 MG/DL (ref 8.3–10.6)
CHLORIDE BLD-SCNC: 92 MMOL/L (ref 99–110)
CO2: 29 MMOL/L (ref 21–32)
CREAT SERPL-MCNC: 1.1 MG/DL (ref 0.8–1.3)
EKG ATRIAL RATE: 64 BPM
EKG DIAGNOSIS: NORMAL
EKG Q-T INTERVAL: 420 MS
EKG QRS DURATION: 90 MS
EKG QTC CALCULATION (BAZETT): 450 MS
EKG R AXIS: 74 DEGREES
EKG T AXIS: 81 DEGREES
EKG VENTRICULAR RATE: 69 BPM
EOSINOPHILS ABSOLUTE: 0.2 K/UL (ref 0–0.6)
EOSINOPHILS RELATIVE PERCENT: 2.4 %
GFR AFRICAN AMERICAN: >60
GFR NON-AFRICAN AMERICAN: >60
GLUCOSE BLD-MCNC: 107 MG/DL (ref 70–99)
HCT VFR BLD CALC: 35.2 % (ref 40.5–52.5)
HEMOGLOBIN: 11.6 G/DL (ref 13.5–17.5)
LIPASE: 38 U/L (ref 13–60)
LYMPHOCYTES ABSOLUTE: 1.7 K/UL (ref 1–5.1)
LYMPHOCYTES RELATIVE PERCENT: 18.9 %
MCH RBC QN AUTO: 31.5 PG (ref 26–34)
MCHC RBC AUTO-ENTMCNC: 32.9 G/DL (ref 31–36)
MCV RBC AUTO: 95.8 FL (ref 80–100)
MONOCYTES ABSOLUTE: 0.6 K/UL (ref 0–1.3)
MONOCYTES RELATIVE PERCENT: 7 %
NEUTROPHILS ABSOLUTE: 6.3 K/UL (ref 1.7–7.7)
NEUTROPHILS RELATIVE PERCENT: 70.8 %
PDW BLD-RTO: 13.5 % (ref 12.4–15.4)
PLATELET # BLD: 372 K/UL (ref 135–450)
PMV BLD AUTO: 7.8 FL (ref 5–10.5)
POTASSIUM REFLEX MAGNESIUM: 4.7 MMOL/L (ref 3.5–5.1)
RBC # BLD: 3.68 M/UL (ref 4.2–5.9)
SODIUM BLD-SCNC: 130 MMOL/L (ref 136–145)
TOTAL PROTEIN: 6.4 G/DL (ref 6.4–8.2)
WBC # BLD: 8.9 K/UL (ref 4–11)

## 2022-06-20 PROCEDURE — 80076 HEPATIC FUNCTION PANEL: CPT

## 2022-06-20 PROCEDURE — 93010 ELECTROCARDIOGRAM REPORT: CPT | Performed by: INTERNAL MEDICINE

## 2022-06-20 PROCEDURE — 74176 CT ABD & PELVIS W/O CONTRAST: CPT

## 2022-06-20 PROCEDURE — 6370000000 HC RX 637 (ALT 250 FOR IP): Performed by: EMERGENCY MEDICINE

## 2022-06-20 PROCEDURE — 80048 BASIC METABOLIC PNL TOTAL CA: CPT

## 2022-06-20 PROCEDURE — 94640 AIRWAY INHALATION TREATMENT: CPT

## 2022-06-20 PROCEDURE — 85025 COMPLETE CBC W/AUTO DIFF WBC: CPT

## 2022-06-20 PROCEDURE — 36415 COLL VENOUS BLD VENIPUNCTURE: CPT

## 2022-06-20 PROCEDURE — 83690 ASSAY OF LIPASE: CPT

## 2022-06-20 RX ORDER — DICYCLOMINE HYDROCHLORIDE 10 MG/1
10 CAPSULE ORAL 4 TIMES DAILY
Qty: 360 CAPSULE | Refills: 1 | Status: ON HOLD | OUTPATIENT
Start: 2022-06-20 | End: 2022-08-02 | Stop reason: HOSPADM

## 2022-06-20 RX ORDER — PANTOPRAZOLE SODIUM 40 MG/1
40 TABLET, DELAYED RELEASE ORAL
Qty: 90 TABLET | Refills: 1 | Status: ON HOLD | OUTPATIENT
Start: 2022-06-20 | End: 2022-10-17 | Stop reason: HOSPADM

## 2022-06-20 RX ORDER — ONDANSETRON 4 MG/1
4 TABLET, FILM COATED ORAL DAILY PRN
Qty: 30 TABLET | Refills: 0 | Status: ON HOLD | OUTPATIENT
Start: 2022-06-20 | End: 2022-10-17 | Stop reason: HOSPADM

## 2022-06-20 RX ORDER — ONDANSETRON 4 MG/1
4 TABLET, ORALLY DISINTEGRATING ORAL ONCE
Status: COMPLETED | OUTPATIENT
Start: 2022-06-20 | End: 2022-06-20

## 2022-06-20 RX ORDER — IPRATROPIUM BROMIDE AND ALBUTEROL SULFATE 2.5; .5 MG/3ML; MG/3ML
1 SOLUTION RESPIRATORY (INHALATION) ONCE
Status: COMPLETED | OUTPATIENT
Start: 2022-06-20 | End: 2022-06-20

## 2022-06-20 RX ADMIN — LIDOCAINE HYDROCHLORIDE: 20 SOLUTION ORAL; TOPICAL at 01:09

## 2022-06-20 RX ADMIN — ONDANSETRON 4 MG: 4 TABLET, ORALLY DISINTEGRATING ORAL at 01:09

## 2022-06-20 RX ADMIN — IPRATROPIUM BROMIDE AND ALBUTEROL SULFATE 1 AMPULE: .5; 2.5 SOLUTION RESPIRATORY (INHALATION) at 01:12

## 2022-06-20 NOTE — ED PROVIDER NOTES
Emergency Department Encounter    Patient: Twin Ardon  MRN: [id number]  : 1950  Date of Evaluation: 2022  ED Provider:  Yolanda Kessler MD    Triage Chief Complaint:   Nausea (Patient states that he is on hospice but is unsure why (states that he thinks it is because of afib) and states that the mediciation that they are giving him is \"tearing up his stomach. \" He believes the meds are causing nausea and constipation.)    Wiyot:  Twin Ardon is a 71 y.o. male that presents ER for evaluation of acute on chronic dyspnea the setting of end-stage COPD, on chronic hospice for CHF and COPD, positive abdominal pain. Positive dyspnea. Afebrile. No productive sputum. Positive nausea abdominal cramping.     ROS - see HPI, below listed is current ROS at time of my eval:  General:  No fevers, no chills, no weakness  Eyes:  no discharge  ENT:  No sore throat, no nasal congestion  Cardiovascular:  No chest pain, no palpitations  Respiratory:  No shortness of breath, no cough, no wheezing  Gastrointestinal:  + pain, + nausea, no vomiting, no diarrhea  Musculoskeletal:  No muscle pain, no joint pain  Skin:  No rash, no pruritis  Neurologic:  no headache  Genitourinary:  No dysuria, no hematuria  Endocrine:  No unexpected weight gain, no unexpected weight loss  Extremities:  no edema, no pain    Past Medical History:   Diagnosis Date    Bronchitis     CHF (congestive heart failure) (HCC)     COPD (chronic obstructive pulmonary disease) (HCC)      Past Surgical History:   Procedure Laterality Date    CHOLECYSTECTOMY, LAPAROSCOPIC N/A 3/17/2021    LAPAROSCOPIC CHOLECYSTECTOMY WITH INTRAOPERATIVE CHOLANGIOGRAM performed by Sherin Rockwell MD at [address] [place] [address]      bilateral cataracts    FEMORAL BYPASS Left 2021    LEFT FEMORAL TO POPLITEAL BYPASS GRAFT ([id number]) performed by Pablo Arvizu MD at [address] elbow    TONSILLECTOMY       Family History Problem Relation Age of Onset    Heart Disease Mother     No Known Problems Father      Social History     Socioeconomic History    Marital status:      Spouse name: Jennifer Makececy    Number of children: 2    Years of education: 15    Highest education level: Not on file   Occupational History    Not on file   Tobacco Use    Smoking status: Former Smoker     Packs/day: 1.00     Years: 30.00     Pack years: 30.00     Types: Cigarettes     Quit date: 2016     Years since quittin.8    Smokeless tobacco: Former User     Quit date: 2016   Vaping Use    Vaping Use: Never used   Substance and Sexual Activity    Alcohol use: Yes     Alcohol/week: 1.0 - 3.0 standard drink     Types: 1 - 3 Cans of beer per week     Comment: daily    Drug use: No    Sexual activity: Yes     Partners: Female     Comment: monogamous   Other Topics Concern    Not on file   Social History Narrative    Not on file     Social Determinants of Health     Financial Resource Strain:     Difficulty of Paying Living Expenses: Not on file   Food Insecurity:     Worried About Running Out of Food in the Last Year: Not on file    Ja of Food in the Last Year: Not on file   Transportation Needs:     Lack of Transportation (Medical): Not on file    Lack of Transportation (Non-Medical):  Not on file   Physical Activity:     Days of Exercise per Week: Not on file    Minutes of Exercise per Session: Not on file   Stress:     Feeling of Stress : Not on file   Social Connections:     Frequency of Communication with Friends and Family: Not on file    Frequency of Social Gatherings with Friends and Family: Not on file    Attends Jainism Services: Not on file    Active Member of Clubs or Organizations: Not on file    Attends Club or Organization Meetings: Not on file    Marital Status: Not on file   Intimate Partner Violence:     Fear of Current or Ex-Partner: Not on file    Emotionally Abused: Not on file   [facility] Physically Abused: Not on file    Sexually Abused: Not on file   Housing Stability:     Unable to Pay for Housing in the Last Year: Not on file    Number of Places Lived in the Last Year: Not on file    Unstable Housing in the Last Year: Not on file     No current facility-administered medications for this encounter. Current Outpatient Medications   Medication Sig Dispense Refill    ondansetron (ZOFRAN) 4 MG tablet Take 1 tablet by mouth daily as needed for Nausea or Vomiting 30 tablet 0    dicyclomine (BENTYL) 10 MG capsule Take 1 capsule by mouth 4 times daily 360 capsule 1    pantoprazole (PROTONIX) 40 MG tablet Take 1 tablet by mouth every morning (before breakfast) 90 tablet 1    LORazepam (ATIVAN) 0.5 MG tablet Take 0.5 mg by mouth every 6 hours as needed for Anxiety.  furosemide (LASIX) 40 MG tablet Take 40 mg by mouth every morning      furosemide (LASIX) 20 MG tablet Take 20 mg by mouth every evening      amiodarone (CORDARONE) 200 MG tablet Take 200 mg by mouth 2 times daily      Morphine Sulfate (MORPHINE 20 MG/ML ORAL SOLN 0.25 ML, SMALL, CMPD) Take 1 drop by mouth every 3-4 hours as needed.       OXYGEN Inhale 3 L into the lungs      apixaban (ELIQUIS) 5 MG TABS tablet Take 1 tablet by mouth 2 times daily 180 tablet 1    ipratropium-albuterol (DUONEB) 0.5-2.5 (3) MG/3ML SOLN nebulizer solution 1 vial inh Q6hrs for next 10 days and then 1 vial inh Q4hrs PRN SOB or wheezing 360 mL 1    predniSONE (DELTASONE) 10 MG tablet Take 4 tabs PO QD for 3 days, then 3 tabs PO QD for 3 days, then 2 tabs PO QD for 3 days, then 1 tab PO QD for 3 days (Patient taking differently: Take 5 mg by mouth daily ) 30 tablet 0    sacubitril-valsartan (ENTRESTO)  MG per tablet Take 1 tablet by mouth 2 times daily 180 tablet 3    carvedilol (COREG) 6.25 MG tablet Take 1 tablet by mouth 2 times daily (with meals) 60 tablet 0     Allergies   Allergen Reactions    Codeine Anxiety and Other (See Comments)     Unknown reaction       Nursing Notes Reviewed    Physical Exam:  Triage VS:    ED Triage Vitals [06/19/22 6063]   Enc Vitals Group      BP (!) 172/94      Heart Rate 71      Resp 26      Temp 98.5 °F (36.9 °C)      Temp Source Oral      SpO2 97 %      Weight       Height       Head Circumference       Peak Flow       Pain Score       Pain Loc       Pain Edu? Excl. in [address]? My pulse ox interpretation is - normal    General appearance:  No acute distress. Skin:  Warm. Dry. No rash. Neck:  Trachea midline. Heart:  Regular rate and rhythm, normal S1 & S2.    Perfusion:  intact  Respiratory:  Lungs clear to auscultation bilaterally. Respirations nonlabored. Abdominal:  diffuse tenderness to palpation, no rebound guarding or rigidity, neg Urena's sign, neg McBurney's point tenderness to palpation, normal bowel sounds, no masses, no organomegaly, no pulsatile masses  Back:  No CVA TTP  Extremity:    normal ROM   Neurological:  Alert and oriented times 3.       I have reviewed and interpreted all of the currently available lab results from this visit (if applicable):  Results for orders placed or performed during the hospital encounter of 35/53/58   Basic Metabolic Panel w/ Reflex to MG   Result Value Ref Range    Sodium 130 (L) 136 - 145 mmol/L    Potassium reflex Magnesium 4.7 3.5 - 5.1 mmol/L    Chloride 92 (L) 99 - 110 mmol/L    CO2 29 21 - 32 mmol/L    Anion Gap 9 3 - 16    Glucose 107 (H) 70 - 99 mg/dL    BUN 12 7 - 20 mg/dL    CREATININE 1.1 0.8 - 1.3 mg/dL    GFR Non-African American >60 >60    GFR African American >60 >60    Calcium 9.0 8.3 - 10.6 mg/dL   CBC with Auto Differential   Result Value Ref Range    WBC 8.9 4.0 - 11.0 K/uL    RBC 3.68 (L) 4.20 - 5.90 M/uL    Hemoglobin 11.6 (L) 13.5 - 17.5 g/dL    Hematocrit 35.2 (L) 40.5 - 52.5 %    MCV 95.8 80.0 - 100.0 fL    MCH 31.5 26.0 - 34.0 pg    MCHC 32.9 31.0 - 36.0 g/dL    RDW 13.5 12.4 - 15.4 %    Platelets 403 119 - 421 K/uL    MPV 7.8 5.0 - 10.5 fL    Neutrophils % 70.8 %    Lymphocytes % 18.9 %    Monocytes % 7.0 %    Eosinophils % 2.4 %    Basophils % 0.9 %    Neutrophils Absolute 6.3 1.7 - 7.7 K/uL    Lymphocytes Absolute 1.7 1.0 - 5.1 K/uL    Monocytes Absolute 0.6 0.0 - 1.3 K/uL    Eosinophils Absolute 0.2 0.0 - 0.6 K/uL    Basophils Absolute 0.1 0.0 - 0.2 K/uL   Hepatic Function Panel   Result Value Ref Range    Total Protein 6.4 6.4 - 8.2 g/dL    Albumin 3.8 3.4 - 5.0 g/dL    Alkaline Phosphatase 76 40 - 129 U/L    ALT 25 10 - 40 U/L    AST 33 15 - 37 U/L    Total Bilirubin <0.2 0.0 - 1.0 mg/dL    Bilirubin, Direct <0.2 0.0 - 0.3 mg/dL    Bilirubin, Indirect see below 0.0 - 1.0 mg/dL   Lipase   Result Value Ref Range    Lipase 38.0 13.0 - 60.0 U/L   EKG 12 Lead   Result Value Ref Range    Ventricular Rate 69 BPM    Atrial Rate 64 BPM    QRS Duration 90 ms    Q-T Interval 420 ms    QTc Calculation (Bazett) 450 ms    R Axis 74 degrees    T Axis 81 degrees    Diagnosis       Accelerated Junctional rhythm with occasional Premature ventricular complexesAbnormal ECG      Radiographs (if obtained):  Radiologist's Report Reviewed:  CT ABDOMEN PELVIS WO CONTRAST Additional Contrast? None    Result Date: 6/20/2022  EXAMINATION: CT OF THE ABDOMEN AND PELVIS WITHOUT CONTRAST 6/20/2022 1:02 am TECHNIQUE: CT of the abdomen and pelvis was performed without the administration of intravenous contrast. Multiplanar reformatted images are provided for review. Automated exposure control, iterative reconstruction, and/or weight based adjustment of the mA/kV was utilized to reduce the radiation dose to as low as reasonably achievable.  COMPARISON: 04/30/2021 HISTORY: ORDERING SYSTEM PROVIDED HISTORY: abd pain ro divertic, perf viscous TECHNOLOGIST PROVIDED HISTORY: Reason for exam:->abd pain ro divertic, perf viscous Additional Contrast?->None Decision Support Exception - unselect if not a suspected or confirmed emergency medical condition->Emergency Medical Condition (MA) Reason for Exam: abd pain ro divertic, perf viscous Relevant Medical/Surgical History: Nausea (Patient states that he is on hospice but is unsure why (states that he thinks it is because of afib) and states that the mediciation that they are giving him is \"tearing up his stomach. \" He believes the meds are causing nausea and constipation.) FINDINGS: Lower Chest: Chronic emphysematous changes are identified. Calcified granuloma seen in the right lower lobe. No spiculated lung mass at the lung bases or focal consolidation. No cardiomegaly. Organs: No hypodense mass in the liver or spleen. No adrenal mass. Cholecystectomy clips. No pancreatitis or ductal dilation. No renal calculi. No ureteral calculi or hydroureteronephrosis. GI/Bowel: No ileus or obstruction. No focal bowel wall thickening is identified. Normal sized appendix without adjacent inflammatory change. Mild rectal distension. Pelvis: No pelvic mass. The bladder is unremarkable. No bulky pelvic lymphadenopathy. Peritoneum/Retroperitoneum: No aortic aneurysm. Diffuse atherosclerotic calcifications. No retroperitoneal or mesenteric lymphadenopathy is identified. No bowel herniation. Bones/Soft Tissues: No acute subcutaneous soft tissue abnormality. No acute osseous abnormality. No osseous destructive process. Transitional segment at the lumbosacral junction with partial sacralization on the left. Multilevel degenerative disc disease, greatest at the level of L3-L4. 1. No ileus or obstruction. Normal appendix. No focal definite inflammatory bowel wall thickening. Some liquid stool noted within the colon. 2. No urinary tract calculi or obstruction. 3. Cholecystectomy. EKG (if obtained): (All EKG's are interpreted by myself in the absence of a cardiologist)      MDM:  Patient here with abdominal pain.  I estimate there is LOW risk for an acute emergent intraabdominal process including, but not limited to, acute appendicitis, bowel obstruction, acute cholecystitis, ruptured diverticulitis, incarcerated/strangling hernia,  perforated bowel/ulcer. Workup reveals chronic COPD exacerbation without evidence of acute hepatitis pancreatitis or bowel obstruction. Antiemetic therapy continue home medications, outpatient treatment with history medical provider    Patient's abdominal exam in the ED is nonsurgical.  I do feel the Patient can be discharged home. I have discussed the results, plan for treatment and possible risks, and patient agrees with discharging home with close follow-up. Patient understands and agrees with the plan, return warnings given. We have discussed the symptoms which are most concerning that necessitate immediate return. Clinical Impression:  1. Pain of upper abdomen    2. COPD with asthma ([facility] [address]      Disposition referral (if applicable):  No follow-up provider specified. Disposition medications (if applicable):  New Prescriptions    DICYCLOMINE (BENTYL) 10 MG CAPSULE    Take 1 capsule by mouth 4 times daily    ONDANSETRON (ZOFRAN) 4 MG TABLET    Take 1 tablet by mouth daily as needed for Nausea or Vomiting    PANTOPRAZOLE (PROTONIX) 40 MG TABLET    Take 1 tablet by mouth every morning (before breakfast)       Comment: Please note this report has been produced using speech recognition software and may contain errors related to that system including errors in grammar, punctuation, and spelling, as well as words and phrases that may be inappropriate. Efforts were made to edit the dictations.       Tanner Jarrett MD  28/31/67 [id number]

## 2022-06-20 NOTE — TELEPHONE ENCOUNTER
Prescription refill    Last OV:05/18/2022    Last Refill: 06/01/2021    Labs:06/20/2022    Future Appt:07/11/2022

## 2022-06-20 NOTE — ED NOTES
Discharge instructions given, patient acknowledged understanding, rx given x3, patient was taken to car by wheelchair      Terrence Sherman RN  06/20/22 8427

## 2022-06-21 RX ORDER — SACUBITRIL AND VALSARTAN 97; 103 MG/1; MG/1
TABLET, FILM COATED ORAL
Qty: 60 TABLET | Refills: 1 | Status: SHIPPED | OUTPATIENT
Start: 2022-06-21 | End: 2022-07-14

## 2022-07-13 ASSESSMENT — ENCOUNTER SYMPTOMS
SHORTNESS OF BREATH: 1
GASTROINTESTINAL NEGATIVE: 1

## 2022-07-13 NOTE — PROGRESS NOTES
[facility]   Congestive Heart Failure    PrimaryCare Doctor:  Sophie Wharton III, DO    Chief Complaint:  SOB    History of Present Illness:  George Ramsey is a 71 y.o. male with PMH NICM, HFimpEF, COPD,  PVD s/p fem/pop bypass in June who presents today for CHF f/u. AT last OV: He had a hospital admit in [facility] for new onset AF/RVR, DCCV and started on Amio, had echo that he reports EF 10-15%  and was discharged with Hospice care. Since that OV he had an echo in our office that showed EF 60-65. Today he tells me that he stopped lasix, amio, and AC about a week ago on his own. Since then he has not had a change in sx or wt. He c/o continued SOB and edema but denies chest pain, palpitations, orthopnea, PND, exertional chest pressure/discomfort, fatigue, early saiety, edema, syncope. He was taking lasix 40/20. Home wt 135-140    Baseline Weight: 135  Wt Readings from Last 3 Encounters:   07/14/22 134 lb (60.8 kg)   05/18/22 133 lb (60.3 kg)   03/12/22 136 lb 9.6 oz (62 kg)     EF:60-65%  Cardiac Imaging: Echo 6/14/22:    Summary   Technically difficult examination due to body habitus and limited windows. Left ventricular systolic function is normal with ejection fraction   estimated at 60-65 %. No regional wall motion abnormalities are noted. Normal left ventricular wall thickness. Aortic valve appears sclerotic but opens adequately. Mild mitral regurgitation. Moderate tricuspid regurgitation. Systolic pulmonary artery pressure (SPAP) estimated at 45 mmHg (right atrial   pressure 3 mmHg), consistent with mild pulmonary hypertension    Echo 3/11/22:    Summary   Technically limited study due to patient body habitus and lung interference   as well as frequent PVCs. Estimated ejection fraction of 45-50%. Mildly diminished left ventricular systolic function. Image quality inadequate to rule out regional wall motion abnormalities. Mild septal left ventricular hypertrophy. (chronic obstructive pulmonary disease) ([facility] [address] Surgical History:   has a past surgical history that includes fracture surgery; Tonsillectomy; eye surgery; Cholecystectomy, laparoscopic (N/A, 3/17/2021); and femoral bypass (Left, 6/1/2021). Social History:   reports that he quit smoking about 5 years ago. His smoking use included cigarettes. He has a 30.00 pack-year smoking history. He quit smokeless tobacco use about 5 years ago. He reports current alcohol use of about 1.0 - 3.0 standard drink of alcohol per week. He reports that he does not use drugs. Family History:   Family History   Problem Relation Age of Onset    Heart Disease Mother     No Known Problems Father        HomeMedications:  Prior to Admission medications    Medication Sig Start Date End Date Taking? Authorizing Provider   ENTRESTO  MG per tablet TAKE ONE TABLET BY MOUTH TWICE A DAY 6/21/22   CHELSY Quezada CNP   ondansetron (ZOFRAN) 4 MG tablet Take 1 tablet by mouth daily as needed for Nausea or Vomiting 2/12/15   Holly Graham MD   dicyclomine (BENTYL) 10 MG capsule Take 1 capsule by mouth 4 times daily 6/40/98   Holly Graham MD   pantoprazole (PROTONIX) 40 MG tablet Take 1 tablet by mouth every morning (before breakfast) 2/65/54   Holly Graham MD   LORazepam (ATIVAN) 0.5 MG tablet Take 0.5 mg by mouth every 6 hours as needed for Anxiety. Historical Provider, MD   furosemide (LASIX) 40 MG tablet Take 40 mg by mouth every morning    Historical Provider, MD   furosemide (LASIX) 20 MG tablet Take 20 mg by mouth every evening    Historical Provider, MD   amiodarone (CORDARONE) 200 MG tablet Take 200 mg by mouth 2 times daily    Historical Provider, MD   Morphine Sulfate (MORPHINE 20 MG/ML ORAL SOLN 0.25 ML, SMALL, CMPD) Take 1 drop by mouth every 3-4 hours as needed.     Historical Provider, MD   OXYGEN Inhale 3 L into the lungs    Historical Provider, MD   apixaban (ELIQUIS) 5 MG TABS tablet Take 1 tablet by mouth 2 times daily 5/18/22   CHELSY Rajput - CNP   ipratropium-albuterol (DUONEB) 0.5-2.5 (3) MG/3ML SOLN nebulizer solution 1 vial inh Q6hrs for next 10 days and then 1 vial inh Q4hrs PRN SOB or wheezing 3/12/22   Rolando Rain MD   predniSONE (DELTASONE) 10 MG tablet Take 4 tabs PO QD for 3 days, then 3 tabs PO QD for 3 days, then 2 tabs PO QD for 3 days, then 1 tab PO QD for 3 days  Patient taking differently: Take 5 mg by mouth daily  3/12/22   Rolando Rain MD   carvedilol (COREG) 6.25 MG tablet Take 1 tablet by mouth 2 times daily (with meals) 5/1/21   Zunilda Mir MD        Allergies:  Codeine     ROS:   Review of Systems   Constitutional: Positive for fatigue. Respiratory: Positive for shortness of breath. Cardiovascular: Positive for leg swelling. Gastrointestinal: Negative. Genitourinary: Negative. Musculoskeletal: Negative. Skin: Negative. Neurological: Negative. Hematological: Negative. Psychiatric/Behavioral: Negative. Physical Examination:    Vitals:    07/14/22 1533 07/14/22 1539   BP: (!) 150/80 (!) 148/80   Site: Left Upper Arm Left Upper Arm   Position: Sitting Sitting   Cuff Size: Medium Adult Medium Adult   Pulse: 78    SpO2: 95%    Weight: 134 lb (60.8 kg)    Height: 5' 11\" (1.803 m)            Physical Exam  Constitutional:       Appearance: Normal appearance. He is well-developed. HENT:      Head: Normocephalic and atraumatic. Eyes:      Extraocular Movements: Extraocular movements intact. Pupils: Pupils are equal, round, and reactive to light. Cardiovascular:      Rate and Rhythm: Normal rate and regular rhythm. Heart sounds: Normal heart sounds. Pulmonary:      Effort: Pulmonary effort is normal.      Breath sounds: Normal breath sounds. Abdominal:      Palpations: Abdomen is soft. Musculoskeletal:         General: Normal range of motion. Cervical back: Normal range of motion and neck supple.       Right lower leg: Edema present. Left lower leg: Edema present. Comments: 2+ to knees   Skin:     General: Skin is warm and dry. Neurological:      General: No focal deficit present. Mental Status: He is alert and oriented to person, place, and time. Mental status is at baseline. Psychiatric:         Mood and Affect: Mood normal.         Behavior: Behavior normal.         Thought Content:  Thought content normal.         Judgment: Judgment normal.         Lab Data:    CBC:   Lab Results   Component Value Date/Time    WBC 8.9 06/20/2022 12:18 AM    WBC 8.5 03/12/2022 04:51 AM    WBC 9.6 03/11/2022 09:20 AM    RBC 3.68 06/20/2022 12:18 AM    RBC 4.11 03/12/2022 04:51 AM    RBC 4.73 03/11/2022 09:20 AM    HGB 11.6 06/20/2022 12:18 AM    HGB 13.4 03/12/2022 04:51 AM    HGB 15.2 03/11/2022 09:20 AM    HCT 35.2 06/20/2022 12:18 AM    HCT 39.2 03/12/2022 04:51 AM    HCT 44.5 03/11/2022 09:20 AM    MCV 95.8 06/20/2022 12:18 AM    MCV 95.4 03/12/2022 04:51 AM    MCV 94.1 03/11/2022 09:20 AM    RDW 13.5 06/20/2022 12:18 AM    RDW 13.4 03/12/2022 04:51 AM    RDW 13.1 03/11/2022 09:20 AM     06/20/2022 12:18 AM     03/12/2022 04:51 AM     03/11/2022 09:20 AM     BMP:  Lab Results   Component Value Date/Time     06/20/2022 12:18 AM     03/12/2022 04:51 AM     03/11/2022 09:20 AM    K 4.7 06/20/2022 12:18 AM    K 4.5 03/12/2022 04:51 AM    K 4.7 03/11/2022 09:20 AM    CL 92 06/20/2022 12:18 AM    CL 98 03/12/2022 04:51 AM    CL 95 03/11/2022 09:20 AM    CO2 29 06/20/2022 12:18 AM    CO2 27 03/12/2022 04:51 AM    CO2 28 03/11/2022 09:20 AM    PHOS 3.7 09/19/2016 02:57 PM    BUN 12 06/20/2022 12:18 AM    BUN 22 03/12/2022 04:51 AM    BUN 10 03/11/2022 09:20 AM    CREATININE 1.1 06/20/2022 12:18 AM    CREATININE 0.9 03/12/2022 04:51 AM    CREATININE 0.8 03/11/2022 09:20 AM     BNP:   Lab Results   Component Value Date/Time    PROBNP 1,067 03/11/2022 09:20 AM    PROBNP 331 03/10/2022 10:09 AM    PROBNP 351 12/05/2017 12:36 PM     Iron Studies:  No components found for: FE,  TIBC,  FERRITIN  Iron Deficiency Anemia:  No    IV Iron Therapy:  No  2017 ACC/AHA HF Guidelines:   intravenous iron replacement in patients with New York Heart Association (NYHA) class II and III HF and iron deficiency (ferritin <100 ng/ml or 100-300 ng/ml if transferrin saturation <20%), to improve functional status and QoL. Assessment/Plan:    Encounter Diagnoses         systolic congestive heart failure (HCC) restart lasix, labs in one week    Nonischemic cardiomyopathy ([address] Continue entresto and coreg    Essential hypertension Elevated today, diurese    SOB (shortness of breath) Continued, severe COPD, has Hospice care at home     atrial fibrillation [facility]) S/p dccv, restart amio and AC, schedule EP f/u to discuss Amio given COPD         Instructions:   1. Medications: restart eliquis and amiodarone, restart lasix at 20mg once a day in the morning, weigh every day and if your wt increases 3lb or more then take an  Extra 20mg. 2. Labs in one week  3. Follow up: schedule appointment with EP and f/u with [place] in 2-3 months        [facility]: [phone]      I appreciate the opportunity of cooperating in the care of this individual.    CHELSY Gruber - CNP, CNP, 7/13/2022,2:15 PM    QUALITY MEASURES  1. Tobacco Cessation Counseling: NA  2. Retake of BP if >140/90:   NA  3. Documentation to PCP/referring for new patient:  Sent to PCP at close of office visit  4. CAD patient on anti-platelet: NA  5. CAD patient on STATIN therapy:  NA  6. Patient with CHF and aFib on anticoagulation:  NA   7. Patient Education:Yes   8. BB for LVSD :  Yes   9. ACE/ARB for LVSD:  Yes   10.  Left Ventricular Ejection Fraction (LVEF) Assessment:  Yes

## 2022-07-14 ENCOUNTER — OFFICE VISIT (OUTPATIENT)
Dept: CARDIOLOGY CLINIC | Age: 72
End: 2022-07-14
Payer: COMMERCIAL

## 2022-07-14 VITALS
BODY MASS INDEX: 18.76 KG/M2 | OXYGEN SATURATION: 95 % | WEIGHT: 134 LBS | HEART RATE: 78 BPM | HEIGHT: 71 IN | SYSTOLIC BLOOD PRESSURE: 148 MMHG | DIASTOLIC BLOOD PRESSURE: 80 MMHG

## 2022-07-14 DIAGNOSIS — I50.22 CHRONIC SYSTOLIC CONGESTIVE HEART FAILURE (HCC): ICD-10-CM

## 2022-07-14 DIAGNOSIS — I10 ESSENTIAL HYPERTENSION: ICD-10-CM

## 2022-07-14 DIAGNOSIS — I48.19 PERSISTENT ATRIAL FIBRILLATION (HCC): ICD-10-CM

## 2022-07-14 DIAGNOSIS — I42.8 NONISCHEMIC CARDIOMYOPATHY (HCC): Primary | ICD-10-CM

## 2022-07-14 PROCEDURE — 99214 OFFICE O/P EST MOD 30 MIN: CPT | Performed by: NURSE PRACTITIONER

## 2022-07-14 PROCEDURE — 1123F ACP DISCUSS/DSCN MKR DOCD: CPT | Performed by: NURSE PRACTITIONER

## 2022-07-14 RX ORDER — RAMIPRIL 5 MG/1
5 CAPSULE ORAL DAILY
COMMUNITY
End: 2022-07-14

## 2022-07-14 RX ORDER — FUROSEMIDE 20 MG/1
20 TABLET ORAL DAILY
Qty: 60 TABLET | Refills: 1 | Status: ON HOLD | OUTPATIENT
Start: 2022-07-14 | End: 2022-10-17 | Stop reason: SDUPTHER

## 2022-07-14 NOTE — PATIENT INSTRUCTIONS
Instructions:   1. Medications: restart eliquis and amiodarone, restart lasix at 20mg once a day in the morning, weigh every day and if your wt increases 3lb or more then take an  Extra 20mg. 2. Labs in one week  3.  Follow up: schedule appointment with EP and f/u with David Barton in 2-3 months        [facility]: [phone]

## 2022-07-22 DIAGNOSIS — I50.22 CHRONIC SYSTOLIC CONGESTIVE HEART FAILURE (HCC): ICD-10-CM

## 2022-07-22 DIAGNOSIS — I10 ESSENTIAL HYPERTENSION: ICD-10-CM

## 2022-07-22 DIAGNOSIS — R06.02 SOB (SHORTNESS OF BREATH): ICD-10-CM

## 2022-07-22 DIAGNOSIS — I42.8 NONISCHEMIC CARDIOMYOPATHY (HCC): ICD-10-CM

## 2022-07-22 RX ORDER — SACUBITRIL AND VALSARTAN 97; 103 MG/1; MG/1
1 TABLET, FILM COATED ORAL 2 TIMES DAILY
Qty: 60 TABLET | Refills: 1 | Status: SHIPPED | OUTPATIENT
Start: 2022-07-22 | End: 2022-10-24

## 2022-07-22 RX ORDER — CARVEDILOL 6.25 MG/1
12.5 TABLET ORAL 2 TIMES DAILY WITH MEALS
Qty: 180 TABLET | Refills: 1 | Status: SHIPPED | OUTPATIENT
Start: 2022-07-22 | End: 2022-08-01

## 2022-08-01 ENCOUNTER — APPOINTMENT (OUTPATIENT)
Dept: CT IMAGING | Age: 72
End: 2022-08-01
Payer: COMMERCIAL

## 2022-08-01 ENCOUNTER — HOSPITAL ENCOUNTER (OUTPATIENT)
Age: 72
Setting detail: OBSERVATION
Discharge: HOME | End: 2022-08-02
Attending: INTERNAL MEDICINE | Admitting: INTERNAL MEDICINE
Payer: COMMERCIAL

## 2022-08-01 ENCOUNTER — APPOINTMENT (OUTPATIENT)
Dept: GENERAL RADIOLOGY | Age: 72
End: 2022-08-01
Payer: COMMERCIAL

## 2022-08-01 DIAGNOSIS — I42.8 NONISCHEMIC CARDIOMYOPATHY (HCC): ICD-10-CM

## 2022-08-01 DIAGNOSIS — R77.8 ELEVATED TROPONIN: ICD-10-CM

## 2022-08-01 DIAGNOSIS — R06.02 SOB (SHORTNESS OF BREATH): ICD-10-CM

## 2022-08-01 DIAGNOSIS — R07.9 ACUTE CHEST PAIN: Primary | ICD-10-CM

## 2022-08-01 DIAGNOSIS — I50.22 CHRONIC SYSTOLIC CONGESTIVE HEART FAILURE (HCC): ICD-10-CM

## 2022-08-01 DIAGNOSIS — I10 ESSENTIAL HYPERTENSION: ICD-10-CM

## 2022-08-01 PROBLEM — F41.9 ANXIETY: Status: ACTIVE | Noted: 2022-08-01

## 2022-08-01 PROBLEM — K21.9 GERD (GASTROESOPHAGEAL REFLUX DISEASE): Status: ACTIVE | Noted: 2022-08-01

## 2022-08-01 LAB
A/G RATIO: 1.8 (ref 1.1–2.2)
ALBUMIN SERPL-MCNC: 4.2 G/DL (ref 3.4–5)
ALP BLD-CCNC: 72 U/L (ref 40–129)
ALT SERPL-CCNC: 23 U/L (ref 10–40)
ANION GAP SERPL CALCULATED.3IONS-SCNC: 7 MMOL/L (ref 3–16)
APTT: 27.7 SEC (ref 23–34.3)
AST SERPL-CCNC: 24 U/L (ref 15–37)
BASOPHILS ABSOLUTE: 0.1 K/UL (ref 0–0.2)
BASOPHILS RELATIVE PERCENT: 0.7 %
BILIRUB SERPL-MCNC: 0.3 MG/DL (ref 0–1)
BUN BLDV-MCNC: 12 MG/DL (ref 7–20)
CALCIUM SERPL-MCNC: 9.4 MG/DL (ref 8.3–10.6)
CHLORIDE BLD-SCNC: 94 MMOL/L (ref 99–110)
CO2: 31 MMOL/L (ref 21–32)
CREAT SERPL-MCNC: 1 MG/DL (ref 0.8–1.3)
D DIMER: 1.37 UG/ML FEU (ref 0–0.6)
EOSINOPHILS ABSOLUTE: 0 K/UL (ref 0–0.6)
EOSINOPHILS RELATIVE PERCENT: 0.1 %
GFR AFRICAN AMERICAN: >60
GFR NON-AFRICAN AMERICAN: >60
GLUCOSE BLD-MCNC: 113 MG/DL (ref 70–99)
HCT VFR BLD CALC: 37.3 % (ref 40.5–52.5)
HEMOGLOBIN: 12 G/DL (ref 13.5–17.5)
INR BLD: 0.93 (ref 0.87–1.14)
LIPASE: 22 U/L (ref 13–60)
LYMPHOCYTES ABSOLUTE: 0.7 K/UL (ref 1–5.1)
LYMPHOCYTES RELATIVE PERCENT: 9.3 %
MCH RBC QN AUTO: 28.8 PG (ref 26–34)
MCHC RBC AUTO-ENTMCNC: 32.2 G/DL (ref 31–36)
MCV RBC AUTO: 89.7 FL (ref 80–100)
MONOCYTES ABSOLUTE: 0.3 K/UL (ref 0–1.3)
MONOCYTES RELATIVE PERCENT: 3.6 %
NEUTROPHILS ABSOLUTE: 6.9 K/UL (ref 1.7–7.7)
NEUTROPHILS RELATIVE PERCENT: 86.3 %
PDW BLD-RTO: 14.2 % (ref 12.4–15.4)
PLATELET # BLD: 312 K/UL (ref 135–450)
PMV BLD AUTO: 7.6 FL (ref 5–10.5)
POTASSIUM SERPL-SCNC: 4.8 MMOL/L (ref 3.5–5.1)
PRO-BNP: 1742 PG/ML (ref 0–124)
PROTHROMBIN TIME: 12.4 SEC (ref 11.7–14.5)
RBC # BLD: 4.16 M/UL (ref 4.2–5.9)
SODIUM BLD-SCNC: 132 MMOL/L (ref 136–145)
TOTAL PROTEIN: 6.6 G/DL (ref 6.4–8.2)
TROPONIN: 0.01 NG/ML
TROPONIN: 0.02 NG/ML
TROPONIN: <0.01 NG/ML
WBC # BLD: 8 K/UL (ref 4–11)

## 2022-08-01 PROCEDURE — 6370000000 HC RX 637 (ALT 250 FOR IP): Performed by: PHYSICIAN ASSISTANT

## 2022-08-01 PROCEDURE — 6370000000 HC RX 637 (ALT 250 FOR IP): Performed by: INTERNAL MEDICINE

## 2022-08-01 PROCEDURE — G0378 HOSPITAL OBSERVATION PER HR: HCPCS

## 2022-08-01 PROCEDURE — 85610 PROTHROMBIN TIME: CPT

## 2022-08-01 PROCEDURE — 36415 COLL VENOUS BLD VENIPUNCTURE: CPT

## 2022-08-01 PROCEDURE — 83690 ASSAY OF LIPASE: CPT

## 2022-08-01 PROCEDURE — 85379 FIBRIN DEGRADATION QUANT: CPT

## 2022-08-01 PROCEDURE — 80151 DRUG ASSAY AMIODARONE: CPT

## 2022-08-01 PROCEDURE — 83880 ASSAY OF NATRIURETIC PEPTIDE: CPT

## 2022-08-01 PROCEDURE — 93005 ELECTROCARDIOGRAM TRACING: CPT | Performed by: PHYSICIAN ASSISTANT

## 2022-08-01 PROCEDURE — 85025 COMPLETE CBC W/AUTO DIFF WBC: CPT

## 2022-08-01 PROCEDURE — 85730 THROMBOPLASTIN TIME PARTIAL: CPT

## 2022-08-01 PROCEDURE — 6360000004 HC RX CONTRAST MEDICATION: Performed by: PHYSICIAN ASSISTANT

## 2022-08-01 PROCEDURE — 99285 EMERGENCY DEPT VISIT HI MDM: CPT

## 2022-08-01 PROCEDURE — 71045 X-RAY EXAM CHEST 1 VIEW: CPT

## 2022-08-01 PROCEDURE — 84484 ASSAY OF TROPONIN QUANT: CPT

## 2022-08-01 PROCEDURE — 71275 CT ANGIOGRAPHY CHEST: CPT

## 2022-08-01 PROCEDURE — 80053 COMPREHEN METABOLIC PANEL: CPT

## 2022-08-01 PROCEDURE — 2580000003 HC RX 258: Performed by: INTERNAL MEDICINE

## 2022-08-01 RX ORDER — PREDNISONE 1 MG/1
5 TABLET ORAL DAILY
Status: ON HOLD | COMMUNITY
End: 2022-10-17 | Stop reason: HOSPADM

## 2022-08-01 RX ORDER — PREDNISONE 1 MG/1
5 TABLET ORAL DAILY
Status: DISCONTINUED | OUTPATIENT
Start: 2022-08-01 | End: 2022-08-02 | Stop reason: HOSPADM

## 2022-08-01 RX ORDER — POTASSIUM CHLORIDE 7.45 MG/ML
10 INJECTION INTRAVENOUS PRN
Status: DISCONTINUED | OUTPATIENT
Start: 2022-08-01 | End: 2022-08-01 | Stop reason: SDUPTHER

## 2022-08-01 RX ORDER — POTASSIUM CHLORIDE 7.45 MG/ML
10 INJECTION INTRAVENOUS PRN
Status: DISCONTINUED | OUTPATIENT
Start: 2022-08-01 | End: 2022-08-02 | Stop reason: HOSPADM

## 2022-08-01 RX ORDER — POTASSIUM CHLORIDE 20 MEQ/1
40 TABLET, EXTENDED RELEASE ORAL PRN
Status: DISCONTINUED | OUTPATIENT
Start: 2022-08-01 | End: 2022-08-01 | Stop reason: SDUPTHER

## 2022-08-01 RX ORDER — ACETAMINOPHEN 325 MG/1
650 TABLET ORAL EVERY 6 HOURS PRN
Status: DISCONTINUED | OUTPATIENT
Start: 2022-08-01 | End: 2022-08-02 | Stop reason: HOSPADM

## 2022-08-01 RX ORDER — AMIODARONE HYDROCHLORIDE 200 MG/1
200 TABLET ORAL 2 TIMES DAILY
Status: DISCONTINUED | OUTPATIENT
Start: 2022-08-01 | End: 2022-08-02 | Stop reason: HOSPADM

## 2022-08-01 RX ORDER — ENOXAPARIN SODIUM 100 MG/ML
40 INJECTION SUBCUTANEOUS DAILY
Status: DISCONTINUED | OUTPATIENT
Start: 2022-08-01 | End: 2022-08-02 | Stop reason: HOSPADM

## 2022-08-01 RX ORDER — ONDANSETRON 2 MG/ML
4 INJECTION INTRAMUSCULAR; INTRAVENOUS EVERY 6 HOURS PRN
Status: DISCONTINUED | OUTPATIENT
Start: 2022-08-01 | End: 2022-08-02 | Stop reason: HOSPADM

## 2022-08-01 RX ORDER — ASPIRIN 81 MG/1
81 TABLET, CHEWABLE ORAL DAILY
Status: DISCONTINUED | OUTPATIENT
Start: 2022-08-02 | End: 2022-08-02 | Stop reason: HOSPADM

## 2022-08-01 RX ORDER — HYDRALAZINE HYDROCHLORIDE 20 MG/ML
10 INJECTION INTRAMUSCULAR; INTRAVENOUS EVERY 6 HOURS PRN
Status: DISCONTINUED | OUTPATIENT
Start: 2022-08-01 | End: 2022-08-02 | Stop reason: HOSPADM

## 2022-08-01 RX ORDER — LANOLIN ALCOHOL/MO/W.PET/CERES
5 CREAM (GRAM) TOPICAL NIGHTLY PRN
Status: DISCONTINUED | OUTPATIENT
Start: 2022-08-01 | End: 2022-08-02 | Stop reason: HOSPADM

## 2022-08-01 RX ORDER — POTASSIUM CHLORIDE 20 MEQ/1
40 TABLET, EXTENDED RELEASE ORAL PRN
Status: DISCONTINUED | OUTPATIENT
Start: 2022-08-01 | End: 2022-08-02 | Stop reason: HOSPADM

## 2022-08-01 RX ORDER — ASPIRIN 81 MG/1
324 TABLET, CHEWABLE ORAL ONCE
Status: COMPLETED | OUTPATIENT
Start: 2022-08-01 | End: 2022-08-01

## 2022-08-01 RX ORDER — 0.9 % SODIUM CHLORIDE 0.9 %
500 INTRAVENOUS SOLUTION INTRAVENOUS PRN
Status: DISCONTINUED | OUTPATIENT
Start: 2022-08-01 | End: 2022-08-02 | Stop reason: HOSPADM

## 2022-08-01 RX ORDER — ACETAMINOPHEN 650 MG/1
650 SUPPOSITORY RECTAL EVERY 6 HOURS PRN
Status: DISCONTINUED | OUTPATIENT
Start: 2022-08-01 | End: 2022-08-02 | Stop reason: HOSPADM

## 2022-08-01 RX ORDER — LORAZEPAM 0.5 MG/1
0.5 TABLET ORAL EVERY 6 HOURS PRN
Status: DISCONTINUED | OUTPATIENT
Start: 2022-08-01 | End: 2022-08-02 | Stop reason: HOSPADM

## 2022-08-01 RX ORDER — NITROGLYCERIN 0.4 MG/1
0.4 TABLET SUBLINGUAL EVERY 5 MIN PRN
Status: DISCONTINUED | OUTPATIENT
Start: 2022-08-01 | End: 2022-08-02 | Stop reason: HOSPADM

## 2022-08-01 RX ORDER — SODIUM CHLORIDE 0.9 % (FLUSH) 0.9 %
5-40 SYRINGE (ML) INJECTION EVERY 12 HOURS SCHEDULED
Status: DISCONTINUED | OUTPATIENT
Start: 2022-08-01 | End: 2022-08-02 | Stop reason: HOSPADM

## 2022-08-01 RX ORDER — ONDANSETRON 4 MG/1
4 TABLET, ORALLY DISINTEGRATING ORAL EVERY 8 HOURS PRN
Status: DISCONTINUED | OUTPATIENT
Start: 2022-08-01 | End: 2022-08-02 | Stop reason: HOSPADM

## 2022-08-01 RX ORDER — ATORVASTATIN CALCIUM 40 MG/1
40 TABLET, FILM COATED ORAL NIGHTLY
Status: DISCONTINUED | OUTPATIENT
Start: 2022-08-01 | End: 2022-08-02 | Stop reason: HOSPADM

## 2022-08-01 RX ORDER — SODIUM CHLORIDE 9 MG/ML
INJECTION, SOLUTION INTRAVENOUS PRN
Status: DISCONTINUED | OUTPATIENT
Start: 2022-08-01 | End: 2022-08-02 | Stop reason: HOSPADM

## 2022-08-01 RX ORDER — SODIUM CHLORIDE 0.9 % (FLUSH) 0.9 %
10 SYRINGE (ML) INJECTION PRN
Status: DISCONTINUED | OUTPATIENT
Start: 2022-08-01 | End: 2022-08-02 | Stop reason: HOSPADM

## 2022-08-01 RX ORDER — METOPROLOL SUCCINATE 50 MG/1
100 TABLET, EXTENDED RELEASE ORAL DAILY
Status: DISCONTINUED | OUTPATIENT
Start: 2022-08-01 | End: 2022-08-02 | Stop reason: HOSPADM

## 2022-08-01 RX ORDER — METOPROLOL SUCCINATE 100 MG/1
200 TABLET, EXTENDED RELEASE ORAL DAILY
COMMUNITY
End: 2022-10-24

## 2022-08-01 RX ADMIN — ATORVASTATIN CALCIUM 40 MG: 40 TABLET, FILM COATED ORAL at 20:12

## 2022-08-01 RX ADMIN — AMIODARONE HYDROCHLORIDE 200 MG: 200 TABLET ORAL at 20:12

## 2022-08-01 RX ADMIN — ASPIRIN 324 MG: 81 TABLET, CHEWABLE ORAL at 16:03

## 2022-08-01 RX ADMIN — PREDNISONE 5 MG: 5 TABLET ORAL at 17:34

## 2022-08-01 RX ADMIN — IOPAMIDOL 75 ML: 755 INJECTION, SOLUTION INTRAVENOUS at 14:20

## 2022-08-01 RX ADMIN — Medication 10 ML: at 21:00

## 2022-08-01 RX ADMIN — NITROGLYCERIN 1 INCH: 20 OINTMENT TOPICAL at 16:04

## 2022-08-01 RX ADMIN — MELATONIN TAB 3 MG 4.5 MG: 3 TAB at 20:49

## 2022-08-01 RX ADMIN — METOPROLOL SUCCINATE 100 MG: 50 TABLET, EXTENDED RELEASE ORAL at 17:34

## 2022-08-01 RX ADMIN — SACUBITRIL AND VALSARTAN 1 TABLET: 97; 103 TABLET, FILM COATED ORAL at 20:12

## 2022-08-01 ASSESSMENT — ENCOUNTER SYMPTOMS
NAUSEA: 0
DIARRHEA: 0
CONSTIPATION: 0
COLOR CHANGE: 0
WHEEZING: 0
ABDOMINAL PAIN: 0
SHORTNESS OF BREATH: 1
BACK PAIN: 1
COUGH: 0
VOMITING: 0
STRIDOR: 0

## 2022-08-01 ASSESSMENT — PAIN SCALES - GENERAL
PAINLEVEL_OUTOF10: 4
PAINLEVEL_OUTOF10: 7

## 2022-08-01 ASSESSMENT — PAIN DESCRIPTION - PAIN TYPE: TYPE: ACUTE PAIN

## 2022-08-01 ASSESSMENT — PAIN DESCRIPTION - ORIENTATION: ORIENTATION: MID

## 2022-08-01 ASSESSMENT — PAIN DESCRIPTION - LOCATION: LOCATION: CHEST

## 2022-08-01 ASSESSMENT — PAIN - FUNCTIONAL ASSESSMENT: PAIN_FUNCTIONAL_ASSESSMENT: 0-10

## 2022-08-01 NOTE — H&P
Observation History and Physical  Dr. Clarice Frey  8/1/2022    PCP: Dago Thrasher DO    Cc:   Chief Complaint   Patient presents with    Chest Pain     C/o mid-sternal tight CP that has been going on for a week that radiates to back on occasion. Also SOB with exertion and unable to lay flat. Shortness of Breath       HPI:  Richmond Humphrey is a 71 y.o. male who has a past medical history of Bronchitis, CHF (congestive heart failure) ([facility] [address] and COPD (chronic obstructive pulmonary disease) ([facility] [address] Patient presents with Chest pain. HPI  (1-3 for expanded problem focused, ?4 for detailed/comprehensive)     71 y.o. male who presents to the emergency department complaining of acute midsternal chest pain on and off for 1 week and associates this with dyspnea on exertion and shortness of breath with lying flat. He denies productive cough, hemoptysis, palpitations, abdominal pain, pain or swelling in extremities, unexpected weight gain or weight loss. He has not taken any aspirin or other medication to alleviate his symptoms. He states that he was prescribed Eliquis in May of this year but did not fill this prescription due to cost.    CT done in ER to r/o PE - reviewed by self    FINDINGS:   Aorta: No evidence of thoracic aortic aneurysm or dissection. No acute   abnormality of the aorta. Mediastinum: No evidence of mediastinal lymphadenopathy. The heart and   pericardium demonstrate no acute abnormality. Lungs/Pleura: The lungs are without acute process. No focal consolidation or   pulmonary edema. No evidence of pleural effusion or pneumothorax. Severe   emphysematous changes are noted throughout the lungs bilaterally   predominately within the bilateral upper lobes. There is biapical scarring   and pleural thickening. Upper Abdomen: Limited images of the upper abdomen demonstrate no acute   abnormality. The gallbladder is surgically absent.      Soft Tissues/Bones: No acute bone or soft tissue abnormality. Pt to be admitted for further workup of chest pain    Problem list of hospitalization thus far: Active Hospital Problems    Diagnosis     GERD (gastroesophageal reflux disease) [K21.9]      Priority: Medium    Anxiety [F41.9]      Priority: Medium    Chest pain [R07.9]      Priority: Medium    COPD, very severe ([facility] [address] [J44.9]     Essential hypertension [I10]          Review of Systems: (1 system for EPF, 2-9 for detailed, 10+ for comprehensive)  Constitutional: Negative for chills and fever. HENT: Negative for dental problem, nosebleeds and rhinorrhea. Eyes: Negative for photophobia and visual disturbance. Respiratory: Negative for cough, chest tightness and shortness of breath. Cardiovascular: positive  for chest pain and negative for leg swelling. Gastrointestinal: Negative for diarrhea, nausea and vomiting. Endocrine: Negative for polydipsia and polyphagia. Genitourinary: Negative for frequency, hematuria and urgency. Musculoskeletal: Negative for back pain and myalgias. Skin: Negative for rash. Allergic/Immunologic: Negative for food allergies. Neurological: Negative for dizziness, seizures, syncope and facial asymmetry. Hematological: Negative for adenopathy. Psychiatric/Behavioral: Negative for dysphoric mood. The patient is not nervous/anxious.              Past Medical History:   Past Medical History:   Diagnosis Date    Bronchitis     CHF (congestive heart failure) ([facility])     COPD (chronic obstructive pulmonary disease) ([facility]        Past Surgical History:   Past Surgical History:   Procedure Laterality Date    CHOLECYSTECTOMY, LAPAROSCOPIC N/A 3/17/2021    LAPAROSCOPIC CHOLECYSTECTOMY WITH INTRAOPERATIVE CHOLANGIOGRAM performed by Eusebia Aggarwal MD at [place] [address]      bilateral cataracts    FEMORAL BYPASS Left 6/1/2021    LEFT FEMORAL TO POPLITEAL BYPASS GRAFT (11975) performed by Charisma Castro MD at [address] [place] [address] FRACTURE SURGERY      LT elbow    TONSILLECTOMY         Social History:   Social History       Tobacco History       Smoking Status  Former Quit Date  9/2/2016 Smoking Frequency  1 pack/day for 30.00 years (30.00 pk-yrs) Smoking Tobacco Type  Cigarettes quit in 9/2/2016      Smokeless Tobacco Use  Former Quit Date  9/5/2016              Alcohol History       Alcohol Use Status  Yes Drinks/Week  1-3 Cans of beer per week Amount  1.0 - 3.0 standard drink of alcohol/wk Comment  daily              Drug Use       Drug Use Status  No              Sexual Activity       Sexually Active  Yes Partners  Female Comment  monogamous                    Fam History:   Family History   Problem Relation Age of Onset    Heart Disease Mother     No Known Problems Father        PFSH: The above PMHx, PSHx, SocHx, FamHx has been reviewed by myself. (1 area for detailed, 2-3 for comprehensive)      Code Status: Prior    Meds - following list of home medications fromelectronic chart has been reviewed by myself  Prior to Admission medications    Medication Sig Start Date End Date Taking? Authorizing Provider   metoprolol succinate (TOPROL XL) 100 MG extended release tablet Take 100 mg by mouth in the morning. Yes Historical Provider, MD   predniSONE (DELTASONE) 5 MG tablet Take 5 mg by mouth in the morning. Yes Historical Provider, MD   sacubitril-valsartan (ENTRESTO)  MG per tablet Take 1 tablet by mouth in the morning and 1 tablet before bedtime.  7/22/22   CHELSY Zayas CNP   furosemide (LASIX) 20 MG tablet Take 1 tablet by mouth daily 7/14/22   CHELSY Zayas CNP   ondansetron (ZOFRAN) 4 MG tablet Take 1 tablet by mouth daily as needed for Nausea or Vomiting  Patient not taking: No sig reported 5/96/74   Minerva García MD   dicyclomine (BENTYL) 10 MG capsule Take 1 capsule by mouth 4 times daily  Patient not taking: No sig reported 4/40/49   Minerva García MD   pantoprazole (PROTONIX) 40 MG tablet Take 1 tablet by mouth every morning (before breakfast)  Patient not taking: No sig reported 0/65/88   Gerardo Jin MD   LORazepam (ATIVAN) 0.5 MG tablet Take 0.5 mg by mouth every 6 hours as needed for Anxiety. Historical Provider, MD   amiodarone (CORDARONE) 200 MG tablet Take 200 mg by mouth in the morning and 200 mg before bedtime. Historical Provider, MD   Morphine Sulfate (MORPHINE 20 MG/ML ORAL SOLN 0.25 ML, SMALL, CMPD) Take 1 drop by mouth every 3-4 hours as needed.     Historical Provider, MD   OXYGEN Inhale 3 L into the lungs as needed     Historical Provider, MD   apixaban (ELIQUIS) 5 MG TABS tablet Take 1 tablet by mouth 2 times daily  Patient not taking: No sig reported 5/18/22   CHELSY Abebe - CNP   ipratropium-albuterol (DUONEB) 0.5-2.5 (3) MG/3ML SOLN nebulizer solution 1 vial inh Q6hrs for next 10 days and then 1 vial inh Q4hrs PRN SOB or wheezing 3/12/22   Kerry Jaimes MD   predniSONE (DELTASONE) 10 MG tablet Take 4 tabs PO QD for 3 days, then 3 tabs PO QD for 3 days, then 2 tabs PO QD for 3 days, then 1 tab PO QD for 3 days  Patient not taking: No sig reported 3/12/22   Kerry Jaimes MD         Allergies   Allergen Reactions    Codeine Anxiety and Other (See Comments)     Unknown reaction             EXAM: (2-7 system for EPF/Detailed, ?8 for Comprehensive)  BP (!) 176/86   Pulse 60   Temp 99 °F (37.2 °C) (Oral)   Resp 22   Ht 5' 11\" (1.803 m)   Wt 135 lb (61.2 kg)   SpO2 96%   BMI 18.83 kg/m²   Constitutional: vitals as above: alert, appears stated age and cooperative    Psychiatric: normal insight and judgment, oriented to person, place, time, and general circumstances    Head: Normocephalic, without obvious abnormality, atraumatic    Eyes:lids and lashes normal, conjunctivae and sclerae normal and pupils equal, round, reactive to light and accomodation    EMNT: external ears normal, nares midline    Neck: no carotid bruit, supple, symmetrical, trachea midline and thyroid not enlarged, symmetric, no tenderness/mass/nodules     Respiratory: clear to auscultation and percussion bilaterally with normal respiratory effort    Cardiovascular: normal rate, regular rhythm, normal S1 and S2 and no murmurs    Gastrointestinal: soft, non-tender, non-distended, normal bowel sounds, no masses or organomegaly    Extremities: no clubbing, no edema    Skin:No rashes or nodules noted. Neurologic:negative          LABS:  Labs Reviewed   CBC WITH AUTO DIFFERENTIAL - Abnormal; Notable for the following components:       Result Value    RBC 4.16 (*)     Hemoglobin 12.0 (*)     Hematocrit 37.3 (*)     Lymphocytes Absolute 0.7 (*)     All other components within normal limits   COMPREHENSIVE METABOLIC PANEL - Abnormal; Notable for the following components:    Sodium 132 (*)     Chloride 94 (*)     Glucose 113 (*)     All other components within normal limits   TROPONIN - Abnormal; Notable for the following components:    Troponin 0.02 (*)     All other components within normal limits   BRAIN NATRIURETIC PEPTIDE - Abnormal; Notable for the following components:    Pro-BNP 1,742 (*)     All other components within normal limits   LIPASE   PROTIME-INR   APTT   AMIODARONE LEVEL         IMAGING:  Imaging results from the ER have been reviewed in the computerized chart. CTA CHEST W WO CONTRAST    Result Date: 8/1/2022  EXAMINATION: CTA OF THE CHEST WITH AND WITHOUT CONTRAST 8/1/2022 2:17 pm TECHNIQUE: CTA of the chest was performed before and after the administration of intravenous contrast.  Multiplanar reformatted images are provided for review. MIP images are provided for review. Automated exposure control, iterative reconstruction, and/or weight based adjustment of the mA/kV was utilized to reduce the radiation dose to as low as reasonably achievable. COMPARISON: None.  HISTORY: ORDERING SYSTEM PROVIDED HISTORY: cp/back pain rule out dissection TECHNOLOGIST PROVIDED HISTORY: Reason for exam:->cp/back pain rule out dissection Decision Support Exception - unselect if not a suspected or confirmed emergency medical condition->Emergency Medical Condition (MA) Reason for Exam: Chest Pain (C/o mid-sternal tight CP that has been going on for a week that radiates to back on occasion. Also SOB with exertion and unable to lay flat. ) FINDINGS: Aorta: No evidence of thoracic aortic aneurysm or dissection. No acute abnormality of the aorta. Mediastinum: No evidence of mediastinal lymphadenopathy. The heart and pericardium demonstrate no acute abnormality. Lungs/Pleura: The lungs are without acute process. No focal consolidation or pulmonary edema. No evidence of pleural effusion or pneumothorax. Severe emphysematous changes are noted throughout the lungs bilaterally predominately within the bilateral upper lobes. There is biapical scarring and pleural thickening. Upper Abdomen: Limited images of the upper abdomen demonstrate no acute abnormality. The gallbladder is surgically absent. Soft Tissues/Bones: No acute bone or soft tissue abnormality. No CT evidence of aortic dissection. XR CHEST PORTABLE    Result Date: 8/1/2022  EXAMINATION: ONE XRAY VIEW OF THE CHEST 8/1/2022 12:48 pm COMPARISON: Chest x-ray dated 03/11/2022. HISTORY: ORDERING SYSTEM PROVIDED HISTORY: cp TECHNOLOGIST PROVIDED HISTORY: Reason for exam:->cp Reason for Exam: Chest Pain (C/o mid-sternal tight CP that has been going on for a week that radiates to back on occasion. Also SOB with exertion and unable to lay flat. ) FINDINGS: HEART/MEDIASTINUM: The cardiomediastinal silhouette is within normal limits. PLEURA/LUNGS: Emphysema is again noted. There are no focal consolidations or pleural effusions. There is no appreciable pneumothorax. Calcified granuloma noted at the right lung base. BONES/SOFT TISSUE: No acute abnormality. No radiographic evidence of acute pulmonary disease. Emphysema.          EKG:   EKG from ER, reviewed by self - it shows normal sinus at 62. No acute st elevation  Old chart reviewed, EKG dated 6/19/22 is reviewed, there is not difference noted. Old study shows sinus at 71      Lab Results   Component Value Date/Time    GLUCOSE 113 08/01/2022 01:06 PM     No results found for: POCGLU  BP (!) 176/86   Pulse 60   Temp 99 °F (37.2 °C) (Oral)   Resp 22   Ht 5' 11\" (1.803 m)   Wt 135 lb (61.2 kg)   SpO2 96%   BMI 18.83 kg/m²     MEDICAL DECISION MAKING:    Principal Problem:    Chest pain -New Problem to me. Pt with chest pain. No PE on cta chest. Mildly elev trop  Plan: Pt to be admitted to telemetry floor. Serial cardiac enzymes to be followed. GXT myoview to be ordered. Will give ASA, NTG. Pt has IV morphine ordered for pain control. LMWH to be given. Active Problems:    GERD (gastroesophageal reflux disease) -Established problem. Stable. Plan: Continue present orders/plan. Anxiety -Established problem. Stable. Plan: cont home meds    Essen tial hypertension - Established problem. Stable. 176/86  Plan: Pt home BP meds reviewed and will be continued. IV Hydralazine ordered for control of extremely high blood pressures. Will monitor labs to assess Creat/K for possible complications of medications. COPD, very severe ([address] -Established problem. Stable. Plan: Continue present orders/plan. Diagnoses as listed above, designated as new or established and plan outlined for each. Data Reviewed:   (1) Lab tests were reviewed or ordered. (1) Radiology tests were reviewed or ordered. (1) Medical test (Echo, EKG, PFT/joseph) were ordered. (1)History was not obtained from someone other than patient  (1) Old records were reviewed - see HPI/MDM for pertinent details if review done. (2) Case was discussed with another health care provider: ER MD  (2) Imaging was viewed by myself. (2) EKG  was viewed by myself.        The patient is being placed in observation status with the expectation of requiring a hospital stay spanning less than  two midnights for care and treatment of the problems noted in the problem list.  This determination is also based on thepatients comorbidities and past medical history, the severity and timing of the signs and symptoms upon presentation.     (Please note that portions of this note were completed with a voice recognition program.  Efforts were made to edit the dictations but occasionally words are mis-transcribed.)      Electronically signed by: Branden Lobo MD 8/1/2022

## 2022-08-01 NOTE — ED NOTES
Bedside report given to [facility] pt. Transported to floor.       Kaylyn Callahan RN  08/01/22 5713

## 2022-08-01 NOTE — ED PROVIDER NOTES
[address] [facility]        Pt Name: Rohit Padilla  MRN: [id number]  [place] 1950  Date of evaluation: 8/1/2022  Provider: Scot Pagan PA-C  PCP: Greg Schultz III, DO  Note Started: 12:47 PM EDT       CARO. I have evaluated this patient. My supervising physician was available for consultation. CHIEF COMPLAINT       Chief Complaint   Patient presents with    Chest Pain     C/o mid-sternal tight CP that has been going on for a week that radiates to back on occasion. Also SOB with exertion and unable to lay flat. Shortness of Breath       HISTORY OF PRESENT ILLNESS   (Location, Timing/Onset, Context/Setting, Quality, Duration, Modifying Factors, Severity, Associated Signs and Symptoms)  Note limiting factors. Chief Complaint: Chest pain    Rohit Padilla is a 71 y.o. male who presents to the emergency department complaining of acute midsternal chest pain on and off for 1 week and associates this with dyspnea on exertion and shortness of breath with lying flat. He denies productive cough, hemoptysis, palpitations, abdominal pain, pain or swelling in extremities, unexpected weight gain or weight loss. He has not taken any aspirin or other medication to alleviate his symptoms. He states that he was prescribed Eliquis in May of this year but did not fill this prescription due to cost.    Nursing Notes were all reviewed and agreed with or any disagreements were addressed in the HPI. REVIEW OF SYSTEMS    (2-9 systems for level 4, 10 or more for level 5)     Review of Systems   Constitutional:  Positive for fatigue. Negative for chills and fever. HENT: Negative. Eyes:  Negative for visual disturbance. Respiratory:  Positive for shortness of breath. Negative for cough, wheezing and stridor. Cardiovascular:  Positive for chest pain. Negative for palpitations and leg swelling.    Gastrointestinal:  Negative for abdominal pain, constipation, diarrhea, nausea and vomiting. Genitourinary: Negative. Musculoskeletal:  Positive for back pain. Negative for neck pain and neck stiffness. Skin:  Negative for color change, pallor, rash and wound. Neurological:  Negative for dizziness, tremors, seizures, syncope, facial asymmetry, speech difficulty, weakness, light-headedness, numbness and headaches. Psychiatric/Behavioral:  Negative for confusion. All other systems reviewed and are negative. Positives and Pertinent negatives as per HPI. Except as noted above in the ROS, all other systems were reviewed and negative. PAST MEDICAL HISTORY     Past Medical History:   Diagnosis Date    Bronchitis     CHF (congestive heart failure) ([facility])     COPD (chronic obstructive pulmonary disease) ([facility] [address]          SURGICAL HISTORY     Past Surgical History:   Procedure Laterality Date    CHOLECYSTECTOMY, LAPAROSCOPIC N/A 3/17/2021    LAPAROSCOPIC CHOLECYSTECTOMY WITH INTRAOPERATIVE CHOLANGIOGRAM performed by Jemma Baker MD at [address] [place] [address]      bilateral cataracts    FEMORAL BYPASS Left 6/1/2021    LEFT FEMORAL TO POPLITEAL BYPASS GRAFT ([id number]) performed [place] [address]      LT elbow    TONSILLECTOMY           CURRENTMEDICATIONS       Previous Medications    AMIODARONE (CORDARONE) 200 MG TABLET    Take 200 mg by mouth in the morning and 200 mg before bedtime. APIXABAN (ELIQUIS) 5 MG TABS TABLET    Take 1 tablet by mouth 2 times daily    DICYCLOMINE (BENTYL) 10 MG CAPSULE    Take 1 capsule by mouth 4 times daily    FUROSEMIDE (LASIX) 20 MG TABLET    Take 1 tablet by mouth daily    IPRATROPIUM-ALBUTEROL (DUONEB) 0.5-2.5 (3) MG/3ML SOLN NEBULIZER SOLUTION    1 vial inh Q6hrs for next 10 days and then 1 vial inh Q4hrs PRN SOB or wheezing    LORAZEPAM (ATIVAN) 0.5 MG TABLET    Take 0.5 mg by mouth every 6 hours as needed for Anxiety.     METOPROLOL SUCCINATE (TOPROL XL) 100 MG EXTENDED RELEASE TABLET    Take 100 mg by mouth in the morning. MORPHINE SULFATE (MORPHINE 20 MG/ML ORAL SOLN 0.25 ML, SMALL, CMPD)    Take 1 drop by mouth every 3-4 hours as needed. ONDANSETRON (ZOFRAN) 4 MG TABLET    Take 1 tablet by mouth daily as needed for Nausea or Vomiting    OXYGEN    Inhale 3 L into the lungs as needed     PANTOPRAZOLE (PROTONIX) 40 MG TABLET    Take 1 tablet by mouth every morning (before breakfast)    PREDNISONE (DELTASONE) 10 MG TABLET    Take 4 tabs PO QD for 3 days, then 3 tabs PO QD for 3 days, then 2 tabs PO QD for 3 days, then 1 tab PO QD for 3 days    PREDNISONE (DELTASONE) 5 MG TABLET    Take 5 mg by mouth in the morning. SACUBITRIL-VALSARTAN (ENTRESTO)  MG PER TABLET    Take 1 tablet by mouth in the morning and 1 tablet before bedtime. ALLERGIES     Codeine    FAMILYHISTORY       Family History   Problem Relation Age of Onset    Heart Disease Mother     No Known Problems Father           SOCIAL HISTORY       Social History     Tobacco Use    Smoking status: Former     Packs/day: 1.00     Years: 30.00     Pack years: 30.00     Types: Cigarettes     Quit date: 2016     Years since quittin.9    Smokeless tobacco: Former     Quit date: 2016   Vaping Use    Vaping Use: Never used   Substance Use Topics    Alcohol use: Yes     Alcohol/week: 1.0 - 3.0 standard drink     Types: 1 - 3 Cans of beer per week     Comment: daily    Drug use: No       SCREENINGS    Carissa Coma Scale  Eye Opening: Spontaneous  Best Verbal Response: Oriented  Best Motor Response: Obeys commands  Carissa Coma Scale Score: 15        PHYSICAL EXAM    (up to 7 for level 4, 8 or more for level 5)     ED Triage Vitals   BP Temp Temp src Pulse Resp SpO2 Height Weight   -- -- -- -- -- -- -- --       Physical Exam  Vitals and nursing note reviewed. Constitutional:       Appearance: Normal appearance. He is well-developed. He is not toxic-appearing or diaphoretic.    HENT: Head: Normocephalic and atraumatic. Right Ear: External ear normal.      Left Ear: External ear normal.      Nose: Nose normal.      Mouth/Throat:      Mouth: Mucous membranes are moist.      Pharynx: Oropharynx is clear. Eyes:      General: No scleral icterus. Right eye: No discharge. Left eye: No discharge. Extraocular Movements: Extraocular movements intact. Conjunctiva/sclera: Conjunctivae normal.      Pupils: Pupils are equal, round, and reactive to light. Cardiovascular:      Rate and Rhythm: Normal rate. Pulmonary:      Effort: Pulmonary effort is normal.      Breath sounds: No stridor. No rhonchi. Abdominal:      General: Bowel sounds are normal. There is no distension or abdominal bruit. Palpations: Abdomen is soft. There is no pulsatile mass. Tenderness: no abdominal tenderness There is no right CVA tenderness, left CVA tenderness, guarding or rebound. Negative signs include Urena's sign, Rovsing's sign, McBurney's sign, psoas sign and obturator sign. Musculoskeletal:         General: Normal range of motion. Cervical back: Normal range of motion. Comments: No acute extremity edema, posterior calf or thigh tenderness, palpable cord, discoloration. Negative homans. Skin:     General: Skin is warm and dry. Coloration: Skin is not jaundiced or pale. Findings: No bruising, erythema, lesion or rash. Neurological:      General: No focal deficit present. Mental Status: He is alert and oriented to person, place, and time.    Psychiatric:         Behavior: Behavior normal.       DIAGNOSTIC RESULTS   LABS:    Labs Reviewed   CBC WITH AUTO DIFFERENTIAL - Abnormal; Notable for the following components:       Result Value    RBC 4.16 (*)     Hemoglobin 12.0 (*)     Hematocrit 37.3 (*)     Lymphocytes Absolute 0.7 (*)     All other components within normal limits   COMPREHENSIVE METABOLIC PANEL - Abnormal; Notable for the following components:    Sodium 132 (*)     Chloride 94 (*)     Glucose 113 (*)     All other components within normal limits   TROPONIN - Abnormal; Notable for the following components:    Troponin 0.02 (*)     All other components within normal limits   BRAIN NATRIURETIC PEPTIDE - Abnormal; Notable for the following components:    Pro-BNP 1,742 (*)     All other components within normal limits   LIPASE   PROTIME-INR   APTT   AMIODARONE LEVEL       When ordered only abnormal lab results are displayed. All other labs were within normal range or not returned as of this dictation. EKG: When ordered, EKG's are interpreted by the Emergency Department Physician in the absence of a cardiologist.  Please see their note for interpretation of EKG. RADIOLOGY:   Non-plain film images such as CT, Ultrasound and MRI are read by the radiologist. Plain radiographic images are visualized and preliminarily interpreted by the ED Provider with the below findings:        Interpretation per the Radiologist below, if available at the time of this note:    CTA CHEST W WO CONTRAST   Final Result   No CT evidence of aortic dissection. XR CHEST PORTABLE   Final Result   No radiographic evidence of acute pulmonary disease. Emphysema.                PROCEDURES   Unless otherwise noted below, none     Procedures    CRITICAL CARE TIME   N/a    CONSULTS:  IP CONSULT TO INTERNAL MEDICINE  DrYu will admit    [address] [place] [address] and DIFFERENTIAL DIAGNOSIS/MDM:   Vitals:    Vitals:    08/01/22 1249   BP: (!) 176/86   Pulse: 60   Resp: 22   Temp: 99 °F (37.2 °C)   TempSrc: Oral   SpO2: 96%   Weight: 135 lb (61.2 kg)   Height: 5' 11\" (1.803 m)       Patient was given the following medications:  Medications   aspirin chewable tablet 324 mg (has no administration in time range)   nitroglycerin (NITRO-BID) 2 % ointment 1 inch (has no administration in time range)   iopamidol (ISOVUE-370) 76 % injection 75 mL (75 mLs IntraVENous Given 8/1/22 0232)         Is this patient to be included in the SEP-1 Core Measure due to severe sepsis or septic shock? No   Exclusion criteria - the patient is NOT to be included for SEP-1 Core Measure due to: Infection is not suspected    This patient presents to the emergency department complaining of chest pain with radiation to back shortness of breath. Chest x-ray appears stable. CT scan of the chest does not show evidence of dissection. Patient does have a minimally elevated troponin without any significant EKG changes to suggest STEMI. I do feel admission is warranted evaluation. Aspirin and nitroglycerin ordered. FINAL IMPRESSION      1. Acute chest pain    2. Elevated troponin          DISPOSITION/PLAN   DISPOSITION Decision To Admit 08/01/2022 03:37:41 PM      PATIENT REFERRED TO:  No follow-up provider specified. DISCHARGE MEDICATIONS:  New Prescriptions    No medications on file       DISCONTINUED MEDICATIONS:  Discontinued Medications    CARVEDILOL (COREG) 6.25 MG TABLET    Take 2 tablets by mouth in the morning and 2 tablets in the evening. Take with meals.               (Please note that portions of this note were completed with a voice recognition program.  Efforts were made to edit the dictations but occasionally words are mis-transcribed.)    Scot Pagan PA-C (electronically signed)            Scot Pagan PA-C  08/01/22 8500

## 2022-08-02 VITALS
HEIGHT: 71 IN | DIASTOLIC BLOOD PRESSURE: 94 MMHG | BODY MASS INDEX: 18.6 KG/M2 | HEART RATE: 60 BPM | WEIGHT: 132.9 LBS | TEMPERATURE: 97.5 F | OXYGEN SATURATION: 94 % | SYSTOLIC BLOOD PRESSURE: 179 MMHG | RESPIRATION RATE: 16 BRPM

## 2022-08-02 LAB
A/G RATIO: 1.5 (ref 1.1–2.2)
ALBUMIN SERPL-MCNC: 3.2 G/DL (ref 3.4–5)
ALP BLD-CCNC: 55 U/L (ref 40–129)
ALT SERPL-CCNC: 18 U/L (ref 10–40)
ANION GAP SERPL CALCULATED.3IONS-SCNC: 5 MMOL/L (ref 3–16)
AST SERPL-CCNC: 20 U/L (ref 15–37)
BILIRUB SERPL-MCNC: 0.3 MG/DL (ref 0–1)
BUN BLDV-MCNC: 16 MG/DL (ref 7–20)
CALCIUM SERPL-MCNC: 8.6 MG/DL (ref 8.3–10.6)
CHLORIDE BLD-SCNC: 99 MMOL/L (ref 99–110)
CHOLESTEROL, TOTAL: 179 MG/DL (ref 0–199)
CO2: 29 MMOL/L (ref 21–32)
CREAT SERPL-MCNC: 1 MG/DL (ref 0.8–1.3)
EKG ATRIAL RATE: 62 BPM
EKG DIAGNOSIS: NORMAL
EKG P AXIS: 52 DEGREES
EKG P-R INTERVAL: 176 MS
EKG Q-T INTERVAL: 428 MS
EKG QRS DURATION: 96 MS
EKG QTC CALCULATION (BAZETT): 434 MS
EKG R AXIS: 71 DEGREES
EKG T AXIS: 82 DEGREES
EKG VENTRICULAR RATE: 62 BPM
GFR AFRICAN AMERICAN: >60
GFR NON-AFRICAN AMERICAN: >60
GLUCOSE BLD-MCNC: 95 MG/DL (ref 70–99)
HDLC SERPL-MCNC: 67 MG/DL (ref 40–60)
LDL CHOLESTEROL CALCULATED: 103 MG/DL
LV EF: 47 %
LVEF MODALITY: NORMAL
POTASSIUM REFLEX MAGNESIUM: 4.2 MMOL/L (ref 3.5–5.1)
SODIUM BLD-SCNC: 133 MMOL/L (ref 136–145)
TOTAL PROTEIN: 5.4 G/DL (ref 6.4–8.2)
TRIGL SERPL-MCNC: 47 MG/DL (ref 0–150)
VLDLC SERPL CALC-MCNC: 9 MG/DL

## 2022-08-02 PROCEDURE — 78452 HT MUSCLE IMAGE SPECT MULT: CPT | Performed by: INTERNAL MEDICINE

## 2022-08-02 PROCEDURE — 93017 CV STRESS TEST TRACING ONLY: CPT | Performed by: INTERNAL MEDICINE

## 2022-08-02 PROCEDURE — G0378 HOSPITAL OBSERVATION PER HR: HCPCS

## 2022-08-02 PROCEDURE — A9502 TC99M TETROFOSMIN: HCPCS | Performed by: INTERNAL MEDICINE

## 2022-08-02 PROCEDURE — 93010 ELECTROCARDIOGRAM REPORT: CPT | Performed by: INTERNAL MEDICINE

## 2022-08-02 PROCEDURE — 80053 COMPREHEN METABOLIC PANEL: CPT

## 2022-08-02 PROCEDURE — 80061 LIPID PANEL: CPT

## 2022-08-02 PROCEDURE — 2580000003 HC RX 258: Performed by: INTERNAL MEDICINE

## 2022-08-02 PROCEDURE — 6370000000 HC RX 637 (ALT 250 FOR IP): Performed by: INTERNAL MEDICINE

## 2022-08-02 PROCEDURE — 36415 COLL VENOUS BLD VENIPUNCTURE: CPT

## 2022-08-02 PROCEDURE — 6360000002 HC RX W HCPCS: Performed by: INTERNAL MEDICINE

## 2022-08-02 PROCEDURE — 3430000000 HC RX DIAGNOSTIC RADIOPHARMACEUTICAL: Performed by: INTERNAL MEDICINE

## 2022-08-02 PROCEDURE — 99214 OFFICE O/P EST MOD 30 MIN: CPT | Performed by: INTERNAL MEDICINE

## 2022-08-02 RX ORDER — AMINOPHYLLINE DIHYDRATE 25 MG/ML
100 INJECTION, SOLUTION INTRAVENOUS ONCE
Status: COMPLETED | OUTPATIENT
Start: 2022-08-02 | End: 2022-08-02

## 2022-08-02 RX ADMIN — TETROFOSMIN 30 MILLICURIE: 1.38 INJECTION, POWDER, LYOPHILIZED, FOR SOLUTION INTRAVENOUS at 14:13

## 2022-08-02 RX ADMIN — ASPIRIN 81 MG: 81 TABLET, CHEWABLE ORAL at 08:53

## 2022-08-02 RX ADMIN — SACUBITRIL AND VALSARTAN 1 TABLET: 97; 103 TABLET, FILM COATED ORAL at 08:55

## 2022-08-02 RX ADMIN — AMINOPHYLLINE 100 MG: 25 INJECTION, SOLUTION INTRAVENOUS at 14:14

## 2022-08-02 RX ADMIN — REGADENOSON 0.4 MG: 0.08 INJECTION, SOLUTION INTRAVENOUS at 14:09

## 2022-08-02 RX ADMIN — METOPROLOL SUCCINATE 100 MG: 50 TABLET, EXTENDED RELEASE ORAL at 08:53

## 2022-08-02 RX ADMIN — PREDNISONE 5 MG: 5 TABLET ORAL at 08:53

## 2022-08-02 RX ADMIN — TETROFOSMIN 10 MILLICURIE: 1.38 INJECTION, POWDER, LYOPHILIZED, FOR SOLUTION INTRAVENOUS at 12:55

## 2022-08-02 RX ADMIN — AMIODARONE HYDROCHLORIDE 200 MG: 200 TABLET ORAL at 08:53

## 2022-08-02 RX ADMIN — Medication 10 ML: at 08:13

## 2022-08-02 ASSESSMENT — PAIN SCALES - GENERAL
PAINLEVEL_OUTOF10: 3
PAINLEVEL_OUTOF10: 0
PAINLEVEL_OUTOF10: 2
PAINLEVEL_OUTOF10: 3
PAINLEVEL_OUTOF10: 2

## 2022-08-02 ASSESSMENT — PAIN DESCRIPTION - LOCATION
LOCATION: CHEST

## 2022-08-02 ASSESSMENT — PAIN DESCRIPTION - ORIENTATION
ORIENTATION: LEFT;RIGHT
ORIENTATION: MID;UPPER
ORIENTATION: LEFT;RIGHT

## 2022-08-02 ASSESSMENT — PAIN DESCRIPTION - DESCRIPTORS
DESCRIPTORS: DISCOMFORT
DESCRIPTORS: PRESSURE
DESCRIPTORS: PRESSURE
DESCRIPTORS: DISCOMFORT;DULL

## 2022-08-02 NOTE — PROGRESS NOTES
Patient returned from stress test, refused to order any food from dietary, states spouse will be picking him up, plan is to follow up tomorrow with PCP, he has a appointment at 4 pm. He does not want to wait here for stress test results. Dr. Garcia Reap notified.

## 2022-08-02 NOTE — PROGRESS NOTES
A&O x 4. Up independently in the room. Cardiology to see patient for potential stress test today. Patient was active with [facility] hospice prior to admission. Hospice is currently suspended, patient plans to follow up after discharge with [facility]. Took all meds per order without any difficulty.

## 2022-08-02 NOTE — PROGRESS NOTES
Progress Note - Dr. Penn Nose - Internal Medicine  PCP: Zaid Nunn DO [facility] / [address] [place], [place] [id number] [address] [place], [place] [address]S [facility] Day: 0  Code Status: DNR-CC  Current Diet: Diet NPO        CC: follow up on medical issues    Subjective:   David Hernadez is a 71 y.o. male. Pt seen and examined  Chart reviewed since last visit, labs and imaging below      Still with faint chest pain  BP has been high        Review of Systems: (1 system for EPF, 2-9 for detailed, 10+ for comprehensive)  Constitutional: Negative for chills and fever. HENT: Negative for dental problem, nosebleeds and rhinorrhea. Eyes: Negative for photophobia and visual disturbance. Respiratory: Negative for cough, chest tightness and shortness of breath. Cardiovascular: positive for chest pain and negative for leg swelling. Gastrointestinal: Negative for diarrhea, nausea and vomiting. Endocrine: Negative for polydipsia and polyphagia. Genitourinary: Negative for frequency, hematuria and urgency. Musculoskeletal: Negative for back pain and myalgias. Skin: Negative for rash. Allergic/Immunologic: Negative for food allergies. Neurological: Negative for dizziness, seizures, syncope and facial asymmetry. Hematological: Negative for adenopathy. Psychiatric/Behavioral: Negative for dysphoric mood. The patient is not nervous/anxious. I have reviewed the patient's medical and social history in detail and updated the computerized patient record. To recap: He  has a past medical history of Bronchitis, CHF (congestive heart failure) ([facility] [address] and COPD (chronic obstructive pulmonary disease) ([facility] [address] . He  has a past surgical history that includes fracture surgery; Tonsillectomy; eye surgery; Cholecystectomy, laparoscopic (N/A, 3/17/2021); and femoral bypass (Left, 6/1/2021). Damien Grossman He  reports that he quit smoking about 5 years ago. His smoking use included cigarettes.  He has a 30.00 pack-year smoking history. He quit smokeless tobacco use about 5 years ago. He reports current alcohol use of about 1.0 - 3.0 standard drink per week. He reports that he does not use drugs. .        Active Hospital Problems    Diagnosis Date Noted    GERD (gastroesophageal reflux disease) [K21.9] 08/01/2022     Priority: Medium    Anxiety [F41.9] 08/01/2022     Priority: Medium    Chest pain [R07.9] 08/01/2022     Priority: Medium    COPD, very severe ([facility] [address] [J44.9] 12/18/2017    Essential hypertension [I10] 11/24/2016       Current Facility-Administered Medications: LORazepam (ATIVAN) tablet 0.5 mg, 0.5 mg, Oral, Q6H PRN  amiodarone (CORDARONE) tablet 200 mg, 200 mg, Oral, BID  sacubitril-valsartan (ENTRESTO)  MG per tablet 1 tablet, 1 tablet, Oral, BID  metoprolol succinate (TOPROL XL) extended release tablet 100 mg, 100 mg, Oral, Daily  predniSONE (DELTASONE) tablet 5 mg, 5 mg, Oral, Daily  sodium chloride flush 0.9 % injection 5-40 mL, 5-40 mL, IntraVENous, 2 times per day  sodium chloride flush 0.9 % injection 10 mL, 10 mL, IntraVENous, PRN  0.9 % sodium chloride infusion, , IntraVENous, PRN  ondansetron (ZOFRAN-ODT) disintegrating tablet 4 mg, 4 mg, Oral, Q8H PRN **OR** ondansetron (ZOFRAN) injection 4 mg, 4 mg, IntraVENous, Q6H PRN  acetaminophen (TYLENOL) tablet 650 mg, 650 mg, Oral, Q6H PRN **OR** acetaminophen (TYLENOL) suppository 650 mg, 650 mg, Rectal, Q6H PRN  magnesium hydroxide (MILK OF MAGNESIA) 400 MG/5ML suspension 30 mL, 30 mL, Oral, Daily PRN  potassium chloride (KLOR-CON M) extended release tablet 40 mEq, 40 mEq, Oral, PRN **OR** potassium bicarb-citric acid (EFFER-K) effervescent tablet 40 mEq, 40 mEq, Oral, PRN **OR** potassium chloride 10 mEq/100 mL IVPB (Peripheral Line), 10 mEq, IntraVENous, PRN  atorvastatin (LIPITOR) tablet 40 mg, 40 mg, Oral, Nightly  nitroGLYCERIN (NITROSTAT) SL tablet 0.4 mg, 0.4 mg, SubLINGual, Q5 Min PRN  enoxaparin (LOVENOX) injection 40 mg, 40 mg, SubCUTAneous, Daily  hydrALAZINE (APRESOLINE) injection 10 mg, 10 mg, IntraVENous, Q6H PRN  0.9 % sodium chloride bolus, 500 mL, IntraVENous, PRN  aspirin chewable tablet 81 mg, 81 mg, Oral, Daily  melatonin tablet 4.5 mg, 4.5 mg, Oral, Nightly PRN         Objective:  BP (!) 174/87   Pulse 70   Temp 98.1 °F (36.7 °C) (Oral)   Resp 16   Ht 5' 11\" (1.803 m)   Wt 132 lb 14.4 oz (60.3 kg)   SpO2 94%   BMI 18.54 kg/m²      Patient Vitals for the past 24 hrs:   BP Temp Temp src Pulse Resp SpO2 Height Weight   08/02/22 0715 (!) 174/87 98.1 °F (36.7 °C) Oral 70 16 94 % -- --   08/02/22 0558 -- -- -- -- -- -- -- 132 lb 14.4 oz (60.3 kg)   08/02/22 0301 139/84 97.8 °F (36.6 °C) Oral 62 15 96 % -- --   08/01/22 2009 (!) 142/78 98.1 °F (36.7 °C) Oral 64 16 96 % -- --   08/01/22 1730 (!) 170/89 -- -- 77 -- -- -- --   08/01/22 1640 (!) 172/102 -- -- 78 18 97 % -- --   08/01/22 1608 (!) 180/106 -- -- 57 22 96 % -- --   08/01/22 1249 (!) 176/86 99 °F (37.2 °C) Oral 60 22 96 % 5' 11\" (1.803 m) 135 lb (61.2 kg)     Patient Vitals for the past 96 hrs (Last 3 readings):   Weight   08/02/22 0558 132 lb 14.4 oz (60.3 kg)   08/01/22 1249 135 lb (61.2 kg)           Intake/Output Summary (Last 24 hours) at 8/2/2022 6521  Last data filed at 8/1/2022 2009  Gross per 24 hour   Intake 240 ml   Output --   Net 240 ml         Physical Exam: (2-7 system for EPF/Detailed, ?8 for Comprehensive)  BP (!) 174/87   Pulse 70   Temp 98.1 °F (36.7 °C) (Oral)   Resp 16   Ht 5' 11\" (1.803 m)   Wt 132 lb 14.4 oz (60.3 kg)   SpO2 94%   BMI 18.54 kg/m²   Constitutional: vitals as above: alert, appears stated age and cooperative     Psychiatric: normal insight and judgment, oriented to person, place, time, and general circumstances    Head: Normocephalic, without obvious abnormality, atraumatic    Eyes:lids and lashes normal, conjunctivae and sclerae normal and pupils equal, round, reactive to light and accomodation    EMNT: external ears normal, nares midline Neck: no carotid bruit, supple, symmetrical, trachea midline and thyroid not enlarged, symmetric, no tenderness/mass/nodules     Respiratory: clear to auscultation and percussion bilaterally with normal respiratory effort    Cardiovascular: normal rate, regular rhythm, normal S1 and S2 and no murmurs    Gastrointestinal: soft, non-tender, non-distended, normal bowel sounds, no masses or organomegaly    Extremities: no clubbing, no edema    Skin:No rashes or nodules noted. Neurologic:negative         Labs:  Lab Results   Component Value Date    WBC 8.0 08/01/2022    HGB 12.0 (L) 08/01/2022    HCT 37.3 (L) 08/01/2022     08/01/2022    ALT 18 08/02/2022    AST 20 08/02/2022     (L) 08/02/2022    K 4.2 08/02/2022    CL 99 08/02/2022    CREATININE 1.0 08/02/2022    BUN 16 08/02/2022    CO2 29 08/02/2022    INR 0.93 08/01/2022    LABMICR YES 05/25/2021     Lab Results   Component Value Date    TROPONINI 0.01 08/01/2022       Recent Imaging Results are Reviewed:  CTA CHEST W WO CONTRAST    Result Date: 8/1/2022  EXAMINATION: CTA OF THE CHEST WITH AND WITHOUT CONTRAST 8/1/2022 2:17 pm TECHNIQUE: CTA of the chest was performed before and after the administration of intravenous contrast.  Multiplanar reformatted images are provided for review. MIP images are provided for review. Automated exposure control, iterative reconstruction, and/or weight based adjustment of the mA/kV was utilized to reduce the radiation dose to as low as reasonably achievable. COMPARISON: None. HISTORY: ORDERING SYSTEM PROVIDED HISTORY: cp/back pain rule out dissection TECHNOLOGIST PROVIDED HISTORY: Reason for exam:->cp/back pain rule out dissection Decision Support Exception - unselect if not a suspected or confirmed emergency medical condition->Emergency Medical Condition (MA) Reason for Exam: Chest Pain (C/o mid-sternal tight CP that has been going on for a week that radiates to back on occasion.  Also SOB with exertion and unable to lay flat. ) FINDINGS: Aorta: No evidence of thoracic aortic aneurysm or dissection. No acute abnormality of the aorta. Mediastinum: No evidence of mediastinal lymphadenopathy. The heart and pericardium demonstrate no acute abnormality. Lungs/Pleura: The lungs are without acute process. No focal consolidation or pulmonary edema. No evidence of pleural effusion or pneumothorax. Severe emphysematous changes are noted throughout the lungs bilaterally predominately within the bilateral upper lobes. There is biapical scarring and pleural thickening. Upper Abdomen: Limited images of the upper abdomen demonstrate no acute abnormality. The gallbladder is surgically absent. Soft Tissues/Bones: No acute bone or soft tissue abnormality. No CT evidence of aortic dissection. XR CHEST PORTABLE    Result Date: 8/1/2022  EXAMINATION: ONE XRAY VIEW OF THE CHEST 8/1/2022 12:48 pm COMPARISON: Chest x-ray dated 03/11/2022. HISTORY: ORDERING SYSTEM PROVIDED HISTORY: cp TECHNOLOGIST PROVIDED HISTORY: Reason for exam:->cp Reason for Exam: Chest Pain (C/o mid-sternal tight CP that has been going on for a week that radiates to back on occasion. Also SOB with exertion and unable to lay flat. ) FINDINGS: HEART/MEDIASTINUM: The cardiomediastinal silhouette is within normal limits. PLEURA/LUNGS: Emphysema is again noted. There are no focal consolidations or pleural effusions. There is no appreciable pneumothorax. Calcified granuloma noted at the right lung base. BONES/SOFT TISSUE: No acute abnormality. No radiographic evidence of acute pulmonary disease. Emphysema. Lab Results   Component Value Date/Time    GLUCOSE 95 08/02/2022 04:55 AM     No results found for: POCGLU  BP (!) 174/87   Pulse 70   Temp 98.1 °F (36.7 °C) (Oral)   Resp 16   Ht 5' 11\" (1.803 m)   Wt 132 lb 14.4 oz (60.3 kg)   SpO2 94%   BMI 18.54 kg/m²     Assessment and Plan:  Principal Problem:    Chest pain -Established problem. Stable. Still with some CP. Trop now 0.01  Plan: for gxt today  Active Problems:    GERD (gastroesophageal reflux disease)  Plan: cont home meds    Anxiety -Established problem. Stable. Appears stable  Plan: Continue present orders/plan. Essential hypertension -Established problem. Uncontrolled. 174/87  Plan: may benefit from increased bp meds    COPD, very severe ([address]  Plan: Continue present orders/plan.      Disp - home if gxt normal. To see PCP for titration of BP meds as well as repeat discussion of eliquis    (Please note that portions of this note were completed with a voice recognition program.  Efforts were made to edit the dictations but occasionally words are mis-transcribed.)        Rhonda Eckert MD  8/2/2022

## 2022-08-02 NOTE — CONSULTS
Nausea or Vomiting  Patient not taking: No sig reported 3/36/64   Micheal Gottron, MD   pantoprazole (PROTONIX) 40 MG tablet Take 1 tablet by mouth every morning (before breakfast)  Patient not taking: No sig reported 1/86/91   Micheal Gottron, MD   dicyclomine (BENTYL) 10 MG capsule Take 1 capsule by mouth 4 times daily  Patient not taking: No sig reported 6/20/22 6/6/57  Micheal Gottron, MD   LORazepam (ATIVAN) 0.5 MG tablet Take 0.5 mg by mouth every 6 hours as needed for Anxiety. Historical Provider, MD   amiodarone (CORDARONE) 200 MG tablet Take 200 mg by mouth in the morning and 200 mg before bedtime. Historical Provider, MD   Morphine Sulfate (MORPHINE 20 MG/ML ORAL SOLN 0.25 ML, SMALL, CMPD) Take 1 drop by mouth every 3-4 hours as needed. Historical Provider, MD   OXYGEN Inhale 3 L into the lungs as needed     Historical Provider, MD   apixaban (ELIQUIS) 5 MG TABS tablet Take 1 tablet by mouth 2 times daily  Patient not taking: No sig reported 5/18/22   Yung Torres, APRN - CNP   ipratropium-albuterol (DUONEB) 0.5-2.5 (3) MG/3ML SOLN nebulizer solution 1 vial inh Q6hrs for next 10 days and then 1 vial inh Q4hrs PRN SOB or wheezing 3/12/22   Teresa Grey MD   predniSONE (DELTASONE) 10 MG tablet Take 4 tabs PO QD for 3 days, then 3 tabs PO QD for 3 days, then 2 tabs PO QD for 3 days, then 1 tab PO QD for 3 days  Patient not taking: No sig reported 3/12/22   Teresa Grey MD        Allergies:  Codeine     Review of Systems:   A complete review of systems has been reviewed and updated today and is negative except as noted in the history of present illness.       Physical Examination:    Vitals:    08/02/22 1115   BP: (!) 171/93   Pulse: 62   Resp: 16   Temp: 99 °F (37.2 °C)   SpO2:     Weight: 132 lb 14.4 oz (60.3 kg)         General Appearance:  Alert, cooperative, no distress, appears stated age   Head:  Normocephalic, without obvious abnormality, atraumatic   Eyes:  EOMI, conjunctiva/corneas clear       Nose: Nares normal   Throat: Lips normal   Neck: Supple, symmetrical, trachea midline,  no carotid bruit or JVD       Lungs:   Clear to auscultation bilaterally, respirations unlabored   Chest Wall:  No tenderness or deformity   Heart:  Regular rate and rhythm, S1, S2 normal, no significant murmur, rub or gallop   Abdomen:   Soft, non-tender, bowel sounds active all four quadrants,  no masses, no organomegaly           Extremities: Extremities normal, atraumatic, no cyanosis or edema   Pulses: 2+ and symmetric   Skin: Skin color, texture, turgor normal, no rashes or lesions   Pysch: Normal mood and affect   Neurologic: Normal gross motor and sensory exam.         EKG:  I have reviewed EKG with the following interpretation:  Impression:    01-AUG-2022 12:58:28 [facility] ROUTINE RECORD  Normal sinus rhythm    Lab Data:  CBC:   Recent Labs     08/01/22  1306   WBC 8.0   HGB 12.0*   HCT 37.3*   MCV 89.7        BMP:   Recent Labs     08/01/22  1306 08/02/22  0455   * 133*   K 4.8 4.2   CL 94* 99   CO2 31 29   BUN 12 16   CREATININE 1.0 1.0     LIVER PROFILE:   Recent Labs     08/01/22  1306 08/02/22  0455   AST 24 20   ALT 23 18   LIPASE 22.0  --    BILITOT 0.3 0.3   ALKPHOS 72 55     PT/INR:   Recent Labs     08/01/22  1306   PROTIME 12.4   INR 0.93     APTT:   Recent Labs     08/01/22  1306   APTT 27.7     BNP:  No results for input(s): BNP in the last 72 hours. Imaging/Procedures:   Echo (Personally reviewed by me) 6/14/2022:  Echo 6/14/22:   Summary   Technically difficult examination due to body habitus and limited windows. Left ventricular systolic function is normal with ejection fraction   estimated at 60-65 %. No regional wall motion abnormalities are noted. Normal left ventricular wall thickness. Aortic valve appears sclerotic but opens adequately. Mild mitral regurgitation. Moderate tricuspid regurgitation.    Systolic pulmonary artery pressure (SPAP) estimated at 45 mmHg (right atrial pressure 3 mmHg), consistent with mild pulmonary hypertension    Echo (Personally reviewed by me) 3/11/2022:  Summary   Technically limited study due to patient body habitus and lung interference as well as frequent PVCs. Estimated ejection fraction of 45-50%. Mildly diminished left ventricular systolic function. Image quality inadequate to rule out regional wall motion abnormalities. Mild septal left ventricular hypertrophy. Normal left ventricle size. Grade I diastolic dysfunction with normal LV filling pressures. The aortic valve appears sclerotic but opens well. Mild to moderate tricuspid valve regurgitation. The inferior vena cava is dilated with respiratory variation greater than   50% consistent with CVP 10-15 mmHg    Cardiac cath 10/13/2016:  LHC 10/13/2016:  Coronary Findings   Vessel  Ostial  Proximal  Mid  Distal  Special    LM  0%  0%  0%  0%      LAD  0%  0%  0%  0%      RI              Cx  0%  0%  0%  0%      RCA  0%  0%  0%  0%         Ventriculography/Pressure Findings  LVEF %  LVEDP  MR    20%  8mmHg          Assessment/Plan:  Principal Problem:    Chest pain  Plan: Atypical.  Low suspicion for ischemia given normal coronary arteries on cardiac cath in 2016. Recommend Myoview stress test.    Active Problems:    Nonischemic cardiomyopathy (HCC)  Plan: LVEF recovered. Continue medical management. GERD (gastroesophageal reflux disease)  Plan: May be contributing to chest discomfort. Essential hypertension  Plan: Stable. COPD, very severe ([address]  Plan: Per pulmonary. Myoview stress test.  If unremarkable, he can be discharged home from a cardiology standpoint. Thank you for allowing us to participate in the care of Yanira Roberts. Further evaluation will be based upon the patient's clinical course and testing results. All questions and concerns were addressed to the patient/family.  Alternatives to my treatment were discussed. The note was completed using EMR. Every effort was made to ensure accuracy; however, inadvertent computerized transcription errors may be present.     Danisha Ghosh M.D.

## 2022-08-02 NOTE — PROGRESS NOTES
Instructed on Lexiscan Stress Test Procedure including possible side effects/ adverse reactions. Patient verbalizes  understanding and denies having any questions . See [facility]

## 2022-08-02 NOTE — DISCHARGE SUMMARY
Physician Discharge Summary       Rohit Padilla  1950  MRN: [id number]    [facility] Date: 8/1/2022  Discharge Date: No discharge date for patient encounter. Discharge Unit: [address] [facility]  [address] [address]  Dept: 21 : [phone]    Attending MD: Jimmy Perez MD  Discharging MD: Jimmy Perez MD  PCP: DO [place] [id number] [phone]      Admission Diagnosis: Chest pain [R07.9]  Elevated troponin [R77.8]  Acute chest pain [R07.9]    Discharge Diagnosis: Chest pain, non cardiac in origin (unable to determine cause)    Full Hospital Problem List:  Active Hospital Problems    Diagnosis Date Noted    GERD (gastroesophageal reflux disease) [K21.9] 08/01/2022     Priority: Medium    Anxiety [F41.9] 08/01/2022     Priority: Medium    Chest pain [R07.9] 08/01/2022     Priority: Medium    COPD, very severe ([address] [J44.9] 12/18/2017    Essential hypertension [I10] 11/24/2016           Hospital Course:    Pt was admitted for chest pain. Placed on telemetry and r/o MI pathway. Serial cardiac enzymes were negative. Pt underwent stress test, results of which are negative. Patient is chest pain free at time of discharge  At this time, etiology of the chest pain is unknown  It is likely that some of this is due to non-compliane with meds. He does not appear to be taking his BP nor GERD meds. Also did not start eliquis as directed by his MD.    No changes are made to patients medications. We attempted to change BP meds, but pt refuses any change  Pt is to follow up with PMD in 1-2 weeks time.   Will defer any medicine changes to PCP    Consults made during Hospitalization:  IP CONSULT TO INTERNAL MEDICINE    Treatment team at time of Discharge: Treatment Team: Attending Provider: Jimmy Perez MD; Consulting Physician: Jimmy Perez MD; Respiratory Therapist (Night): Immanuel Vegas RCP; Respiratory Therapist (Day): Galina Hagen RCP; Registered Nurse: Rc Song RN    Condition at discharge: Stable    Imaging Results:  CTA CHEST W WO CONTRAST    Result Date: 8/1/2022  EXAMINATION: CTA OF THE CHEST WITH AND WITHOUT CONTRAST 8/1/2022 2:17 pm TECHNIQUE: CTA of the chest was performed before and after the administration of intravenous contrast.  Multiplanar reformatted images are provided for review. MIP images are provided for review. Automated exposure control, iterative reconstruction, and/or weight based adjustment of the mA/kV was utilized to reduce the radiation dose to as low as reasonably achievable. COMPARISON: None. HISTORY: ORDERING SYSTEM PROVIDED HISTORY: cp/back pain rule out dissection TECHNOLOGIST PROVIDED HISTORY: Reason for exam:->cp/back pain rule out dissection Decision Support Exception - unselect if not a suspected or confirmed emergency medical condition->Emergency Medical Condition (MA) Reason for Exam: Chest Pain (C/o mid-sternal tight CP that has been going on for a week that radiates to back on occasion. Also SOB with exertion and unable to lay flat. ) FINDINGS: Aorta: No evidence of thoracic aortic aneurysm or dissection. No acute abnormality of the aorta. Mediastinum: No evidence of mediastinal lymphadenopathy. The heart and pericardium demonstrate no acute abnormality. Lungs/Pleura: The lungs are without acute process. No focal consolidation or pulmonary edema. No evidence of pleural effusion or pneumothorax. Severe emphysematous changes are noted throughout the lungs bilaterally predominately within the bilateral upper lobes. There is biapical scarring and pleural thickening. Upper Abdomen: Limited images of the upper abdomen demonstrate no acute abnormality. The gallbladder is surgically absent. Soft Tissues/Bones: No acute bone or soft tissue abnormality. No CT evidence of aortic dissection.      XR CHEST PORTABLE    Result Date: 8/1/2022  EXAMINATION: ONE XRAY VIEW OF THE CHEST 8/1/2022 12:48 pm COMPARISON: Chest x-ray dated 03/11/2022. HISTORY: ORDERING SYSTEM PROVIDED HISTORY: cp TECHNOLOGIST PROVIDED HISTORY: Reason for exam:->cp Reason for Exam: Chest Pain (C/o mid-sternal tight CP that has been going on for a week that radiates to back on occasion. Also SOB with exertion and unable to lay flat. ) FINDINGS: HEART/MEDIASTINUM: The cardiomediastinal silhouette is within normal limits. PLEURA/LUNGS: Emphysema is again noted. There are no focal consolidations or pleural effusions. There is no appreciable pneumothorax. Calcified granuloma noted at the right lung base. BONES/SOFT TISSUE: No acute abnormality. No radiographic evidence of acute pulmonary disease. Emphysema. Discharge Exam:  See progress note from day of d/c    Disposition: home    Discharge Medications:     Medication List        CONTINUE taking these medications      Entresto  MG per tablet  Generic drug: sacubitril-valsartan  Take 1 tablet by mouth in the morning and 1 tablet before bedtime. furosemide 20 MG tablet  Commonly known as: LASIX  Take 1 tablet by mouth daily     ipratropium-albuterol 0.5-2.5 (3) MG/3ML Soln nebulizer solution  Commonly known as: DUONEB  1 vial inh Q6hrs for next 10 days and then 1 vial inh Q4hrs PRN SOB or wheezing     LORazepam 0.5 MG tablet  Commonly known as: ATIVAN     metoprolol succinate 100 MG extended release tablet  Commonly known as: TOPROL XL     MORPHINE 20 MG/ML ORAL SOLN 0.25 ML (SMALL) CMPD     OXYGEN     * predniSONE 5 MG tablet  Commonly known as: DELTASONE           * This list has 1 medication(s) that are the same as other medications prescribed for you. Read the directions carefully, and ask your doctor or other care provider to review them with you.                 STOP taking these medications      dicyclomine 10 MG capsule  Commonly known as: BENTYL            ASK your doctor about these medications      amiodarone 200 MG tablet  Commonly known as: CORDARONE     apixaban 5 MG Tabs tablet  Commonly known as: Eliquis  Take 1 tablet by mouth 2 times daily     ondansetron 4 MG tablet  Commonly known as: ZOFRAN  Take 1 tablet by mouth daily as needed for Nausea or Vomiting     pantoprazole 40 MG tablet  Commonly known as: PROTONIX  Take 1 tablet by mouth every morning (before breakfast)     * predniSONE 10 MG tablet  Commonly known as: DELTASONE  Take 4 tabs PO QD for 3 days, then 3 tabs PO QD for 3 days, then 2 tabs PO QD for 3 days, then 1 tab PO QD for 3 days           * This list has 1 medication(s) that are the same as other medications prescribed for you. Read the directions carefully, and ask your doctor or other care provider to review them with you.                   Allergies   Allergen Reactions    Codeine Anxiety and Other (See Comments)     Unknown reaction       Follow-up with PCP in 1-2 weeks time  Pt is asked to call PMD or return to ER if chest pain recurs    Total time spent on day of discharge including face-to-face visit, examination, documentation, counseling, preparation of discharge plans and followup, and discharge medicine reconciliation and presciptions is 32 minutes    Signed:  Nora Landeros MD  8/2/2022

## 2022-08-02 NOTE — CARE COORDINATION
Patient admitted  with an anticipated short hospitalization length of stay. Chart reviewed and it appears that patient has minimal needs for discharge at this time. Discussed with patients nurse and requested that case management be notified if discharge needs are identified. Case management will continue to follow progress and update discharge plan as needed.     Electronically signed by OSCAR Beltrán on 8/2/2022 at 8:16 AM

## 2022-08-02 NOTE — CARE COORDINATION
Discharge Planning. The SW received a call from [address] [facility] [phone] the nurse from [facility] who stated he revocated hospice services when he came to the hospital this admission. Darlene stated she will follow up with the patient regarding if he would like to resume services.     Electronically signed by OSCAR Salazar on 8/2/2022 at 4:06 PM

## 2022-08-04 LAB
AMIODARONE LEVEL: 1.1 UG/ML (ref 0.5–2)
DES-AMIOD: 1.1 UG/ML

## 2022-10-14 ENCOUNTER — HOSPITAL ENCOUNTER (INPATIENT)
Age: 72
LOS: 3 days | Discharge: HOSPICE | DRG: 291 | End: 2022-10-17
Attending: INTERNAL MEDICINE | Admitting: INTERNAL MEDICINE
Payer: COMMERCIAL

## 2022-10-14 ENCOUNTER — APPOINTMENT (OUTPATIENT)
Dept: GENERAL RADIOLOGY | Age: 72
DRG: 291 | End: 2022-10-14
Payer: COMMERCIAL

## 2022-10-14 ENCOUNTER — APPOINTMENT (OUTPATIENT)
Dept: CT IMAGING | Age: 72
DRG: 291 | End: 2022-10-14
Payer: COMMERCIAL

## 2022-10-14 DIAGNOSIS — R06.02 SHORTNESS OF BREATH: Primary | ICD-10-CM

## 2022-10-14 PROBLEM — J96.00 ACUTE RESPIRATORY FAILURE (HCC): Status: ACTIVE | Noted: 2022-10-14

## 2022-10-14 LAB
A/G RATIO: 1.6 (ref 1.1–2.2)
ALBUMIN SERPL-MCNC: 3.7 G/DL (ref 3.4–5)
ALP BLD-CCNC: 69 U/L (ref 40–129)
ALT SERPL-CCNC: 48 U/L (ref 10–40)
ANION GAP SERPL CALCULATED.3IONS-SCNC: 4 MMOL/L (ref 3–16)
AST SERPL-CCNC: 43 U/L (ref 15–37)
BASOPHILS ABSOLUTE: 0 K/UL (ref 0–0.2)
BASOPHILS RELATIVE PERCENT: 0.5 %
BILIRUB SERPL-MCNC: 0.3 MG/DL (ref 0–1)
BUN BLDV-MCNC: 9 MG/DL (ref 7–20)
CALCIUM SERPL-MCNC: 8.8 MG/DL (ref 8.3–10.6)
CHLORIDE BLD-SCNC: 95 MMOL/L (ref 99–110)
CO2: 32 MMOL/L (ref 21–32)
CREAT SERPL-MCNC: 0.9 MG/DL (ref 0.8–1.3)
EKG ATRIAL RATE: 59 BPM
EKG DIAGNOSIS: NORMAL
EKG P AXIS: 106 DEGREES
EKG P-R INTERVAL: 178 MS
EKG Q-T INTERVAL: 420 MS
EKG QRS DURATION: 102 MS
EKG QTC CALCULATION (BAZETT): 415 MS
EKG R AXIS: 72 DEGREES
EKG T AXIS: 80 DEGREES
EKG VENTRICULAR RATE: 59 BPM
EOSINOPHILS ABSOLUTE: 0 K/UL (ref 0–0.6)
EOSINOPHILS RELATIVE PERCENT: 0.6 %
GFR AFRICAN AMERICAN: >60
GFR NON-AFRICAN AMERICAN: >60
GLUCOSE BLD-MCNC: 108 MG/DL (ref 70–99)
HCT VFR BLD CALC: 34.4 % (ref 40.5–52.5)
HEMOGLOBIN: 11.2 G/DL (ref 13.5–17.5)
LYMPHOCYTES ABSOLUTE: 1.3 K/UL (ref 1–5.1)
LYMPHOCYTES RELATIVE PERCENT: 19 %
MCH RBC QN AUTO: 28.2 PG (ref 26–34)
MCHC RBC AUTO-ENTMCNC: 32.6 G/DL (ref 31–36)
MCV RBC AUTO: 86.6 FL (ref 80–100)
MONOCYTES ABSOLUTE: 0.6 K/UL (ref 0–1.3)
MONOCYTES RELATIVE PERCENT: 8.5 %
NEUTROPHILS ABSOLUTE: 5.1 K/UL (ref 1.7–7.7)
NEUTROPHILS RELATIVE PERCENT: 71.4 %
PDW BLD-RTO: 17.6 % (ref 12.4–15.4)
PLATELET # BLD: 312 K/UL (ref 135–450)
PMV BLD AUTO: 8.2 FL (ref 5–10.5)
POTASSIUM REFLEX MAGNESIUM: 4.9 MMOL/L (ref 3.5–5.1)
PRO-BNP: 1237 PG/ML (ref 0–124)
RBC # BLD: 3.98 M/UL (ref 4.2–5.9)
SODIUM BLD-SCNC: 131 MMOL/L (ref 136–145)
TOTAL PROTEIN: 6 G/DL (ref 6.4–8.2)
TROPONIN: <0.01 NG/ML
WBC # BLD: 7.1 K/UL (ref 4–11)

## 2022-10-14 PROCEDURE — 6370000000 HC RX 637 (ALT 250 FOR IP): Performed by: PHYSICIAN ASSISTANT

## 2022-10-14 PROCEDURE — 80053 COMPREHEN METABOLIC PANEL: CPT

## 2022-10-14 PROCEDURE — 2700000000 HC OXYGEN THERAPY PER DAY

## 2022-10-14 PROCEDURE — 93010 ELECTROCARDIOGRAM REPORT: CPT | Performed by: INTERNAL MEDICINE

## 2022-10-14 PROCEDURE — 84439 ASSAY OF FREE THYROXINE: CPT

## 2022-10-14 PROCEDURE — 99285 EMERGENCY DEPT VISIT HI MDM: CPT

## 2022-10-14 PROCEDURE — 6360000002 HC RX W HCPCS: Performed by: PHYSICIAN ASSISTANT

## 2022-10-14 PROCEDURE — 71045 X-RAY EXAM CHEST 1 VIEW: CPT

## 2022-10-14 PROCEDURE — 85025 COMPLETE CBC W/AUTO DIFF WBC: CPT

## 2022-10-14 PROCEDURE — 6360000004 HC RX CONTRAST MEDICATION: Performed by: PHYSICIAN ASSISTANT

## 2022-10-14 PROCEDURE — 71260 CT THORAX DX C+: CPT | Performed by: PHYSICIAN ASSISTANT

## 2022-10-14 PROCEDURE — 2060000000 HC ICU INTERMEDIATE R&B

## 2022-10-14 PROCEDURE — 6370000000 HC RX 637 (ALT 250 FOR IP): Performed by: INTERNAL MEDICINE

## 2022-10-14 PROCEDURE — 36415 COLL VENOUS BLD VENIPUNCTURE: CPT

## 2022-10-14 PROCEDURE — 96375 TX/PRO/DX INJ NEW DRUG ADDON: CPT

## 2022-10-14 PROCEDURE — 93005 ELECTROCARDIOGRAM TRACING: CPT | Performed by: EMERGENCY MEDICINE

## 2022-10-14 PROCEDURE — 6360000002 HC RX W HCPCS: Performed by: INTERNAL MEDICINE

## 2022-10-14 PROCEDURE — 96374 THER/PROPH/DIAG INJ IV PUSH: CPT

## 2022-10-14 PROCEDURE — 2580000003 HC RX 258: Performed by: INTERNAL MEDICINE

## 2022-10-14 PROCEDURE — 83880 ASSAY OF NATRIURETIC PEPTIDE: CPT

## 2022-10-14 PROCEDURE — 84443 ASSAY THYROID STIM HORMONE: CPT

## 2022-10-14 PROCEDURE — 84484 ASSAY OF TROPONIN QUANT: CPT

## 2022-10-14 PROCEDURE — 94640 AIRWAY INHALATION TREATMENT: CPT

## 2022-10-14 RX ORDER — POTASSIUM CHLORIDE 20 MEQ/1
40 TABLET, EXTENDED RELEASE ORAL PRN
Status: DISCONTINUED | OUTPATIENT
Start: 2022-10-14 | End: 2022-10-17 | Stop reason: HOSPADM

## 2022-10-14 RX ORDER — PANTOPRAZOLE SODIUM 40 MG/1
40 TABLET, DELAYED RELEASE ORAL
Status: DISCONTINUED | OUTPATIENT
Start: 2022-10-15 | End: 2022-10-17 | Stop reason: HOSPADM

## 2022-10-14 RX ORDER — METHYLPREDNISOLONE SODIUM SUCCINATE 125 MG/2ML
125 INJECTION, POWDER, LYOPHILIZED, FOR SOLUTION INTRAMUSCULAR; INTRAVENOUS ONCE
Status: COMPLETED | OUTPATIENT
Start: 2022-10-14 | End: 2022-10-14

## 2022-10-14 RX ORDER — ONDANSETRON 4 MG/1
4 TABLET, ORALLY DISINTEGRATING ORAL EVERY 8 HOURS PRN
Status: DISCONTINUED | OUTPATIENT
Start: 2022-10-14 | End: 2022-10-17 | Stop reason: HOSPADM

## 2022-10-14 RX ORDER — HYDRALAZINE HYDROCHLORIDE 20 MG/ML
10 INJECTION INTRAMUSCULAR; INTRAVENOUS EVERY 6 HOURS PRN
Status: DISCONTINUED | OUTPATIENT
Start: 2022-10-14 | End: 2022-10-17 | Stop reason: HOSPADM

## 2022-10-14 RX ORDER — IPRATROPIUM BROMIDE AND ALBUTEROL SULFATE 2.5; .5 MG/3ML; MG/3ML
1 SOLUTION RESPIRATORY (INHALATION) 4 TIMES DAILY
Status: DISCONTINUED | OUTPATIENT
Start: 2022-10-14 | End: 2022-10-14

## 2022-10-14 RX ORDER — 0.9 % SODIUM CHLORIDE 0.9 %
500 INTRAVENOUS SOLUTION INTRAVENOUS PRN
Status: DISCONTINUED | OUTPATIENT
Start: 2022-10-14 | End: 2022-10-17 | Stop reason: HOSPADM

## 2022-10-14 RX ORDER — ACETAMINOPHEN 325 MG/1
650 TABLET ORAL EVERY 6 HOURS PRN
Status: DISCONTINUED | OUTPATIENT
Start: 2022-10-14 | End: 2022-10-17 | Stop reason: HOSPADM

## 2022-10-14 RX ORDER — LORAZEPAM 0.5 MG/1
0.5 TABLET ORAL EVERY 6 HOURS PRN
Status: DISCONTINUED | OUTPATIENT
Start: 2022-10-14 | End: 2022-10-14

## 2022-10-14 RX ORDER — SODIUM CHLORIDE 0.9 % (FLUSH) 0.9 %
10 SYRINGE (ML) INJECTION PRN
Status: DISCONTINUED | OUTPATIENT
Start: 2022-10-14 | End: 2022-10-17 | Stop reason: HOSPADM

## 2022-10-14 RX ORDER — SODIUM CHLORIDE 0.9 % (FLUSH) 0.9 %
5-40 SYRINGE (ML) INJECTION EVERY 12 HOURS SCHEDULED
Status: DISCONTINUED | OUTPATIENT
Start: 2022-10-14 | End: 2022-10-17 | Stop reason: HOSPADM

## 2022-10-14 RX ORDER — NITROFURANTOIN 25; 75 MG/1; MG/1
100 CAPSULE ORAL 2 TIMES DAILY
COMMUNITY
Start: 2022-10-12 | End: 2022-10-26

## 2022-10-14 RX ORDER — IPRATROPIUM BROMIDE AND ALBUTEROL SULFATE 2.5; .5 MG/3ML; MG/3ML
1 SOLUTION RESPIRATORY (INHALATION) ONCE
Status: COMPLETED | OUTPATIENT
Start: 2022-10-14 | End: 2022-10-14

## 2022-10-14 RX ORDER — FUROSEMIDE 10 MG/ML
40 INJECTION INTRAMUSCULAR; INTRAVENOUS 3 TIMES DAILY
Status: DISCONTINUED | OUTPATIENT
Start: 2022-10-14 | End: 2022-10-16

## 2022-10-14 RX ORDER — IPRATROPIUM BROMIDE AND ALBUTEROL SULFATE 2.5; .5 MG/3ML; MG/3ML
1 SOLUTION RESPIRATORY (INHALATION) 2 TIMES DAILY
Status: DISCONTINUED | OUTPATIENT
Start: 2022-10-15 | End: 2022-10-17 | Stop reason: HOSPADM

## 2022-10-14 RX ORDER — TAMSULOSIN HYDROCHLORIDE 0.4 MG/1
0.4 CAPSULE ORAL NIGHTLY
COMMUNITY

## 2022-10-14 RX ORDER — ACETAMINOPHEN 650 MG/1
650 SUPPOSITORY RECTAL EVERY 6 HOURS PRN
Status: DISCONTINUED | OUTPATIENT
Start: 2022-10-14 | End: 2022-10-17 | Stop reason: HOSPADM

## 2022-10-14 RX ORDER — AMIODARONE HYDROCHLORIDE 200 MG/1
200 TABLET ORAL 2 TIMES DAILY
Status: DISCONTINUED | OUTPATIENT
Start: 2022-10-14 | End: 2022-10-17 | Stop reason: HOSPADM

## 2022-10-14 RX ORDER — ONDANSETRON 4 MG/1
4 TABLET, FILM COATED ORAL DAILY PRN
Status: DISCONTINUED | OUTPATIENT
Start: 2022-10-14 | End: 2022-10-14 | Stop reason: SDUPTHER

## 2022-10-14 RX ORDER — IPRATROPIUM BROMIDE AND ALBUTEROL SULFATE 2.5; .5 MG/3ML; MG/3ML
1 SOLUTION RESPIRATORY (INHALATION) EVERY 4 HOURS PRN
Status: DISCONTINUED | OUTPATIENT
Start: 2022-10-14 | End: 2022-10-17 | Stop reason: HOSPADM

## 2022-10-14 RX ORDER — POTASSIUM CHLORIDE 7.45 MG/ML
10 INJECTION INTRAVENOUS PRN
Status: DISCONTINUED | OUTPATIENT
Start: 2022-10-14 | End: 2022-10-17 | Stop reason: HOSPADM

## 2022-10-14 RX ORDER — METOPROLOL SUCCINATE 50 MG/1
100 TABLET, EXTENDED RELEASE ORAL DAILY
Status: DISCONTINUED | OUTPATIENT
Start: 2022-10-15 | End: 2022-10-17 | Stop reason: HOSPADM

## 2022-10-14 RX ORDER — SODIUM CHLORIDE 9 MG/ML
INJECTION, SOLUTION INTRAVENOUS PRN
Status: DISCONTINUED | OUTPATIENT
Start: 2022-10-14 | End: 2022-10-17 | Stop reason: HOSPADM

## 2022-10-14 RX ORDER — ENOXAPARIN SODIUM 100 MG/ML
40 INJECTION SUBCUTANEOUS DAILY
Status: DISCONTINUED | OUTPATIENT
Start: 2022-10-14 | End: 2022-10-14 | Stop reason: SDUPTHER

## 2022-10-14 RX ORDER — ONDANSETRON 2 MG/ML
4 INJECTION INTRAMUSCULAR; INTRAVENOUS EVERY 6 HOURS PRN
Status: DISCONTINUED | OUTPATIENT
Start: 2022-10-14 | End: 2022-10-17 | Stop reason: HOSPADM

## 2022-10-14 RX ORDER — FUROSEMIDE 10 MG/ML
40 INJECTION INTRAMUSCULAR; INTRAVENOUS ONCE
Status: COMPLETED | OUTPATIENT
Start: 2022-10-14 | End: 2022-10-14

## 2022-10-14 RX ADMIN — Medication 10 ML: at 21:42

## 2022-10-14 RX ADMIN — FUROSEMIDE 40 MG: 10 INJECTION, SOLUTION INTRAMUSCULAR; INTRAVENOUS at 21:42

## 2022-10-14 RX ADMIN — FUROSEMIDE 40 MG: 10 INJECTION, SOLUTION INTRAMUSCULAR; INTRAVENOUS at 14:27

## 2022-10-14 RX ADMIN — IOPAMIDOL 75 ML: 755 INJECTION, SOLUTION INTRAVENOUS at 13:38

## 2022-10-14 RX ADMIN — ALBUTEROL SULFATE 5 MG: 2.5 SOLUTION RESPIRATORY (INHALATION) at 14:38

## 2022-10-14 RX ADMIN — IPRATROPIUM BROMIDE AND ALBUTEROL SULFATE 1 AMPULE: 2.5; .5 SOLUTION RESPIRATORY (INHALATION) at 20:43

## 2022-10-14 RX ADMIN — SACUBITRIL AND VALSARTAN 1 TABLET: 97; 103 TABLET, FILM COATED ORAL at 21:42

## 2022-10-14 RX ADMIN — METHYLPREDNISOLONE SODIUM SUCCINATE 125 MG: 125 INJECTION, POWDER, FOR SOLUTION INTRAMUSCULAR; INTRAVENOUS at 14:23

## 2022-10-14 RX ADMIN — IPRATROPIUM BROMIDE AND ALBUTEROL SULFATE 1 AMPULE: 2.5; .5 SOLUTION RESPIRATORY (INHALATION) at 14:38

## 2022-10-14 RX ADMIN — AMIODARONE HYDROCHLORIDE 200 MG: 200 TABLET ORAL at 21:42

## 2022-10-14 ASSESSMENT — ENCOUNTER SYMPTOMS
NAUSEA: 0
CONSTIPATION: 0
COUGH: 0
COLOR CHANGE: 0
DIARRHEA: 0
VOMITING: 0
SHORTNESS OF BREATH: 1
ABDOMINAL PAIN: 0
BACK PAIN: 0
CHEST TIGHTNESS: 1
PHOTOPHOBIA: 0

## 2022-10-14 ASSESSMENT — PAIN - FUNCTIONAL ASSESSMENT: PAIN_FUNCTIONAL_ASSESSMENT: NONE - DENIES PAIN

## 2022-10-14 NOTE — PROGRESS NOTES
Patient admitted to room 3385 from ER at 1708. Oriented to room, call light, phone, TV, thermostat, bed controls, bathroom, emergency cord, white board, therapy times, unit routine, visiting hours,  and meal times. Patient verbalized understanding. States bowel routine is daily. Assessment complete. VSS. Pt A&O x4, scheduled meds given. The care plan and education has been reviewed and mutually agreed upon with the patient. Patient remains free from falls or accidental injury. Room free from clutter. All fall precautions in place. Bed in lowest position with wheels locked and alarm on, call light and belongings within reach, and door open.

## 2022-10-14 NOTE — H&P
History and Physical  Dr. Afshin Harris  10/14/2022    PCP: Malik Burrows DO    Cc:   Chief Complaint   Patient presents with    Shortness of Breath     From home via EMS cc shortness of breath, worse of exertion. This has been worsening over the last week. EMS reports 94% on RA, down to low 80% after minor exertion. Patient 95% SPO2 on room air at triage. HPI:  Kristan Holguin is a 71 y.o. male who has a past medical history of Bronchitis, CHF (congestive heart failure) ([facility] and COPD (chronic obstructive pulmonary disease) ([facility] Patient presents with Chronic systolic congestive heart failure ([facility] HPI  (1-3 for expanded problem focused, ?4 for detailed/comprehensive)      71 y.o. male with past medical history of COPD, CHF and bronchitis who presents to the ED with complaint of shortness of breath. Patient arrives from home by EMS with complaints of shortness of breath. She is worsened with exertion. Patient states been ongoing for the past week. He states he has a history of COPD and congestive heart failure. Patient states he took himself off of his water pill Lasix a couple weeks ago because he states he did not like having to wake up and urinate throughout the night. He apparently has been enrolled in hospice but states he rescinded hospice and is DNR so he can come to the emergency department and be treated. Patient states he has some chest tightness. He denies any specific chest pain. Denies cough or hemoptysis. Patient states he was written a prescription for Eliquis but states he has not gotten it filled because he states of medication cost.     Pt has made it clear that he has revoked hospice care (though he has not resumed his meds)    To be admitted. Meds restarted. Will have Cards eval patient    Pt will need palliative care discussion. If he does not want to take diuretics because they cause him to urinate frequently, he will never have controlled heart failure sx. Problem list of hospitalization thus far: Active Hospital Problems    Diagnosis     GERD (gastroesophageal reflux disease) [K21.9]      Priority: Medium    Anxiety [F41.9]      Priority: Medium    COPD, very severe ([facility] [J44.9]     Essential hypertension [I10]     Chronic systolic congestive heart failure ([facility] [I50.22]          Review of Systems: (1 system for EPF, 2-9 for detailed, 10+ for comprehensive)  Constitutional: Negative for chills and fever. HENT: Negative for dental problem, nosebleeds and rhinorrhea. Eyes: Negative for photophobia and visual disturbance. Respiratory: Negative for cough, chest tightness and positive for shortness of breath. Cardiovascular: Negative for chest pain and leg swelling. Gastrointestinal: Negative for diarrhea, nausea and vomiting. Endocrine: Negative for polydipsia and polyphagia. Genitourinary: Negative for frequency, hematuria and urgency. Musculoskeletal: Negative for back pain and myalgias. Skin: Negative for rash. Allergic/Immunologic: Negative for food allergies. Neurological: Negative for dizziness, seizures, syncope and facial asymmetry. Hematological: Negative for adenopathy. Psychiatric/Behavioral: Negative for dysphoric mood. The patient is not nervous/anxious.         Past Medical History:   Past Medical History:   Diagnosis Date    Bronchitis     CHF (congestive heart failure) (HCC)     COPD (chronic obstructive pulmonary disease) (HCC)        Past Surgical History:   Past Surgical History:   Procedure Laterality Date    CHOLECYSTECTOMY, LAPAROSCOPIC N/A 3/17/2021    LAPAROSCOPIC CHOLECYSTECTOMY WITH INTRAOPERATIVE CHOLANGIOGRAM performed by Neha Prince MD at [facility]. [facility]      bilateral cataracts    FEMORAL BYPASS Left 6/1/2021    LEFT FEMORAL TO POPLITEAL BYPASS GRAFT ([id number]) [facility]      LT elbow    TONSILLECTOMY Social History:   Social History       Tobacco History       Smoking Status  Former Quit Date  9/2/2016 Smoking Frequency  1 pack/day for 30.00 years (30.00 pk-yrs) Smoking Tobacco Type  Cigarettes quit in 9/2/2016      Smokeless Tobacco Use  Former Quit Date  9/5/2016              Alcohol History       Alcohol Use Status  Yes Drinks/Week  1-3 Cans of beer per week Amount  1.0 - 3.0 standard drink of alcohol/wk Comment  daily              Drug Use       Drug Use Status  No              Sexual Activity       Sexually Active  Yes Partners  Female Comment  monogamous                    Fam History:   Family History   Problem Relation Age of Onset    Heart Disease Mother     No Known Problems Father        PFSH: The above PMHx, PSHx, SocHx, FamHx has been reviewed by myself. (1 area for detailed, 2-3 for comprehensive)      Code Status: Prior    Meds - following list of home medications fromelectronic chart has been reviewed by myself  Prior to Admission medications    Medication Sig Start Date End Date Taking? Authorizing Provider   metoprolol succinate (TOPROL XL) 100 MG extended release tablet Take 100 mg by mouth in the morning. Historical Provider, MD   predniSONE (DELTASONE) 5 MG tablet Take 5 mg by mouth in the morning. Historical Provider, MD   sacubitril-valsartan (ENTRESTO)  MG per tablet Take 1 tablet by mouth in the morning and 1 tablet before bedtime.  7/22/22   CHELSY Lopez CNP   furosemide (LASIX) 20 MG tablet Take 1 tablet by mouth daily 7/14/22   CHELSY Lopez CNP   ondansetron (ZOFRAN) 4 MG tablet Take 1 tablet by mouth daily as needed for Nausea or Vomiting  Patient not taking: No sig reported 7/58/65   Trena Mesa MD   pantoprazole (PROTONIX) 40 MG tablet Take 1 tablet by mouth every morning (before breakfast)  Patient not taking: No sig reported 5/84/41   Trena Mesa MD   dicyclomine (BENTYL) 10 MG capsule Take 1 capsule by mouth 4 times daily  Patient not taking: No sig reported 6/20/22 1/5/45  Bryan Kelly MD   LORazepam (ATIVAN) 0.5 MG tablet Take 0.5 mg by mouth every 6 hours as needed for Anxiety. Historical Provider, MD   amiodarone (CORDARONE) 200 MG tablet Take 200 mg by mouth in the morning and 200 mg before bedtime. Historical Provider, MD   Morphine Sulfate (MORPHINE 20 MG/ML ORAL SOLN 0.25 ML, SMALL, CMPD) Take 1 drop by mouth every 3-4 hours as needed.     Historical Provider, MD   OXYGEN Inhale 3 L into the lungs as needed     Historical Provider, MD   apixaban (ELIQUIS) 5 MG TABS tablet Take 1 tablet by mouth 2 times daily  Patient not taking: No sig reported 5/18/22   CHELSY Bailey - CNP   ipratropium-albuterol (DUONEB) 0.5-2.5 (3) MG/3ML SOLN nebulizer solution 1 vial inh Q6hrs for next 10 days and then 1 vial inh Q4hrs PRN SOB or wheezing 3/12/22   David Travis MD   predniSONE (DELTASONE) 10 MG tablet Take 4 tabs PO QD for 3 days, then 3 tabs PO QD for 3 days, then 2 tabs PO QD for 3 days, then 1 tab PO QD for 3 days  Patient not taking: No sig reported 3/12/22   David Travis MD         Allergies   Allergen Reactions    Codeine Anxiety and Other (See Comments)     Unknown reaction             EXAM: (2-7 system for EPF/Detailed, ?8 for Comprehensive)  BP (!) 151/82   Pulse 59   Temp 98.5 °F (36.9 °C) (Oral)   Resp 14   Ht 5' 11\" (1.803 m)   Wt 149 lb 12.8 oz (67.9 kg)   BMI 20.89 kg/m²   Constitutional: vitals as above: alert, appears stated age and cooperative    Psychiatric: normal insight and judgment, oriented to person, place, time, and general circumstances    Head: Normocephalic, without obvious abnormality, atraumatic    Eyes:lids and lashes normal, conjunctivae and sclerae normal and pupils equal, round, reactive to light and accomodation    EMNT: external ears normal, nares midline    Neck: no carotid bruit, supple, symmetrical, trachea midline and thyroid not enlarged, symmetric, no tenderness/mass/nodules     Respiratory: crackles bilaterally with normal respiratory effort    Cardiovascular: normal rate, regular rhythm, normal S1 and S2 and no murmurs    Gastrointestinal: soft, non-tender, non-distended, normal bowel sounds, no masses or organomegaly    Extremities: no clubbing, 1+ edema    Skin:No rashes or nodules noted. Neurologic:negative         LABS:  Labs Reviewed   CBC WITH AUTO DIFFERENTIAL - Abnormal; Notable for the following components:       Result Value    RBC 3.98 (*)     Hemoglobin 11.2 (*)     Hematocrit 34.4 (*)     RDW 17.6 (*)     All other components within normal limits   COMPREHENSIVE METABOLIC PANEL W/ REFLEX TO MG FOR LOW K - Abnormal; Notable for the following components:    Sodium 131 (*)     Chloride 95 (*)     Glucose 108 (*)     Total Protein 6.0 (*)     ALT 48 (*)     AST 43 (*)     All other components within normal limits   BRAIN NATRIURETIC PEPTIDE - Abnormal; Notable for the following components:    Pro-BNP 1,237 (*)     All other components within normal limits   TROPONIN         IMAGING:  Imaging results from the ER have been reviewed in the computerized chart. XR CHEST PORTABLE    Result Date: 10/14/2022  EXAMINATION: ONE XRAY VIEW OF THE CHEST 10/14/2022 12:40 pm COMPARISON: 08/01/2022. HISTORY: ORDERING SYSTEM PROVIDED HISTORY: sob TECHNOLOGIST PROVIDED HISTORY: Reason for exam:->sob Reason for Exam: sob FINDINGS: The lungs are hyperexpanded without acute focal process. There is no effusion or pneumothorax. The cardiomediastinal silhouette is stable. The osseous structures are stable. Stable COPD. CT CHEST PULMONARY EMBOLISM W CONTRAST    Result Date: 10/14/2022  EXAMINATION: CTA OF THE CHEST 10/14/2022 1:38 pm TECHNIQUE: CTA of the chest was performed after the administration of intravenous contrast.  Multiplanar reformatted images are provided for review. MIP images are provided for review.  Automated exposure control, iterative the medicines he is supposed to be on. Was recently enrolled in Hospice, but HE HAS REVOKED THIS AND WANTS ALL AGGRESSIVE [place]  Plan: Place on telemetry floor. Start on iv diuretics. Monitor strict I/o. Check ECHO. Cards asked to see. Trend cardiac enzymes. Active Problems:    GERD (gastroesophageal reflux disease) -Established problem. Stable. Plan: cont on ppi    Anxiety -Established problem. Stable. Plan: stay on home meds    Essential hypertension -Established problem. Stable. 151/82  Plan: Pt home BP meds reviewed and will be continued. IV Hydralazine ordered for control of extremely high blood pressures. Will monitor labs to assess Creat/K for possible complications of medications. COPD, very severe ([facility]  Plan: cont inhale tx        Diagnoses as listed above, designated as new or established and plan outlined for each. Data Reviewed:   (1) Lab tests were reviewed or ordered. (1) Radiology tests were reviewed or ordered. (1) Medical test (Echo, EKG, PFT/joseph) were ordered. (1)History was not obtained from someone other than patient  (1) Old records were reviewed - see HPI/MDM for pertinent details if review done. (2) Case was discussed with another health care provider: Dr Juliet Ross  (2) Imaging was viewed by myself. (2) EKG  was viewed by myself. The patient is being placed in inpatient status with the expectation of requiring a hospital stay spanning at least two midnights for care and treatment of the problems noted in the problem list.  This determination is also based on thepatients comorbidities and past medical history, the severity and timing of the signs and symptoms upon presentation.     (Please note that portions of this note were completed with a voice recognition program.  Efforts were made to edit the dictations but occasionally words are mis-transcribed.)      Electronically signed by: Ken Jack MD 10/14/2022

## 2022-10-14 NOTE — ED PROVIDER NOTES
Patient was seen and evaluated independently by CARO. I was immediately available for consultation if needed. This note is provided for EKG interpretation only.     EKG:    EKG was reviewed by emergency department physician in the absence of a cardiologist    Narrow complex sinus rhythm, rate 59, normal axis, normal OR and QRS intervals, normal Qtc, no ST elevations or depressions, TWI aVL, impression sinus bradycardia with nonspecific t wave morphology, no STEMI, no significant change from comparison 8/1/2022       [address] [place] [address],   10/14/22 [id number]

## 2022-10-14 NOTE — ED PROVIDER NOTES
[address] [facility]        Pt Name: Xochitl Bajwa  MRN: [id number]  [place] 1950  Date of evaluation: 10/14/2022  Provider: GATO Young  PCP: Juju Chavez III, DO  Note Started: 2:13 PM EDT       CARO. I have evaluated this patient. My supervising physician was available for consultation. CHIEF COMPLAINT       Chief Complaint   Patient presents with    Shortness of Breath     From home via EMS cc shortness of breath, worse of exertion. This has been worsening over the last week. EMS reports 94% on RA, down to low 80% after minor exertion. Patient 95% SPO2 on room air at triage. HISTORY OF PRESENT ILLNESS   (Location, Timing/Onset, Context/Setting, Quality, Duration, Modifying Factors, Severity, Associated Signs and Symptoms)  Note limiting factors. Chief Complaint: RHONA Bajwa is a 71 y.o. male with past medical history of COPD, CHF and bronchitis who presents to the ED with complaint of shortness of breath. Patient arrives from home by EMS with complaints of shortness of breath. She is worsened with exertion. Patient states been ongoing for the past week. He states he has a history of COPD and congestive heart failure. Patient states he took himself off of his water pill Lasix a couple weeks ago because he states he did not like having to wake up and urinate throughout the night. He apparently has been enrolled in hospice but states he rescinded hospice and is DNR so he can come to the emergency department and be treated. Patient states he has some chest tightness. He denies any specific chest pain. Denies cough or hemoptysis. Patient states he was written a prescription for Eliquis but states he has not gotten it filled because he states of medication cost.  He denies abdominal pain, nausea/vomiting, urinary symptoms or changes in bowel movements. He does notice some swelling to his legs.   He denies any orthopnea. Denies any calf pain. Denies fever or chills. Denies rashes or lesions. Nursing Notes were all reviewed and agreed with or any disagreements were addressed in the HPI. REVIEW OF SYSTEMS    (2-9 systems for level 4, 10 or more for level 5)     Review of Systems   Constitutional:  Positive for activity change and fatigue. Negative for appetite change, chills, diaphoresis and fever. Eyes:  Negative for photophobia and visual disturbance. Respiratory:  Positive for chest tightness and shortness of breath. Negative for cough. Cardiovascular:  Positive for leg swelling. Negative for chest pain and palpitations. Gastrointestinal:  Negative for abdominal pain, constipation, diarrhea, nausea and vomiting. Genitourinary:  Negative for decreased urine volume, difficulty urinating, dysuria, flank pain, frequency, hematuria and urgency. Musculoskeletal:  Negative for arthralgias, back pain, myalgias, neck pain and neck stiffness. Skin:  Negative for color change, pallor, rash and wound. Neurological:  Positive for weakness. Negative for dizziness, tremors, seizures, syncope, speech difficulty, light-headedness, numbness and headaches. Positives and Pertinent negatives as per HPI. Except as noted above in the ROS, all other systems were reviewed and negative.        PAST MEDICAL HISTORY     Past Medical History:   Diagnosis Date    Bronchitis     CHF (congestive heart failure) (HCC)     COPD (chronic obstructive pulmonary disease) ([facility] [address]          SURGICAL HISTORY     Past Surgical History:   Procedure Laterality Date    CHOLECYSTECTOMY, LAPAROSCOPIC N/A 3/17/2021    LAPAROSCOPIC CHOLECYSTECTOMY WITH INTRAOPERATIVE CHOLANGIOGRAM performed by Jose G Ann MD at [address] [place] [address]      bilateral cataracts    FEMORAL BYPASS Left 6/1/2021    LEFT FEMORAL TO POPLITEAL BYPASS GRAFT ([id number]) performed by Inyd Whelan MD at [address] TONSILLECTOMY           CURRENTMEDICATIONS       Previous Medications    AMIODARONE (CORDARONE) 200 MG TABLET    Take 200 mg by mouth in the morning and 200 mg before bedtime. APIXABAN (ELIQUIS) 5 MG TABS TABLET    Take 1 tablet by mouth 2 times daily    FUROSEMIDE (LASIX) 20 MG TABLET    Take 1 tablet by mouth daily    IPRATROPIUM-ALBUTEROL (DUONEB) 0.5-2.5 (3) MG/3ML SOLN NEBULIZER SOLUTION    1 vial inh Q6hrs for next 10 days and then 1 vial inh Q4hrs PRN SOB or wheezing    LORAZEPAM (ATIVAN) 0.5 MG TABLET    Take 0.5 mg by mouth every 6 hours as needed for Anxiety. METOPROLOL SUCCINATE (TOPROL XL) 100 MG EXTENDED RELEASE TABLET    Take 100 mg by mouth in the morning. MORPHINE SULFATE (MORPHINE 20 MG/ML ORAL SOLN 0.25 ML, SMALL, CMPD)    Take 1 drop by mouth every 3-4 hours as needed. ONDANSETRON (ZOFRAN) 4 MG TABLET    Take 1 tablet by mouth daily as needed for Nausea or Vomiting    OXYGEN    Inhale 3 L into the lungs as needed     PANTOPRAZOLE (PROTONIX) 40 MG TABLET    Take 1 tablet by mouth every morning (before breakfast)    PREDNISONE (DELTASONE) 10 MG TABLET    Take 4 tabs PO QD for 3 days, then 3 tabs PO QD for 3 days, then 2 tabs PO QD for 3 days, then 1 tab PO QD for 3 days    PREDNISONE (DELTASONE) 5 MG TABLET    Take 5 mg by mouth in the morning. SACUBITRIL-VALSARTAN (ENTRESTO)  MG PER TABLET    Take 1 tablet by mouth in the morning and 1 tablet before bedtime. ALLERGIES     Codeine    FAMILYHISTORY       Family History   Problem Relation Age of Onset    Heart Disease Mother     No Known Problems Father           SOCIAL HISTORY       Social History     Tobacco Use    Smoking status: Former     Packs/day: 1.00     Years: 30.00     Pack years: 30.00     Types: Cigarettes     Quit date: 2016     Years since quittin.1    Smokeless tobacco: Former     Quit date: 2016   Vaping Use    Vaping Use: Never used   Substance Use Topics    Alcohol use:  Yes     Alcohol/week: 1.0 - 3.0 standard drink     Types: 1 - 3 Cans of beer per week     Comment: daily    Drug use: No       SCREENINGS    [place] Coma Scale  Eye Opening: Spontaneous  Best Verbal Response: Oriented  Best Motor Response: Obeys commands  [place] Coma Scale Score: 15        PHYSICAL EXAM    (up to 7 for level 4, 8 or more for level 5)     ED Triage Vitals [10/14/22 1218]   BP Temp Temp Source Heart Rate Resp SpO2 Height Weight   (!) 151/82 98.5 °F (36.9 °C) Oral 59 14 -- 5' 11\" (1.803 m) 149 lb 12.8 oz (67.9 kg)       Physical Exam  Constitutional:       General: He is not in acute distress. Appearance: He is well-developed. He is not ill-appearing, toxic-appearing or diaphoretic. HENT:      Head: Normocephalic and atraumatic. Right Ear: External ear normal.      Left Ear: External ear normal.   Eyes:      General:         Right eye: No discharge. Left eye: No discharge. Cardiovascular:      Rate and Rhythm: Normal rate and regular rhythm. Pulses: Normal pulses. Heart sounds: Normal heart sounds. No murmur heard. No friction rub. No gallop. Comments: 2+ radial pulses bilaterally. 2+ pitting edema to the bilateral lower extremities. No calf tenderness. No JVD. Pulmonary:      Effort: Respiratory distress present. Breath sounds: Normal breath sounds. No stridor. No wheezing, rhonchi or rales. Comments: Tachypnea noted. Diminished aeration to bilateral lung fields with crackles to the bibasilar lungs. No significant wheezing noted. Chest:      Chest wall: No tenderness. Abdominal:      General: Abdomen is flat. There is no distension. Palpations: Abdomen is soft. There is no mass. Tenderness: There is no abdominal tenderness. There is no right CVA tenderness, left CVA tenderness, guarding or rebound. Hernia: No hernia is present. Musculoskeletal:         General: Normal range of motion. Cervical back: Normal range of motion and neck supple.  No rigidity or tenderness. Lymphadenopathy:      Cervical: No cervical adenopathy. Skin:     General: Skin is warm and dry. Coloration: Skin is not pale. Findings: No erythema or rash. Neurological:      Mental Status: He is alert and oriented to person, place, and time. Psychiatric:         Behavior: Behavior normal.       DIAGNOSTIC RESULTS   LABS:    Labs Reviewed   CBC WITH AUTO DIFFERENTIAL - Abnormal; Notable for the following components:       Result Value    RBC 3.98 (*)     Hemoglobin 11.2 (*)     Hematocrit 34.4 (*)     RDW 17.6 (*)     All other components within normal limits   COMPREHENSIVE METABOLIC PANEL W/ REFLEX TO MG FOR LOW K - Abnormal; Notable for the following components:    Sodium 131 (*)     Chloride 95 (*)     Glucose 108 (*)     Total Protein 6.0 (*)     ALT 48 (*)     AST 43 (*)     All other components within normal limits   BRAIN NATRIURETIC PEPTIDE - Abnormal; Notable for the following components:    Pro-BNP 1,237 (*)     All other components within normal limits   TROPONIN       When ordered only abnormal lab results are displayed. All other labs were within normal range or not returned as of this dictation. EKG: When ordered, EKG's are interpreted by the Emergency Department Physician in the absence of a cardiologist.  Please see their note for interpretation of EKG. RADIOLOGY:   Non-plain film images such as CT, Ultrasound and MRI are read by the radiologist. Plain radiographic images are visualized and preliminarily interpreted by the ED Provider with the below findings:        Interpretation per the Radiologist below, if available at the time of this note:    CT CHEST PULMONARY EMBOLISM W CONTRAST   Final Result   1. Negative for pulmonary embolus. 2. Emphysema with trace left pleural effusion. XR CHEST PORTABLE   Final Result   Stable COPD.            XR CHEST PORTABLE    Result Date: 10/14/2022  EXAMINATION: ONE XRAY VIEW OF THE CHEST 10/14/2022 12:40 pm COMPARISON: 08/01/2022. HISTORY: ORDERING SYSTEM PROVIDED HISTORY: sob TECHNOLOGIST PROVIDED HISTORY: Reason for exam:->sob Reason for Exam: sob FINDINGS: The lungs are hyperexpanded without acute focal process. There is no effusion or pneumothorax. The cardiomediastinal silhouette is stable. The osseous structures are stable. Stable COPD. CT CHEST PULMONARY EMBOLISM W CONTRAST    Result Date: 10/14/2022  EXAMINATION: CTA OF THE CHEST 10/14/2022 1:38 pm TECHNIQUE: CTA of the chest was performed after the administration of intravenous contrast.  Multiplanar reformatted images are provided for review. MIP images are provided for review. Automated exposure control, iterative reconstruction, and/or weight based adjustment of the mA/kV was utilized to reduce the radiation dose to as low as reasonably achievable. COMPARISON: 08/01/2022 HISTORY: ORDERING SYSTEM PROVIDED HISTORY: sob - off anticoag TECHNOLOGIST PROVIDED HISTORY: Reason for exam:->sob - off anticoag Decision Support Exception - unselect if not a suspected or confirmed emergency medical condition->Emergency Medical Condition (MA) Reason for Exam: sob - off anticoag FINDINGS: Pulmonary Arteries: Pulmonary arteries are adequately opacified for evaluation. No evidence of intraluminal filling defect to suggest pulmonary embolism. Main pulmonary artery is normal in caliber. Mediastinum: No evidence of mediastinal lymphadenopathy. The heart and pericardium demonstrate no acute abnormality. There is no acute abnormality of the thoracic aorta. Lungs/pleura: There are mild to moderate bilateral emphysematous changes. There is no lobar consolidation, pneumothorax or suspicious parenchymal mass. There is a trace left pleural effusion. Upper Abdomen: Limited images of the upper abdomen are unremarkable. Soft Tissues/Bones: No acute bone or soft tissue abnormality. 1. Negative for pulmonary embolus. 2. Emphysema with trace left pleural effusion. PROCEDURES   Unless otherwise noted below, none     Procedures    CRITICAL CARE TIME       CONSULTS:  IP CONSULT TO INTERNAL MEDICINE      EMERGENCY DEPARTMENT COURSE and DIFFERENTIAL DIAGNOSIS/MDM:   Vitals:    Vitals:    10/14/22 1218   BP: (!) 151/82   Pulse: 59   Resp: 14   Temp: 98.5 °F (36.9 °C)   TempSrc: Oral   Weight: 149 lb 12.8 oz (67.9 kg)   Height: 5' 11\" (1.803 m)       Patient was given the following medications:  Medications   methylPREDNISolone sodium (SOLU-MEDROL) injection 125 mg (has no administration in time range)   albuterol (PROVENTIL) nebulizer solution 5 mg (has no administration in time range)   ipratropium-albuterol (DUONEB) nebulizer solution 1 ampule (has no administration in time range)   furosemide (LASIX) injection 40 mg (has no administration in time range)   iopamidol (ISOVUE-370) 76 % injection 75 mL (75 mLs IntraVENous Given 10/14/22 1338)         Is this patient to be included in the SEP-1 Core Measure due to severe sepsis or septic shock? No   Exclusion criteria - the patient is NOT to be included for SEP-1 Core Measure due to: Infection is not suspected    Patient is a 18-year-old male who presents to the ED with complaint of shortness of breath. apparently he was involved in hospice but sounds like he does not fully understand hospice care/goals and came to the emergency department for further evaluation and treatment. He and family rescinded his DNR and hospice care so that he could be treated here in the emergency department. Patient states he does want full care at this time and states he would want intubation and CPR \"if it was needed. \"  On exam he appears to be suffering from some fluid overload. He is satting at 90 to 93% on room air but with any exertion or movement he drops into the 80s. He has some pitting pedal edema and sound like he has some crackles and diminished aeration to the bilateral lungs. IV established and blood work obtained.   CBC showed normal white count and platelets. Hemoglobin 11.2. CMP relatively unremarkable. Troponin normal.  BNP 1200. EKG inter by attending. Chest x-ray shows stable COPD. CT of the chest obtained given patient's dyspnea upon exertion with hypoxia and the fact that he states he has not filled his Eliquis or anticoagulation. CT showed no evidence of pulm embolism. There is a trace left pleural effusion noted. Clinically he appears to have some fluid overload with elevated BNP and did order some Lasix here in the emergency department. May also have component of his COPD and he was given some steroids and some breathing treatments here in the emergency department. Given the fact that he becomes hypoxic with minimal exertion/movement believe he would be a poor candidate for outpatient management especially given the fact that he/family have rescinded his DNR/hospice care. Case was discussed with Dr. Migdalia Becerril for admission. FINAL IMPRESSION      1. Shortness of breath          DISPOSITION/PLAN   DISPOSITION Decision To Admit 10/14/2022 02:11:38 PM      PATIENT REFERRED TO:  No follow-up provider specified.     DISCHARGE MEDICATIONS:  New Prescriptions    No medications on file       DISCONTINUED MEDICATIONS:  Discontinued Medications    No medications on file              (Please note that portions of this note were completed with a voice recognition program.  Efforts were made to edit the dictations but occasionally words are mis-transcribed.)    GATO Reyes (electronically signed)           GATO Stewart  10/14/22 [id number]

## 2022-10-15 PROBLEM — I50.43 ACUTE ON CHRONIC COMBINED SYSTOLIC AND DIASTOLIC CHF (CONGESTIVE HEART FAILURE) (HCC): Status: ACTIVE | Noted: 2022-10-15

## 2022-10-15 PROBLEM — I48.0 PAROXYSMAL A-FIB (HCC): Status: ACTIVE | Noted: 2022-10-15

## 2022-10-15 LAB
A/G RATIO: 1.5 (ref 1.1–2.2)
ALBUMIN SERPL-MCNC: 3.5 G/DL (ref 3.4–5)
ALP BLD-CCNC: 64 U/L (ref 40–129)
ALT SERPL-CCNC: 41 U/L (ref 10–40)
ANION GAP SERPL CALCULATED.3IONS-SCNC: 6 MMOL/L (ref 3–16)
AST SERPL-CCNC: 32 U/L (ref 15–37)
BASOPHILS ABSOLUTE: 0 K/UL (ref 0–0.2)
BASOPHILS RELATIVE PERCENT: 0.4 %
BILIRUB SERPL-MCNC: 0.3 MG/DL (ref 0–1)
BUN BLDV-MCNC: 15 MG/DL (ref 7–20)
CALCIUM SERPL-MCNC: 8.7 MG/DL (ref 8.3–10.6)
CHLORIDE BLD-SCNC: 97 MMOL/L (ref 99–110)
CHOLESTEROL, TOTAL: 197 MG/DL (ref 0–199)
CO2: 33 MMOL/L (ref 21–32)
CREAT SERPL-MCNC: 1.1 MG/DL (ref 0.8–1.3)
D DIMER: 1.72 UG/ML FEU (ref 0–0.6)
EOSINOPHILS ABSOLUTE: 0 K/UL (ref 0–0.6)
EOSINOPHILS RELATIVE PERCENT: 0 %
GFR AFRICAN AMERICAN: >60
GFR NON-AFRICAN AMERICAN: >60
GLUCOSE BLD-MCNC: 174 MG/DL (ref 70–99)
HCT VFR BLD CALC: 33.8 % (ref 40.5–52.5)
HDLC SERPL-MCNC: 84 MG/DL (ref 40–60)
HEMOGLOBIN: 11 G/DL (ref 13.5–17.5)
LDL CHOLESTEROL CALCULATED: ABNORMAL MG/DL
LDL CHOLESTEROL DIRECT: 105 MG/DL
LYMPHOCYTES ABSOLUTE: 0.5 K/UL (ref 1–5.1)
LYMPHOCYTES RELATIVE PERCENT: 6.7 %
MAGNESIUM: 2.1 MG/DL (ref 1.8–2.4)
MCH RBC QN AUTO: 27.8 PG (ref 26–34)
MCHC RBC AUTO-ENTMCNC: 32.5 G/DL (ref 31–36)
MCV RBC AUTO: 85.6 FL (ref 80–100)
MONOCYTES ABSOLUTE: 0.1 K/UL (ref 0–1.3)
MONOCYTES RELATIVE PERCENT: 0.8 %
NEUTROPHILS ABSOLUTE: 6.8 K/UL (ref 1.7–7.7)
NEUTROPHILS RELATIVE PERCENT: 92.1 %
PDW BLD-RTO: 17.7 % (ref 12.4–15.4)
PLATELET # BLD: 298 K/UL (ref 135–450)
PMV BLD AUTO: 7.9 FL (ref 5–10.5)
POTASSIUM SERPL-SCNC: 4.9 MMOL/L (ref 3.5–5.1)
RBC # BLD: 3.95 M/UL (ref 4.2–5.9)
SODIUM BLD-SCNC: 136 MMOL/L (ref 136–145)
T4 FREE: 1 NG/DL (ref 0.9–1.8)
TOTAL PROTEIN: 5.8 G/DL (ref 6.4–8.2)
TRIGL SERPL-MCNC: 28 MG/DL (ref 0–150)
TSH SERPL DL<=0.05 MIU/L-ACNC: 12.86 UIU/ML (ref 0.27–4.2)
VLDLC SERPL CALC-MCNC: ABNORMAL MG/DL
WBC # BLD: 7.4 K/UL (ref 4–11)

## 2022-10-15 PROCEDURE — 80053 COMPREHEN METABOLIC PANEL: CPT

## 2022-10-15 PROCEDURE — 99223 1ST HOSP IP/OBS HIGH 75: CPT | Performed by: INTERNAL MEDICINE

## 2022-10-15 PROCEDURE — 85379 FIBRIN DEGRADATION QUANT: CPT

## 2022-10-15 PROCEDURE — 2060000000 HC ICU INTERMEDIATE R&B

## 2022-10-15 PROCEDURE — 85025 COMPLETE CBC W/AUTO DIFF WBC: CPT

## 2022-10-15 PROCEDURE — 80061 LIPID PANEL: CPT

## 2022-10-15 PROCEDURE — 6370000000 HC RX 637 (ALT 250 FOR IP): Performed by: INTERNAL MEDICINE

## 2022-10-15 PROCEDURE — 83735 ASSAY OF MAGNESIUM: CPT

## 2022-10-15 PROCEDURE — 36415 COLL VENOUS BLD VENIPUNCTURE: CPT

## 2022-10-15 PROCEDURE — 94761 N-INVAS EAR/PLS OXIMETRY MLT: CPT

## 2022-10-15 PROCEDURE — 2700000000 HC OXYGEN THERAPY PER DAY

## 2022-10-15 PROCEDURE — 6360000002 HC RX W HCPCS: Performed by: INTERNAL MEDICINE

## 2022-10-15 PROCEDURE — 94640 AIRWAY INHALATION TREATMENT: CPT

## 2022-10-15 PROCEDURE — 2580000003 HC RX 258: Performed by: INTERNAL MEDICINE

## 2022-10-15 RX ORDER — TRAMADOL HYDROCHLORIDE 50 MG/1
100 TABLET ORAL EVERY 6 HOURS PRN
Status: DISCONTINUED | OUTPATIENT
Start: 2022-10-15 | End: 2022-10-17 | Stop reason: HOSPADM

## 2022-10-15 RX ADMIN — IPRATROPIUM BROMIDE AND ALBUTEROL SULFATE 1 AMPULE: 2.5; .5 SOLUTION RESPIRATORY (INHALATION) at 09:02

## 2022-10-15 RX ADMIN — SODIUM CHLORIDE, PRESERVATIVE FREE 10 ML: 5 INJECTION INTRAVENOUS at 13:31

## 2022-10-15 RX ADMIN — FUROSEMIDE 40 MG: 10 INJECTION, SOLUTION INTRAMUSCULAR; INTRAVENOUS at 08:42

## 2022-10-15 RX ADMIN — METOPROLOL SUCCINATE 100 MG: 50 TABLET, EXTENDED RELEASE ORAL at 08:42

## 2022-10-15 RX ADMIN — TRAMADOL HYDROCHLORIDE 100 MG: 50 TABLET, COATED ORAL at 18:25

## 2022-10-15 RX ADMIN — AMIODARONE HYDROCHLORIDE 200 MG: 200 TABLET ORAL at 08:42

## 2022-10-15 RX ADMIN — PANTOPRAZOLE SODIUM 40 MG: 40 TABLET, DELAYED RELEASE ORAL at 05:56

## 2022-10-15 RX ADMIN — Medication 10 ML: at 20:32

## 2022-10-15 RX ADMIN — ACETAMINOPHEN 650 MG: 325 TABLET ORAL at 15:57

## 2022-10-15 RX ADMIN — Medication 10 ML: at 08:42

## 2022-10-15 RX ADMIN — SACUBITRIL AND VALSARTAN 1 TABLET: 97; 103 TABLET, FILM COATED ORAL at 08:42

## 2022-10-15 RX ADMIN — FUROSEMIDE 40 MG: 10 INJECTION, SOLUTION INTRAMUSCULAR; INTRAVENOUS at 20:31

## 2022-10-15 RX ADMIN — IPRATROPIUM BROMIDE AND ALBUTEROL SULFATE 1 AMPULE: 2.5; .5 SOLUTION RESPIRATORY (INHALATION) at 21:37

## 2022-10-15 RX ADMIN — FUROSEMIDE 40 MG: 10 INJECTION, SOLUTION INTRAMUSCULAR; INTRAVENOUS at 13:31

## 2022-10-15 RX ADMIN — AMIODARONE HYDROCHLORIDE 200 MG: 200 TABLET ORAL at 20:31

## 2022-10-15 RX ADMIN — SACUBITRIL AND VALSARTAN 1 TABLET: 97; 103 TABLET, FILM COATED ORAL at 20:31

## 2022-10-15 ASSESSMENT — PAIN DESCRIPTION - FREQUENCY: FREQUENCY: INTERMITTENT

## 2022-10-15 ASSESSMENT — PAIN SCALES - GENERAL
PAINLEVEL_OUTOF10: 5
PAINLEVEL_OUTOF10: 4
PAINLEVEL_OUTOF10: 4

## 2022-10-15 ASSESSMENT — PAIN DESCRIPTION - ORIENTATION
ORIENTATION: LEFT

## 2022-10-15 ASSESSMENT — PAIN DESCRIPTION - LOCATION
LOCATION: RIB CAGE
LOCATION: FLANK
LOCATION: RIB CAGE

## 2022-10-15 ASSESSMENT — PAIN DESCRIPTION - DESCRIPTORS
DESCRIPTORS: SHARP
DESCRIPTORS: SHOOTING;STABBING

## 2022-10-15 NOTE — PLAN OF CARE
Problem: Discharge Planning  Goal: Discharge to home or other facility with appropriate resources  Outcome: Progressing  Flowsheets (Taken 10/14/2022 1950)  Discharge to home or other facility with appropriate resources: Identify barriers to discharge with patient and caregiver     Problem: Safety - Adult  Goal: Free from fall injury  Outcome: Progressing

## 2022-10-15 NOTE — PLAN OF CARE
Problem: Discharge Planning  Goal: Discharge to home or other facility with appropriate resources  10/15/2022 1017 by Sunshine Gonzales RN  Outcome: Progressing       Problem: Safety - Adult  Goal: Free from fall injury  10/15/2022 1017 by Sunshine Gonzales RN  Outcome: Progressing

## 2022-10-15 NOTE — PROGRESS NOTES
Progress Note - Dr. Afshin Harris - Internal Medicine  PCP: DO Leigh Ann Gauthier / [place] [id number] Day: 1  Code Status: Full Code  Current Diet: ADULT DIET; Regular; Low Sodium (2 gm)        CC: follow up on medical issues    Subjective:   Kristan Holguin is a 71 y.o. male. Pt seen and examined  Chart reviewed since last visit, labs and imaging below      Doing ok  Still sob  Does not seem to understand hospice  At this point, he confirms that he revokes it         Review of Systems: (1 system for EPF, 2-9 for detailed, 10+ for comprehensive)  Constitutional: Negative for chills and fever. HENT: Negative for dental problem, nosebleeds and rhinorrhea. Eyes: Negative for photophobia and visual disturbance. Respiratory: Negative for cough, chest tightness and positive for shortness of breath. Cardiovascular: Negative for chest pain and leg swelling. Gastrointestinal: Negative for diarrhea, nausea and vomiting. Endocrine: Negative for polydipsia and polyphagia. Genitourinary: Negative for frequency, hematuria and urgency. Musculoskeletal: Negative for back pain and myalgias. Skin: Negative for rash. Allergic/Immunologic: Negative for food allergies. Neurological: Negative for dizziness, seizures, syncope and facial asymmetry. Hematological: Negative for adenopathy. Psychiatric/Behavioral: Negative for dysphoric mood. The patient is not nervous/anxious. I have reviewed the patient's medical and social history in detail and updated the computerized patient record. To recap: He  has a past medical history of Bronchitis, CHF (congestive heart failure) ([facility] and COPD (chronic obstructive pulmonary disease) ([facility] . He  has a past surgical history that includes fracture surgery; Tonsillectomy; eye surgery; Cholecystectomy, laparoscopic (N/A, 3/17/2021); and femoral bypass (Left, 6/1/2021). Brent Shields  He  reports that he quit smoking about 6 years ago. His smoking use included cigarettes. He has a 30.00 pack-year smoking history. He quit smokeless tobacco use about 6 years ago. He reports current alcohol use of about 1.0 - 3.0 standard drink per week. He reports that he does not use drugs. .        Active Hospital Problems    Diagnosis Date Noted    Acute respiratory failure ([facility] [J96.00] 10/14/2022     Priority: Medium    GERD (gastroesophageal reflux disease) [K21.9] 08/01/2022     Priority: Medium    Anxiety [F41.9] 08/01/2022     Priority: Medium    COPD, very severe ([facility] [J44.9] 12/18/2017    Essential hypertension [I10] 11/24/2016    Chronic systolic congestive heart failure ([facility] [I50.22] 10/04/2016       Current Facility-Administered Medications: amiodarone (CORDARONE) tablet 200 mg, 200 mg, Oral, BID  pantoprazole (PROTONIX) tablet 40 mg, 40 mg, Oral, QAM AC  sacubitril-valsartan (ENTRESTO)  MG per tablet 1 tablet, 1 tablet, Oral, BID  metoprolol succinate (TOPROL XL) extended release tablet 100 mg, 100 mg, Oral, Daily  perflutren lipid microspheres (DEFINITY) injection 1.65 mg, 1.5 mL, IntraVENous, ONCE PRN  sodium chloride flush 0.9 % injection 5-40 mL, 5-40 mL, IntraVENous, 2 times per day  sodium chloride flush 0.9 % injection 10 mL, 10 mL, IntraVENous, PRN  0.9 % sodium chloride infusion, , IntraVENous, PRN  ondansetron (ZOFRAN-ODT) disintegrating tablet 4 mg, 4 mg, Oral, Q8H PRN **OR** ondansetron (ZOFRAN) injection 4 mg, 4 mg, IntraVENous, Q6H PRN  magnesium hydroxide (MILK OF MAGNESIA) 400 MG/5ML suspension 30 mL, 30 mL, Oral, Daily PRN  acetaminophen (TYLENOL) tablet 650 mg, 650 mg, Oral, Q6H PRN **OR** acetaminophen (TYLENOL) suppository 650 mg, 650 mg, Rectal, Q6H PRN  furosemide (LASIX) injection 40 mg, 40 mg, IntraVENous, TID  hydrALAZINE (APRESOLINE) injection 10 mg, 10 mg, IntraVENous, Q6H PRN  0.9 % sodium chloride bolus, 500 mL, IntraVENous, PRN  potassium chloride (KLOR-CON M) extended release tablet 40 mEq, 40 mEq, Oral, PRN **OR** potassium bicarb-citric acid (EFFER-K) effervescent tablet 40 mEq, 40 mEq, Oral, PRN **OR** potassium chloride 10 mEq/100 mL IVPB (Peripheral Line), 10 mEq, IntraVENous, PRN  ipratropium-albuterol (DUONEB) nebulizer solution 1 ampule, 1 ampule, Inhalation, Q4H PRN  ipratropium-albuterol (DUONEB) nebulizer solution 1 ampule, 1 ampule, Inhalation, BID         Objective:  /69   Pulse 76   Temp 98.4 °F (36.9 °C) (Oral)   Resp 17   Ht 5' 11\" (1.803 m)   Wt 142 lb 1.6 oz (64.5 kg)   SpO2 95%   BMI 19.82 kg/m²      Patient Vitals for the past 24 hrs:   BP Temp Temp src Pulse Resp SpO2 Height Weight   10/15/22 1015 -- -- -- 76 -- -- -- --   10/15/22 0902 -- -- -- -- -- 95 % -- --   10/15/22 0841 -- -- -- 72 -- -- -- --   10/15/22 0700 120/69 98.4 °F (36.9 °C) Oral 71 17 91 % -- --   10/15/22 0430 99/60 98.5 °F (36.9 °C) Oral 66 17 97 % -- 142 lb 1.6 oz (64.5 kg)   10/15/22 0215 (!) 102/58 98.3 °F (36.8 °C) Oral 68 17 97 % -- --   10/14/22 2043 -- -- -- 66 19 92 % -- --   10/14/22 1945 124/78 98.5 °F (36.9 °C) Oral 56 17 97 % -- --   10/14/22 1827 123/69 98.5 °F (36.9 °C) Oral 57 18 -- -- --   10/14/22 1815 123/69 98.5 °F (36.9 °C) Oral 57 18 95 % -- --   10/14/22 1456 117/79 -- -- 58 18 100 % -- --   10/14/22 1426 121/78 -- -- 57 17 100 % -- --   10/14/22 1326 132/73 -- -- 61 13 97 % -- --   10/14/22 1256 103/66 -- -- 60 23 94 % -- --   10/14/22 1226 (!) 142/75 -- -- 58 19 94 % -- --   10/14/22 1218 (!) 151/82 98.5 °F (36.9 °C) Oral 59 14 -- 5' 11\" (1.803 m) 149 lb 12.8 oz (67.9 kg)     Patient Vitals for the past 96 hrs (Last 3 readings):   Weight   10/15/22 0430 142 lb 1.6 oz (64.5 kg)   10/14/22 1218 149 lb 12.8 oz (67.9 kg)           Intake/Output Summary (Last 24 hours) at 10/15/2022 1112  Last data filed at 10/15/2022 0916  Gross per 24 hour   Intake 250 ml   Output 400 ml   Net -150 ml         Physical Exam: (2-7 system for EPF/Detailed, ?8 for Comprehensive)  /69   Pulse 76 Temp 98.4 °F (36.9 °C) (Oral)   Resp 17   Ht 5' 11\" (1.803 m)   Wt 142 lb 1.6 oz (64.5 kg)   SpO2 95%   BMI 19.82 kg/m²   Constitutional: vitals as above: alert, appears stated age and cooperative    Psychiatric: normal insight and judgment, oriented to person, place, time, and general circumstances    Head: Normocephalic, without obvious abnormality, atraumatic    Eyes:lids and lashes normal, conjunctivae and sclerae normal and pupils equal, round, reactive to light and accomodation    EMNT: external ears normal, nares midline    Neck: no carotid bruit, supple, symmetrical, trachea midline and thyroid not enlarged, symmetric, no tenderness/mass/nodules     Respiratory: crackles bilaterally with normal respiratory effort    Cardiovascular: normal rate, regular rhythm, normal S1 and S2 and no murmurs    Gastrointestinal: soft, non-tender, non-distended, normal bowel sounds, no masses or organomegaly    Extremities: no clubbing, no edema    Skin:No rashes or nodules noted. Neurologic:negative         Labs:  Lab Results   Component Value Date    WBC 7.4 10/15/2022    HGB 11.0 (L) 10/15/2022    HCT 33.8 (L) 10/15/2022     10/15/2022    CHOL 179 08/02/2022    TRIG 47 08/02/2022    HDL 67 (H) 08/02/2022    ALT 41 (H) 10/15/2022    AST 32 10/15/2022     10/15/2022    K 4.9 10/15/2022    CL 97 (L) 10/15/2022    CREATININE 1.1 10/15/2022    BUN 15 10/15/2022    CO2 33 (H) 10/15/2022    TSH 12.86 (H) 10/14/2022    INR 0.93 08/01/2022    LABMICR YES 05/25/2021     Lab Results   Component Value Date    TROPONINI <0.01 10/14/2022       Recent Imaging Results are Reviewed:  XR CHEST PORTABLE    Result Date: 10/14/2022  EXAMINATION: ONE XRAY VIEW OF THE CHEST 10/14/2022 12:40 pm COMPARISON: 08/01/2022. HISTORY: ORDERING SYSTEM PROVIDED HISTORY: sob TECHNOLOGIST PROVIDED HISTORY: Reason for exam:->sob Reason for Exam: sob FINDINGS: The lungs are hyperexpanded without acute focal process.   There is no effusion or pneumothorax. The cardiomediastinal silhouette is stable. The osseous structures are stable. Stable COPD. CT CHEST PULMONARY EMBOLISM W CONTRAST    Result Date: 10/14/2022  EXAMINATION: CTA OF THE CHEST 10/14/2022 1:38 pm TECHNIQUE: CTA of the chest was performed after the administration of intravenous contrast.  Multiplanar reformatted images are provided for review. MIP images are provided for review. Automated exposure control, iterative reconstruction, and/or weight based adjustment of the mA/kV was utilized to reduce the radiation dose to as low as reasonably achievable. COMPARISON: 08/01/2022 HISTORY: ORDERING SYSTEM PROVIDED HISTORY: sob - off anticoag TECHNOLOGIST PROVIDED HISTORY: Reason for exam:->sob - off anticoag Decision Support Exception - unselect if not a suspected or confirmed emergency medical condition->Emergency Medical Condition (MA) Reason for Exam: sob - off anticoag FINDINGS: Pulmonary Arteries: Pulmonary arteries are adequately opacified for evaluation. No evidence of intraluminal filling defect to suggest pulmonary embolism. Main pulmonary artery is normal in caliber. Mediastinum: No evidence of mediastinal lymphadenopathy. The heart and pericardium demonstrate no acute abnormality. There is no acute abnormality of the thoracic aorta. Lungs/pleura: There are mild to moderate bilateral emphysematous changes. There is no lobar consolidation, pneumothorax or suspicious parenchymal mass. There is a trace left pleural effusion. Upper Abdomen: Limited images of the upper abdomen are unremarkable. Soft Tissues/Bones: No acute bone or soft tissue abnormality. 1. Negative for pulmonary embolus. 2. Emphysema with trace left pleural effusion.        Lab Results   Component Value Date/Time    GLUCOSE 174 10/15/2022 04:22 AM     No results found for: POCGLU  /69   Pulse 76   Temp 98.4 °F (36.9 °C) (Oral)   Resp 17   Ht 5' 11\" (1.803 m)   Wt 142 lb 1.6 oz (64.5 kg)   SpO2 95%   BMI 19.82 kg/m²     Assessment and Plan:  Principal Problem:    Chronic systolic congestive heart failure ([address] -Established problem. Stable. Plan: cont iv lasix. Cont to trend in/out. Would need eval/education from cards  Active Problems:    GERD (gastroesophageal reflux disease) -Established problem. Stable. Plan: cont ppi    Anxiety -Established problem. Stable. Plan: Continue present orders/plan. Acute respiratory failure ([address] -Established problem. Stable. Still on 3L  Plan: cont oxygen. Cont to diurese    Essential hypertension -Established problem. Stable. 120/69  Plan: stay on meds    COPD, very severe ([address] -Established problem. Stable. Plan: Continue present orders/plan.        (Please note that portions of this note were completed with a voice recognition program.  Efforts were made to edit the dictations but occasionally words are mis-transcribed.)        George Benitez MD  10/15/2022

## 2022-10-15 NOTE — RT PROTOCOL NOTE
RT Inhaler-Nebulizer Bronchodilator Protocol Note    There is a bronchodilator order in the chart from a provider indicating to follow the RT Bronchodilator Protocol and there is an Initiate RT Inhaler-Nebulizer Bronchodilator Protocol order as well (see protocol at bottom of note). CXR Findings:  XR CHEST PORTABLE    Result Date: 10/14/2022  Stable COPD. The findings from the last RT Protocol Assessment were as follows:   History Pulmonary Disease: Chronic pulmonary disease  Respiratory Pattern: Dyspnea on exertion or RR 21-25 bpm  Breath Sounds: Slightly diminished and/or crackles  Cough: Strong, spontaneous, non-productive  Indication for Bronchodilator Therapy: Decreased or absent breath sounds, On home bronchodilators  Bronchodilator Assessment Score: 6    Aerosolized bronchodilator medication orders have been revised according to the RT Inhaler-Nebulizer Bronchodilator Protocol below. Respiratory Therapist to perform RT Therapy Protocol Assessment initially then follow the protocol. Repeat RT Therapy Protocol Assessment PRN for score 0-3 or on second treatment, BID, and PRN for scores above 3. No Indications - adjust the frequency to every 6 hours PRN wheezing or bronchospasm, if no treatments needed after 48 hours then discontinue using Per Protocol order mode. If indication present, adjust the RT bronchodilator orders based on the Bronchodilator Assessment Score as indicated below. Use Inhaler orders unless patient has one or more of the following: on home nebulizer, not able to hold breath for 10 seconds, is not alert and oriented, cannot activate and use MDI correctly, or respiratory rate 25 breaths per minute or more, then use the equivalent nebulizer order(s) with same Frequency and PRN reasons based on the score. If a patient is on this medication at home then do not decrease Frequency below that used at home.     0-3 - enter or revise RT bronchodilator order(s) to equivalent RT Bronchodilator order with Frequency of every 4 hours PRN for wheezing or increased work of breathing using Per Protocol order mode. 4-6 - enter or revise RT Bronchodilator order(s) to two equivalent RT bronchodilator orders with one order with BID Frequency and one order with Frequency of every 4 hours PRN wheezing or increased work of breathing using Per Protocol order mode. 7-10 - enter or revise RT Bronchodilator order(s) to two equivalent RT bronchodilator orders with one order with TID Frequency and one order with Frequency of every 4 hours PRN wheezing or increased work of breathing using Per Protocol order mode. 11-13 - enter or revise RT Bronchodilator order(s) to one equivalent RT bronchodilator order with QID Frequency and an Albuterol order with Frequency of every 4 hours PRN wheezing or increased work of breathing using Per Protocol order mode. Greater than 13 - enter or revise RT Bronchodilator order(s) to one equivalent RT bronchodilator order with every 4 hours Frequency and an Albuterol order with Frequency of every 2 hours PRN wheezing or increased work of breathing using Per Protocol order mode. RT to enter RT Home Evaluation for COPD & MDI Assessment order using Per Protocol order mode.   Pt. States he takes hhns bid and q4prn at home  Electronically signed by Giuseppe Caputo RCP on 10/14/2022 at 8:50 PM

## 2022-10-15 NOTE — CONSULTS
[facility]  Advanced CHF/Pulmonary Hypertension   Cardiac Evaluation      Armand Cordoba  YOB: 1950    Requesting PHysician:  Dr. Bora Maldonado      Chief Complaint   Patient presents with    Shortness of Breath     From home via EMS cc shortness of breath, worse of exertion. This has been worsening over the last week. EMS reports 94% on RA, down to low 80% after minor exertion. Patient 95% SPO2 on room air at triage. History of Present Illness:  Jacob Irene is a 72 yo male with a PMH of COPD, chronic systolic HF, and bronchitis who presented to the ED with shortness of breath. SOB is worsened with exertion. This had been going on for about a week. He says he took himself off of his water pill lasix a few weeks ago because he was told to take it at night and he didn't want to be up all night. He apparently has been enrolled in hospice since he returned from [place] this summer. He also admits to some chest tightness. Denies chest pain. Denies cough or hemoptysis. He has not started Eliquis due to cost.  Denies abdominal pain, nausea/vomiting, urinary symptoms, or changes in bowel movements. He has had some swelling in his legs. Denies orthopnea. No calf pain. No fever or chills. He has COPD, NICM with improved LVEF. He had a hospital admit in [place] for new onset AF/RVR, was cardioverted and started on amiodarone. Was told then that his EF was 10-15% and was discharged in hospice care. However, since then, he had an echo in our office showing LVEF 60-65%. We are consulted for management of HF. He has elevated BNP with negative troponin. He was not taking lasix prior to admission. Labs:  Sodium 136  K 4.9  BUN/cre 15/1.1  Bnp 1237   Troponin < 0.01  H/H 11.0/33.8    CXR:     Impression   Stable COPD. Echo:  6/14/22:   Summary   Technically difficult examination due to body habitus and limited windows.    Left ventricular systolic function is normal with ejection fraction   estimated at 60-65 %. No regional wall motion abnormalities are noted. Normal left ventricular wall thickness. Aortic valve appears sclerotic but opens adequately. Mild mitral regurgitation. Moderate tricuspid regurgitation. Systolic pulmonary artery pressure (SPAP) estimated at 45 mmHg (right atrial   pressure 3 mmHg), consistent with mild pulmonary hypertension. Chest CT:  Impression   1. Negative for pulmonary embolus. 2. Emphysema with trace left pleural effusion.          EKG:  sinus bradycardia    Meds prior to admission:  Toprol xl 100 qd  Entresto 97/103 bid  Lasix 20 qd  Amiodarone 200 bid  Eliquis 5 bid (not taking)    I/O's negative 150 cc  Allergies   Allergen Reactions    Codeine Anxiety and Other (See Comments)     Unknown reaction     Current Facility-Administered Medications   Medication Dose Route Frequency Provider Last Rate Last Admin    amiodarone (CORDARONE) tablet 200 mg  200 mg Oral BID Criselda Land MD   200 mg at 10/15/22 0842    pantoprazole (PROTONIX) tablet 40 mg  40 mg Oral QAM AC Criselda Land MD   40 mg at 10/15/22 0556    sacubitril-valsartan (ENTRESTO)  MG per tablet 1 tablet  1 tablet Oral BID Criselda Land MD   1 tablet at 10/15/22 7091    metoprolol succinate (TOPROL XL) extended release tablet 100 mg  100 mg Oral Daily Criselda Land MD   100 mg at 10/15/22 1943    perflutren lipid microspheres (DEFINITY) injection 1.65 mg  1.5 mL IntraVENous ONCE PRN Criselda Land MD        sodium chloride flush 0.9 % injection 5-40 mL  5-40 mL IntraVENous 2 times per day Criselda Land MD   10 mL at 10/15/22 0842    sodium chloride flush 0.9 % injection 10 mL  10 mL IntraVENous PRN Criselda Land MD        0.9 % sodium chloride infusion   IntraVENous PRN Criselda Land MD        ondansetron (ZOFRAN-ODT) disintegrating tablet 4 mg  4 mg Oral Q8H PRN Criselda Lnad MD        Or    ondansetron [facility]) injection 4 mg  4 mg IntraVENous Q6H PRN Shawn Knowles MD        magnesium hydroxide (MILK OF MAGNESIA) 400 MG/5ML suspension 30 mL  30 mL Oral Daily PRN Shawn Knowles MD        acetaminophen (TYLENOL) tablet 650 mg  650 mg Oral Q6H PRN Shawn Knowles MD        Or    acetaminophen (TYLENOL) suppository 650 mg  650 mg Rectal Q6H PRN Shawn Knowles MD        furosemide (LASIX) injection 40 mg  40 mg IntraVENous TID Shawn Knowles MD   40 mg at 10/15/22 0533    hydrALAZINE (APRESOLINE) injection 10 mg  10 mg IntraVENous Q6H PRN Shawn Knowles MD        0.9 % sodium chloride bolus  500 mL IntraVENous PRN Shawn Knowles MD        potassium chloride (KLOR-CON M) extended release tablet 40 mEq  40 mEq Oral PRN Shawn Knowles MD        Or    potassium bicarb-citric acid (EFFER-K) effervescent tablet 40 mEq  40 mEq Oral PRN Shawn Knowles MD        Or    potassium chloride 10 mEq/100 mL IVPB (Peripheral Line)  10 mEq IntraVENous PRN Shawn Knowles MD        ipratropium-albuterol (DUONEB) nebulizer solution 1 ampule  1 ampule Inhalation Q4H PRN Shawn Knowles MD        ipratropium-albuterol (DUONEB) nebulizer solution 1 ampule  1 ampule Inhalation BID Shawn Knowles MD   1 ampule at 10/15/22 0902       Past Medical History:   Diagnosis Date    Bronchitis     CHF (congestive heart failure) ([facility])     COPD (chronic obstructive pulmonary disease) ([facility] [address]      Past Surgical History:   Procedure Laterality Date    CHOLECYSTECTOMY, LAPAROSCOPIC N/A 3/17/2021    LAPAROSCOPIC CHOLECYSTECTOMY WITH INTRAOPERATIVE CHOLANGIOGRAM performed by Rafa Fernandes MD at [address] [place] [address]      bilateral cataracts    FEMORAL BYPASS Left 6/1/2021    LEFT FEMORAL TO POPLITEAL BYPASS GRAFT ([address]) performed by Kendra Hannah MD at [address]      LT elbow    TONSILLECTOMY       Family History   Problem Relation Age of Onset    Heart Disease Mother     No Known Problems Father      Social History     Socioeconomic History    Marital status:      Spouse name: Mariangel Ryan    Number of children: 2    Years of education: 12    Highest education level: Not on file   Occupational History    Not on file   Tobacco Use    Smoking status: Former     Packs/day: 1.00     Years: 30.00     Pack years: 30.00     Types: Cigarettes     Quit date: 2016     Years since quittin.1    Smokeless tobacco: Former     Quit date: 2016   Vaping Use    Vaping Use: Never used   Substance and Sexual Activity    Alcohol use: Yes     Alcohol/week: 1.0 - 3.0 standard drink     Types: 1 - 3 Cans of beer per week     Comment: daily    Drug use: No    Sexual activity: Yes     Partners: Female     Comment: monogamous   Other Topics Concern    Not on file   Social History Narrative    Not on file     Social Determinants of Health     Financial Resource Strain: Not on file   Food Insecurity: Not on file   Transportation Needs: Not on file   Physical Activity: Not on file   Stress: Not on file   Social Connections: Not on file   Intimate Partner Violence: Not on file   Housing Stability: Not on file       Review of Systems:   Constitutional: there has been no unanticipated weight loss. There's been no change in energy level, sleep pattern, or activity level. Eyes: No visual changes or diplopia. No scleral icterus. ENT: No Headaches, hearing loss or vertigo. No mouth sores or sore throat. Cardiovascular: Reviewed in HPI  Respiratory: No cough or wheezing, no sputum production. No hematemesis. Gastrointestinal: No abdominal pain, appetite loss, blood in stools. No change in bowel or bladder habits. Genitourinary: No dysuria, trouble voiding, or hematuria. Musculoskeletal:  No gait disturbance, weakness or joint complaints. Integumentary: No rash or pruritis. Neurological: No headache, diplopia, change in muscle strength, numbness or tingling.  No change in gait, balance, coordination, mood, affect, memory, mentation, behavior. Psychiatric: No anxiety, no depression. Endocrine: No malaise, fatigue or temperature intolerance. No excessive thirst, fluid intake, or urination. No tremor. Hematologic/Lymphatic: No abnormal bruising or bleeding, blood clots or swollen lymph nodes. Allergic/Immunologic: No nasal congestion or hives. Physical Examination:    Vitals:    10/15/22 0902 10/15/22 1015 10/15/22 1138 10/15/22 1145   BP:   106/64    Pulse:  76 66 68   Resp:   18    Temp:   98.1 °F (36.7 °C)    TempSrc:   Oral    SpO2: 95%  93% 97%   Weight:       Height:         Body mass index is 19.82 kg/m². Wt Readings from Last 3 Encounters:   10/15/22 142 lb 1.6 oz (64.5 kg)   08/02/22 132 lb 14.4 oz (60.3 kg)   07/14/22 134 lb (60.8 kg)     BP Readings from Last 3 Encounters:   10/15/22 106/64   08/02/22 (!) 179/94   07/14/22 (!) 148/80     Constitutional and General Appearance:   Chronically ill appearing  HEENT:  NC/AT  JUAN  No problems with hearing  Skin:  Warm, dry  Respiratory:  Normal excursion and expansion without use of accessory muscles  Resp Auscultation: Normal breath sounds without dullness  Cardiovascular: The apical impulses not displaced  Heart tones are crisp and normal  Cervical veins are not engorged  The carotid upstroke is normal in amplitude and contour without delay or bruit  JVP less than 8 cm H2O  RRR with nl S1 and S2 without m,r,g  Peripheral pulses are symmetrical and full  There is no clubbing, cyanosis of the extremities.   Trace bilateral  edema  Femoral Arteries: 2+ and equal  Pedal Pulses: 2+ and equal   Neck:  No thyromegaly  Abdomen:  No masses or tenderness  Liver/Spleen: No Abnormalities Noted  Neurological/Psychiatric:  Alert and oriented in all spheres  Moves all extremities well  Exhibits normal gait balance and coordination  No abnormalities of mood, affect, memory, mentation, or behavior are noted  Labs were reviewed including labs from other hospital systems through [facility]. Cardiac testing was reviewed including echos, nuclear scans, cardiac catheterization, including from other hospital systems through [facility]. Assessment:    1. Shortness of breath     2. Acute on chronic combined systolic and diastolic hf  3. Paroxysmal atrial fib  4. COPD on oxygen  5. ?hospice care    Plan:   Continue IV Lasix one more day  Discharge him on lasix 40 mg a day (take in am)  Continue toprol XL and Entresto  Would suggest continuing amiodarone for now  He is not in afib now, so will not start eliquis  Palliative care consult r.e. hospice/palliative care, etc  CHF education reinforced. ~salt restriction  ~fluid restriction  ~medication compliance  ~daily weights and notify of any significant weight gain/loss  ~establish with CHF nurse  ~outpatient follow-up with our CHF team    He can likely be discharged in 1-2 days after diuresis. The patient was seen for > 70 minutes. I reviewed interval history, physical exam, review of data including labs, imaging, development and implementation of treatment plan and coordination of complex care. More than 50% of the time was devoted to counseling the patient on their diagnoses/treatments, as well as coordination of care with the other care teams      I appreciate the opportunity of cooperating in the care of this patient.     Jeanine Keith M.D., [address] [facility]

## 2022-10-16 PROBLEM — E03.9 HYPOTHYROID: Status: ACTIVE | Noted: 2022-10-16

## 2022-10-16 LAB
ANION GAP SERPL CALCULATED.3IONS-SCNC: 5 MMOL/L (ref 3–16)
BASOPHILS ABSOLUTE: 0 K/UL (ref 0–0.2)
BASOPHILS RELATIVE PERCENT: 0.3 %
BUN BLDV-MCNC: 20 MG/DL (ref 7–20)
CALCIUM SERPL-MCNC: 8.2 MG/DL (ref 8.3–10.6)
CHLORIDE BLD-SCNC: 93 MMOL/L (ref 99–110)
CO2: 33 MMOL/L (ref 21–32)
CREAT SERPL-MCNC: 1.5 MG/DL (ref 0.8–1.3)
EOSINOPHILS ABSOLUTE: 0 K/UL (ref 0–0.6)
EOSINOPHILS RELATIVE PERCENT: 0 %
GFR AFRICAN AMERICAN: 56
GFR NON-AFRICAN AMERICAN: 46
GLUCOSE BLD-MCNC: 110 MG/DL (ref 70–99)
HCT VFR BLD CALC: 30.9 % (ref 40.5–52.5)
HEMOGLOBIN: 10 G/DL (ref 13.5–17.5)
LYMPHOCYTES ABSOLUTE: 1.4 K/UL (ref 1–5.1)
LYMPHOCYTES RELATIVE PERCENT: 10.8 %
MAGNESIUM: 1.9 MG/DL (ref 1.8–2.4)
MCH RBC QN AUTO: 27.7 PG (ref 26–34)
MCHC RBC AUTO-ENTMCNC: 32.4 G/DL (ref 31–36)
MCV RBC AUTO: 85.4 FL (ref 80–100)
MONOCYTES ABSOLUTE: 0.9 K/UL (ref 0–1.3)
MONOCYTES RELATIVE PERCENT: 7.4 %
NEUTROPHILS ABSOLUTE: 10.4 K/UL (ref 1.7–7.7)
NEUTROPHILS RELATIVE PERCENT: 81.5 %
PDW BLD-RTO: 17.9 % (ref 12.4–15.4)
PLATELET # BLD: 284 K/UL (ref 135–450)
PMV BLD AUTO: 7.9 FL (ref 5–10.5)
POTASSIUM SERPL-SCNC: 3.6 MMOL/L (ref 3.5–5.1)
PRO-BNP: 871 PG/ML (ref 0–124)
RBC # BLD: 3.62 M/UL (ref 4.2–5.9)
SODIUM BLD-SCNC: 131 MMOL/L (ref 136–145)
WBC # BLD: 12.8 K/UL (ref 4–11)

## 2022-10-16 PROCEDURE — 6370000000 HC RX 637 (ALT 250 FOR IP): Performed by: INTERNAL MEDICINE

## 2022-10-16 PROCEDURE — 2580000003 HC RX 258: Performed by: INTERNAL MEDICINE

## 2022-10-16 PROCEDURE — 6370000000 HC RX 637 (ALT 250 FOR IP): Performed by: NURSE PRACTITIONER

## 2022-10-16 PROCEDURE — 85025 COMPLETE CBC W/AUTO DIFF WBC: CPT

## 2022-10-16 PROCEDURE — 97165 OT EVAL LOW COMPLEX 30 MIN: CPT

## 2022-10-16 PROCEDURE — 97161 PT EVAL LOW COMPLEX 20 MIN: CPT

## 2022-10-16 PROCEDURE — 94761 N-INVAS EAR/PLS OXIMETRY MLT: CPT

## 2022-10-16 PROCEDURE — 80048 BASIC METABOLIC PNL TOTAL CA: CPT

## 2022-10-16 PROCEDURE — 6360000002 HC RX W HCPCS: Performed by: INTERNAL MEDICINE

## 2022-10-16 PROCEDURE — 94640 AIRWAY INHALATION TREATMENT: CPT

## 2022-10-16 PROCEDURE — 99232 SBSQ HOSP IP/OBS MODERATE 35: CPT | Performed by: NURSE PRACTITIONER

## 2022-10-16 PROCEDURE — 6360000002 HC RX W HCPCS: Performed by: NURSE PRACTITIONER

## 2022-10-16 PROCEDURE — 97116 GAIT TRAINING THERAPY: CPT

## 2022-10-16 PROCEDURE — 83735 ASSAY OF MAGNESIUM: CPT

## 2022-10-16 PROCEDURE — 83880 ASSAY OF NATRIURETIC PEPTIDE: CPT

## 2022-10-16 PROCEDURE — 36415 COLL VENOUS BLD VENIPUNCTURE: CPT

## 2022-10-16 PROCEDURE — 2060000000 HC ICU INTERMEDIATE R&B

## 2022-10-16 PROCEDURE — 97535 SELF CARE MNGMENT TRAINING: CPT

## 2022-10-16 PROCEDURE — 2700000000 HC OXYGEN THERAPY PER DAY

## 2022-10-16 RX ORDER — ASPIRIN 81 MG/1
81 TABLET, CHEWABLE ORAL DAILY
Status: DISCONTINUED | OUTPATIENT
Start: 2022-10-16 | End: 2022-10-17 | Stop reason: HOSPADM

## 2022-10-16 RX ORDER — FUROSEMIDE 10 MG/ML
40 INJECTION INTRAMUSCULAR; INTRAVENOUS 2 TIMES DAILY
Status: DISCONTINUED | OUTPATIENT
Start: 2022-10-16 | End: 2022-10-17

## 2022-10-16 RX ORDER — LEVOTHYROXINE SODIUM 0.03 MG/1
50 TABLET ORAL DAILY
Status: DISCONTINUED | OUTPATIENT
Start: 2022-10-17 | End: 2022-10-17 | Stop reason: HOSPADM

## 2022-10-16 RX ADMIN — SACUBITRIL AND VALSARTAN 1 TABLET: 97; 103 TABLET, FILM COATED ORAL at 20:25

## 2022-10-16 RX ADMIN — FUROSEMIDE 40 MG: 10 INJECTION, SOLUTION INTRAMUSCULAR; INTRAVENOUS at 17:39

## 2022-10-16 RX ADMIN — FUROSEMIDE 40 MG: 10 INJECTION, SOLUTION INTRAMUSCULAR; INTRAVENOUS at 08:26

## 2022-10-16 RX ADMIN — TRAMADOL HYDROCHLORIDE 100 MG: 50 TABLET, COATED ORAL at 05:35

## 2022-10-16 RX ADMIN — IPRATROPIUM BROMIDE AND ALBUTEROL SULFATE 1 AMPULE: 2.5; .5 SOLUTION RESPIRATORY (INHALATION) at 09:03

## 2022-10-16 RX ADMIN — ASPIRIN 81 MG 81 MG: 81 TABLET ORAL at 13:30

## 2022-10-16 RX ADMIN — AMIODARONE HYDROCHLORIDE 200 MG: 200 TABLET ORAL at 08:26

## 2022-10-16 RX ADMIN — Medication 10 ML: at 08:27

## 2022-10-16 RX ADMIN — METOPROLOL SUCCINATE 100 MG: 50 TABLET, EXTENDED RELEASE ORAL at 08:26

## 2022-10-16 RX ADMIN — Medication 10 ML: at 20:25

## 2022-10-16 RX ADMIN — SACUBITRIL AND VALSARTAN 1 TABLET: 97; 103 TABLET, FILM COATED ORAL at 08:26

## 2022-10-16 RX ADMIN — PANTOPRAZOLE SODIUM 40 MG: 40 TABLET, DELAYED RELEASE ORAL at 05:35

## 2022-10-16 RX ADMIN — AMIODARONE HYDROCHLORIDE 200 MG: 200 TABLET ORAL at 20:25

## 2022-10-16 RX ADMIN — IPRATROPIUM BROMIDE AND ALBUTEROL SULFATE 1 AMPULE: 2.5; .5 SOLUTION RESPIRATORY (INHALATION) at 20:48

## 2022-10-16 ASSESSMENT — PAIN SCALES - GENERAL
PAINLEVEL_OUTOF10: 3
PAINLEVEL_OUTOF10: 0
PAINLEVEL_OUTOF10: 5
PAINLEVEL_OUTOF10: 7

## 2022-10-16 ASSESSMENT — PAIN DESCRIPTION - LOCATION
LOCATION: RIB CAGE

## 2022-10-16 ASSESSMENT — PAIN DESCRIPTION - DESCRIPTORS: DESCRIPTORS: SHARP

## 2022-10-16 ASSESSMENT — PAIN DESCRIPTION - ORIENTATION
ORIENTATION: LEFT

## 2022-10-16 ASSESSMENT — PAIN DESCRIPTION - FREQUENCY: FREQUENCY: INTERMITTENT

## 2022-10-16 NOTE — PROGRESS NOTES
[facility] [address]   Phone: [phone]    Physical Therapy    [] Initial Evaluation            [] Daily Treatment Note         [] Discharge Summary      Patient: Ely Christianson   : [birth year]   MRN: [id number]   Date of Service:  10/16/2022  Admitting Diagnosis: Chronic systolic congestive heart failure [facility])  Current Admission Summary: Ely Christianson is a 71 y.o. male with past medical history of COPD, CHF and bronchitis who presents to the ED with complaint of shortness of breath. Patient arrives from home by EMS with complaints of shortness of breath. She is worsened with exertion. Patient states been ongoing for the past week. He states he has a history of COPD and congestive heart failure. Patient states he took himself off of his water pill Lasix a couple weeks ago because he states he did not like having to wake up and urinate throughout the night. He apparently has been enrolled in hospice but states he rescinded hospice and is DNR so he can come to the emergency department and be treated. Patient states he has some chest tightness. He denies any specific chest pain. Denies cough or hemoptysis. Patient states he was written a prescription for Eliquis but states he has not gotten it filled because he states of medication cost.  He denies abdominal pain, nausea/vomiting, urinary symptoms or changes in bowel movements. He does notice some swelling to his legs. He denies any orthopnea. Denies any calf pain. Denies fever or chills. Denies rashes or lesions. Past Medical History:  has a past medical history of Bronchitis, CHF (congestive heart failure) ([facility] [address] and COPD (chronic obstructive pulmonary disease) ([facility] [address] Past Surgical History:  has a past surgical history that includes fracture surgery; Tonsillectomy; eye surgery; Cholecystectomy, laparoscopic (N/A, 3/17/2021); and femoral bypass (Left, 2021).   Discharge Recommendations: Mckinley Osuna Espinoza Engel scored a 22/24 on the AM-PAC short mobility form. At this time, no further PT is recommended upon discharge due to patient planning to go on hospice and reporting he is at baseline. Will follow during acute stay for updated recommendations. Recommend patient returns to prior setting with prior services. DME Required For Discharge: no DME required at discharge  Precautions/Restrictions: high fall risk  Weight Bearing Restrictions: weight bearing as tolerated  [] Right Upper Extremity  [] Left Upper Extremity [] Right Lower Extremity  [] Left Lower Extremity     Required Braces/Orthotics: no braces required   [] Right  [] Left  Positional Restrictions:no positional restrictions    Pre-Admission Information   Lives With: spouse                  Type of Home: house  Home Layout: tri-level  Home Access: level entry  Bathroom Layout: tub/shower unit, walk in shower  Bathroom Equipment:  reports has no DME for shower stall, stated may benefit from a grab bar, able to stand for several minutes in shower   Toilet Height: standard height, high commode in other bathroom   Home Equipment: rolling walker, hospital bed, oxygen, SpO2  Transfer Assistance: Independent without use of device, uses a RW outside  Ambulation Assistance:Independent without use of device  ADL Assistance:  indep all ADLs   IADL Assistance:  wife performs   Active :        [x] Yes                 [] No  Hand Dominance: [] Left                 [x] Right  Current Employment: retired. Occupation: [employer]   Hobbies: woodworking, classic cars   Recent Falls: reports 1 fall in last 6 months--was carrying an elliptical machine    Examination   Vision:   Vision Gross Assessment: Impaired and Vision Corrective Device: wears glasses for reading  Hearing:   [facility]  Observation:   General Observation:  Patient supine in bed, HOB elevated, 2L O2 via nasal cannula.   Posture:   WFL  Sensation:   WFL  Proprioception:    WFL  Tone: Normotonic  Coordination Testing:   WFL    ROM:   (B) LE AROM WFL  Strength:   (B) LE strength grossly WFL  Decision Making: low complexity  Clinical Presentation: evolving      Subjective  General: Patient reports wearing 2L O2 at baseline, plans to return home on hospice. Pain: 0/10  Pain Interventions: not applicable       Functional Mobility  Bed Mobility  Supine to Sit: modified independent  Sit to Supine: modified independent  Scooting: modified independent  Comments:  Transfers  Sit to stand transfer: modified independent  Stand to sit transfer: modified independent  Bed to chair transfer: modified independent  Comments:  Ambulation  Surface:level surface  Assistive Device: no device  Assistance: supervision  Distance: 15'  Gait Mechanics: reciprocal, mild balance deficits  Comments:  O2 88% post ambulation, SOB limiting ambulation distance  Stair Mobility  Stair mobility not completed on this date. Comments:  Wheelchair Mobility:  No w/c mobility completed on this date. Comments:  Balance  Static Sitting Balance: good: independent with functional balance in unsupported position  Static Standing Balance: good: independent with functional balance in unsupported position  Dynamic Standing Balance: fair (+): maintains balance at SBA/supervision without use of UE support  Comments:    Other Therapeutic Interventions    Functional Outcomes  AM-PAC Inpatient Mobility Raw Score : 22              Cognition  WFL  Orientation:    alert and oriented x 4  Command Following:   [facility]    Education  Barriers To Learning: none  Patient Education: patient educated on goals, PT role and benefits, plan of care, general safety, functional mobility training, energy conservation, disease specific education, transfer training, discharge recommendations  Learning Assessment:  patient verbalizes and demonstrates understanding    Assessment  Activity Tolerance: Ambulation limited by SOB.   Impairments Requiring Therapeutic Intervention: decreased functional mobility, decreased endurance, decreased balance  Prognosis: poor  Clinical Assessment: Patient was on hospice but rescinded hospice to be able to come to ED. He states he will resume hospice at d/c. He is functionally independent but with supervision for safety given O2 saturation levels and endurance deficits. Will follow during acute stay but likely going home on hospice, in which case, no therapy would be recommended. Safety Interventions: patient left in bed, bed alarm in place, call light within reach, patient at risk for falls, and nurse notified    Plan  Frequency: 1-2 x/per week  Current Treatment Recommendations: balance training and endurance training    Goals  Patient Goals: Return home with hospice. Short Term Goals:  Time Frame: Discharge.   Patient will ambulate 50 ft with use of no device at [address] Time      Individual Group Co-treatment   Time In     1029   Time Out     1054   Minutes     25     Timed Code Treatment Minutes:  10 Minutes  Total Treatment Minutes:  25       Electronically Signed By: Fidelina Asencio PT, DPT, [id number]

## 2022-10-16 NOTE — PROGRESS NOTES
[facility]              Progress Note      Admit Date 10/14/2022  HPI:   Reji Guzman is a 72 yo male with a PMH of COPD, chronic systolic HF, and bronchitis     ~presented to the ED with shortness of breath. SOB is worsened with exertion. This had been going on for about a week. He says he took himself off of his water pill lasix a few weeks ago because he was told to take it at night and he didn't want to be up all night. He apparently has been enrolled in hospice since he returned from [place] this summer. He also admits to some chest tightness. Denies chest pain. Denies cough or hemoptysis. He has not started Eliquis due to cost.  Denies abdominal pain, nausea/vomiting, urinary symptoms, or changes in bowel movements. He has had some swelling in his legs. Denies orthopnea. No calf pain. No fever or chills. He has COPD, NICM with improved LVEF. He had a hospital admit in [place] for new onset AF/RVR, was cardioverted and started on amiodarone. Was told then that his EF was 10-15% and was discharged in hospice care. However, since then, he had an echo in our office showing LVEF 60-65%. We are consulted for management of HF. He has elevated BNP with negative troponin. He was not taking lasix prior to admission.      Interval history:  Na+ 131 creatinine 1.5     Lasix 40 mg IV tid     BNP 1237 > 871     Wt down 6# / 24 hrs     Net loss 1.2 L    Mr. Javier Pereira denies chest pain, shortness of breath, palpitations  Subjective: had a pain under his left rib earlier today       Scheduled Meds:   amiodarone  200 mg Oral BID    pantoprazole  40 mg Oral QAM AC    sacubitril-valsartan  1 tablet Oral BID    metoprolol succinate  100 mg Oral Daily    sodium chloride flush  5-40 mL IntraVENous 2 times per day    furosemide  40 mg IntraVENous TID    ipratropium-albuterol  1 ampule Inhalation BID     Continuous Infusions:   sodium chloride       PRN Meds:traMADol, perflutren lipid microspheres, sodium chloride flush, sodium chloride, ondansetron **OR** ondansetron, magnesium hydroxide, acetaminophen **OR** acetaminophen, hydrALAZINE, sodium chloride, potassium chloride **OR** potassium alternative oral replacement **OR** potassium chloride, ipratropium-albuterol       Objective: Wt Readings from Last 3 Encounters:   10/16/22 136 lb 11.2 oz (62 kg)   22 132 lb 14.4 oz (60.3 kg)   22 134 lb (60.8 kg)   Admit weight: Weight: 149 lb 12.8 oz (67.9 kg)      Temperature range over 24hrs:   Temp  Av.1 °F (36.7 °C)  Min: 97.9 °F (36.6 °C)  Max: 98.4 °F (36.9 °C)  Current Respiratory Rate:  Resp: 18  Current Pulse:  Heart Rate: 56  Current Blood Pressure:  BP: 113/73  24hr Blood Pressure Range:  Systolic (83PYC), XHF:875 , Min:106 , YLF:347   ; Diastolic (48ZEQ), ZEM:45, Min:63, Max:73    Current Pulse Oximetry:  SpO2: 99 %      Intake/Output Summary (Last 24 hours) at 10/16/2022 1128  Last data filed at 10/16/2022 1031  Gross per 24 hour   Intake 500 ml   Output 1550 ml   Net -1050 ml       Telemetry monitor: sinus rhythm     Physical Exam:  General:  Awake, alert, NAD  Psych : calm  Skin:  Warm and dry  Chest:  Clear to auscultation, respiration easy  Cardiovascular:  RRR 60 S1S2 no murmur to auscultation; no JVD  Abdomen: Bowel sounds normal, abd soft, non-tender  Extremities:  No edema  : unremarkable      Imaging     Echo:   Summary   Technically difficult examination due to body habitus and limited windows. Left ventricular systolic function is normal with ejection fraction   estimated at 60-65 %. No regional wall motion abnormalities are noted. Normal left ventricular wall thickness. Aortic valve appears sclerotic but opens adequately. Mild mitral regurgitation. Moderate tricuspid regurgitation. Systolic pulmonary artery pressure (SPAP) estimated at 45 mmHg (right atrial   pressure 3 mmHg), consistent with mild pulmonary hypertension.      Lab Review Renal Profile:   Lab Results   Component Value Date/Time    CREATININE 1.5 10/16/2022 04:38 AM    BUN 20 10/16/2022 04:38 AM     10/16/2022 04:38 AM    K 3.6 10/16/2022 04:38 AM    K 4.9 10/14/2022 12:06 PM    CL 93 10/16/2022 04:38 AM    CO2 33 10/16/2022 04:38 AM     CBC:    Lab Results   Component Value Date/Time    WBC 12.8 10/16/2022 04:38 AM    RBC 3.62 10/16/2022 04:38 AM    HGB 10.0 10/16/2022 04:38 AM    HCT 30.9 10/16/2022 04:38 AM    MCV 85.4 10/16/2022 04:38 AM    RDW 17.9 10/16/2022 04:38 AM     10/16/2022 04:38 AM     BNP:  No results found for: BNP  Fasting Lipid Panel:    Lab Results   Component Value Date/Time    CHOL 197 10/15/2022 04:22 AM    HDL 84 10/15/2022 04:22 AM    TRIG 28 10/15/2022 04:22 AM     Cardiac Enzymes:    Lab Results   Component Value Date/Time    TROPONINI <0.01 10/14/2022 12:06 PM     PT/ INR   Lab Results   Component Value Date/Time    INR 0.93 08/01/2022 01:06 PM    INR 1.02 05/25/2021 10:12 AM    INR 1.09 05/01/2021 04:45 AM    PROTIME 12.4 08/01/2022 01:06 PM    PROTIME 11.8 05/25/2021 10:12 AM    PROTIME 12.6 05/01/2021 04:45 AM     PTT No results found for: PTT   Lab Results   Component Value Date/Time    MG 1.90 10/16/2022 04:38 AM      Lab Results   Component Value Date/Time    TSH 12.86 10/14/2022 12:06 PM       Assessment/Plan:     Patient Active Problem List   Diagnosis    Chronic obstructive pulmonary disease (HCC)    Acute hypoxemic respiratory failure (HCC)    Left ventricular noncompaction (HCC)    Chronic systolic congestive heart failure (HCC)    Nonischemic cardiomyopathy (HCC)    Essential hypertension    Shortness of breath    COPD, very severe (HCC)    Pulmonary nodule    Chronic cough    Centrilobular emphysema (HCC)    Symptomatic cholelithiasis    Superficial occlusion of femoral artery (HCC)    Hyponatremia    Atherosclerosis of artery of extremity with rest pain (HCC)    PVD (peripheral vascular disease) with claudication ([facility] [address] 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 Shortness of breath     2. Acute on chronic combined systolic and diastolic hf  3. Paroxysmal atrial fib   ~sinus rhythm on telemetry : therefore DOAC not being restarted   ~metoprolol / amiodarone    ~TSH elevated  4. COPD on oxygen  5. ?hospice care     Plan:   Continue IV Lasix : decrease to bid anticipating transitioning to oral in am  Discharge him on lasix 40 mg a day (take in am)  Continue toprol XL and Entresto  Would suggest continuing amiodarone for now : will seek EP input regarding amiodarone dose and COPD  Defer elevated TSH to internal medicine  He is not in afib now, so will not start eliquis > start ASA 81 mg daily  Palliative care consult r.e. hospice/palliative care, etc    He can likely be discharged in 1-2 days after diuresis. The patient was seen for >35 minutes.  I reviewed interval history, physical exam, review of data including labs, imaging, development and implementation of treatment plan and coordination of complex care

## 2022-10-16 NOTE — PROGRESS NOTES
[address] [facility],[address] Msb Department   Phone: [phone]    Occupational Therapy    [x] Initial Evaluation            [] Daily Treatment Note         [x] Discharge Summary      Patient: Veena Cuello   :    MRN: [id number]   Date of Service:  10/16/2022    Admitting Diagnosis:  Chronic systolic congestive heart failure [facility])  Current Admission Summary: Chief Complaint: RHONA Cuello is a 71 y.o. male with past medical history of COPD, CHF and bronchitis who presents to the ED with complaint of shortness of breath. Patient arrives from home by EMS with complaints of shortness of breath. She is worsened with exertion. Patient states been ongoing for the past week. He states he has a history of COPD and congestive heart failure. Patient states he took himself off of his water pill Lasix a couple weeks ago because he states he did not like having to wake up and urinate throughout the night. He apparently has been enrolled in hospice but states he rescinded hospice and is DNR so he can come to the emergency department and be treated. Patient states he has some chest tightness. He denies any specific chest pain. Denies cough or hemoptysis. Patient states he was written a prescription for Eliquis but states he has not gotten it filled because he states of medication cost.  He denies abdominal pain, nausea/vomiting, urinary symptoms or changes in bowel movements. He does notice some swelling to his legs. He denies any orthopnea. Denies any calf pain. Denies fever or chills. Denies rashes or lesions. Past Medical History:  has a past medical history of Bronchitis, CHF (congestive heart failure) ([facility] [address] and COPD (chronic obstructive pulmonary disease) ([facility] [address] Past Surgical History:  has a past surgical history that includes fracture surgery; Tonsillectomy; eye surgery;  Cholecystectomy, laparoscopic (N/A, 3/17/2021); and femoral bypass (Left, 6/1/2021). Past Medical History:  has a past medical history of Bronchitis, CHF (congestive heart failure) ([facility] [address] and COPD (chronic obstructive pulmonary disease) ([facility] [address] Past Surgical History:  has a past surgical history that includes fracture surgery; Tonsillectomy; eye surgery; Cholecystectomy, laparoscopic (N/A, 3/17/2021); and femoral bypass (Left, 6/1/2021). Discharge Recommendations: Kirk Alarcon scored a 23/24 on the AM-[facility] ADL Inpatient form. At this time, no further OT is recommended upon discharge due to patient at independent level. Recommend patient returns to prior setting with prior services. DME Required For Discharge: No DME required-- stated does have a shower chair     Precautions/Restrictions: high fall risk  Positional Restrictions:no positional restrictions      Pre-Admission Information   Lives With: spouse                  Type of Home: house  Home Layout: tri-level  Home Access: level entry  Bathroom Layout: tub/shower unit, walk in shower  Bathroom Equipment:  reports has no DME for shower stall, stated may benefit from a grab bar, able to stand for several minutes in shower   Toilet Height: standard height, high commode in other bathroom   Home Equipment: rolling walker, hospital bed, oxygen, SpO2  Transfer Assistance: Independent without use of device, uses a RW outside  Ambulation Assistance:Independent without use of device  ADL Assistance:  indep all ADLs   IADL Assistance:  wife performs   Active :        [x] Yes                 [] No  Hand Dominance: [] Left                 [x] Right  Current Employment: retired.   Occupation: [employer]   Hobbies: woodworking, classic cars   Recent Falls: reports 1 fall in last 6 months--was carrying an elliptical machine    Examination   Vision:   Vision Corrective Device: wears glasses for reading  Hearing:   WFL  Observation:   General Observation:  2L of O2 NC, sats 91-95% after ambulating into bathroom to brush teeth and ambulate @ 20 feet. Posture:   Fair, easily SOB   Sensation:   denies numbness and tingling  ROM:   (B) UE ROM WFL  Strength:   (B) UE gross strength WFL    Decision Making: low complexity  Clinical Presentation: stable      Subjective  General: Patient supine in bed, pleasant and cooperative. Pain: 0/10, stated no pain at beginning of session then stated c/o soreness in lower left side  Pain Interventions: repositioned         Activities of Daily Living  Basic Activities of Daily Living  Feeding: Independent  Grooming: Independent  Upper Extremity Bathing: Comment: reports indep   Lower Extremity Bathing: Independent   Upper Extremity Dressing: Independent  Toileting: Independent. Toileting Comments: reports is indep with sponge bathing and dressing this AM   General Comments: patient indep grooming, indep donning shoes, indep ambulation in room   Instrumental Activities of Daily Living  No IADL completed on this date. Functional Mobility  Bed Mobility  Supine to Sit: Independent  Sit to Supine: Independent  Rolling Left: Independent  Rolling Right: Independent  Comments:  Transfers  Sit to stand transfer:Independent  Stand to sit transfer: Independent  Bed / Chair transfer: Independent. Toilet transfer: Independent  Comments:  Functional Mobility:  Sitting Balance Comment: indep   Functional Mobility: .   Independent  Functional Mobility Comment: indep ambulation x 30 feet in room, 2L of O2 NC    Other Therapeutic Interventions    Functional Outcomes  AM-PAC Inpatient Daily Activity Raw Score: 23    Cognition  WFL  Orientation:    A&O x 4  Command Following:   [facility]     Education  Barriers To Learning: none  Patient Education: Patient educated on discharge recommendations  Learning Assessment:  Patient verbalized and demonstrates understanding    Assessment  Impairments Requiring Therapeutic Intervention: none - eval with same day discharge  Prognosis: good without need for therapy intervention  Clinical Assessment: Patient presenting at independent level for completion of required self care tasks for return to home. Eval with d/c at this time. No therapy services indicated. Safety Interventions: patient left in bed and nurse notified    Plan  Frequency: Eval with same day discharge. No follow up required. Current Treatment Recommendations: Not applicable, evaluation completed with same day discharge. Goals  Patient eval with same day discharge. No goals set as patient is at baseline self care status.       Therapy Session Time     Individual Group Co-treatment   Time In    9711   Time Out    1054   Minutes    25        Timed Code Treatment Minutes: 10    Total Treatment Minutes:  25       Electronically Signed By: Aiyana Mckeon OTR/L [phone]

## 2022-10-16 NOTE — PROGRESS NOTES
Progress Note - Dr. Angeli Brice - Internal Medicine  PCP: Vicky Dukes DO [facility] / [place] 81 Day: 2  Code Status: Full Code  Current Diet: ADULT DIET; Regular; Low Sodium (2 gm)        CC: follow up on medical issues    Subjective:   Angus Redman is a 71 y.o. male. Pt seen and examined  Chart reviewed since last visit, labs and imaging below      Doing ok  Still a little sob    About 1400cc out so far  Appreciate cards eval      Review of Systems: (1 system for EPF, 2-9 for detailed, 10+ for comprehensive)  Constitutional: Negative for chills and fever. HENT: Negative for dental problem, nosebleeds and rhinorrhea. Eyes: Negative for photophobia and visual disturbance. Respiratory: Negative for cough, chest tightness and positive for shortness of breath. Cardiovascular: Negative for chest pain and leg swelling. Gastrointestinal: Negative for diarrhea, nausea and vomiting. Endocrine: Negative for polydipsia and polyphagia. Genitourinary: Negative for frequency, hematuria and urgency. Musculoskeletal: Negative for back pain and myalgias. Skin: Negative for rash. Allergic/Immunologic: Negative for food allergies. Neurological: Negative for dizziness, seizures, syncope and facial asymmetry. Hematological: Negative for adenopathy. Psychiatric/Behavioral: Negative for dysphoric mood. The patient is not nervous/anxious. I have reviewed the patient's medical and social history in detail and updated the computerized patient record. To recap: He  has a past medical history of Bronchitis, CHF (congestive heart failure) ([facility] [address] and COPD (chronic obstructive pulmonary disease) ([facility] [address] . He  has a past surgical history that includes fracture surgery; Tonsillectomy; eye surgery; Cholecystectomy, laparoscopic (N/A, 3/17/2021); and femoral bypass (Left, 6/1/2021). Edgard Agosto He  reports that he quit smoking about 6 years ago.  His smoking use included cigarettes. He has a 30.00 pack-year smoking history. He quit smokeless tobacco use about 6 years ago. He reports current alcohol use of about 1.0 - 3.0 standard drink per week. He reports that he does not use drugs. .        Active Hospital Problems    Diagnosis Date Noted    Acute on chronic combined systolic and diastolic CHF (congestive heart failure) ([facility] [I50.43] 10/15/2022     Priority: Medium    Paroxysmal A-fib ([facility] [I48.0] 10/15/2022     Priority: Medium    Acute respiratory failure ([facility] [J96.00] 10/14/2022     Priority: Medium    GERD (gastroesophageal reflux disease) [K21.9] 08/01/2022     Priority: Medium    Anxiety [F41.9] 08/01/2022     Priority: Medium    COPD, very severe ([facility] [J44.9] 12/18/2017    Shortness of breath [R06.02] 12/18/2017    Essential hypertension [I10] 11/24/2016    Chronic systolic congestive heart failure ([facility] [I50.22] 10/04/2016    Chronic obstructive pulmonary disease ([facility] [J44.9]        Current Facility-Administered Medications: traMADol (ULTRAM) tablet 100 mg, 100 mg, Oral, Q6H PRN  amiodarone (CORDARONE) tablet 200 mg, 200 mg, Oral, BID  pantoprazole (PROTONIX) tablet 40 mg, 40 mg, Oral, QAM AC  sacubitril-valsartan (ENTRESTO)  MG per tablet 1 tablet, 1 tablet, Oral, BID  metoprolol succinate (TOPROL XL) extended release tablet 100 mg, 100 mg, Oral, Daily  perflutren lipid microspheres (DEFINITY) injection 1.65 mg, 1.5 mL, IntraVENous, ONCE PRN  sodium chloride flush 0.9 % injection 5-40 mL, 5-40 mL, IntraVENous, 2 times per day  sodium chloride flush 0.9 % injection 10 mL, 10 mL, IntraVENous, PRN  0.9 % sodium chloride infusion, , IntraVENous, PRN  ondansetron (ZOFRAN-ODT) disintegrating tablet 4 mg, 4 mg, Oral, Q8H PRN **OR** ondansetron (ZOFRAN) injection 4 mg, 4 mg, IntraVENous, Q6H PRN  magnesium hydroxide (MILK OF MAGNESIA) 400 MG/5ML suspension 30 mL, 30 mL, Oral, Daily PRN  acetaminophen (TYLENOL) tablet 650 mg, 650 mg, Oral, Q6H PRN **OR** acetaminophen (TYLENOL) suppository 650 mg, 650 mg, Rectal, Q6H PRN  furosemide (LASIX) injection 40 mg, 40 mg, IntraVENous, TID  hydrALAZINE (APRESOLINE) injection 10 mg, 10 mg, IntraVENous, Q6H PRN  0.9 % sodium chloride bolus, 500 mL, IntraVENous, PRN  potassium chloride (KLOR-CON M) extended release tablet 40 mEq, 40 mEq, Oral, PRN **OR** potassium bicarb-citric acid (EFFER-K) effervescent tablet 40 mEq, 40 mEq, Oral, PRN **OR** potassium chloride 10 mEq/100 mL IVPB (Peripheral Line), 10 mEq, IntraVENous, PRN  ipratropium-albuterol (DUONEB) nebulizer solution 1 ampule, 1 ampule, Inhalation, Q4H PRN  ipratropium-albuterol (DUONEB) nebulizer solution 1 ampule, 1 ampule, Inhalation, BID         Objective:  /66   Pulse 60   Temp 98.1 °F (36.7 °C) (Oral)   Resp 18   Ht 5' 11\" (1.803 m)   Wt 136 lb 11.2 oz (62 kg)   SpO2 96%   BMI 19.07 kg/m²      Patient Vitals for the past 24 hrs:   BP Temp Temp src Pulse Resp SpO2 Weight   10/16/22 0904 -- -- -- 60 18 96 % --   10/16/22 0822 -- -- -- 65 -- -- --   10/16/22 0705 109/66 98.1 °F (36.7 °C) Oral 61 18 96 % --   10/16/22 0605 -- -- -- -- 16 -- --   10/16/22 0540 -- -- -- -- -- -- 136 lb 11.2 oz (62 kg)   10/16/22 0534 114/63 98 °F (36.7 °C) Oral 60 16 97 % --   10/16/22 0247 -- -- -- 62 -- -- --   10/16/22 0025 110/67 98.1 °F (36.7 °C) Oral 66 16 95 % --   10/15/22 2140 -- -- -- -- -- 97 % --   10/15/22 2103 -- -- -- 62 -- -- --   10/15/22 2027 (!) 147/71 98.1 °F (36.7 °C) Oral 63 16 98 % --   10/15/22 1825 -- -- -- -- 16 -- --   10/15/22 1739 -- -- -- 67 -- -- --   10/15/22 1553 122/73 98.4 °F (36.9 °C) Oral 64 26 94 % --   10/15/22 1329 -- -- -- 70 -- -- --   10/15/22 1145 -- -- -- 68 -- 97 % --   10/15/22 1138 106/64 98.1 °F (36.7 °C) Oral 66 18 93 % --   10/15/22 1015 -- -- -- 76 -- -- --       Patient Vitals for the past 96 hrs (Last 3 readings):   Weight   10/16/22 0540 136 lb 11.2 oz (62 kg)   10/15/22 0430 142 lb 1.6 oz (64.5 kg)   10/14/22 1218 149 lb 12.8 oz (67.9 kg)             Intake/Output Summary (Last 24 hours) at 10/16/2022 0942  Last data filed at 10/16/2022 0827  Gross per 24 hour   Intake 260 ml   Output 1550 ml   Net -1290 ml           Physical Exam: (2-7 system for EPF/Detailed, ?8 for Comprehensive)  /66   Pulse 60   Temp 98.1 °F (36.7 °C) (Oral)   Resp 18   Ht 5' 11\" (1.803 m)   Wt 136 lb 11.2 oz (62 kg)   SpO2 96%   BMI 19.07 kg/m²   Constitutional: vitals as above: alert, appears stated age and cooperative    Psychiatric: normal insight and judgment, oriented to person, place, time, and general circumstances    Head: Normocephalic, without obvious abnormality, atraumatic    Eyes:lids and lashes normal, conjunctivae and sclerae normal and pupils equal, round, reactive to light and accomodation    EMNT: external ears normal, nares midline    Neck: no carotid bruit, supple, symmetrical, trachea midline and thyroid not enlarged, symmetric, no tenderness/mass/nodules     Respiratory: crackles bilaterally with normal respiratory effort    Cardiovascular: normal rate, regular rhythm, normal S1 and S2 and no murmurs    Gastrointestinal: soft, non-tender, non-distended, normal bowel sounds, no masses or organomegaly    Extremities: no clubbing, no edema    Skin:No rashes or nodules noted.     Neurologic:negative         Labs:  Lab Results   Component Value Date    WBC 12.8 (H) 10/16/2022    HGB 10.0 (L) 10/16/2022    HCT 30.9 (L) 10/16/2022     10/16/2022    CHOL 197 10/15/2022    TRIG 28 10/15/2022    HDL 84 (H) 10/15/2022    LDLDIRECT 105 (H) 10/15/2022    ALT 41 (H) 10/15/2022    AST 32 10/15/2022     (L) 10/16/2022    K 3.6 10/16/2022    CL 93 (L) 10/16/2022    CREATININE 1.5 (H) 10/16/2022    BUN 20 10/16/2022    CO2 33 (H) 10/16/2022    TSH 12.86 (H) 10/14/2022    INR 0.93 08/01/2022    LABMICR YES 05/25/2021     Lab Results   Component Value Date    TROPONINI <0.01 10/14/2022       Recent Imaging Results are Reviewed:  XR CHEST PORTABLE    Result Date: 10/14/2022  EXAMINATION: ONE XRAY VIEW OF THE CHEST 10/14/2022 12:40 pm COMPARISON: 08/01/2022. HISTORY: ORDERING SYSTEM PROVIDED HISTORY: sob TECHNOLOGIST PROVIDED HISTORY: Reason for exam:->sob Reason for Exam: sob FINDINGS: The lungs are hyperexpanded without acute focal process. There is no effusion or pneumothorax. The cardiomediastinal silhouette is stable. The osseous structures are stable. Stable COPD. CT CHEST PULMONARY EMBOLISM W CONTRAST    Result Date: 10/14/2022  EXAMINATION: CTA OF THE CHEST 10/14/2022 1:38 pm TECHNIQUE: CTA of the chest was performed after the administration of intravenous contrast.  Multiplanar reformatted images are provided for review. MIP images are provided for review. Automated exposure control, iterative reconstruction, and/or weight based adjustment of the mA/kV was utilized to reduce the radiation dose to as low as reasonably achievable. COMPARISON: 08/01/2022 HISTORY: ORDERING SYSTEM PROVIDED HISTORY: sob - off anticoag TECHNOLOGIST PROVIDED HISTORY: Reason for exam:->sob - off anticoag Decision Support Exception - unselect if not a suspected or confirmed emergency medical condition->Emergency Medical Condition (MA) Reason for Exam: sob - off anticoag FINDINGS: Pulmonary Arteries: Pulmonary arteries are adequately opacified for evaluation. No evidence of intraluminal filling defect to suggest pulmonary embolism. Main pulmonary artery is normal in caliber. Mediastinum: No evidence of mediastinal lymphadenopathy. The heart and pericardium demonstrate no acute abnormality. There is no acute abnormality of the thoracic aorta. Lungs/pleura: There are mild to moderate bilateral emphysematous changes. There is no lobar consolidation, pneumothorax or suspicious parenchymal mass. There is a trace left pleural effusion. Upper Abdomen: Limited images of the upper abdomen are unremarkable.  Soft Tissues/Bones: No acute bone or soft tissue abnormality. 1. Negative for pulmonary embolus. 2. Emphysema with trace left pleural effusion. Lab Results   Component Value Date/Time    GLUCOSE 110 10/16/2022 04:38 AM     No results found for: POCGLU  /66   Pulse 60   Temp 98.1 °F (36.7 °C) (Oral)   Resp 18   Ht 5' 11\" (1.803 m)   Wt 136 lb 11.2 oz (62 kg)   SpO2 96%   BMI 19.07 kg/m²     Assessment and Plan:  Principal Problem:    Chronic systolic congestive heart failure (HCC) -Established problem. Stable. -1400 cc out  Plan: cont iv lasix. Cont to trend in/out. D/c plans per dr Matt Lee; po lasix, amio, entresto, toprol  Active Problems:    GERD (gastroesophageal reflux disease) -Established problem. Stable. Plan: cont ppi    Anxiety -Established problem. Stable. Plan: Continue present orders/plan. Acute respiratory failure ([address] -Established problem. Stable. On 2L  Plan: cont oxygen. Cont to diurese    Essential hypertension -Established problem. Stable. 109/66  Plan: stay on meds    COPD, very severe ([address] -Established problem. Stable. Plan: Continue present orders/plan.      Disp - per cards    (Please note that portions of this note were completed with a voice recognition program.  Efforts were made to edit the dictations but occasionally words are mis-transcribed.)        Clay Duong MD  10/16/2022

## 2022-10-17 VITALS
OXYGEN SATURATION: 97 % | BODY MASS INDEX: 19.29 KG/M2 | HEART RATE: 52 BPM | RESPIRATION RATE: 16 BRPM | WEIGHT: 137.8 LBS | DIASTOLIC BLOOD PRESSURE: 75 MMHG | TEMPERATURE: 97.6 F | SYSTOLIC BLOOD PRESSURE: 158 MMHG | HEIGHT: 71 IN

## 2022-10-17 PROBLEM — Z51.5 HOSPICE CARE PATIENT: Status: ACTIVE | Noted: 2022-10-17

## 2022-10-17 LAB
ANION GAP SERPL CALCULATED.3IONS-SCNC: 6 MMOL/L (ref 3–16)
BASOPHILS ABSOLUTE: 0 K/UL (ref 0–0.2)
BASOPHILS RELATIVE PERCENT: 0.5 %
BUN BLDV-MCNC: 18 MG/DL (ref 7–20)
CALCIUM SERPL-MCNC: 8.5 MG/DL (ref 8.3–10.6)
CHLORIDE BLD-SCNC: 90 MMOL/L (ref 99–110)
CO2: 37 MMOL/L (ref 21–32)
CREAT SERPL-MCNC: 1.1 MG/DL (ref 0.8–1.3)
EOSINOPHILS ABSOLUTE: 0.1 K/UL (ref 0–0.6)
EOSINOPHILS RELATIVE PERCENT: 1.2 %
GFR AFRICAN AMERICAN: >60
GFR NON-AFRICAN AMERICAN: >60
GLUCOSE BLD-MCNC: 101 MG/DL (ref 70–99)
HCT VFR BLD CALC: 34.4 % (ref 40.5–52.5)
HEMOGLOBIN: 11 G/DL (ref 13.5–17.5)
LYMPHOCYTES ABSOLUTE: 1.6 K/UL (ref 1–5.1)
LYMPHOCYTES RELATIVE PERCENT: 18.6 %
MAGNESIUM: 1.8 MG/DL (ref 1.8–2.4)
MCH RBC QN AUTO: 27.5 PG (ref 26–34)
MCHC RBC AUTO-ENTMCNC: 32.1 G/DL (ref 31–36)
MCV RBC AUTO: 85.7 FL (ref 80–100)
MONOCYTES ABSOLUTE: 0.8 K/UL (ref 0–1.3)
MONOCYTES RELATIVE PERCENT: 9.9 %
NEUTROPHILS ABSOLUTE: 5.9 K/UL (ref 1.7–7.7)
NEUTROPHILS RELATIVE PERCENT: 69.8 %
PDW BLD-RTO: 17.5 % (ref 12.4–15.4)
PLATELET # BLD: 298 K/UL (ref 135–450)
PMV BLD AUTO: 7.6 FL (ref 5–10.5)
POTASSIUM SERPL-SCNC: 4 MMOL/L (ref 3.5–5.1)
PRO-BNP: 974 PG/ML (ref 0–124)
RBC # BLD: 4.01 M/UL (ref 4.2–5.9)
SODIUM BLD-SCNC: 133 MMOL/L (ref 136–145)
WBC # BLD: 8.4 K/UL (ref 4–11)

## 2022-10-17 PROCEDURE — 97116 GAIT TRAINING THERAPY: CPT

## 2022-10-17 PROCEDURE — 97110 THERAPEUTIC EXERCISES: CPT

## 2022-10-17 PROCEDURE — 6360000002 HC RX W HCPCS: Performed by: NURSE PRACTITIONER

## 2022-10-17 PROCEDURE — 2580000003 HC RX 258: Performed by: INTERNAL MEDICINE

## 2022-10-17 PROCEDURE — 99233 SBSQ HOSP IP/OBS HIGH 50: CPT | Performed by: CLINICAL NURSE SPECIALIST

## 2022-10-17 PROCEDURE — 2700000000 HC OXYGEN THERAPY PER DAY

## 2022-10-17 PROCEDURE — 36415 COLL VENOUS BLD VENIPUNCTURE: CPT

## 2022-10-17 PROCEDURE — 83735 ASSAY OF MAGNESIUM: CPT

## 2022-10-17 PROCEDURE — 94640 AIRWAY INHALATION TREATMENT: CPT

## 2022-10-17 PROCEDURE — 6370000000 HC RX 637 (ALT 250 FOR IP): Performed by: INTERNAL MEDICINE

## 2022-10-17 PROCEDURE — 6370000000 HC RX 637 (ALT 250 FOR IP): Performed by: CLINICAL NURSE SPECIALIST

## 2022-10-17 PROCEDURE — 97530 THERAPEUTIC ACTIVITIES: CPT

## 2022-10-17 PROCEDURE — 6370000000 HC RX 637 (ALT 250 FOR IP): Performed by: NURSE PRACTITIONER

## 2022-10-17 PROCEDURE — 83880 ASSAY OF NATRIURETIC PEPTIDE: CPT

## 2022-10-17 PROCEDURE — 85025 COMPLETE CBC W/AUTO DIFF WBC: CPT

## 2022-10-17 PROCEDURE — 80048 BASIC METABOLIC PNL TOTAL CA: CPT

## 2022-10-17 PROCEDURE — 94761 N-INVAS EAR/PLS OXIMETRY MLT: CPT

## 2022-10-17 RX ORDER — LEVOTHYROXINE SODIUM 0.05 MG/1
50 TABLET ORAL DAILY
Qty: 30 TABLET | Refills: 1 | Status: SHIPPED | OUTPATIENT
Start: 2022-10-18

## 2022-10-17 RX ORDER — FUROSEMIDE 40 MG/1
40 TABLET ORAL DAILY
Qty: 30 TABLET | Refills: 1 | Status: ON HOLD | OUTPATIENT
Start: 2022-10-17 | End: 2022-11-01 | Stop reason: SDUPTHER

## 2022-10-17 RX ORDER — FUROSEMIDE 40 MG/1
40 TABLET ORAL DAILY
Status: DISCONTINUED | OUTPATIENT
Start: 2022-10-17 | End: 2022-10-17 | Stop reason: HOSPADM

## 2022-10-17 RX ADMIN — ACETAMINOPHEN 650 MG: 325 TABLET ORAL at 08:19

## 2022-10-17 RX ADMIN — FUROSEMIDE 40 MG: 10 INJECTION, SOLUTION INTRAMUSCULAR; INTRAVENOUS at 08:19

## 2022-10-17 RX ADMIN — PANTOPRAZOLE SODIUM 40 MG: 40 TABLET, DELAYED RELEASE ORAL at 05:25

## 2022-10-17 RX ADMIN — FUROSEMIDE 40 MG: 40 TABLET ORAL at 12:32

## 2022-10-17 RX ADMIN — ASPIRIN 81 MG 81 MG: 81 TABLET ORAL at 08:19

## 2022-10-17 RX ADMIN — MAGNESIUM HYDROXIDE 30 ML: 400 SUSPENSION ORAL at 08:19

## 2022-10-17 RX ADMIN — SACUBITRIL AND VALSARTAN 1 TABLET: 97; 103 TABLET, FILM COATED ORAL at 08:19

## 2022-10-17 RX ADMIN — IPRATROPIUM BROMIDE AND ALBUTEROL SULFATE 1 AMPULE: 2.5; .5 SOLUTION RESPIRATORY (INHALATION) at 09:30

## 2022-10-17 RX ADMIN — AMIODARONE HYDROCHLORIDE 200 MG: 200 TABLET ORAL at 08:19

## 2022-10-17 RX ADMIN — METOPROLOL SUCCINATE 100 MG: 50 TABLET, EXTENDED RELEASE ORAL at 08:19

## 2022-10-17 RX ADMIN — Medication 10 ML: at 08:21

## 2022-10-17 ASSESSMENT — PAIN DESCRIPTION - FREQUENCY: FREQUENCY: INTERMITTENT

## 2022-10-17 ASSESSMENT — PAIN DESCRIPTION - LOCATION: LOCATION: RECTUM

## 2022-10-17 ASSESSMENT — ENCOUNTER SYMPTOMS
COUGH: 1
ABDOMINAL DISTENTION: 0
ALLERGIC/IMMUNOLOGIC NEGATIVE: 1
EYES NEGATIVE: 1
CHEST TIGHTNESS: 1
TROUBLE SWALLOWING: 0
SHORTNESS OF BREATH: 1

## 2022-10-17 ASSESSMENT — PAIN SCALES - GENERAL
PAINLEVEL_OUTOF10: 0
PAINLEVEL_OUTOF10: 4

## 2022-10-17 ASSESSMENT — PAIN DESCRIPTION - ONSET: ONSET: GRADUAL

## 2022-10-17 ASSESSMENT — PAIN DESCRIPTION - DESCRIPTORS: DESCRIPTORS: CRAMPING

## 2022-10-17 ASSESSMENT — PAIN DESCRIPTION - PAIN TYPE: TYPE: ACUTE PAIN

## 2022-10-17 NOTE — PLAN OF CARE
Problem: Discharge Planning  Goal: Discharge to home or other facility with appropriate resources  10/17/2022 1432 by Randy Toscano RN  Outcome: Completed     Problem: Safety - Adult  Goal: Free from fall injury  10/17/2022 1432 by Randy Toscano RN  Outcome: Completed     Problem: Pain  Goal: Verbalizes/displays adequate comfort level or baseline comfort level  10/17/2022 1432 by Randy Toscano RN  Outcome: Completed

## 2022-10-17 NOTE — PLAN OF CARE
Problem: Discharge Planning  Goal: Discharge to home or other facility with appropriate resources  10/17/2022 1222 by Alejandrina High RN  Outcome: Progressing     Problem: Safety - Adult  Goal: Free from fall injury  10/17/2022 1222 by Alejandrina High RN  Outcome: Progressing     Problem: Pain  Goal: Verbalizes/displays adequate comfort level or baseline comfort level  10/17/2022 1222 by Alejandrina High RN  Outcome: Progressing

## 2022-10-17 NOTE — DISCHARGE SUMMARY
[facility] -- Physician Discharge Summary     Yung Evans  [birth year]  MRN: [id number]    Admit Date: 10/14/2022  Discharge Date: No discharge date for patient encounter. Attending MD: Edgar Maurice MD  Discharging MD: Edgar Maurice MD  PCP: Sukumar Franco DO [facility] / [place] [address] [phone]    Admission Diagnosis: Acute respiratory failure ([address] [J96.00]  Shortness of breath [R06.02]  DISCHARGE DIAGNOSIS: same    Full Hospital Problem List:  Active Hospital Problems    Diagnosis Date Noted    Hypothyroid [E03.9] 10/16/2022     Priority: Medium    Acute on chronic combined systolic and diastolic CHF (congestive heart failure) ([address] [I50.43] 10/15/2022     Priority: Medium    Paroxysmal A-fib ([address] [I48.0] 10/15/2022     Priority: Medium    Acute respiratory failure ([address] [J96.00] 10/14/2022     Priority: Medium    GERD (gastroesophageal reflux disease) [K21.9] 08/01/2022     Priority: Medium    Anxiety [F41.9] 08/01/2022     Priority: Medium    COPD, very severe ([address] [J44.9] 12/18/2017    Shortness of breath [R06.02] 12/18/2017    Essential hypertension [I10] 11/24/2016    Chronic systolic congestive heart failure ([address] [I50.22] 10/04/2016    Chronic obstructive pulmonary disease ([address] [J44.9]            Hospital Course:   71 y.o. male with past medical history of COPD, CHF and bronchitis who presents to the ED with complaint of shortness of breath. Patient arrives from home by EMS with complaints of shortness of breath. She is worsened with exertion. Patient states been ongoing for the past week. He states he has a history of COPD and congestive heart failure. Patient states he took himself off of his water pill Lasix a couple weeks ago because he states he did not like having to wake up and urinate throughout the night.   He apparently has been enrolled in hospice but states he rescinded hospice and is DNR so he can come to the emergency department and be treated. Patient states he has some chest tightness. He denies any specific chest pain. Denies cough or hemoptysis. Patient states he was written a prescription for Eliquis but states he has not gotten it filled because he states of medication cost.      Pt has made it clear that he has revoked hospice care (though he has not resumed his meds)     To be admitted. Meds restarted. Will have Cards eval patient     Pt will need palliative care discussion. If he does not want to take diuretics because they cause him to urinate frequently, he will never have controlled heart failure sx. He had a hospital admit in [place] for new onset AF/RVR, was cardioverted and started on amiodarone. Was told then that his EF was 10-15% and was discharged in hospice care. However, since then, he had an echo in our office showing LVEF 60-65%.       Pt plced on iv lasix  Diureses fairly well  Total 2500 cc out  Breathing easily on 2L at time of d/c    Final cards plan\"     Continue IV Lasix : decrease to bid anticipating transitioning to oral in am  Discharge him on lasix 40 mg a day (take in am)  Continue toprol XL and Entresto  Would suggest continuing amiodarone for now : will seek EP input regarding amiodarone dose and COPD  Defer elevated TSH to internal medicine  He is not in afib now, so will not start eliquis > start ASA 81 mg daily  Palliative care consult r.e. hospice/palliative care, etc    Pt noted to have elevated TSH  Also started on synthroid 50 here  To see PCP for recheck TSH in 4wk    Consults made during Hospitalization:  IP CONSULT TO INTERNAL MEDICINE  IP CONSULT TO CARDIOLOGY  IP CONSULT TO HEART FAILURE NURSE/COORDINATOR  IP CONSULT TO EVELYN Marquis 159    Treatment team at time of Discharge: Treatment Team: Attending Provider: Clay Duong MD; Consulting Physician: Clay Duong MD; Consulting Physician: Ange Meng RN; Consulting Physician: Gm Malik MD; Respiratory Therapist (Day): Amy Encinas RCP; Registered Nurse: Cheo Gallo RN; Nursing Student: Samy Carter; Patient Care Tech: Deatra Klinefelter    Imaging Results:  XR CHEST PORTABLE    Result Date: 10/14/2022  EXAMINATION: ONE XRAY VIEW OF THE CHEST 10/14/2022 12:40 pm COMPARISON: 08/01/2022. HISTORY: ORDERING SYSTEM PROVIDED HISTORY: sob TECHNOLOGIST PROVIDED HISTORY: Reason for exam:->sob Reason for Exam: sob FINDINGS: The lungs are hyperexpanded without acute focal process. There is no effusion or pneumothorax. The cardiomediastinal silhouette is stable. The osseous structures are stable. Stable COPD. CT CHEST PULMONARY EMBOLISM W CONTRAST    Result Date: 10/14/2022  EXAMINATION: CTA OF THE CHEST 10/14/2022 1:38 pm TECHNIQUE: CTA of the chest was performed after the administration of intravenous contrast.  Multiplanar reformatted images are provided for review. MIP images are provided for review. Automated exposure control, iterative reconstruction, and/or weight based adjustment of the mA/kV was utilized to reduce the radiation dose to as low as reasonably achievable. COMPARISON: 08/01/2022 HISTORY: ORDERING SYSTEM PROVIDED HISTORY: sob - off anticoag TECHNOLOGIST PROVIDED HISTORY: Reason for exam:->sob - off anticoag Decision Support Exception - unselect if not a suspected or confirmed emergency medical condition->Emergency Medical Condition (MA) Reason for Exam: sob - off anticoag FINDINGS: Pulmonary Arteries: Pulmonary arteries are adequately opacified for evaluation. No evidence of intraluminal filling defect to suggest pulmonary embolism. Main pulmonary artery is normal in caliber. Mediastinum: No evidence of mediastinal lymphadenopathy. The heart and pericardium demonstrate no acute abnormality. There is no acute abnormality of the thoracic aorta. Lungs/pleura: There are mild to moderate bilateral emphysematous changes.  There is no lobar consolidation, pneumothorax or suspicious parenchymal mass. There is a trace left pleural effusion. Upper Abdomen: Limited images of the upper abdomen are unremarkable. Soft Tissues/Bones: No acute bone or soft tissue abnormality. 1. Negative for pulmonary embolus. 2. Emphysema with trace left pleural effusion. Discharge Exam: (2-7 system for EPF/Detailed, ?8 for Comprehensive)  BP (!) 146/73   Pulse 58   Temp 97.9 °F (36.6 °C) (Oral)   Resp 16   Ht 5' 11\" (1.803 m)   Wt 135 lb 9.3 oz (61.5 kg)   SpO2 97%   BMI 18.91 kg/m²   Constitutional: vitals as above: alert, appears stated age and cooperative    Psychiatric: normal insight and judgment, oriented to person, place, time, and general circumstances    Head: Normocephalic, without obvious abnormality, atraumatic    Eyes:lids and lashes normal, conjunctivae and sclerae normal and pupils equal, round, reactive to light and accomodation    EMNT: external ears normal, nares midline    Neck: no carotid bruit, supple, symmetrical, trachea midline and thyroid not enlarged, symmetric, no tenderness/mass/nodules     Respiratory: clear to auscultation and percussion bilaterally with normal respiratory effort    Cardiovascular: normal rate, regular rhythm, normal S1 and S2 and no murmurs    Gastrointestinal: soft, non-tender, non-distended, normal bowel sounds, no masses or organomegaly    Extremities: no clubbing, trace edema    Skin:No rashes or nodules noted. Neurologic:negative             Disposition: home    Condition: stable    Discharge Medications:  [unfilled]       Medication List      START taking these medications     levothyroxine 50 MCG tablet; Commonly known as: SYNTHROID; Take 1 tablet   by mouth Daily; Start taking on: October 18, 2022     CHANGE how you take these medications     furosemide 40 MG tablet; Commonly known as: LASIX; Take 1 tablet by   mouth daily;  What changed: medication strength, how much to take     CONTINUE taking these medications amiodarone 200 MG tablet; Commonly known as: CORDARONE   Entresto  MG per tablet; Generic drug: sacubitril-valsartan; Take   1 tablet by mouth in the morning and 1 tablet before bedtime. ipratropium-albuterol 0.5-2.5 (3) MG/3ML Soln nebulizer solution;   Commonly known as: DUONEB; 1 vial inh Q6hrs for next 10 days and then 1   vial inh Q4hrs PRN SOB or wheezing   metoprolol succinate 100 MG extended release tablet; Commonly known as:   TOPROL XL   nitrofurantoin (macrocrystal-monohydrate) 100 MG capsule; Commonly known   as: MACROBID   OXYGEN   tamsulosin 0.4 MG capsule; Commonly known as: FLOMAX     STOP taking these medications     apixaban 5 MG Tabs tablet; Commonly known as: Eliquis   dicyclomine 10 MG capsule; Commonly known as: BENTYL   LORazepam 0.5 MG tablet; Commonly known as: ATIVAN   MORPHINE 20 MG/ML ORAL SOLN 0.25 ML (SMALL) CMPD   ondansetron 4 MG tablet; Commonly known as: ZOFRAN   pantoprazole 40 MG tablet; Commonly known as: PROTONIX   predniSONE 10 MG tablet; Commonly known as: DELTASONE   predniSONE 5 MG tablet; Commonly known as: DELTASONE         Allergies: Allergies   Allergen Reactions    Codeine Anxiety and Other (See Comments)     Unknown reaction       Follow up Instructions: Follow-up with PCP: Destinee Steinberg III, DO in 2 wk . Total time spent on day of discharge including face-to-face visit, examination, documentation, counseling, preparation of discharge plans and followup, and discharge medicine reconciliation and presciptions is 33 minutes      ---       Subjective from day of d/c:   Kirk Alarcon is a 71 y.o. male. Pt seen and examined  Chart reviewed since last visit, labs and imaging below      Doing ok  Breathing easier  Down to 2L    Review of Systems: (1 system for EPF, 2-9 for detailed, 10+ for comprehensive)  Constitutional: Negative for chills and fever. HENT: Negative for dental problem, nosebleeds and rhinorrhea.       Eyes: Negative for photophobia and visual disturbance. Respiratory: Negative for cough, chest tightness and positive for shortness of breath. Cardiovascular: Negative for chest pain and leg swelling. Gastrointestinal: Negative for diarrhea, nausea and vomiting. Endocrine: Negative for polydipsia and polyphagia. Genitourinary: Negative for frequency, hematuria and urgency. Musculoskeletal: Negative for back pain and myalgias. Skin: Negative for rash. Allergic/Immunologic: Negative for food allergies. Neurological: Negative for dizziness, seizures, syncope and facial asymmetry. Hematological: Negative for adenopathy. Psychiatric/Behavioral: Negative for dysphoric mood. The patient is not nervous/anxious. I have reviewed the patient's medical and social history in detail and updated the computerized patient record. To recap: He  has a past medical history of Bronchitis, CHF (congestive heart failure) [address] [address] and COPD (chronic obstructive pulmonary disease) ([facility] [address] . He  has a past surgical history that includes fracture surgery; Tonsillectomy; eye surgery; Cholecystectomy, laparoscopic (N/A, 3/17/2021); and femoral bypass (Left, 6/1/2021). Katie Milan He  reports that he quit smoking about 6 years ago. His smoking use included cigarettes. He has a 30.00 pack-year smoking history. He quit smokeless tobacco use about 6 years ago. He reports current alcohol use of about 1.0 - 3.0 standard drink per week. He reports that he does not use drugs. .      Current Facility-Administered Medications: aspirin chewable tablet 81 mg, 81 mg, Oral, Daily  furosemide (LASIX) injection 40 mg, 40 mg, IntraVENous, BID  levothyroxine (SYNTHROID) tablet 50 mcg, 50 mcg, Oral, Daily  traMADol (ULTRAM) tablet 100 mg, 100 mg, Oral, Q6H PRN  amiodarone (CORDARONE) tablet 200 mg, 200 mg, Oral, BID  pantoprazole (PROTONIX) tablet 40 mg, 40 mg, Oral, QAM AC  sacubitril-valsartan (ENTRESTO)  MG per tablet 1 tablet, 1 tablet, Oral, BID  metoprolol succinate (TOPROL XL) extended release tablet 100 mg, 100 mg, Oral, Daily  perflutren lipid microspheres (DEFINITY) injection 1.65 mg, 1.5 mL, IntraVENous, ONCE PRN  sodium chloride flush 0.9 % injection 5-40 mL, 5-40 mL, IntraVENous, 2 times per day  sodium chloride flush 0.9 % injection 10 mL, 10 mL, IntraVENous, PRN  0.9 % sodium chloride infusion, , IntraVENous, PRN  ondansetron (ZOFRAN-ODT) disintegrating tablet 4 mg, 4 mg, Oral, Q8H PRN **OR** ondansetron (ZOFRAN) injection 4 mg, 4 mg, IntraVENous, Q6H PRN  magnesium hydroxide (MILK OF MAGNESIA) 400 MG/5ML suspension 30 mL, 30 mL, Oral, Daily PRN  acetaminophen (TYLENOL) tablet 650 mg, 650 mg, Oral, Q6H PRN **OR** acetaminophen (TYLENOL) suppository 650 mg, 650 mg, Rectal, Q6H PRN  hydrALAZINE (APRESOLINE) injection 10 mg, 10 mg, IntraVENous, Q6H PRN  0.9 % sodium chloride bolus, 500 mL, IntraVENous, PRN  potassium chloride (KLOR-CON M) extended release tablet 40 mEq, 40 mEq, Oral, PRN **OR** potassium bicarb-citric acid (EFFER-K) effervescent tablet 40 mEq, 40 mEq, Oral, PRN **OR** potassium chloride 10 mEq/100 mL IVPB (Peripheral Line), 10 mEq, IntraVENous, PRN  ipratropium-albuterol (DUONEB) nebulizer solution 1 ampule, 1 ampule, Inhalation, Q4H PRN  ipratropium-albuterol (DUONEB) nebulizer solution 1 ampule, 1 ampule, Inhalation, BID         Objective (exam): see above    Labs at time of d/c:  Lab Results   Component Value Date    WBC 8.4 10/17/2022    HGB 11.0 (L) 10/17/2022    HCT 34.4 (L) 10/17/2022     10/17/2022    CHOL 197 10/15/2022    TRIG 28 10/15/2022    HDL 84 (H) 10/15/2022    LDLDIRECT 105 (H) 10/15/2022    ALT 41 (H) 10/15/2022    AST 32 10/15/2022     (L) 10/17/2022    K 4.0 10/17/2022    CL 90 (L) 10/17/2022    CREATININE 1.1 10/17/2022    BUN 18 10/17/2022    CO2 37 (H) 10/17/2022    TSH 12.86 (H) 10/14/2022    INR 0.93 08/01/2022    LABMICR YES 05/25/2021     Lab Results   Component Value Date    TROPONINI <0.01 10/14/2022       (Please note that portions of this note were completed with a voice recognition program.  Efforts were made to edit the dictations but occasionally words are mis-transcribed.)      Signed:  Edgar Maurice MD  10/17/2022

## 2022-10-17 NOTE — PROGRESS NOTES
Mendoza Man    Met with patient to discuss hospice, hospice philosophy, levels of care and locations of care. Patient and family elected to sign on to services with [facility]. Medical equipment being delivered to patient's home today. Patient's wife transporting patient home.     Thank you for this referral,   Melissa Dawson, RN    [phone]

## 2022-10-17 NOTE — CONSULTS
PALLIATIVE MEDICINE CONSULTATION     Patient name:Ricardo Mckenzie   XYT:[id number]    :1950  Room/Bed:[facility]-3385/3385-01   LOS: 3 days         Date of consult:10/17/2022    Consult Information  Palliative Medicine Consult performed by: CHELSY Damian CNP, CNP    Inpatient consult to Palliative Care  Consult performed by: CHELSY Damian CNP  Consult ordered by: Delia Ríos MD  Reason for consult: [address] and code status             ASSESSMENT/RECOMMENDATIONS     71 y.o. male with Dyspnea and debility r/t CHF       Symptom Management:  Dyspnea- due to COPD and fluid retention from CHF pt is back to baseline 2L  Debility- pt has dyspnea with exertion that is improved after diuretics   Goals of Care- pt was enrolled in Hospice in [place] and prior to admission was enrolled with [facility] he feels like they were helping with his symptoms of heart failure they gave his morphine to take when he was out of breath and that didn't help so he came to ER. We discussed that his goal is to focus on quality of life he is agreeable to Hospice but would like a different agency we discussed [facility] advanced cardiac program to help manage his CHF symptoms, He is agreeable pt is [facility] and states that he is FULL code on this admission because he was told in ER they needed to make him FULL code to admit him. He would like to go back to DNR does not want CPR or intubation. Patient/Family Goals of Care :    pt was enrolled in Hospice in [place] and prior to admission was enrolled with [facility] he feels like they were helping with his symptoms of heart failure they gave his morphine to take when he was out of breath and that didn't help so he came to ER.  We discussed that his goal is to focus on quality of life he is agreeable to Hospice but would like a different agency we discussed [facility] advanced cardiac program to help manage his CHF symptoms, He is agreeable pt is [facility] and states that he is FULL code on this admission because he was told in ER they needed to make him FULL code to admit him. He would like to go back to DNR does not want CPR or intubation. Disposition/Discharge Plan:   pending    Advance Directives:  Surrogate Decision Maker: Dwaine Mckenna  Code status:  DNR-CC    Case discussed with: patient, floor RN  Thank you for allowing us to participate in the care of this patient. HISTORY     CC: Dyspnea  HPI: The patient is a 71 y.o. male with PMH of COPD, chronic systolic HF, and bronchitis who presented to the ED with shortness of breath. SOB is worsened with exertion. This had been going on for about a week. He says he took himself off of his water pill lasix a few weeks ago because he was told to take it at night and he didn't want to be up all night. He apparently has been enrolled in hospice since he returned from [place] this summer. Palliative Medicine SymptomScreening/ROS:    Review of Systems   Constitutional:  Positive for activity change and fatigue. Negative for unexpected weight change. HENT:  Positive for congestion. Negative for trouble swallowing. Eyes: Negative. Respiratory:  Positive for cough, chest tightness and shortness of breath. Cardiovascular:  Positive for leg swelling. Gastrointestinal:  Negative for abdominal distention. Endocrine: Negative. Genitourinary: Negative. Musculoskeletal:  Positive for arthralgias. Skin:  Positive for pallor. Allergic/Immunologic: Negative. Neurological:  Positive for weakness. Psychiatric/Behavioral:  Negative for confusion. Palliative Performance Scale:     [x] 60%  Amb reduced; Sig dz. Can't do hobbies/housework; Intake normal or reduced, Occasional assist; LOC full/confusion   [] 50%  Mainly sit/lie; Extensive disease. Mainly assist, Intake normal or reduced; Occasional assist; LOC full/confusion   [] 40%  Mainly in bed; Extensive disease; Mainly assist; Intake normal or reduced;  Occasional assist; LOC full/confusion   [] 30%  Bed bound, Extensive disease; Total care; Intake reduced; LOC full/confusion   [] 20%  Bed bound; Extensive disease; Total care; Intake minimal; Drowsy/coma   [] 10%  Bed bound; Extensive disease; Total care; Mouth care only; Drowsy/coma   []  0%   Death       Home med list and hospital medications reviewed in chart as of 10/17/2022     EXAM     Vitals:    10/17/22 0932   BP:    Pulse:    Resp:    Temp:    SpO2: 98%       Physical Exam  Constitutional:       Appearance: He is ill-appearing. HENT:      Head: Normocephalic and atraumatic. Nose: Nose normal.      Mouth/Throat:      Mouth: Mucous membranes are dry. Eyes:      Pupils: Pupils are equal, round, and reactive to light. Cardiovascular:      Rate and Rhythm: Tachycardia present. Pulses: Normal pulses. Heart sounds: No murmur heard. No gallop. Pulmonary:      Effort: Pulmonary effort is normal.   Chest:      Chest wall: Tenderness present. Abdominal:      General: There is no distension. Musculoskeletal:      Cervical back: Neck supple. Right lower leg: Edema present. Left lower leg: Edema present. Skin:     General: Skin is dry. Coloration: Skin is pale. Findings: Bruising present. Neurological:      Mental Status: He is alert and oriented to person, place, and time. Mental status is at baseline. Psychiatric:         Mood and Affect: Mood normal.         Behavior: Behavior normal.         Thought Content:  Thought content normal.         Judgment: Judgment normal.              Current labs in the epic chart reviewed as of 10/17/2022   Review of previous notes, admits, labs, radiology and testing relevant to this consult done in this chart today 10/17/2022      Total time: 70 minutes  >50% of time spent counseling patient at bedside or POA/family member if applicable , reviewing information and discussing care, coordinating with care team  Signed By: Electronically signed by CHELSY Galo - CNP on 10/17/2022 at 9:34 AM  Palliative Medicine   [phone]    October 17, 2022

## 2022-10-17 NOTE — PROGRESS NOTES
[facility] Department   Phone: [phone]    Physical Therapy    [] Initial Evaluation            [x] Daily Treatment Note         [] Discharge Summary      Patient: Ernestina Knox   : [birth year]   MRN: [id number]   Date of Service:  10/17/2022  Admitting Diagnosis: Acute on chronic systolic heart failure [facility])  Current Admission Summary: Ernestina Knox is a 71 y.o. male with past medical history of COPD, CHF and bronchitis who presents to the ED with complaint of shortness of breath. Patient arrives from home by EMS with complaints of shortness of breath. She is worsened with exertion. Patient states been ongoing for the past week. He states he has a history of COPD and congestive heart failure. Patient states he took himself off of his water pill Lasix a couple weeks ago because he states he did not like having to wake up and urinate throughout the night. He apparently has been enrolled in hospice but states he rescinded hospice and is DNR so he can come to the emergency department and be treated. Patient states he has some chest tightness. He denies any specific chest pain. Denies cough or hemoptysis. Patient states he was written a prescription for Eliquis but states he has not gotten it filled because he states of medication cost.  He denies abdominal pain, nausea/vomiting, urinary symptoms or changes in bowel movements. He does notice some swelling to his legs. He denies any orthopnea. Denies any calf pain. Denies fever or chills. Denies rashes or lesions. Past Medical History:  has a past medical history of Bronchitis, CHF (congestive heart failure) ([facility] [address] and COPD (chronic obstructive pulmonary disease) ([facility] [address] Past Surgical History:  has a past surgical history that includes fracture surgery; Tonsillectomy; eye surgery; Cholecystectomy, laparoscopic (N/A, 3/17/2021); and femoral bypass (Left, 2021).   Discharge Recommendations: Parminder Hager Joseph Bermeo scored a 22/24 on the AM-PAC short mobility form. At this time, no further PT is recommended upon discharge due to patient planning to go on hospice and reporting he is at baseline. Will follow during acute stay for updated recommendations. Recommend patient returns to prior setting with prior services. Planning to return home with hospice care. DME Required For Discharge: no DME required at discharge  Precautions/Restrictions: high fall risk  Weight Bearing Restrictions: weight bearing as tolerated  [] Right Upper Extremity  [] Left Upper Extremity [] Right Lower Extremity  [] Left Lower Extremity     Required Braces/Orthotics: no braces required   [] Right  [] Left  Positional Restrictions:no positional restrictions    Pre-Admission Information   Lives With: spouse                  Type of Home: house  Home Layout: tri-level  Home Access: level entry  Bathroom Layout: tub/shower unit, walk in shower  Bathroom Equipment:  reports has no DME for shower stall, stated may benefit from a grab bar, able to stand for several minutes in shower   Toilet Height: standard height, high commode in other bathroom   Home Equipment: rolling walker, hospital bed, oxygen, SpO2  Transfer Assistance: Independent without use of device, uses a RW outside  Ambulation Assistance:Independent without use of device  ADL Assistance:  indep all ADLs   IADL Assistance:  wife performs   Active :        [x] Yes                 [] No  Hand Dominance: [] Left                 [x] Right  Current Employment: retired. Occupation: [employer]   Hobbies: woodworking, classic cars   Recent Falls: reports 1 fall in last 6 months--was carrying an elliptical machine    Examination   Vision:   Vision Gross Assessment: Impaired and Vision Corrective Device: wears glasses for reading  Hearing:   [facility]  Observation:   General Observation:  Patient supine in bed, HOB elevated, 2L O2 via nasal cannula.   Posture:   WFL  Sensation: WFL  Proprioception:    WFL  Tone:   Normotonic  Coordination Testing:   WFL    ROM:   (B) LE AROM WFL  Strength:   (B) LE strength grossly WFL  Decision Making: low complexity  Clinical Presentation: evolving      Subjective  General: Patient seated EOB upon arrival. Agreeable to working with PT. Pt getting ready for DC, pt and wife with many questions. Pain: 0/10  Pain Interventions: repositioned        Functional Mobility  Bed Mobility  Scooting: Independent  Comments: Pt seated EOB upon arrival and left seated EOB at end of session. Transfers  Sit to stand transfer: modified independent  Stand to sit transfer: modified independent  Comments: transfers from EOB. Ambulation  Surface:level surface  Assistive Device: no device  Assistance: supervision  Distance: 30'  Gait Mechanics: slight flexed trunk and LEs, mild sway noted with gait. Comments:  Pt amb in room and returned to sitting EOB. SOB and fatigued at end of activity. Stair Mobility  Stair mobility not completed on this date. Comments:  Wheelchair Mobility:  No w/c mobility completed on this date. Comments:  Balance  Static Sitting Balance: good: independent with functional balance in unsupported position  Dynamic Sitting Balance: good: independent with functional balance in unsupported position  Static Standing Balance: fair (+): maintains balance at SBA/supervision without use of UE support  Dynamic Standing Balance: fair (+): maintains balance at SBA/supervision without use of UE support  Comments: no AD needed with amb    Other Therapeutic Interventions  Pt donned shoes independently. Pt 94% on 2LO2 at rest. Removed O2 and dropped to 89% on RA. Pt then placed O2 back on before amb. And remained 92% or higher with activity. In seated, pt performed HR/TR x 10 bilat, Marching x 10 bilat, LAQs x 10 bilat. Photo handout given with these exercise and instructed in GS, standing marching and mini squats.  Discussed DC recommendations, AD use, energy conservation, and home ex program with pt and wife. Functional Outcomes  AM-PAC Inpatient Mobility Raw Score : 22              Cognition  WFL  Orientation:    alert and oriented x 4  Command Following:   [facility]    Education  Barriers To Learning: none  Patient Education: patient educated on goals, PT role and benefits, plan of care, general safety, functional mobility training, energy conservation, disease specific education, transfer training, discharge recommendations  Learning Assessment:  patient verbalizes and demonstrates understanding    Assessment  Activity Tolerance: Ambulation limited by SOB. Impairments Requiring Therapeutic Intervention: decreased functional mobility, decreased endurance, decreased balance  Prognosis: poor  Clinical Assessment: Pt independent bed mob, mod I for transfers, and supervision for gait with no AD. Patient was on hospice but rescinded hospice to be able to come to ED. He states he will resume hospice at d/c. Pt needing supervision for safety given O2 saturation levels and endurance deficits. Will follow during acute stay but likely going home on hospice, in which case, no therapy would be recommended. Safety Interventions: patient left in bed, call light within reach, patient at risk for falls, nurse notified, and family/caregiver present - wife and nursing present at end of session. Plan  Frequency: 1-2 x/per week  Current Treatment Recommendations: balance training and endurance training    Goals  Patient Goals: Return home with hospice. Short Term Goals:  Time Frame: Discharge.   Patient will ambulate 50 ft with use of no device at Independent  **no goals met in full this date, 10/17    Therapy Session Time      Individual Group Co-treatment   Time In 1358       Time Out 1436       Minutes 38         Total Treatment Minutes:  38 minutes       Electronically Signed By: Christoph Johnson [id number]

## 2022-10-17 NOTE — PLAN OF CARE
Problem: Discharge Planning  Goal: Discharge to home or other facility with appropriate resources  10/17/2022 1432 by Lucero Lagos RN  Outcome: Completed     Problem: Safety - Adult  Goal: Free from fall injury  10/17/2022 1432 by Lucero Lagos RN  Outcome: Completed     Problem: Pain  Goal: Verbalizes/displays adequate comfort level or baseline comfort level  10/17/2022 1432 by Lucero Lagos RN  Outcome: Completed

## 2022-10-17 NOTE — PROGRESS NOTES
Discharge instructions and medications reviewed with patient. Patient voices understanding and denies further questions/ concerns at this time. Patient to follow up with PCP in 1 week. All belongings with patient. Patient discharged at [phone] per vehicle with his wife.

## 2022-10-17 NOTE — PROGRESS NOTES
[facility]   Daily Progress Note      Admit Date:  10/14/2022    HPI:    Mr. Ruthie Prasad is a 71year old male with history of COPD, chronic systolic heart failure, bronchitis, NICM with improved LVEF    He is admitted with worsening shortness of breath for a week. He stopped his diuretics a few weeks ago. He has been enrolled in hospice since he returned from [place] this summer. He was admitted in [place] for new AF/RVR and has not started eliquis due to cost.     TSH 12.86    Subjective:  Patient is being seen for systolic heart failure . There were no acute overnight cardiac events. He is up walking around in his room. He uses oxygen at home and has this at bedside. He denies any increase shortness of breath. Weight 142->137 and -2.2 L out    Objective:   BP (!) 146/73   Pulse 58   Temp 97.9 °F (36.6 °C) (Oral)   Resp 16   Ht 5' 11\" (1.803 m)   Wt 137 lb 12.8 oz (62.5 kg)   SpO2 98%   BMI 19.22 kg/m²     Intake/Output Summary (Last 24 hours) at 10/17/2022 1104  Last data filed at 10/17/2022 0855  Gross per 24 hour   Intake 720 ml   Output 1760 ml   Net -1040 ml          Physical Exam:  General:  Awake, alert, oriented in NAD  Skin:  Warm and dry. No unusual bruising or rash  Neck:  Supple. No JVD or carotid bruit appreciated  Chest:  Normal effort.   Clear to auscultation, no wheezes/rhonchi/rales  Cardiovascular:  RRR, S1/S2, no murmur/gallop/rub  Abdomen:  Soft, nontender, +bowel sounds  Extremities:  No edema  Neurological: No focal deficits  Psychological: Normal mood and affect      Medications:    aspirin  81 mg Oral Daily    furosemide  40 mg IntraVENous BID    levothyroxine  50 mcg Oral Daily    amiodarone  200 mg Oral BID    pantoprazole  40 mg Oral QAM AC    sacubitril-valsartan  1 tablet Oral BID    metoprolol succinate  100 mg Oral Daily    sodium chloride flush  5-40 mL IntraVENous 2 times per day    ipratropium-albuterol  1 ampule Inhalation BID      sodium chloride Lab Data:  CBC:   Recent Labs     10/15/22  0422 10/16/22  0438 10/17/22  0515   WBC 7.4 12.8* 8.4   HGB 11.0* 10.0* 11.0*    284 298     BMP:    Recent Labs     10/15/22  0422 10/16/22  0438 10/17/22  0515    131* 133*   K 4.9 3.6 4.0   CO2 33* 33* 37*   BUN 15 20 18   CREATININE 1.1 1.5* 1.1     INR:  No results for input(s): INR in the last 72 hours. BNP:    Recent Labs     10/14/22  1206 10/16/22  0438 10/17/22  0515   PROBNP 1,237* 871* 974*         Diagnostics:  Echo:  6/14/22:   Summary   Technically difficult examination due to body habitus and limited windows. Left ventricular systolic function is normal with ejection fraction   estimated at 60-65 %. No regional wall motion abnormalities are noted. Normal left ventricular wall thickness. Aortic valve appears sclerotic but opens adequately. Mild mitral regurgitation. Moderate tricuspid regurgitation. Systolic pulmonary artery pressure (SPAP) estimated at 45 mmHg (right atrial   pressure 3 mmHg), consistent with mild pulmonary hypertension. Chest CT:  Impression   1. Negative for pulmonary embolus. 2. Emphysema with trace left pleural effusion. Assessment:    1. Shortness of breath, resolved  2. Acute on chronic systolic heart failure with improved LVEF  3. PAF currently in sinus, no need for anticoag per patient choice  4. COPD on chronic oxygen at home  5. Hospice care      Plan:    Palliative care following  Lasix 40 mg po daily in am  Continue toprol 100 mg daily  Continue entresto  mg twice a day   CHF nurse following for diet education, fluid restriction and daily weights  Hospitalist to address his increased TSH or defer to PCP    Follow up is arranged  Stable from cardiology standpoint  NYHA III    Discussed with patient who is agreeable with plan of care. Thank you for allowing me to participate in the care of your patient.     CHELSY Hinojosa - CNS, CNS

## 2022-10-17 NOTE — CARE COORDINATION
CM reviewed chart for d/c planning. On 2 liters baseline oxygen. Revoked hospice this admission. Pt has no further therapy recommendations on discharge. Palliative care was consulted this admission.     Patient discharged 10/17/2022 to home   All discharge needs met per case management     Bashir Gomez RN, BSN  [phone]

## 2022-10-17 NOTE — ACP (ADVANCE CARE PLANNING)
Advance Care Planning   The patient has the following advanced directives on file:  Advance Directives       Power of [address] ACP-Advance Directive ACP-Power of     Not on File Filed on 10/14/16 Filed Not on File            The patient has appointed the following active healthcare agents:  Winsome Gibson    The Patient has the following current code status:    Code Status: DNR-CC    Visit Documentation:  pt was enrolled in Hospice in [place] and prior to admission was enrolled with [facility] he feels like they were helping with his symptoms of heart failure they gave his morphine to take when he was out of breath and that didn't help so he came to ER. We discussed that his goal is to focus on quality of life he is agreeable to Hospice but would like a different agency we discussed [facility] advanced cardiac program to help manage his CHF symptoms, He is agreeable pt is [facility] and states that he is FULL code on this admission because he was told in ER they needed to make him FULL code to admit him. He would like to go back to DNR does not want CPR or intubation.      Rony Choudhury, APRN - CNP  10/17/2022

## 2022-10-17 NOTE — CARE COORDINATION
CM notified by Brea Hahn RN with [facility] [phone] that pt will be going home with [facility] services today and his spouse is picking him up. She is also updating pt's RN.     Pili Land RN, BSN  [phone]

## 2022-10-21 ASSESSMENT — ENCOUNTER SYMPTOMS
SHORTNESS OF BREATH: 1
GASTROINTESTINAL NEGATIVE: 1

## 2022-10-21 NOTE — PROGRESS NOTES
[address]   Congestive Heart Failure    PrimaryCare Doctor:  Kay Irizarry III, DO    Chief Complaint:  SOB    History of Present Illness:  Jagdeep Quinones is a 71 y.o. male with PMH NICM, HFimpEF, COPD,  PVD s/p fem/pop bypass in June who presents today for hosp f/u. He was admitted 10/14-10/17/22. Hospital Course:  presents to the ED with complaint of shortness of breath. Patient arrives from home by EMS with complaints of shortness of breath. She is worsened with exertion. Patient states been ongoing for the past week. He states he has a history of COPD and congestive heart failure. Patient states he took himself off of his water pill Lasix a couple weeks ago because he states he did not like having to wake up and urinate throughout the night. He apparently has been enrolled in hospice but states he rescinded hospice and is DNR so he can come to the emergency department and be treated. Patient states he has some chest tightness. He denies any specific chest pain. Denies cough or hemoptysis. Patient states he was written a prescription for Eliquis but states he has not gotten it filled because he states of medication cost.      Pt has made it clear that he has revoked hospice care (though he has not resumed his meds)     To be admitted. Meds restarted. Will have Cards eval patient     Pt will need palliative care discussion. If he does not want to take diuretics because they cause him to urinate frequently, he will never have controlled heart failure sx. He had a hospital admit in [place] for new onset AF/RVR, was cardioverted and started on amiodarone. Was told then that his EF was 10-15% and was discharged in hospice care. However, since then, he had an echo in our office showing LVEF 60-65%.        Pt plced on iv lasix  Diureses fairly well  Total 2500 cc out  Breathing easily on 2L at time of d/c     Final cards plan\"      Continue IV Lasix : decrease to bid anticipating transitioning to oral in am  Discharge him on lasix 40 mg a day (take in am)  Continue toprol XL and Entresto  Would suggest continuing amiodarone for now : will seek EP input regarding amiodarone dose and COPD  Defer elevated TSH to internal medicine  He is not in afib now, so will not start eliquis > start ASA 81 mg daily  Palliative care consult r.e. hospice/palliative care, etc     Pt noted to have elevated TSH  Also started on synthroid 50 here  To see PCP for recheck TSH in 4wk       Since hospitalization: he increased lasix to 60mg yesterday for SOB, wt gain and edema. Katie Milan He is taking higher dose toprol than prescribed discharge on PCP rec. His BP is very low here on first reading today but a little better on second - he took all meds this am along with MSo4 and ativan. He denies dizziness, chest pain, palpitations, PND, exertional chest pressure/discomfort, fatigue, early saiety, syncope. He never f/u with EP per my rec at last OV to discuss amio with COPD, and AC - he is not taking per choice, we discussed risk again today. He is back with Hospice care at this point but is asking about CardioMems and seems to be a little in denial about prognosis. Hosp d/c wt: 135  Home d/c wt: [address] wt 139    Baseline Weight: 135  Wt Readings from Last 3 Encounters:   10/17/22 137 lb 12.8 oz (62.5 kg)   08/02/22 132 lb 14.4 oz (60.3 kg)   07/14/22 134 lb (60.8 kg)     Admit BNP: 1237   D/C BNP:974    EF:60-65%  Cardiac Imaging: ST 8/1/22: There is a small fixed apical defect that is likely bowel artifact rather    than scar.    -There is no ischemia.    -There is mild global hypokinesis with EF=47%. -Intermediate risk study related to EF < 50%. Echo 6/14/22:    Summary   Technically difficult examination due to body habitus and limited windows. Left ventricular systolic function is normal with ejection fraction   estimated at 60-65 %. No regional wall motion abnormalities are noted.    Normal left ventricular wall thickness. Aortic valve appears sclerotic but opens adequately. Mild mitral regurgitation. Moderate tricuspid regurgitation. Systolic pulmonary artery pressure (SPAP) estimated at 45 mmHg (right atrial   pressure 3 mmHg), consistent with mild pulmonary hypertension    Echo 3/11/22:    Summary   Technically limited study due to patient body habitus and lung interference   as well as frequent PVCs. Estimated ejection fraction of 45-50%. Mildly diminished left ventricular systolic function. Image quality inadequate to rule out regional wall motion abnormalities. Mild septal left ventricular hypertrophy. Normal left ventricle size. Grade I diastolic dysfunction with normal LV filling pressures. The aortic valve appears sclerotic but opens well. Mild to moderate tricuspid valve regurgitation. The inferior vena cava is dilated with respiratory variation greater than   50% consistent with CVP 10-15 mmHg    Echo 4/1/21:   Summary   -Technically difficult exam due to body habitus. -Moderately reduced global systolic function with an ejection fraction   estimated at 40%. -Global hypokinesis with abnormal septal motion noted. -Grade II diastolic dysfunction with elevated LV filling pressures. Avg.   E/e'=5.4   -Normal function of all valves. Echo 10/5/2018:  Summary   -Left ventricular cavity size is normal.   -Normal left ventricular wall thickness.   -Overall left ventricular systolic function appears mildly to moderately   reduced with an ejection fraction on the 40-45% range.   -There is moderate diffuse global hypokinesis. -Abnormal (paradoxical) septal motion is present .   -Normal diastolic function. .E/e\"=6.45   -Trivial mitral and tricuspid regurgitation.       [facility] 10/13/2016:  Coronary Findings   Vessel  Ostial  Proximal  Mid  Distal  Special    LM  0%  0%  0%  0%      LAD  0%  0%  0%  0%      RI              Cx  0%  0%  0%  0%      RCA  0%  0%  0%  0% Ventriculography/Pressure Findings  LVEF %  LVEDP  MR    20%  8mmHg         Device: No     Activity: at baseline  Can you walk 1-2 blocks or do a moderate amount of house/yard work? No    NYHA Class: III     Sodium Restrictions: 3g  Fluid Restrictions: 48-64 oz/day  Sodium and fluid restriction compliance: fair    Pt Education: The patient has received education on the following topics: dietary sodium restriction, heart failure medications, the importance of physical activity, symptom management and weight monitoring     FLY No     Past Medical History:   has a past medical history of Bronchitis, CHF (congestive heart failure) ([facility] [address] and COPD (chronic obstructive pulmonary disease) ([facility] [address] Surgical History:   has a past surgical history that includes fracture surgery; Tonsillectomy; eye surgery; Cholecystectomy, laparoscopic (N/A, 3/17/2021); and femoral bypass (Left, 6/1/2021). Social History:   reports that he quit smoking about 6 years ago. His smoking use included cigarettes. He has a 30.00 pack-year smoking history. He quit smokeless tobacco use about 6 years ago. He reports current alcohol use of about 1.0 - 3.0 standard drink per week. He reports that he does not use drugs. Family History:   Family History   Problem Relation Age of Onset    Heart Disease Mother     No Known Problems Father        HomeMedications:  Prior to Admission medications    Medication Sig Start Date End Date Taking?  Authorizing Provider   furosemide (LASIX) 40 MG tablet Take 1 tablet by mouth daily 10/17/22   Mai Rogers MD   levothyroxine (SYNTHROID) 50 MCG tablet Take 1 tablet by mouth Daily 10/18/22   Mai Rogers MD   tamsulosin [facility]) 0.4 MG capsule Take 0.4 mg by mouth nightly    Historical Provider, MD   nitrofurantoin, macrocrystal-monohydrate, (MACROBID) 100 MG capsule Take 100 mg by mouth 2 times daily 10/12/22 10/26/22  Historical Provider, MD   metoprolol succinate (TOPROL XL) 100 MG extended release tablet Take 100 mg by mouth daily    Historical Provider, MD   sacubitril-valsartan (ENTRESTO)  MG per tablet Take 1 tablet by mouth in the morning and 1 tablet before bedtime. 7/22/22   CHELSY Gustafson - BHAVESH   dicyclomine (BENTYL) 10 MG capsule Take 1 capsule by mouth 4 times daily  Patient not taking: No sig reported 6/20/22 9/7/14  Miriam Mauricio MD   amiodarone (CORDARONE) 200 MG tablet Take 200 mg by mouth in the morning and 200 mg before bedtime. Historical Provider, MD   OXYGEN Inhale 3 L into the lungs as needed     Historical Provider, MD   ipratropium-albuterol (DUONEB) 0.5-2.5 (3) MG/3ML SOLN nebulizer solution 1 vial inh Q6hrs for next 10 days and then 1 vial inh Q4hrs PRN SOB or wheezing 3/12/22   Eliazar Nuñez MD        Allergies:  Codeine     ROS:   Review of Systems   Constitutional:  Positive for fatigue. Respiratory:  Positive for shortness of breath. Cardiovascular:  Positive for leg swelling. Gastrointestinal: Negative. Genitourinary: Negative. Musculoskeletal: Negative. Skin: Negative. Neurological: Negative. Hematological: Negative. Psychiatric/Behavioral: Negative. Physical Examination:    Vitals:    10/24/22 1258   BP: (!) 102/48   Pulse: 55   SpO2: 98%   Weight: 139 lb 3.2 oz (63.1 kg)   Height: 5' 11\" (1.803 m)           Physical Exam  Constitutional:       Appearance: Normal appearance. He is well-developed. HENT:      Head: Normocephalic and atraumatic. Eyes:      Extraocular Movements: Extraocular movements intact. Pupils: Pupils are equal, round, and reactive to light. Cardiovascular:      Rate and Rhythm: Normal rate and regular rhythm. Heart sounds: Normal heart sounds. Pulmonary:      Effort: Pulmonary effort is normal.      Breath sounds: Normal breath sounds. Abdominal:      Palpations: Abdomen is soft. Musculoskeletal:         General: Normal range of motion.       Cervical back: Normal range of motion and neck supple. Right lower leg: Edema present. Left lower leg: Edema present. Comments: 1+ feet and ankles   Skin:     General: Skin is warm and dry. Neurological:      General: No focal deficit present. Mental Status: He is alert and oriented to person, place, and time. Mental status is at baseline. Psychiatric:         Mood and Affect: Mood normal.         Behavior: Behavior normal.         Thought Content:  Thought content normal.         Judgment: Judgment normal.       Lab Data:    CBC:   Lab Results   Component Value Date/Time    WBC 8.4 10/17/2022 05:15 AM    WBC 12.8 10/16/2022 04:38 AM    WBC 7.4 10/15/2022 04:22 AM    RBC 4.01 10/17/2022 05:15 AM    RBC 3.62 10/16/2022 04:38 AM    RBC 3.95 10/15/2022 04:22 AM    HGB 11.0 10/17/2022 05:15 AM    HGB 10.0 10/16/2022 04:38 AM    HGB 11.0 10/15/2022 04:22 AM    HCT 34.4 10/17/2022 05:15 AM    HCT 30.9 10/16/2022 04:38 AM    HCT 33.8 10/15/2022 04:22 AM    MCV 85.7 10/17/2022 05:15 AM    MCV 85.4 10/16/2022 04:38 AM    MCV 85.6 10/15/2022 04:22 AM    RDW 17.5 10/17/2022 05:15 AM    RDW 17.9 10/16/2022 04:38 AM    RDW 17.7 10/15/2022 04:22 AM     10/17/2022 05:15 AM     10/16/2022 04:38 AM     10/15/2022 04:22 AM     BMP:  Lab Results   Component Value Date/Time     10/17/2022 05:15 AM     10/16/2022 04:38 AM     10/15/2022 04:22 AM    K 4.0 10/17/2022 05:15 AM    K 3.6 10/16/2022 04:38 AM    K 4.9 10/15/2022 04:22 AM    K 4.9 10/14/2022 12:06 PM    K 4.2 08/02/2022 04:55 AM    K 4.7 06/20/2022 12:18 AM    CL 90 10/17/2022 05:15 AM    CL 93 10/16/2022 04:38 AM    CL 97 10/15/2022 04:22 AM    CO2 37 10/17/2022 05:15 AM    CO2 33 10/16/2022 04:38 AM    CO2 33 10/15/2022 04:22 AM    PHOS 3.7 09/19/2016 02:57 PM    BUN 18 10/17/2022 05:15 AM    BUN 20 10/16/2022 04:38 AM    BUN 15 10/15/2022 04:22 AM    CREATININE 1.1 10/17/2022 05:15 AM    CREATININE 1.5 10/16/2022 04:38 AM    CREATININE 1.1 10/15/2022 04:22 AM BNP:   Lab Results   Component Value Date/Time    PROBNP 974 10/17/2022 05:15 AM    PROBNP 871 10/16/2022 04:38 AM    PROBNP 1,237 10/14/2022 12:06 PM     Iron Studies:  No components found for: FE,  TIBC,  FERRITIN  Iron Deficiency Anemia:  No    IV Iron Therapy:  No  2017 ACC/AHA HF Guidelines:   intravenous iron replacement in patients with New York Heart Association (NYHA) class II and III HF and iron deficiency (ferritin <100 ng/ml or 100-300 ng/ml if transferrin saturation <20%), to improve functional status and QoL. Assessment/Plan:    Encounter Diagnoses         systolic congestive heart failure (HCC) Continue higher dose lasix, labs in one week    Nonischemic cardiomyopathy (HCC) entresto and toprol    Hypotension Decrease entresto and toprol          atrial fibrillation (HCC) Sanjeev Sleigh today, decrease BB, continue AMio no AC per pt choice,  schedule EP f/u to discuss Amio given COPD         Instructions:   Medications: decrease metoprolol to 100mg before bed, decrease entresto to 1/2 pill twice a day, continue lasix at 60mg once a day  Labs in one week  Follow up: schedule appointment with EP and f/u with Akash Scales in  3-4 weeks        [facility]: [phone]      I appreciate the opportunity of cooperating in the care of this individual.    CHELSY Gillis - CNP, CNP, 10/21/2022,8:52 AM    QUALITY MEASURES  1. Tobacco Cessation Counseling: NA  2. Retake of BP if >140/90:   NA  3. Documentation to PCP/referring for new patient:  Sent to PCP at close of office visit  4. CAD patient on anti-platelet: NA  5. CAD patient on STATIN therapy:  NA  6. Patient with CHF and aFib on anticoagulation:  NA   7. Patient Education:Yes   8. BB for LVSD :  Yes   9. ACE/ARB for LVSD:  Yes   10.  Left Ventricular Ejection Fraction (LVEF) Assessment:  Yes

## 2022-10-24 ENCOUNTER — OFFICE VISIT (OUTPATIENT)
Dept: CARDIOLOGY CLINIC | Age: 72
End: 2022-10-24
Payer: COMMERCIAL

## 2022-10-24 VITALS
HEART RATE: 55 BPM | SYSTOLIC BLOOD PRESSURE: 102 MMHG | BODY MASS INDEX: 19.49 KG/M2 | OXYGEN SATURATION: 98 % | WEIGHT: 139.2 LBS | HEIGHT: 71 IN | DIASTOLIC BLOOD PRESSURE: 48 MMHG

## 2022-10-24 DIAGNOSIS — I95.2 HYPOTENSION DUE TO DRUGS: ICD-10-CM

## 2022-10-24 DIAGNOSIS — I42.8 NONISCHEMIC CARDIOMYOPATHY (HCC): Primary | ICD-10-CM

## 2022-10-24 DIAGNOSIS — I50.22 CHRONIC SYSTOLIC CONGESTIVE HEART FAILURE (HCC): ICD-10-CM

## 2022-10-24 DIAGNOSIS — I48.19 PERSISTENT ATRIAL FIBRILLATION (HCC): ICD-10-CM

## 2022-10-24 PROCEDURE — G9692 HOSP RECD BY PT DUR MSMT PER: HCPCS | Performed by: NURSE PRACTITIONER

## 2022-10-24 PROCEDURE — 99214 OFFICE O/P EST MOD 30 MIN: CPT | Performed by: NURSE PRACTITIONER

## 2022-10-24 RX ORDER — SACUBITRIL AND VALSARTAN 97; 103 MG/1; MG/1
0.5 TABLET, FILM COATED ORAL 2 TIMES DAILY
Qty: 60 TABLET | Refills: 1 | OUTPATIENT
Start: 2022-10-24 | End: 2022-11-04 | Stop reason: ALTCHOICE

## 2022-10-24 RX ORDER — METOPROLOL SUCCINATE 100 MG/1
100 TABLET, EXTENDED RELEASE ORAL EVERY EVENING
Qty: 30 TABLET | Refills: 1 | OUTPATIENT
Start: 2022-10-24

## 2022-10-24 NOTE — PATIENT INSTRUCTIONS
Instructions:   Medications: decrease metoprolol to 100mg before bed, decrease entresto to 1/2 pill twice a day, continue lasix at 60mg once a day  Labs in one week  Follow up: schedule appointment with EP and f/u with Saskia Sanford in  3-4 weeks        [facility]: [phone]

## 2022-10-31 ENCOUNTER — APPOINTMENT (OUTPATIENT)
Dept: GENERAL RADIOLOGY | Age: 72
DRG: 291 | End: 2022-10-31
Payer: COMMERCIAL

## 2022-10-31 ENCOUNTER — HOSPITAL ENCOUNTER (INPATIENT)
Age: 72
LOS: 1 days | Discharge: HOME | DRG: 291 | End: 2022-11-01
Attending: EMERGENCY MEDICINE | Admitting: INTERNAL MEDICINE
Payer: COMMERCIAL

## 2022-10-31 DIAGNOSIS — R07.9 CHEST PAIN, UNSPECIFIED TYPE: Primary | ICD-10-CM

## 2022-10-31 DIAGNOSIS — R06.00 DYSPNEA, UNSPECIFIED TYPE: ICD-10-CM

## 2022-10-31 DIAGNOSIS — I50.9 ACUTE ON CHRONIC CONGESTIVE HEART FAILURE, UNSPECIFIED HEART FAILURE TYPE (HCC): ICD-10-CM

## 2022-10-31 DIAGNOSIS — I95.9 HYPOTENSION, UNSPECIFIED HYPOTENSION TYPE: ICD-10-CM

## 2022-10-31 LAB
A/G RATIO: 1.5 (ref 1.1–2.2)
ALBUMIN SERPL-MCNC: 3.7 G/DL (ref 3.4–5)
ALP BLD-CCNC: 66 U/L (ref 40–129)
ALT SERPL-CCNC: 68 U/L (ref 10–40)
ANION GAP SERPL CALCULATED.3IONS-SCNC: 1 MMOL/L (ref 3–16)
AST SERPL-CCNC: 58 U/L (ref 15–37)
BACTERIA: ABNORMAL /HPF
BASOPHILS ABSOLUTE: 0 K/UL (ref 0–0.2)
BASOPHILS RELATIVE PERCENT: 0.6 %
BILIRUB SERPL-MCNC: <0.2 MG/DL (ref 0–1)
BILIRUBIN URINE: NEGATIVE
BLOOD, URINE: ABNORMAL
BUN BLDV-MCNC: 23 MG/DL (ref 7–20)
CALCIUM SERPL-MCNC: 8.9 MG/DL (ref 8.3–10.6)
CHLORIDE BLD-SCNC: 91 MMOL/L (ref 99–110)
CLARITY: CLEAR
CO2: 38 MMOL/L (ref 21–32)
COLOR: YELLOW
CREAT SERPL-MCNC: 1.1 MG/DL (ref 0.8–1.3)
EKG ATRIAL RATE: 58 BPM
EKG DIAGNOSIS: NORMAL
EKG P AXIS: 73 DEGREES
EKG P-R INTERVAL: 158 MS
EKG Q-T INTERVAL: 412 MS
EKG QRS DURATION: 88 MS
EKG QTC CALCULATION (BAZETT): 404 MS
EKG R AXIS: 73 DEGREES
EKG T AXIS: 78 DEGREES
EKG VENTRICULAR RATE: 58 BPM
EOSINOPHILS ABSOLUTE: 0.2 K/UL (ref 0–0.6)
EOSINOPHILS RELATIVE PERCENT: 2.2 %
EPITHELIAL CELLS, UA: 1 /HPF (ref 0–5)
GFR SERPL CREATININE-BSD FRML MDRD: >60 ML/MIN/{1.73_M2}
GLUCOSE BLD-MCNC: 115 MG/DL (ref 70–99)
GLUCOSE URINE: NEGATIVE MG/DL
HCT VFR BLD CALC: 33.7 % (ref 40.5–52.5)
HEMOGLOBIN: 10.8 G/DL (ref 13.5–17.5)
HYALINE CASTS: 1 /LPF (ref 0–8)
IRON SATURATION: 18 % (ref 20–50)
IRON: 56 UG/DL (ref 59–158)
KETONES, URINE: NEGATIVE MG/DL
LACTIC ACID: 0.4 MMOL/L (ref 0.4–2)
LEUKOCYTE ESTERASE, URINE: ABNORMAL
LV EF: 60 %
LVEF MODALITY: NORMAL
LYMPHOCYTES ABSOLUTE: 1.2 K/UL (ref 1–5.1)
LYMPHOCYTES RELATIVE PERCENT: 15.6 %
MCH RBC QN AUTO: 27.6 PG (ref 26–34)
MCHC RBC AUTO-ENTMCNC: 32 G/DL (ref 31–36)
MCV RBC AUTO: 86.2 FL (ref 80–100)
MICROSCOPIC EXAMINATION: YES
MONOCYTES ABSOLUTE: 0.7 K/UL (ref 0–1.3)
MONOCYTES RELATIVE PERCENT: 8.7 %
NEUTROPHILS ABSOLUTE: 5.4 K/UL (ref 1.7–7.7)
NEUTROPHILS RELATIVE PERCENT: 72.9 %
NITRITE, URINE: NEGATIVE
PDW BLD-RTO: 17.4 % (ref 12.4–15.4)
PH UA: 5.5 (ref 5–8)
PLATELET # BLD: 297 K/UL (ref 135–450)
PMV BLD AUTO: 7.9 FL (ref 5–10.5)
POTASSIUM REFLEX MAGNESIUM: 4.9 MMOL/L (ref 3.5–5.1)
PRO-BNP: 1527 PG/ML (ref 0–124)
PROTEIN UA: ABNORMAL MG/DL
RBC # BLD: 3.9 M/UL (ref 4.2–5.9)
RBC UA: 4 /HPF (ref 0–4)
SODIUM BLD-SCNC: 130 MMOL/L (ref 136–145)
SPECIFIC GRAVITY UA: 1.02 (ref 1–1.03)
T4 FREE: 1 NG/DL (ref 0.9–1.8)
TOTAL IRON BINDING CAPACITY: 315 UG/DL (ref 260–445)
TOTAL PROTEIN: 6.1 G/DL (ref 6.4–8.2)
TROPONIN: <0.01 NG/ML
TSH SERPL DL<=0.05 MIU/L-ACNC: 13.48 UIU/ML (ref 0.27–4.2)
URINE REFLEX TO CULTURE: ABNORMAL
URINE TYPE: ABNORMAL
UROBILINOGEN, URINE: 1 E.U./DL
WBC # BLD: 7.5 K/UL (ref 4–11)
WBC UA: 2 /HPF (ref 0–5)

## 2022-10-31 PROCEDURE — 93306 TTE W/DOPPLER COMPLETE: CPT

## 2022-10-31 PROCEDURE — 83550 IRON BINDING TEST: CPT

## 2022-10-31 PROCEDURE — 99285 EMERGENCY DEPT VISIT HI MDM: CPT

## 2022-10-31 PROCEDURE — 36415 COLL VENOUS BLD VENIPUNCTURE: CPT

## 2022-10-31 PROCEDURE — 1200000000 HC SEMI PRIVATE

## 2022-10-31 PROCEDURE — 71045 X-RAY EXAM CHEST 1 VIEW: CPT

## 2022-10-31 PROCEDURE — 93010 ELECTROCARDIOGRAM REPORT: CPT | Performed by: INTERNAL MEDICINE

## 2022-10-31 PROCEDURE — 6370000000 HC RX 637 (ALT 250 FOR IP): Performed by: EMERGENCY MEDICINE

## 2022-10-31 PROCEDURE — 6370000000 HC RX 637 (ALT 250 FOR IP): Performed by: STUDENT IN AN ORGANIZED HEALTH CARE EDUCATION/TRAINING PROGRAM

## 2022-10-31 PROCEDURE — 97116 GAIT TRAINING THERAPY: CPT

## 2022-10-31 PROCEDURE — 81001 URINALYSIS AUTO W/SCOPE: CPT

## 2022-10-31 PROCEDURE — 6370000000 HC RX 637 (ALT 250 FOR IP): Performed by: INTERNAL MEDICINE

## 2022-10-31 PROCEDURE — 97165 OT EVAL LOW COMPLEX 30 MIN: CPT

## 2022-10-31 PROCEDURE — 97161 PT EVAL LOW COMPLEX 20 MIN: CPT

## 2022-10-31 PROCEDURE — 84439 ASSAY OF FREE THYROXINE: CPT

## 2022-10-31 PROCEDURE — 6360000002 HC RX W HCPCS: Performed by: INTERNAL MEDICINE

## 2022-10-31 PROCEDURE — 96374 THER/PROPH/DIAG INJ IV PUSH: CPT

## 2022-10-31 PROCEDURE — 87040 BLOOD CULTURE FOR BACTERIA: CPT

## 2022-10-31 PROCEDURE — 83540 ASSAY OF IRON: CPT

## 2022-10-31 PROCEDURE — 83605 ASSAY OF LACTIC ACID: CPT

## 2022-10-31 PROCEDURE — 83880 ASSAY OF NATRIURETIC PEPTIDE: CPT

## 2022-10-31 PROCEDURE — 96375 TX/PRO/DX INJ NEW DRUG ADDON: CPT

## 2022-10-31 PROCEDURE — 97535 SELF CARE MNGMENT TRAINING: CPT

## 2022-10-31 PROCEDURE — 94640 AIRWAY INHALATION TREATMENT: CPT

## 2022-10-31 PROCEDURE — 2580000003 HC RX 258: Performed by: INTERNAL MEDICINE

## 2022-10-31 PROCEDURE — 6360000002 HC RX W HCPCS: Performed by: EMERGENCY MEDICINE

## 2022-10-31 PROCEDURE — 84484 ASSAY OF TROPONIN QUANT: CPT

## 2022-10-31 PROCEDURE — 85025 COMPLETE CBC W/AUTO DIFF WBC: CPT

## 2022-10-31 PROCEDURE — 84443 ASSAY THYROID STIM HORMONE: CPT

## 2022-10-31 PROCEDURE — 93005 ELECTROCARDIOGRAM TRACING: CPT | Performed by: EMERGENCY MEDICINE

## 2022-10-31 PROCEDURE — 2700000000 HC OXYGEN THERAPY PER DAY

## 2022-10-31 PROCEDURE — 99223 1ST HOSP IP/OBS HIGH 75: CPT | Performed by: INTERNAL MEDICINE

## 2022-10-31 PROCEDURE — 80053 COMPREHEN METABOLIC PANEL: CPT

## 2022-10-31 PROCEDURE — 94761 N-INVAS EAR/PLS OXIMETRY MLT: CPT

## 2022-10-31 RX ORDER — METOPROLOL SUCCINATE 50 MG/1
100 TABLET, EXTENDED RELEASE ORAL EVERY EVENING
Status: DISCONTINUED | OUTPATIENT
Start: 2022-10-31 | End: 2022-11-01 | Stop reason: HOSPADM

## 2022-10-31 RX ORDER — SODIUM CHLORIDE 0.9 % (FLUSH) 0.9 %
10 SYRINGE (ML) INJECTION PRN
Status: DISCONTINUED | OUTPATIENT
Start: 2022-10-31 | End: 2022-11-01 | Stop reason: HOSPADM

## 2022-10-31 RX ORDER — HYDRALAZINE HYDROCHLORIDE 20 MG/ML
10 INJECTION INTRAMUSCULAR; INTRAVENOUS EVERY 6 HOURS PRN
Status: DISCONTINUED | OUTPATIENT
Start: 2022-10-31 | End: 2022-11-01 | Stop reason: HOSPADM

## 2022-10-31 RX ORDER — ACETAMINOPHEN 325 MG/1
650 TABLET ORAL EVERY 6 HOURS PRN
Status: DISCONTINUED | OUTPATIENT
Start: 2022-10-31 | End: 2022-11-01 | Stop reason: HOSPADM

## 2022-10-31 RX ORDER — LEVOTHYROXINE SODIUM 0.03 MG/1
50 TABLET ORAL DAILY
Status: DISCONTINUED | OUTPATIENT
Start: 2022-10-31 | End: 2022-11-01 | Stop reason: HOSPADM

## 2022-10-31 RX ORDER — FUROSEMIDE 10 MG/ML
40 INJECTION INTRAMUSCULAR; INTRAVENOUS 2 TIMES DAILY
Status: DISCONTINUED | OUTPATIENT
Start: 2022-10-31 | End: 2022-11-01

## 2022-10-31 RX ORDER — ONDANSETRON 2 MG/ML
4 INJECTION INTRAMUSCULAR; INTRAVENOUS EVERY 6 HOURS PRN
Status: DISCONTINUED | OUTPATIENT
Start: 2022-10-31 | End: 2022-11-01 | Stop reason: HOSPADM

## 2022-10-31 RX ORDER — SODIUM CHLORIDE 0.9 % (FLUSH) 0.9 %
5-40 SYRINGE (ML) INJECTION EVERY 12 HOURS SCHEDULED
Status: DISCONTINUED | OUTPATIENT
Start: 2022-10-31 | End: 2022-11-01 | Stop reason: HOSPADM

## 2022-10-31 RX ORDER — LANOLIN ALCOHOL/MO/W.PET/CERES
3 CREAM (GRAM) TOPICAL NIGHTLY PRN
Status: DISCONTINUED | OUTPATIENT
Start: 2022-10-31 | End: 2022-11-01 | Stop reason: HOSPADM

## 2022-10-31 RX ORDER — IPRATROPIUM BROMIDE AND ALBUTEROL SULFATE 2.5; .5 MG/3ML; MG/3ML
2 SOLUTION RESPIRATORY (INHALATION) ONCE
Status: COMPLETED | OUTPATIENT
Start: 2022-10-31 | End: 2022-10-31

## 2022-10-31 RX ORDER — ASPIRIN 81 MG/1
243 TABLET, CHEWABLE ORAL ONCE
Status: COMPLETED | OUTPATIENT
Start: 2022-10-31 | End: 2022-10-31

## 2022-10-31 RX ORDER — 0.9 % SODIUM CHLORIDE 0.9 %
500 INTRAVENOUS SOLUTION INTRAVENOUS PRN
Status: DISCONTINUED | OUTPATIENT
Start: 2022-10-31 | End: 2022-11-01 | Stop reason: HOSPADM

## 2022-10-31 RX ORDER — SODIUM CHLORIDE 9 MG/ML
INJECTION, SOLUTION INTRAVENOUS PRN
Status: DISCONTINUED | OUTPATIENT
Start: 2022-10-31 | End: 2022-11-01 | Stop reason: HOSPADM

## 2022-10-31 RX ORDER — METHYLPREDNISOLONE SODIUM SUCCINATE 125 MG/2ML
125 INJECTION, POWDER, LYOPHILIZED, FOR SOLUTION INTRAMUSCULAR; INTRAVENOUS ONCE
Status: COMPLETED | OUTPATIENT
Start: 2022-10-31 | End: 2022-10-31

## 2022-10-31 RX ORDER — FUROSEMIDE 10 MG/ML
20 INJECTION INTRAMUSCULAR; INTRAVENOUS ONCE
Status: COMPLETED | OUTPATIENT
Start: 2022-10-31 | End: 2022-10-31

## 2022-10-31 RX ORDER — TAMSULOSIN HYDROCHLORIDE 0.4 MG/1
0.4 CAPSULE ORAL NIGHTLY
Status: DISCONTINUED | OUTPATIENT
Start: 2022-10-31 | End: 2022-11-01 | Stop reason: HOSPADM

## 2022-10-31 RX ORDER — IPRATROPIUM BROMIDE AND ALBUTEROL SULFATE 2.5; .5 MG/3ML; MG/3ML
1 SOLUTION RESPIRATORY (INHALATION) EVERY 4 HOURS PRN
Status: DISCONTINUED | OUTPATIENT
Start: 2022-10-31 | End: 2022-11-01 | Stop reason: HOSPADM

## 2022-10-31 RX ORDER — AMIODARONE HYDROCHLORIDE 200 MG/1
100 TABLET ORAL 2 TIMES DAILY
Status: DISCONTINUED | OUTPATIENT
Start: 2022-10-31 | End: 2022-11-01 | Stop reason: HOSPADM

## 2022-10-31 RX ORDER — ONDANSETRON 4 MG/1
4 TABLET, ORALLY DISINTEGRATING ORAL EVERY 8 HOURS PRN
Status: DISCONTINUED | OUTPATIENT
Start: 2022-10-31 | End: 2022-11-01 | Stop reason: HOSPADM

## 2022-10-31 RX ORDER — FUROSEMIDE 10 MG/ML
40 INJECTION INTRAMUSCULAR; INTRAVENOUS 3 TIMES DAILY
Status: DISCONTINUED | OUTPATIENT
Start: 2022-10-31 | End: 2022-10-31

## 2022-10-31 RX ORDER — IPRATROPIUM BROMIDE AND ALBUTEROL SULFATE 2.5; .5 MG/3ML; MG/3ML
1 SOLUTION RESPIRATORY (INHALATION)
Status: DISCONTINUED | OUTPATIENT
Start: 2022-10-31 | End: 2022-10-31

## 2022-10-31 RX ORDER — ACETAMINOPHEN 650 MG/1
650 SUPPOSITORY RECTAL EVERY 6 HOURS PRN
Status: DISCONTINUED | OUTPATIENT
Start: 2022-10-31 | End: 2022-11-01 | Stop reason: HOSPADM

## 2022-10-31 RX ORDER — POTASSIUM CHLORIDE 20 MEQ/1
40 TABLET, EXTENDED RELEASE ORAL PRN
Status: DISCONTINUED | OUTPATIENT
Start: 2022-10-31 | End: 2022-11-01 | Stop reason: HOSPADM

## 2022-10-31 RX ORDER — POTASSIUM CHLORIDE 7.45 MG/ML
10 INJECTION INTRAVENOUS PRN
Status: DISCONTINUED | OUTPATIENT
Start: 2022-10-31 | End: 2022-11-01 | Stop reason: HOSPADM

## 2022-10-31 RX ORDER — PREDNISONE 20 MG/1
20 TABLET ORAL DAILY
Status: DISCONTINUED | OUTPATIENT
Start: 2022-10-31 | End: 2022-11-01 | Stop reason: HOSPADM

## 2022-10-31 RX ORDER — ENOXAPARIN SODIUM 100 MG/ML
40 INJECTION SUBCUTANEOUS DAILY
Status: DISCONTINUED | OUTPATIENT
Start: 2022-10-31 | End: 2022-11-01 | Stop reason: HOSPADM

## 2022-10-31 RX ADMIN — IPRATROPIUM BROMIDE AND ALBUTEROL SULFATE 2 AMPULE: 2.5; .5 SOLUTION RESPIRATORY (INHALATION) at 02:54

## 2022-10-31 RX ADMIN — METHYLPREDNISOLONE SODIUM SUCCINATE 125 MG: 125 INJECTION, POWDER, FOR SOLUTION INTRAMUSCULAR; INTRAVENOUS at 02:50

## 2022-10-31 RX ADMIN — LEVOTHYROXINE SODIUM 50 MCG: 0.03 TABLET ORAL at 09:08

## 2022-10-31 RX ADMIN — TAMSULOSIN HYDROCHLORIDE 0.4 MG: 0.4 CAPSULE ORAL at 20:04

## 2022-10-31 RX ADMIN — FUROSEMIDE 40 MG: 10 INJECTION, SOLUTION INTRAMUSCULAR; INTRAVENOUS at 17:08

## 2022-10-31 RX ADMIN — FUROSEMIDE 40 MG: 10 INJECTION, SOLUTION INTRAMUSCULAR; INTRAVENOUS at 09:12

## 2022-10-31 RX ADMIN — AMIODARONE HYDROCHLORIDE 100 MG: 200 TABLET ORAL at 09:07

## 2022-10-31 RX ADMIN — ENOXAPARIN SODIUM 40 MG: 100 INJECTION SUBCUTANEOUS at 09:08

## 2022-10-31 RX ADMIN — METOPROLOL SUCCINATE 100 MG: 50 TABLET, EXTENDED RELEASE ORAL at 17:08

## 2022-10-31 RX ADMIN — ASPIRIN 81 MG 243 MG: 81 TABLET ORAL at 02:49

## 2022-10-31 RX ADMIN — AMIODARONE HYDROCHLORIDE 100 MG: 200 TABLET ORAL at 20:03

## 2022-10-31 RX ADMIN — MELATONIN TAB 3 MG 3 MG: 3 TAB at 20:04

## 2022-10-31 RX ADMIN — Medication 10 ML: at 20:12

## 2022-10-31 RX ADMIN — FUROSEMIDE 20 MG: 10 INJECTION, SOLUTION INTRAMUSCULAR; INTRAVENOUS at 03:42

## 2022-10-31 RX ADMIN — Medication 10 ML: at 10:08

## 2022-10-31 RX ADMIN — PREDNISONE 20 MG: 20 TABLET ORAL at 11:43

## 2022-10-31 ASSESSMENT — PAIN - FUNCTIONAL ASSESSMENT
PAIN_FUNCTIONAL_ASSESSMENT: 0-10
PAIN_FUNCTIONAL_ASSESSMENT: 0-10

## 2022-10-31 ASSESSMENT — PAIN DESCRIPTION - DESCRIPTORS
DESCRIPTORS: PRESSURE
DESCRIPTORS: PRESSURE

## 2022-10-31 ASSESSMENT — PAIN SCALES - GENERAL
PAINLEVEL_OUTOF10: 0
PAINLEVEL_OUTOF10: 9

## 2022-10-31 ASSESSMENT — PAIN DESCRIPTION - LOCATION: LOCATION: CHEST

## 2022-10-31 NOTE — CARE COORDINATION
Discharge Planning Assessment  Discharge Planning Assessment  RN/SW discharge planner met with patient/ (and family member) to discuss reason for admission, current living situation, and potential needs at the time of discharge    Demographics/Insurance verified Yes    Current type of dwellin story home. No steps to enter home , 8 steps to upper level and handrail     Patient from ECF/SW confirmed with: n/a     Living arrangements: Lives with spouse     Level of function/Support: Independent , spouse is supportive     PCP: Raya Bradford     Last Visit to PCP:  has appointment next week     DME: 2 wheeled walker, hospital bed, wheelchair - [facility] has his oxygen from [facility]. Uses 3 liters baseline. Has concentrator and tanks. Active with any community resources/agencies/skilled home care: pt was active with [facility]. Pt revoked hospice to come to hospital. Will sign up again at discharge with hospice like admission one week ago. Medication compliance issues: Indpendent     Financial issues that could impact healthcare:none         Tentative discharge plan: home with possible [facility] hospice services again at discharge. Discussed and provided facilities of choice if transition to a skilled nursing facility is required at the time of discharge   N/A therapy has no further recommendations on discharge    Discussed with patient and/or family that on the day of discharge home tentative time of discharge will be between 10 AM and noon.     Transportation at the time of discharge: spouse     Roseline Cote RN, BSN  [phone]

## 2022-10-31 NOTE — PROGRESS NOTES
Recommendations: Kyle Delaney scored a 21/24 on the AM-PAC short mobility form. At this time, no further PT is recommended upon discharge due to at baseline function. Recommend patient returns to prior setting with prior services. DME Required For Discharge: patient has all required DME for discharge  Precautions/Restrictions: low fall risk  Weight Bearing Restrictions: no restrictions  [] Right Upper Extremity  [] Left Upper Extremity [] Right Lower Extremity  [] Left Lower Extremity     Required Braces/Orthotics: no braces required   [] Right  [] Left  Positional Restrictions:no positional restrictions    Pre-Admission Information   Lives With: spouse                  Type of Home: house  Home Layout: tri-level  Home Access: level entry  Bathroom Layout: tub/shower unit, walk in shower  Bathroom Equipment:  reports has no DME for shower stall, stated may benefit from a grab bar, able to stand for several minutes in shower   Toilet Height: standard height, high commode in other bathroom   Home Equipment: rolling walker, hospital bed, oxygen, SpO2  Transfer Assistance: Independent without use of device, uses a RW outside  Ambulation Assistance:Independent without use of device  ADL Assistance:  indep all ADLs   IADL Assistance:  wife performs   Active :        [x] Yes                 [] No  Hand Dominance: [] Left                 [x] Right  Current Employment: retired. Occupation: [employer]   Hobbies: woodworking, classic cars   Recent Falls: reports 1 fall in last 6 months--was carrying an elliptical machine    Examination   Vision:   Vision Corrective Device: wears glasses for reading  Hearing:   [facility]  Observation:   General Observation:  pt in bed on 4L O2  Posture:   WFL  Sensation:   WFL  ROM:   (B) LE AROM WFL  Strength:   (B) LE strength grossly +4  Decision Making: low complexity  Clinical Presentation: stable      Subjective  General: Pt in bed on 4L O2, requesting to use bathroom.  Agreeable to PT/OT eval.   Pain: 0/10  Pain Interventions: not applicable       Functional Mobility  Bed Mobility  Supine to Sit: modified independent  Sit to Supine: modified independent  Comments:  Transfers  Sit to stand transfer: supervision  Stand to sit transfer: supervision  Comments: from EOB, toilet   Ambulation  Surface:level surface  Assistive Device: no device  Other Appliance: supplemental O2  Assistance: supervision  Distance: 10' +10'  Gait Mechanics: decreased step length, narrow ELVIS, no LOB   Comments:  pt amb from bed to bathroom, bathroom to bed. SpO2 86% following amb trial.     Ambulation Trial 2  Surface:level surface  Assistive Device: rolling walker  Other Appliance: supplemental O2  Assistance: supervision  Distance: 76'  Gait Mechanics: decreased step length, NBOS, flexed posture, no LOB   Comments:  Pt requesting use of RW for hallway amb, as he uses AD for longer distances at baseline. pt with flexed posture over RW-- education provided for safety. Pt showing good awareness of fatigue and needing rest breaks. SpO2 dropping to 81%, increasing to 96% with rest.   Stair Mobility  Stair mobility not completed on this date. Comments:  Wheelchair Mobility:  No w/c mobility completed on this date. Comments:  Balance  Static Sitting Balance: good: independent with functional balance in unsupported position  Dynamic Sitting Balance: good: independent with functional balance in unsupported position  Static Standing Balance: fair (+): maintains balance at SBA/supervision without use of UE support  Dynamic Standing Balance: fair (+): maintains balance at SBA/supervision without use of UE support  Comments:    Other Therapeutic Interventions  Education provided on monitoring O2, safe SpO2 levels. See OT note for ADL assessment.        Functional Outcomes  AM-PAC Inpatient Mobility Raw Score : 21              Cognition  WFL  Orientation:    alert and oriented x 4  Command Following: WFL    Education  Barriers To Learning: none  Patient Education: patient educated on goals, PT role and benefits, plan of care, discharge recommendations  Learning Assessment:  patient verbalizes and demonstrates understanding    Assessment  Activity Tolerance: fair, pt desatting to 81% with amb   Impairments Requiring Therapeutic Intervention: none - eval with same day discharge  Clinical Assessment: Pt presenting to PT/OT co-eval following admission for heart failure. Pt with PMH of CHF and COPD, on 3L O2 at baseline. At baseline, pt is IND with amb for short household distances, mod I for longer distances with use of RW. Pt presenting at baseline function for mobility this date, with SpO2 dropping to 81% with mobility but demonstrating good awareness of when to take breaks. Pt at baseline function and not requiring skilled PT services at this time. Safety Interventions: patient left in bed, call light within reach, gait belt, and nurse notified    Plan  Frequency: Eval with same day discharge. No follow up required. Current Treatment Recommendations: not applicable, evaluation completed with same day discharge. Goals  Not applicable, eval with same day discharge     Therapy Session Time      Individual Group Co-treatment   Time In     1148   Time Out     1220   Minutes     32     Timed Code Treatment Minutes:   17  Total Treatment Minutes:  32 minutes        Electronically Signed By: Jamilah Godfrey, SPT   Therapist observed and directed the above evaluation.  Thanks, Paul Hopper, [address] [facility], DPT [id number]

## 2022-10-31 NOTE — CONSULTS
Palliative Care:        Chest Pain and Shortness of Breath    H&P: 71 y.o. male who presents to the ED for chest pain and shortness of breath. This is the same discomfort that he had when he was recently admitted for CHF. He has been taking his diuretics but occasionally will miss some. His urine output has been normal.  He feels that he is about 7 or 8 pounds over his dry weight for the past 3 weeks. No nausea or vomiting. No fevers or chills. Occasional cough. No body aches. BNP is elevated but BP soft. this limits the treatments available to him. Per ER MD note - \"I discussed end-of-life care with him. He states that he would be okay with something like BiPAP. He would not want a central line. I did discuss that the utility of a central line would be for Levophed to increase his blood pressure and he understands this. I do believe that he has medical decision-making capacity. We will respect his wishes and not go down that route. \"  Admit: 10/31/2022  Consult: 10/31/22    Past Medical History:   has a past medical history of Bronchitis, CHF (congestive heart failure) ([facility] [address] and COPD (chronic obstructive pulmonary disease) ([facility] [address] Past Surgical History:   has a past surgical history that includes fracture surgery; Tonsillectomy; eye surgery; Cholecystectomy, laparoscopic (N/A, 3/17/2021); and femoral bypass (Left, 6/1/2021).     Advance Directives:        Advance Care Planning   The patient has the following advanced directives on file:  Advance Directives       Power of  Living Will ACP-Advance Directive ACP-Power of     Not on File Filed on 10/14/16 Filed Not on File     The Patient has the following current code status:    Code Status: [facility]    Extended Emergency Contact Information  Primary Emergency Contact: Duane Singh"  Address: [facility], [address] [place] [address] Phone: [phone]  Mobile Phone: [phone]  Relation: Spouse    Problem Severity: Pain/Other Symptoms:        Weight 140#  Body mass index is 19.59 kg/m². Bed Mobility/Toileting/Transfer:        PT/OT 21/24    Performance Status:        Palliative Performance Scale:  100% []Full Normal activity & work No evidence of disease  90%   [] Full Normal activity & work Some evidence of disease  80%   [] Full Normal activity with Effort Some evidence of disease  70%   [] Reduced Unable Normal Job/Work Significant disease Full Normal or reduced  60%   [] Ambulation reduced; Significant disease; Can't do hobbies/housework; intake normal   or reduced; occasional assist; LOC full/confusion  50%   [] Mainly sit/lie; Extensive disease; Can't do any work; Considerable assist; intake normal  Or reduced; LOC full/confusion  40%   [] Mainly in bed; Extensive disease; Mainly assist; intake normal or reduced; occasional assist; LOC full/confusion  30%   [x] Bed Bound; Extensive disease; Total care; intake reduced; LOC full/confusion  20%   [] Bed Bound; Extensive disease; Total care; intake minimal; Drowsy/coma  10%   [] Bed Bound; Extensive disease; Total care; Mouth care only; Drowsy/coma    PPS 30%    Symptom Assessment: Appetite/Nausea/Bowels/Fatigue:          Intake/Output Summary (Last 24 hours) at 10/31/2022 1625  Last data filed at 10/31/2022 1144  Gross per 24 hour   Intake 240 ml   Output 1200 ml   Net -960 ml     ADULT DIET; Regular; Low Sodium (2 gm); 1500 ml    Social History:   reports that he quit smoking about 6 years ago. His smoking use included cigarettes. He has a 30.00 pack-year smoking history. He quit smokeless tobacco use about 6 years ago. He reports current alcohol use of about 1.0 - 3.0 standard drink per week. He reports that he does not use drugs. Family History:  family history includes Heart Disease in his mother; No Known Problems in his father.     Psychological/Spiritual:             Family Discussion:        All MD/RN notes and personal interaction indicate that pt is capable of independent decision making. Pt expressed frustration with hospice and their inability to manage his symptoms. So he \"fired them and came back here again. \" Is willing to consider signing back up with hospice after discharge but doesn't want to be discharged until he feels better. Will follow up and support patient/family as time allows or circumstances dictate. Please reach out via [employer], [employer], or email [email or web address] if pt condition changes significantly or if family requests support. Thank you.     Electronically signed by Nato Garza RN, BSN on 10/31/2022 at 4:29 PM

## 2022-10-31 NOTE — ED TRIAGE NOTES
Pt complaining of shortness of breath and pressure in his chest 9/10. States this has been going on since the 25th. He was admitted for this previously felt better when he was here but started feeling bad when he got home. He is short of breath with any exertion.

## 2022-10-31 NOTE — CONSULTS
Cardiovascular Consultation    PATIENT: Yung Evans  :   MRN: [id number]    Chief complaint:   Dyspnea    History of present illness:   Mr. Yung Evans is a 71 y.o. male patient, well known to the [address] [place] [address], presented back to the hospital with recurrence of the \"same shortness of breath and chest tightness\" as earlier this month when he was admitted for CHF exacerbation. He had stopped his Lasix at the time as he was frequently getting out of bed at nighttime to go to the bathroom. He additionally has a medical history including PAF and COPD, oxygen dependence since 2022. He reports hospitalization in [place] for new onset AF/RVR, was told then that his EF was 10-15%, and ultimately discharged in hospice care. However, since then, he had an echo in our office revealing LVEF 60-65% and has stated he rescinded hospice care. Did not start Eliquis at the time due to cost.  He states that he has not compliant with medications including Entresto and diuretic. He states that he weighs himself daily and stayed around 139 pounds until the day before admission, with an abrupt increase to 143 pounds. Denies going over fluid or sodium restrictions in place. States he had exertional dyspnea just ambulating to the restroom. Bilateral lower extremity edema as 1+. No palpitations or syncope.     Medical History:      Diagnosis Date    Bronchitis     CHF (congestive heart failure) ([facility])     COPD (chronic obstructive pulmonary disease) ([address]        Surgical History:      Procedure Laterality Date    CHOLECYSTECTOMY, LAPAROSCOPIC N/A 3/17/2021    LAPAROSCOPIC CHOLECYSTECTOMY WITH INTRAOPERATIVE CHOLANGIOGRAM performed by Maikel Phipps MD at [address] [place] [address]      bilateral cataracts    FEMORAL BYPASS Left 2021    LEFT FEMORAL TO POPLITEAL BYPASS GRAFT ([id number]) performed by Soni Pulido MD at [facility]      LT elbow    TONSILLECTOMY Social History:  Social History     Socioeconomic History    Marital status:      Spouse name: Inge Clay    Number of children: 2    Years of education: 12    Highest education level: Not on file   Occupational History    Not on file   Tobacco Use    Smoking status: Former     Packs/day: 1.00     Years: 30.00     Pack years: 30.00     Types: Cigarettes     Quit date: 2016     Years since quittin.1    Smokeless tobacco: Former     Quit date: 2016   Vaping Use    Vaping Use: Never used   Substance and Sexual Activity    Alcohol use: Yes     Alcohol/week: 1.0 - 3.0 standard drink     Types: 1 - 3 Cans of beer per week     Comment: daily    Drug use: No    Sexual activity: Yes     Partners: Female     Comment: monogamous   Other Topics Concern    Not on file   Social History Narrative    Not on file     Social Determinants of Health     Financial Resource Strain: Not on file   Food Insecurity: Not on file   Transportation Needs: Not on file   Physical Activity: Not on file   Stress: Not on file   Social Connections: Not on file   Intimate Partner Violence: Not on file   Housing Stability: Not on file        Family History:  No evidence for sudden cardiac death or premature CAD. Problem Relation Age of Onset    Heart Disease Mother     No Known Problems Father        Medications:  Prior to Admission medications    Medication Sig Start Date End Date Taking?  Authorizing Provider   metoprolol succinate (TOPROL XL) 100 MG extended release tablet Take 1 tablet by mouth every evening 10/24/22   CHELSY Valentino CNP   sacubitril-valsartan (ENTRESTO)  MG per tablet Take 0.5 tablets by mouth 2 times daily 10/24/22   CHELSY Valentino CNP   furosemide (LASIX) 40 MG tablet Take 1 tablet by mouth daily  Patient taking differently: Take 60 mg by mouth daily 10/17/22   Roxanne Klein MD   levothyroxine (SYNTHROID) 50 MCG tablet Take 1 tablet by mouth Daily 10/18/22   Roxanne Klein MD CREATININE 1.1 10/31/2022 02:35 AM    GLUCOSE 115 10/31/2022 02:35 AM    CALCIUM 8.9 10/31/2022 02:35 AM     Lab Results   Component Value Date/Time    TROPONINI <0.01 10/31/2022 02:35 AM     Lab Results   Component Value Date/Time    WBC 7.5 10/31/2022 02:35 AM    HGB 10.8 10/31/2022 02:35 AM    HCT 33.7 10/31/2022 02:35 AM    MCV 86.2 10/31/2022 02:35 AM     10/31/2022 02:35 AM     Lab Results   Component Value Date/Time    CHOL 197 10/15/2022 04:22 AM    TRIG 28 10/15/2022 04:22 AM    HDL 84 10/15/2022 04:22 AM    LDLDIRECT 105 10/15/2022 04:22 AM         Imaging:  I have reviewed the below testing personally:    EKG  10/31/22  Sinus bradycardia  Septal infarct , age undetermined    CXR 10/31/22  The heart and mediastinum are normal.  The lungs are lucent and hyperinflated. Stable pattern of reticular and ground-glass opacification bilaterally. This appears chronic. There are no significant skeletal findings. SPECT 8/1/22: There is a small fixed apical defect that is likely bowel artifact rather    than scar.    -There is no ischemia.    -There is mild global hypokinesis with EF=47%. -Intermediate risk study related to EF < 50%. Echo 6/14/22:    Summary   Technically difficult examination due to body habitus and limited windows. Left ventricular systolic function is normal with ejection fraction   estimated at 60-65 %. No regional wall motion abnormalities are noted. Normal left ventricular wall thickness. Aortic valve appears sclerotic but opens adequately. Mild mitral regurgitation. Moderate tricuspid regurgitation. Systolic pulmonary artery pressure (SPAP) estimated at 45 mmHg (right atrial   pressure 3 mmHg), consistent with mild pulmonary hypertension     Echo 3/11/22:    Summary   Technically limited study due to patient body habitus and lung interference   as well as frequent PVCs. Estimated ejection fraction of 45-50%.    Mildly diminished left ventricular systolic function. Image quality inadequate to rule out regional wall motion abnormalities. Mild septal left ventricular hypertrophy. Normal left ventricle size. Grade I diastolic dysfunction with normal LV filling pressures. The aortic valve appears sclerotic but opens well. Mild to moderate tricuspid valve regurgitation. The inferior vena cava is dilated with respiratory variation greater than   50% consistent with CVP 10-15 mmHg     Echo 4/1/21:   Summary   -Technically difficult exam due to body habitus. -Moderately reduced global systolic function with an ejection fraction   estimated at 40%. -Global hypokinesis with abnormal septal motion noted. -Grade II diastolic dysfunction with elevated LV filling pressures. Avg.   E/e'=5.4   -Normal function of all valves. Echo 10/5/2018:  Summary   -Left ventricular cavity size is normal.   -Normal left ventricular wall thickness.   -Overall left ventricular systolic function appears mildly to moderately   reduced with an ejection fraction on the 40-45% range.   -There is moderate diffuse global hypokinesis. -Abnormal (paradoxical) septal motion is present .   -Normal diastolic function. .E/e\"=6.45   -Trivial mitral and tricuspid regurgitation.       [facility] 10/13/2016:  Coronary Findings   Vessel  Ostial  Proximal  Mid  Distal  Special    LM  0%  0%  0%  0%      LAD  0%  0%  0%  0%      RI              Cx  0%  0%  0%  0%      RCA  0%  0%  0%  0%         Ventriculography/Pressure Findings  LVEF %  LVEDP  MR    20%  8mmHg         [id number] Score for Atrial Fibrillation Stroke Risk   Risk   Factors  Component Value   C CHF Yes 1   H HTN Yes 1   A2 Age >= 76 No,  (76 y.o.) 0   D DM No 0   S2 Prior Stroke/TIA No 0   V Vascular Disease Yes 1   A Age 74-69 Yes,  (76 y.o.) 1   Sc Sex male 0    [id number]  Score  4   Score last updated 10/31/22 48:36 AM EDT    Click here for a link to the UpToDate guideline \"Atrial Fibrillation: Anticoagulation therapy to prevent embolization    Disclaimer: Risk Score calculation is dependent on accuracy of patient problem list and past encounter diagnosis. 10/31/22 TTE   Technically difficult study due to thin body frame and poor acoustical   window. Left ventricular cavity size and wall thickness is normal. Ejection fraction   is visually estimated to be 60%. Abnormal septal motion. Normal diastolic function. E/e' = 7.5. Aortic valve appears sclerotic but opens adequately. The right ventricle is normal in size and function. TAPSE: 2.72 cm. RVS   velocity: 13.4 cm/s. BNP 1527  Troponin <0.01    Impression/Recommendations    Mr. Bib Verdugo is a 71 y.o. male patient of Dr. Gm Malik with    Acute on chronic systolic CHF  NICM, recovered (EF 60%)  Normal coronaries 2016/nonischemic SPECT MPI 8/2022  PAF, declined DOAC 2/2 costs; currently on ASA and Amiodarone  PAD s/p fem/pop bypass 6/2022, Dr. Irene Rm  Severe COPD  GERD  DNR-CC? Palliative care consulted to review goals of care and code status. Echo today personally reviewed and stable, with preserved LVEF and no acute changes. Mildly decompensated. Holding Entresto (recently decreased at outpatient follow up to half tablet bid) while diuresing. Metoprolol also recently decreased to 100 mg nightly. Appreciate CHF nurse educator/CHF service in follow up. CHF education reinforced.                         ~salt restriction: 2,000 mg daily                         ~fluid restriction: 1500 ml daily                        ~medication compliance                        ~daily weights and notify of any significant weight gain/loss                        ~establish with CHF nurse                        ~outpatient follow-up with our CHF team            Elvira Irizarry D.O., [facility]  Interventional Cardiology     o: [phone]  [address] [place], [place] [address] [address] [facility], [address] [place] [address]

## 2022-10-31 NOTE — RT PROTOCOL NOTE
RT Nebulizer Bronchodilator Protocol Note    There is a bronchodilator order in the chart from a provider indicating to follow the RT Bronchodilator Protocol and there is an Initiate RT Bronchodilator Protocol order as well (see protocol at bottom of note). CXR Findings:  XR CHEST PORTABLE    Result Date: 10/31/2022  Spectrum of findings would favor COPD and chronic interstitial change. Stable appearance from prior imaging. The findings from the last RT Protocol Assessment were as follows:  Smoking: Chronic pulmonary disease  Respiratory Pattern: Regular pattern and RR 12-20 bpm  Breath Sounds: Slightly diminished and/or crackles  Cough: Strong, spontaneous, non-productive  Indication for Bronchodilator Therapy:    Bronchodilator Assessment Score: 4    Aerosolized bronchodilator medication orders have been revised according to the RT Nebulizer Bronchodilator Protocol below. Respiratory Therapist to perform RT Therapy Protocol Assessment initially then follow the protocol. Repeat RT Therapy Protocol Assessment PRN for score 0-3 or on second treatment, BID, and PRN for scores above 3. No Indications - adjust the frequency to every 6 hours PRN wheezing or bronchospasm, if no treatments needed after 48 hours then discontinue using Per Protocol order mode. If indication present, adjust the RT bronchodilator orders based on the Bronchodilator Assessment Score as indicated below. If a patient is on this medication at home then do not decrease Frequency below that used at home. 0-3 - enter or revise RT bronchodilator order(s) to equivalent RT Bronchodilator order with Frequency of every 4 hours PRN for wheezing or increased work of breathing using Per Protocol order mode.        4-6 - enter or revise RT Bronchodilator order(s) to two equivalent RT bronchodilator orders with one order with BID Frequency and one order with Frequency of every 4 hours PRN wheezing or increased work of breathing using Per Protocol order mode. 7-10 - enter or revise RT Bronchodilator order(s) to two equivalent RT bronchodilator orders with one order with TID Frequency and one order with Frequency of every 4 hours PRN wheezing or increased work of breathing using Per Protocol order mode. 11-13 - enter or revise RT Bronchodilator order(s) to one equivalent RT bronchodilator order with QID Frequency and an Albuterol order with Frequency of every 4 hours PRN wheezing or increased work of breathing using Per Protocol order mode. Greater than 13 - enter or revise RT Bronchodilator order(s) to one equivalent RT bronchodilator order with every 4 hours Frequency and an Albuterol order with Frequency of every 2 hours PRN wheezing or increased work of breathing using Per Protocol order mode. RT to enter RT Home Evaluation for COPD & MDI Assessment order using Per Protocol order mode.     Electronically signed by Cintia Perkins RCP on 10/31/2022 at 9:15 AM

## 2022-10-31 NOTE — ED PROVIDER NOTES
[address] [facility] PROVIDER NOTE    Patient Identification  Pt Name: Suyapa Mcclellan  MRN: [id number]  Joyce 1950  Date of evaluation: 10/31/2022  Provider: Reji Nolan MD  PCP: Michael Peres DO    Chief Complaint  Chest Pain and Shortness of Breath      HPI  History provided by patient   This is a 71 y.o. male who presents to the ED for chest pain and shortness of breath. This is the same discomfort that he had when he was recently admitted for CHF. He has been taking his diuretics but occasionally will miss some. His urine output has been normal.  He feels that he is about 7 or 8 pounds over his dry weight for the past 3 weeks. No nausea or vomiting. No fevers or chills. Occasional cough. No body aches. Took 1 baby aspirin today. Received 1 DuoNeb on route here. Harden Beech ROS  12 systems reviewed, pertinent positives/negatives per HPI otherwise noted to be negative.     I have reviewed the following nursing documentation:  Allergies: Codeine    Past medical history:   Past Medical History:   Diagnosis Date    Bronchitis     CHF (congestive heart failure) (HCC)     COPD (chronic obstructive pulmonary disease) (HCC)      Past surgical history:   Past Surgical History:   Procedure Laterality Date    CHOLECYSTECTOMY, LAPAROSCOPIC N/A 3/17/2021    LAPAROSCOPIC CHOLECYSTECTOMY WITH INTRAOPERATIVE CHOLANGIOGRAM performed by Bao Hamm MD at [address] [place] [address]      bilateral cataracts    FEMORAL BYPASS Left 6/1/2021    LEFT FEMORAL TO POPLITEAL BYPASS GRAFT (72595) performed by Haritha Mejia MD at [address] elbow    TONSILLECTOMY         Home medications:   Previous Medications    AMIODARONE (CORDARONE) 200 MG TABLET    Take 100 mg by mouth 2 times daily    FUROSEMIDE (LASIX) 40 MG TABLET    Take 1 tablet by mouth daily    IPRATROPIUM-ALBUTEROL (DUONEB) 0.5-2.5 (3) MG/3ML SOLN NEBULIZER SOLUTION    1 vial inh Q6hrs for next 10 days and then 1 vial inh Q4hrs PRN SOB or wheezing    LEVOTHYROXINE (SYNTHROID) 50 MCG TABLET    Take 1 tablet by mouth Daily    METOPROLOL SUCCINATE (TOPROL XL) 100 MG EXTENDED RELEASE TABLET    Take 1 tablet by mouth every evening    OXYGEN    Inhale 3 L into the lungs as needed     SACUBITRIL-VALSARTAN (ENTRESTO)  MG PER TABLET    Take 0.5 tablets by mouth 2 times daily    TAMSULOSIN (FLOMAX) 0.4 MG CAPSULE    Take 0.4 mg by mouth nightly       Social history:  reports that he quit smoking about 6 years ago. His smoking use included cigarettes. He has a 30.00 pack-year smoking history. He quit smokeless tobacco use about 6 years ago. He reports current alcohol use of about 1.0 - 3.0 standard drink per week. He reports that he does not use drugs. Family history:    Family History   Problem Relation Age of Onset    Heart Disease Mother     No Known Problems Father          Exam  ED Triage Vitals   BP Temp Temp Source Heart Rate Resp SpO2 Height Weight   10/31/22 0230 10/31/22 0230 10/31/22 0230 10/31/22 0230 10/31/22 0230 10/31/22 0230 10/31/22 0235 10/31/22 0235   (!) 86/56 98 °F (36.7 °C) Oral 60 24 100 % 5' 11\" (1.803 m) 140 lb (63.5 kg)     Nursing note and vitals reviewed. Constitutional: In no acute distress  HENT:      Head: Normocephalic      Ears: External ears normal.      Nose: Nose normal.     Mouth: Membrane mucosa moist   Eyes: No discharge. Neck: Supple. Trachea midline. Cardiovascular: Regular rate. Warm extremities  Pulmonary/Chest: Effort increased. No accessory muscle use. Bilateral wheezes. Distant breath sounds bilaterally  Abdominal: Soft. No distension. Nontender  : Deferred  Rectal: Deferred   Musculoskeletal: Moves all extremities. No gross deformity. Neurological: Alert and oriented. Face symmetric. Speech is clear. Skin: Warm and dry. Psychiatric: Normal mood and affect.  Behavior is normal.    Procedures      EKG  The Ekg interpreted by me in the absence of a cardiologist shows. warren sinus rhythm. No specific ST changes appreciated. HR 58  No significant change from prior EKG dated 10/22        Radiology  XR CHEST PORTABLE   Final Result   Spectrum of findings would favor COPD and chronic interstitial change. Stable appearance from prior imaging.              Labs  Results for orders placed or performed during the hospital encounter of 10/31/22   Lactic Acid   Result Value Ref Range    Lactic Acid 0.4 0.4 - 2.0 mmol/L   CBC with Auto Differential   Result Value Ref Range    WBC 7.5 4.0 - 11.0 K/uL    RBC 3.90 (L) 4.20 - 5.90 M/uL    Hemoglobin 10.8 (L) 13.5 - 17.5 g/dL    Hematocrit 33.7 (L) 40.5 - 52.5 %    MCV 86.2 80.0 - 100.0 fL    MCH 27.6 26.0 - 34.0 pg    MCHC 32.0 31.0 - 36.0 g/dL    RDW 17.4 (H) 12.4 - 15.4 %    Platelets 315 466 - 545 K/uL    MPV 7.9 5.0 - 10.5 fL    Neutrophils % 72.9 %    Lymphocytes % 15.6 %    Monocytes % 8.7 %    Eosinophils % 2.2 %    Basophils % 0.6 %    Neutrophils Absolute 5.4 1.7 - 7.7 K/uL    Lymphocytes Absolute 1.2 1.0 - 5.1 K/uL    Monocytes Absolute 0.7 0.0 - 1.3 K/uL    Eosinophils Absolute 0.2 0.0 - 0.6 K/uL    Basophils Absolute 0.0 0.0 - 0.2 K/uL   CMP w/ Reflex to MG   Result Value Ref Range    Sodium 130 (L) 136 - 145 mmol/L    Potassium reflex Magnesium 4.9 3.5 - 5.1 mmol/L    Chloride 91 (L) 99 - 110 mmol/L    CO2 38 (H) 21 - 32 mmol/L    Anion Gap 1 (L) 3 - 16    Glucose 115 (H) 70 - 99 mg/dL    BUN 23 (H) 7 - 20 mg/dL    Creatinine 1.1 0.8 - 1.3 mg/dL    Est, Glom Filt Rate >60 >60    Calcium 8.9 8.3 - 10.6 mg/dL    Total Protein 6.1 (L) 6.4 - 8.2 g/dL    Albumin 3.7 3.4 - 5.0 g/dL    Albumin/Globulin Ratio 1.5 1.1 - 2.2    Total Bilirubin <0.2 0.0 - 1.0 mg/dL    Alkaline Phosphatase 66 40 - 129 U/L    ALT 68 (H) 10 - 40 U/L    AST 58 (H) 15 - 37 U/L   Troponin   Result Value Ref Range    Troponin <0.01 <0.01 ng/mL   Brain Natriuretic Peptide   Result Value Ref Range    Pro-BNP 1,527 (H) 0 - 124 pg/mL   Urinalysis with Reflex to Culture    Specimen: Urine   Result Value Ref Range    Color, UA Yellow Straw/Yellow    Clarity, UA Clear Clear    Glucose, Ur Negative Negative mg/dL    Bilirubin Urine Negative Negative    Ketones, Urine Negative Negative mg/dL    Specific Gravity, UA 1.020 1.005 - 1.030    Blood, Urine SMALL (A) Negative    pH, UA 5.5 5.0 - 8.0    Protein, UA TRACE (A) Negative mg/dL    Urobilinogen, Urine 1.0 <2.0 E.U./dL    Nitrite, Urine Negative Negative    Leukocyte Esterase, Urine TRACE (A) Negative    Microscopic Examination YES     Urine Type Cleancatch     Urine Reflex to Culture Not Indicated    Microscopic Urinalysis   Result Value Ref Range    Bacteria, UA Rare (A) None Seen /HPF    Hyaline Casts, UA 1 0 - 8 /LPF    WBC, UA 2 0 - 5 /HPF    RBC, UA 4 0 - 4 /HPF    Epithelial Cells, UA 1 0 - 5 /HPF   EKG 12 Lead   Result Value Ref Range    Ventricular Rate 58 BPM    Atrial Rate 58 BPM    P-R Interval 158 ms    QRS Duration 88 ms    Q-T Interval 412 ms    QTc Calculation (Bazett) 404 ms    P Axis 73 degrees    R Axis 73 degrees    T Axis 78 degrees    Diagnosis       Sinus bradycardiaSeptal infarct , age undeterminedAbnormal ECG       Screenings   Carissa Coma Scale  Eye Opening: Spontaneous  Best Verbal Response: Oriented  Best Motor Response: Obeys commands  Irma Coma Scale Score: 15       MDM and ED Course  This is a 71 y.o. male who presents to the ED for chest pain, shortness of breath. Ongoing for the past 3 to 4 days. Similar to when he had prior CHF exacerbation. He is 8 pounds over his dry weight. We will give some Lasix however will give a large dose because he is currently hypotensive. Also wheezy on exam.  May have element of COPD. Will give duo nebs and steroids. No nausea or vomiting or diarrhea to indicate volume depletion therefore I feel fluids would harm him. ACS in differential.  Will obtain troponin. EKG shows no acute ischemic changes.   Pulmonary embolism in differential but I have better alternative explanation for symptoms at this time. Infection in differential but lower suspicion for pneumonia at this time. He is DNR CC  And on hospice. I discussed end-of-life care with him. He states that he would be okay with something like BiPAP. He would not want a central line. I did discuss that the utility of a central line would be for Levophed to increase his blood pressure and he understands this. I do believe that he has medical decision-making capacity. We will respect his wishes and not go down that route.  ---------  Reassessed patient after DuoNeb's. Wheezing improved however still has distant lung sounds. BNP is elevated. I feel that this is more likely related to heart failure. Blood pressure remains tenuous with last blood pressure 96/57. I feel that benefits of Lasix at this time outweigh risks. Discussed this with patient and his wife who are in agreement. No signs of infection on chest x-ray. No leukocytosis or lactic acidosis. Blood counts are stable. The total critical care time spent while evaluating and treating this patient was at least 35 minutes. This excludes time spent doing separately billable procedures. This includes time at the bedside, data interpretation, medication management, obtaining critical history from collateral sources if the patient is unable to provide it directly, and physician consultation. Specifics of interventions taken and potentially life-threatening diagnostic considerations are listed above in the medical decision making. [unfilled]    Is this patient to be included in the SEP-1 Core Measure due to severe sepsis or septic shock? No   Exclusion criteria - the patient is NOT to be included for SEP-1 Core Measure due to: Infection is not suspected        Final Impression  1. Chest pain, unspecified type    2. Hypotension, unspecified hypotension type    3. Dyspnea, unspecified type    4.  Acute on chronic congestive heart failure, unspecified heart failure type (HCC)        Blood pressure 100/60, pulse 62, temperature 98 °F (36.7 °C), temperature source Oral, resp. rate 15, height 5' 11\" (1.803 m), weight 140 lb (63.5 kg), SpO2 98 %. Disposition:  DISPOSITION Admitted 10/31/2022 05:38:53 AM      Patient Referrals:  No follow-up provider specified. Discharge Medications:  New Prescriptions    No medications on file       Discontinued Medications:  Discontinued Medications    No medications on file       This chart was generated using the TriOv[employer] dictation system. I created this record but it may contain dictation errors given the limitations of this technology.         Angie Fermin MD  10/31/22 1323

## 2022-10-31 NOTE — ED NOTES
Pt being admited to [phone] as a medical overflow. Reported called to nurse.   Transported upstairs on 3L of oxygen     Jones Rios RN  10/31/22 7629

## 2022-10-31 NOTE — PROGRESS NOTES
10/31/22 0913   RT Protocol   History Pulmonary Disease 2   Respiratory pattern 0   Breath sounds 2   Cough 0   Bronchodilator Assessment Score 4

## 2022-10-31 NOTE — PROGRESS NOTES
[facility]   Phone: [phone]    Occupational Therapy    [x] Initial Evaluation            [] Daily Treatment Note         [x] Discharge Summary      Patient: Ernestina Knox   : [birth year]   MRN: [id number]   Date of Service:  10/31/2022    Admitting Diagnosis:  Acute heart failure, unspecified heart failure type [facility])  Current Admission Summary: Mr. Ernestina Knox is a 71 y.o. male patient, well known to the [address] [place] [address], presented back to the hospital with recurrence of the \"same shortness of breath and chest tightness\" as earlier this month when he was admitted for CHF exacerbation. He had stopped his Lasix at the time as he was frequently getting out of bed at nighttime to go to the bathroom. He additionally has a medical history including PAF and COPD, oxygen dependence since 2022. He reports hospitalization in [place] for new onset AF/RVR, was told then that his EF was 10-15%, and ultimately discharged in hospice care. However, since then, he had an echo in our office revealing LVEF 60-65% and has stated he rescinded hospice care. Did not start Eliquis at the time due to cost.  He states that he has not compliant with medications including Entresto and diuretic. He states that he weighs himself daily and stayed around 139 pounds until the day before admission, with an abrupt increase to 143 pounds. Denies going over fluid or sodium restrictions in place. States he had exertional dyspnea just ambulating to the restroom. Bilateral lower extremity edema as 1+. No palpitations or syncope. Past Medical History:  has a past medical history of Bronchitis, CHF (congestive heart failure) ([facility] and COPD (chronic obstructive pulmonary disease) ([facility] Past Surgical History:  has a past surgical history that includes fracture surgery; Tonsillectomy; eye surgery;  Cholecystectomy, laparoscopic (N/A, 3/17/2021); and femoral bypass (Left, 6/1/2021). Discharge Recommendations: Kirk Alarcon scored a 24/24 on the AM-PAC ADL Inpatient form. At this time, no further OT is recommended upon discharge due to patient at independent level. Recommend patient returns to prior setting with prior services. DME Required For Discharge: No DME required    Precautions/Restrictions: low fall risk, 4L O2  Positional Restrictions:no positional restrictions    Pre-Admission Information   Lives With: spouse                  Type of Home: house  Home Layout: tri-level  Home Access: level entry  Bathroom Layout: tub/shower unit, walk in shower  Bathroom Equipment:  has a shower chair, but reports that his shower is too small; he holds on to the top of the door, and that works for him. Toilet Height: standard height, high commode in other bathroom   Home Equipment: rolling walker, hospital bed, oxygen, SpO2  Transfer Assistance: Independent without use of device, uses a RW outside  Ambulation Assistance:Independent without use of device  ADL Assistance:  indep all ADLs   IADL Assistance:  wife performs   Active :        [x] Yes                 [] No  Hand Dominance: [] Left                 [x] Right  Current Employment: retired. Occupation: [employer]   Hobbies: [employer], classic cars   Recent Falls: reports 1 fall in last 6 months--was carrying an elliptical machine. Pt reports that he has certain places in the house where he takes a leaning rest break during activity. Examination   Vision:   Vision Gross Assessment: Impaired and Vision Corrective Device: wears glasses for reading  Hearing:   WFL  Observation:   General Observation:  Pt on 4L O2; RN stated pt supposed to be on 3L  Sensation:   denies numbness and tingling  ROM:   (B) UE ROM WFL  Strength:   (B) UE gross strength WFL    Decision Making: low complexity  Clinical Presentation: stable      Subjective  General: Pt supine in be on therapy arrival. Pt agreeable to evaluation.    Pain: 0/10  Pain Interventions: not applicable        Activities of Daily Living  Basic Activities of Daily Living  Grooming: modified independent Comment: in stance at sink to wash hands; pt leaning on sink periodically for rest break and to catch his breath. Lower Extremity Dressing: Independent Comment: don/doff socks, clothing mgt  Toileting: Independent. Instrumental Activities of Daily Living  No IADL completed on this date. Functional Mobility  Bed Mobility  Supine to Sit: modified independent  Sit to Supine: modified independent  Comments: HOB elevated  Transfers  Sit to stand transfer:supervision, from EOB X 2 trials, from toilet  Stand to sit transfer: supervision, to toilet, to EOB X 2 trials  Stand step transfer: supervision, w/RW  Toilet transfer: supervision  Toilet transfer equipment: standard toilet, grab bars  Comments:  Functional Mobility:  Sitting Balance: Independent. Standing Balance: supervision, with and without RW. Functional Mobility: . supervision  Functional Mobility Activity: to/from bathroom, functional mobility ~2 min in [facility]. Functional Mobility Device Use: rolling walker (used in [facility], no device to/from bathroom)  Comment: Pt took multiple rest breaks, leaning on sink, during ambulation in [facility], leaning on walker, and leaning forward seated on EOB. Other Therapeutic Interventions    Functional Outcomes  AM-PAC Inpatient Daily Activity Raw Score: 24    Cognition  WFL  Orientation:    A&O x 4  Command Following:   [facility]     Education  Barriers To Learning: none  Patient Education: Patient educated on OT role and benefits, discharge recommendations, pursed lip breathing  Learning Assessment:  Patient verbalized and demonstrates understanding; pt is aware, states he has difficulty breathing in through his nose. Assessment  Activity Tolerance: Pt's SPO2 ranged from 81% after activity to 96% after a seated rest break.  Pt independently took at least 3 \"leaning breaks\" as needed during activity, which he states that he does at home. Pt reports that he monitors his home O2. Impairments Requiring Therapeutic Intervention: none - eval with same day discharge  Prognosis: good without need for therapy intervention  Clinical Assessment: Patient presenting at independent level for completion of required self care tasks for return to home. Eval with d/c at this time. No therapy services indicated. Safety Interventions: patient left in bed, call light within reach, gait belt, and nurse notified    Plan  Frequency: Eval with same day discharge. No follow up required. Current Treatment Recommendations: Not applicable, evaluation completed with same day discharge. Goals  Patient eval with same day discharge. No goals set as patient is at baseline self care status.       Therapy Session Time     Individual Group Co-treatment   Time In    1148   Time Out    1220   Minutes    32        Timed Code Treatment Minutes:   17 min  Total Treatment Minutes:  32 min       Electronically Signed By: Darrian Peres, NIKITA Astudillo, OTR/L, [id number]

## 2022-10-31 NOTE — DISCHARGE INSTR - DIET
Good nutrition is important when healing from an illness, injury, or surgery. Follow any nutrition recommendations given to you during your hospital stay. If you were given an oral nutrition supplement while in the hospital, continue to take this supplement at home. You can take it with meals, in-between meals, and/or before bedtime. These supplements can be purchased at most local grocery stores, pharmacies, and chain super-stores. If you have any questions about your diet or nutrition, call the hospital and ask for the dietitian. For nutrition questions after discharge please call the Registered Dietitian at [phone]. Heart Failure Nutrition Therapy  This diet will help you feel better and support your heart by reducing symptoms of fluid retention, shortness of breath and swelling. You should focus on:  Limiting sodium in your diet by reading labels and limiting foods high in sodium. Limit your daily sodium intake to 2,000 mg per day. Select foods with 140 mg of sodium or less per serving. Foods with more than 300 mg of sodium per serving may not fit into a reduced-sodium meal plan. Check serving sizes. If you eat more than 1 serving, you will get more sodium than the amount listed. Limiting fluid in your diet. Ask your doctor how much fluid you can have per day  Remember foods that are liquid at room temperature such as popsicles, soup, ice cream and Jell-O are fluids. Checking your weight to make sure you're not retaining too much fluid. Weigh yourself every morning. If you gain 3 or more pounds in one day or 5 pounds within 1 week, call your doctor. Foods to choose and avoid:  Avoid processed foods. Eat more fresh foods. Fresh and frozen fruits and vegetables are good choices. Choose fresh meats. Avoid processed meats such as ordaz, sausage and hot dogs. Do not add salt to your food while cooking or at the table.   Try dry or fresh herbs, pepper, lemon juice, or a sodium-free seasoning blend such as Mrs. Dash to add flavor to food. Do not use a salt substitute. Use caution at [employer] foods are high in sodium. Ask for your food to be cooked without salt and request sauces and dressing to come on the side. 

## 2022-10-31 NOTE — H&P
History and Physical  Dr[facility]  10/31/2022    PCP: Lianna Hassan DO    Cc:   Chief Complaint   Patient presents with    Chest Pain    Shortness of Breath       HPI:  Destinee Camacho is a 71 y.o. male who has a past medical history of Bronchitis, CHF (congestive heart failure) ([facility] and COPD (chronic obstructive pulmonary disease) ([facility] Patient presents with Acute heart failure, unspecified heart failure type ([facility] HPI  (1-3 for expanded problem focused, ?4 for detailed/comprehensive)     71 y.o. male who presents to the ED for chest pain and shortness of breath. This is the same discomfort that he had when he was recently admitted for CHF. He has been taking his diuretics but occasionally will miss some. His urine output has been normal.  He feels that he is about 7 or 8 pounds over his dry weight for the past 3 weeks. No nausea or vomiting. No fevers or chills. Occasional cough. No body aches. BNP is elevated but BP soft. this limits the treatments available to him    Per ER MD note - \"I discussed end-of-life care with him. He states that he would be okay with something like BiPAP. He would not want a central line. I did discuss that the utility of a central line would be for Levophed to increase his blood pressure and he understands this. I do believe that he has medical decision-making capacity. We will respect his wishes and not go down that route. \"      To be admitted  Will try to give iv lasix  Will ask cards to see again  Will ask palliative care to see again    Problem list of hospitalization thus far:   Active Hospital Problems    Diagnosis     Acute heart failure, unspecified heart failure type (HCC) [I50.9]      Priority: Medium    Hypothyroid [E03.9]      Priority: Medium    GERD (gastroesophageal reflux disease) [K21.9]      Priority: Medium    Persistent atrial fibrillation (HCC) [I48.19]      Priority: Medium    Essential hypertension [I10]          Review of Systems: (1 system for EPF, 2-9 for detailed, 10+ for comprehensive)  Constitutional: Negative for chills and fever. HENT: Negative for dental problem, nosebleeds and rhinorrhea. Eyes: Negative for photophobia and visual disturbance. Respiratory: Negative for cough, positive for chest tightness and positive for shortness of breath. Cardiovascular: Negative for chest pain and leg swelling. Gastrointestinal: Negative for diarrhea, nausea and vomiting. Endocrine: Negative for polydipsia and polyphagia. Genitourinary: Negative for frequency, hematuria and urgency. Musculoskeletal: Negative for back pain and myalgias. Skin: Negative for rash. Allergic/Immunologic: Negative for food allergies. Neurological: Negative for dizziness, seizures, syncope and facial asymmetry. Hematological: Negative for adenopathy. Psychiatric/Behavioral: Negative for dysphoric mood. The patient is not nervous/anxious.              Past Medical History:   Past Medical History:   Diagnosis Date    Bronchitis     CHF (congestive heart failure) (HCC)     COPD (chronic obstructive pulmonary disease) (HCC)        Past Surgical History:   Past Surgical History:   Procedure Laterality Date    CHOLECYSTECTOMY, LAPAROSCOPIC N/A 3/17/2021    LAPAROSCOPIC CHOLECYSTECTOMY WITH INTRAOPERATIVE CHOLANGIOGRAM performed by Elsi Tinoco MD at [address] [place] [address]      bilateral cataracts    FEMORAL BYPASS Left 6/1/2021    LEFT FEMORAL TO POPLITEAL BYPASS GRAFT ([id number]) performed by Berta Todd MD at [address] elbow    TONSILLECTOMY         Social History:   Social History       Tobacco History       Smoking Status  Former Quit Date  9/2/2016 Smoking Frequency  1 pack/day for 30.00 years (30.00 pk-yrs) Smoking Tobacco Type  Cigarettes quit in 9/2/2016      Smokeless Tobacco Use  Former Quit Date  9/5/2016              Alcohol History       Alcohol Use Status  Yes Drinks/Week  1-3 Cans of beer per week Amount  1.0 - 3.0 standard drink of alcohol/wk Comment  daily              Drug Use       Drug Use Status  No              Sexual Activity       Sexually Active  Yes Partners  Female Comment  monogamous                    Fam History:   Family History   Problem Relation Age of Onset    Heart Disease Mother     No Known Problems Father        PFSH: The above PMHx, PSHx, SocHx, FamHx has been reviewed by myself. (1 area for detailed, 2-3 for comprehensive)      Code Status: DNR-CC    Meds - following list of home medications fromelectronic chart has been reviewed by myself  Prior to Admission medications    Medication Sig Start Date End Date Taking?  Authorizing Provider   metoprolol succinate (TOPROL XL) 100 MG extended release tablet Take 1 tablet by mouth every evening 10/24/22   CHELSY Orta CNP   sacubitril-valsartan (ENTRESTO)  MG per tablet Take 0.5 tablets by mouth 2 times daily 10/24/22   CHELSY Orta CNP   furosemide (LASIX) 40 MG tablet Take 1 tablet by mouth daily  Patient taking differently: Take 60 mg by mouth daily 10/17/22   Cosme Delgado MD   levothyroxine (SYNTHROID) 50 MCG tablet Take 1 tablet by mouth Daily 10/18/22   Cosme Delgado MD   tamsulosin (FLOMAX) 0.4 MG capsule Take 0.4 mg by mouth nightly    Historical Provider, MD   dicyclomine (BENTYL) 10 MG capsule Take 1 capsule by mouth 4 times daily  Patient not taking: No sig reported 6/20/22 2/0/29  Daniel Godwin MD   amiodarone (CORDARONE) 200 MG tablet Take 100 mg by mouth 2 times daily    Historical Provider, MD   OXYGEN Inhale 3 L into the lungs as needed     Historical Provider, MD   ipratropium-albuterol (Lovina Founds) 0.5-2.5 (3) MG/3ML SOLN nebulizer solution 1 vial inh Q6hrs for next 10 days and then 1 vial inh Q4hrs PRN SOB or wheezing 3/12/22   Ruthann Shannon MD         Allergies   Allergen Reactions    Codeine Anxiety and Other (See Comments)     Unknown reaction             EXAM: (2-7 system for EPF/Detailed, ?8 for Comprehensive)  BP (!) 91/55   Pulse 68   Temp 97.6 °F (36.4 °C) (Temporal)   Resp 18   Ht 5' 11\" (1.803 m)   Wt 140 lb 6.9 oz (63.7 kg)   SpO2 97%   BMI 19.59 kg/m²   Constitutional: vitals as above: alert, appears stated age and cooperative    Psychiatric: normal insight and judgment, oriented to person, place, time, and general circumstances    Head: Normocephalic, without obvious abnormality, atraumatic    Eyes:lids and lashes normal, conjunctivae and sclerae normal and pupils equal, round, reactive to light and accomodation    EMNT: external ears normal, nares midline    Neck: no carotid bruit, supple, symmetrical, trachea midline and thyroid not enlarged, symmetric, no tenderness/mass/nodules     Respiratory: crackles bilat, end exp wheezes  Cardiovascular: normal rate, regular rhythm, normal S1 and S2 and no murmurs    Gastrointestinal: soft, non-tender, non-distended, normal bowel sounds, no masses or organomegaly    Extremities: no clubbing, trace edema    Skin:No rashes or nodules noted.     Neurologic:negative         LABS:  Labs Reviewed   CBC WITH AUTO DIFFERENTIAL - Abnormal; Notable for the following components:       Result Value    RBC 3.90 (*)     Hemoglobin 10.8 (*)     Hematocrit 33.7 (*)     RDW 17.4 (*)     All other components within normal limits   COMPREHENSIVE METABOLIC PANEL W/ REFLEX TO MG FOR LOW K - Abnormal; Notable for the following components:    Sodium 130 (*)     Chloride 91 (*)     CO2 38 (*)     Anion Gap 1 (*)     Glucose 115 (*)     BUN 23 (*)     Total Protein 6.1 (*)     ALT 68 (*)     AST 58 (*)     All other components within normal limits   BRAIN NATRIURETIC PEPTIDE - Abnormal; Notable for the following components:    Pro-BNP 1,527 (*)     All other components within normal limits   URINALYSIS WITH REFLEX TO CULTURE - Abnormal; Notable for the following components:    Blood, Urine SMALL (*)     Protein, UA TRACE (*)     Leukocyte Esterase, Urine TRACE (*)     All other components within normal limits   MICROSCOPIC URINALYSIS - Abnormal; Notable for the following components:    Bacteria, UA Rare (*)     All other components within normal limits   CULTURE, BLOOD 1   CULTURE, BLOOD 2   LACTIC ACID   TROPONIN   TSH   T4, FREE   IRON AND TIBC         IMAGING:  Imaging results from the ER have been reviewed in the computerized chart. XR CHEST PORTABLE    Result Date: 10/31/2022  EXAMINATION: ONE XRAY VIEW OF THE CHEST 10/31/2022 2:51 am COMPARISON: 10/14/2022 radiograph HISTORY: ORDERING SYSTEM PROVIDED HISTORY: chest pain dyspnea no fever no significant cough TECHNOLOGIST PROVIDED HISTORY: Reason for exam:->chest pain dyspnea no fever no significant cough FINDINGS: The heart and mediastinum are normal.  The lungs are lucent and hyperinflated. Stable pattern of reticular and ground-glass opacification bilaterally. This appears chronic. There are no significant skeletal findings. Spectrum of findings would favor COPD and chronic interstitial change. Stable appearance from prior imaging. XR CHEST PORTABLE    Result Date: 10/14/2022  EXAMINATION: ONE XRAY VIEW OF THE CHEST 10/14/2022 12:40 pm COMPARISON: 08/01/2022. HISTORY: ORDERING SYSTEM PROVIDED HISTORY: sob TECHNOLOGIST PROVIDED HISTORY: Reason for exam:->sob Reason for Exam: sob FINDINGS: The lungs are hyperexpanded without acute focal process. There is no effusion or pneumothorax. The cardiomediastinal silhouette is stable. The osseous structures are stable. Stable COPD. CT CHEST PULMONARY EMBOLISM W CONTRAST    Result Date: 10/14/2022  EXAMINATION: CTA OF THE CHEST 10/14/2022 1:38 pm TECHNIQUE: CTA of the chest was performed after the administration of intravenous contrast.  Multiplanar reformatted images are provided for review. MIP images are provided for review.  Automated exposure control, iterative reconstruction, and/or weight based adjustment of the mA/kV was utilized to reduce the radiation dose to as low as reasonably achievable. COMPARISON: 08/01/2022 HISTORY: ORDERING SYSTEM PROVIDED HISTORY: sob - off anticoag TECHNOLOGIST PROVIDED HISTORY: Reason for exam:->sob - off anticoag Decision Support Exception - unselect if not a suspected or confirmed emergency medical condition->Emergency Medical Condition (MA) Reason for Exam: sob - off anticoag FINDINGS: Pulmonary Arteries: Pulmonary arteries are adequately opacified for evaluation. No evidence of intraluminal filling defect to suggest pulmonary embolism. Main pulmonary artery is normal in caliber. Mediastinum: No evidence of mediastinal lymphadenopathy. The heart and pericardium demonstrate no acute abnormality. There is no acute abnormality of the thoracic aorta. Lungs/pleura: There are mild to moderate bilateral emphysematous changes. There is no lobar consolidation, pneumothorax or suspicious parenchymal mass. There is a trace left pleural effusion. Upper Abdomen: Limited images of the upper abdomen are unremarkable. Soft Tissues/Bones: No acute bone or soft tissue abnormality. 1. Negative for pulmonary embolus. 2. Emphysema with trace left pleural effusion. EKG:   EKG from ER, reviewed by self - it shows sinus warren at 58. No st elevation  Old chart reviewed, EKG dated 10/14/22 is reviewed, there is not difference noted. Old study shows sinus warren at 61    Lab Results   Component Value Date/Time    GLUCOSE 115 10/31/2022 02:35 AM     No results found for: POCGLU  BP (!) 91/55   Pulse 68   Temp 97.6 °F (36.4 °C) (Temporal)   Resp 18   Ht 5' 11\" (1.803 m)   Wt 140 lb 6.9 oz (63.7 kg)   SpO2 97%   BMI 19.59 kg/m²     MEDICAL DECISION MAKING:    Principal Problem:    Acute heart failure, unspecified heart failure type ([address] -Established problem. Uncontrolled. Plan: admit, try to give iv lasix. May have to hold entresto due to low bp.  Consult cards  Active Problems:    Persistent atrial fibrillation ([address] -Established problem. Stable. In sinus now  Plan: Continue present orders/plan. GERD (gastroesophageal reflux disease)  Plan: stay on ppi    Hypothyroid -Established problem. Stable. Plan: check tsh    Essential hypertension -Established problem. Stable. 91/55  Plan: Pt home BP meds reviewed and will not be continued. BP too low right now. Will monitor labs to assess Creat/K for possible complications of medications. Diagnoses as listed above, designated as new or established and plan outlined for each. Data Reviewed:   (1) Lab tests were reviewed or ordered. (1) Radiology tests were reviewed or ordered. (1) Medical test (Echo, EKG, PFT/joseph) were ordered. (1)History was not obtained from someone other than patient  (1) Old records were reviewed - see HPI/MDM for pertinent details if review done. (2) Case was discussed with another health care provider: Dr Herman Naranjo  (2) Imaging was viewed by myself. (2) EKG  was viewed by myself. The patient is being placed in inpatient status with the expectation of requiring a hospital stay spanning at least two midnights for care and treatment of the problems noted in the problem list.  This determination is also based on thepatients comorbidities and past medical history, the severity and timing of the signs and symptoms upon presentation.     (Please note that portions of this note were completed with a voice recognition program.  Efforts were made to edit the dictations but occasionally words are mis-transcribed.)      Electronically signed by: Raul Montanez MD 10/31/2022

## 2022-10-31 NOTE — ED NOTES
This RN spoke with hospice center at this time, gave fax number.       Mark Galdamez RN  10/31/22 [id number]

## 2022-11-01 ENCOUNTER — TELEPHONE (OUTPATIENT)
Dept: CARDIOLOGY CLINIC | Age: 72
End: 2022-11-01

## 2022-11-01 VITALS
TEMPERATURE: 97.9 F | SYSTOLIC BLOOD PRESSURE: 109 MMHG | DIASTOLIC BLOOD PRESSURE: 73 MMHG | RESPIRATION RATE: 20 BRPM | HEART RATE: 64 BPM | OXYGEN SATURATION: 96 % | BODY MASS INDEX: 19.32 KG/M2 | HEIGHT: 71 IN | WEIGHT: 138.01 LBS

## 2022-11-01 LAB
A/G RATIO: 1.6 (ref 1.1–2.2)
ALBUMIN SERPL-MCNC: 3.1 G/DL (ref 3.4–5)
ALP BLD-CCNC: 55 U/L (ref 40–129)
ALT SERPL-CCNC: 55 U/L (ref 10–40)
ANION GAP SERPL CALCULATED.3IONS-SCNC: 4 MMOL/L (ref 3–16)
ANISOCYTOSIS: ABNORMAL
AST SERPL-CCNC: 38 U/L (ref 15–37)
BANDED NEUTROPHILS RELATIVE PERCENT: 2 % (ref 0–7)
BASOPHILS ABSOLUTE: 0 K/UL (ref 0–0.2)
BASOPHILS RELATIVE PERCENT: 0 %
BILIRUB SERPL-MCNC: <0.2 MG/DL (ref 0–1)
BUN BLDV-MCNC: 25 MG/DL (ref 7–20)
CALCIUM SERPL-MCNC: 8.5 MG/DL (ref 8.3–10.6)
CHLORIDE BLD-SCNC: 92 MMOL/L (ref 99–110)
CHOLESTEROL, TOTAL: 166 MG/DL (ref 0–199)
CO2: 36 MMOL/L (ref 21–32)
CREAT SERPL-MCNC: 1.2 MG/DL (ref 0.8–1.3)
EOSINOPHILS ABSOLUTE: 0 K/UL (ref 0–0.6)
EOSINOPHILS RELATIVE PERCENT: 0 %
GFR SERPL CREATININE-BSD FRML MDRD: >60 ML/MIN/{1.73_M2}
GLUCOSE BLD-MCNC: 112 MG/DL (ref 70–99)
HCT VFR BLD CALC: 26.7 % (ref 40.5–52.5)
HDLC SERPL-MCNC: 65 MG/DL (ref 40–60)
HEMOGLOBIN: 8.9 G/DL (ref 13.5–17.5)
LDL CHOLESTEROL CALCULATED: 93 MG/DL
LYMPHOCYTES ABSOLUTE: 0.7 K/UL (ref 1–5.1)
LYMPHOCYTES RELATIVE PERCENT: 6 %
MAGNESIUM: 2 MG/DL (ref 1.8–2.4)
MCH RBC QN AUTO: 28.6 PG (ref 26–34)
MCHC RBC AUTO-ENTMCNC: 33.5 G/DL (ref 31–36)
MCV RBC AUTO: 85.6 FL (ref 80–100)
MONOCYTES ABSOLUTE: 0.4 K/UL (ref 0–1.3)
MONOCYTES RELATIVE PERCENT: 4 %
NEUTROPHILS ABSOLUTE: 10.1 K/UL (ref 1.7–7.7)
NEUTROPHILS RELATIVE PERCENT: 88 %
PDW BLD-RTO: 17.1 % (ref 12.4–15.4)
PLATELET # BLD: 263 K/UL (ref 135–450)
PMV BLD AUTO: 8.3 FL (ref 5–10.5)
POTASSIUM SERPL-SCNC: 4.4 MMOL/L (ref 3.5–5.1)
RBC # BLD: 3.13 M/UL (ref 4.2–5.9)
SODIUM BLD-SCNC: 132 MMOL/L (ref 136–145)
TOTAL PROTEIN: 5.1 G/DL (ref 6.4–8.2)
TRIGL SERPL-MCNC: 40 MG/DL (ref 0–150)
VLDLC SERPL CALC-MCNC: 8 MG/DL
WBC # BLD: 11.2 K/UL (ref 4–11)

## 2022-11-01 PROCEDURE — 94761 N-INVAS EAR/PLS OXIMETRY MLT: CPT

## 2022-11-01 PROCEDURE — 83735 ASSAY OF MAGNESIUM: CPT

## 2022-11-01 PROCEDURE — 6360000002 HC RX W HCPCS: Performed by: INTERNAL MEDICINE

## 2022-11-01 PROCEDURE — 6370000000 HC RX 637 (ALT 250 FOR IP): Performed by: INTERNAL MEDICINE

## 2022-11-01 PROCEDURE — 85025 COMPLETE CBC W/AUTO DIFF WBC: CPT

## 2022-11-01 PROCEDURE — 99232 SBSQ HOSP IP/OBS MODERATE 35: CPT | Performed by: INTERNAL MEDICINE

## 2022-11-01 PROCEDURE — 2580000003 HC RX 258: Performed by: INTERNAL MEDICINE

## 2022-11-01 PROCEDURE — 80053 COMPREHEN METABOLIC PANEL: CPT

## 2022-11-01 PROCEDURE — 2700000000 HC OXYGEN THERAPY PER DAY

## 2022-11-01 PROCEDURE — 80061 LIPID PANEL: CPT

## 2022-11-01 RX ORDER — FUROSEMIDE 40 MG/1
40 TABLET ORAL 2 TIMES DAILY
Status: DISCONTINUED | OUTPATIENT
Start: 2022-11-01 | End: 2022-11-01 | Stop reason: HOSPADM

## 2022-11-01 RX ORDER — FUROSEMIDE 40 MG/1
40 TABLET ORAL 2 TIMES DAILY
Qty: 60 TABLET | Refills: 1 | Status: SHIPPED | OUTPATIENT
Start: 2022-11-01

## 2022-11-01 RX ADMIN — LEVOTHYROXINE SODIUM 50 MCG: 0.03 TABLET ORAL at 06:30

## 2022-11-01 RX ADMIN — AMIODARONE HYDROCHLORIDE 100 MG: 200 TABLET ORAL at 08:21

## 2022-11-01 RX ADMIN — Medication 10 ML: at 08:27

## 2022-11-01 RX ADMIN — FUROSEMIDE 40 MG: 10 INJECTION, SOLUTION INTRAMUSCULAR; INTRAVENOUS at 08:21

## 2022-11-01 RX ADMIN — ENOXAPARIN SODIUM 40 MG: 100 INJECTION SUBCUTANEOUS at 08:21

## 2022-11-01 RX ADMIN — PREDNISONE 20 MG: 20 TABLET ORAL at 08:20

## 2022-11-01 NOTE — CARE COORDINATION
CM spoke to pt and he wants to go with [address]. CM notified Burger Jamey at [facility] of pt's choice. CM called [facility] intake and spoke to Yanira on speaker with patient and she informed him that [address] RN is calling to set up admission visit. Pt verbalized understanding. Pt's spouse will transport pt home. Patient discharged 11/1/2022 to home with [address] services for follow up.    All discharge needs met per case management     Estephanie Doran RN, BSN  [phone]

## 2022-11-01 NOTE — TELEPHONE ENCOUNTER
Wendy from [facility] called regarding patient. Please call regarding regarding frequent hospitalizations and noncompliance. Julian Guerin feels patient should be referred to palliative care. Patient is revoking hospice and being admitted. This is the third time he has revoked hospice. Please call and advise.   kelton

## 2022-11-01 NOTE — PROGRESS NOTES
Progress Note - Dr. Arturo Monzon - Internal Medicine  PCP: DO Leigh Ann Lockett / Viola Grimaldo [id number] Day: 1  Code Status: DNR-CC  Current Diet: ADULT DIET; Regular; Low Sodium (2 gm); 1500 ml        CC: follow up on medical issues    Subjective:   Suyapa Mcclellan is a 71 y.o. male. Pt seen and examined  Chart reviewed since last visit, labs and imaging below      Doing ok   -1400 cc out  Appreciate dr ernandez's eval  He states that he has not compliant with medications including Entresto and diuretic. Entresto currently on hold due to soft BPs      Review of Systems: (1 system for EPF, 2-9 for detailed, 10+ for comprehensive)  Constitutional: Negative for chills and fever. HENT: Negative for dental problem, nosebleeds and rhinorrhea. Eyes: Negative for photophobia and visual disturbance. Respiratory: Negative for cough, positive for chest tightness and positive for shortness of breath. Cardiovascular: Negative for chest pain and leg swelling. Gastrointestinal: Negative for diarrhea, nausea and vomiting. Endocrine: Negative for polydipsia and polyphagia. Genitourinary: Negative for frequency, hematuria and urgency. Musculoskeletal: Negative for back pain and myalgias. Skin: Negative for rash. Allergic/Immunologic: Negative for food allergies. Neurological: Negative for dizziness, seizures, syncope and facial asymmetry. Hematological: Negative for adenopathy. Psychiatric/Behavioral: Negative for dysphoric mood. The patient is not nervous/anxious. I have reviewed the patient's medical and social history in detail and updated the computerized patient record. To recap: He  has a past medical history of Bronchitis, CHF (congestive heart failure) ([facility] and COPD (chronic obstructive pulmonary disease) ([facility] . He  has a past surgical history that includes fracture surgery; Tonsillectomy; eye surgery;  Cholecystectomy, laparoscopic (N/A, 3/17/2021); and femoral bypass (Left, 6/1/2021). Richard Choudhury He  reports that he quit smoking about 6 years ago. His smoking use included cigarettes. He has a 30.00 pack-year smoking history. He quit smokeless tobacco use about 6 years ago. He reports current alcohol use of about 1.0 - 3.0 standard drink per week. He reports that he does not use drugs. .        Active Hospital Problems    Diagnosis Date Noted    Acute heart failure, unspecified heart failure type ([facility] [I50.9] 10/31/2022     Priority: Medium    Hypothyroid [E03.9] 10/16/2022     Priority: Medium    GERD (gastroesophageal reflux disease) [K21.9] 08/01/2022     Priority: Medium    Persistent atrial fibrillation ([facility] [I48.19] 05/19/2022     Priority: Medium    Essential hypertension [I10] 11/24/2016       Current Facility-Administered Medications: amiodarone (CORDARONE) tablet 100 mg, 100 mg, Oral, BID  ipratropium-albuterol (DUONEB) nebulizer solution 1 ampule, 1 ampule, Inhalation, Q4H PRN  levothyroxine (SYNTHROID) tablet 50 mcg, 50 mcg, Oral, Daily  metoprolol succinate (TOPROL XL) extended release tablet 100 mg, 100 mg, Oral, QPM  [Held by provider] sacubitril-valsartan (ENTRESTO)  MG per tablet 0.5 tablet, 0.5 tablet, Oral, BID  tamsulosin (FLOMAX) capsule 0.4 mg, 0.4 mg, Oral, Nightly  perflutren lipid microspheres (DEFINITY) injection 1.65 mg, 1.5 mL, IntraVENous, ONCE PRN  sodium chloride flush 0.9 % injection 5-40 mL, 5-40 mL, IntraVENous, 2 times per day  sodium chloride flush 0.9 % injection 10 mL, 10 mL, IntraVENous, PRN  0.9 % sodium chloride infusion, , IntraVENous, PRN  ondansetron (ZOFRAN-ODT) disintegrating tablet 4 mg, 4 mg, Oral, Q8H PRN **OR** ondansetron (ZOFRAN) injection 4 mg, 4 mg, IntraVENous, Q6H PRN  magnesium hydroxide (MILK OF MAGNESIA) 400 MG/5ML suspension 30 mL, 30 mL, Oral, Daily PRN  acetaminophen (TYLENOL) tablet 650 mg, 650 mg, Oral, Q6H PRN **OR** acetaminophen (TYLENOL) suppository 650 mg, 650 mg, Rectal, Q6H PRN  enoxaparin (LOVENOX) injection 40 mg, 40 mg, SubCUTAneous, Daily  hydrALAZINE (APRESOLINE) injection 10 mg, 10 mg, IntraVENous, Q6H PRN  0.9 % sodium chloride bolus, 500 mL, IntraVENous, PRN  potassium chloride (KLOR-CON M) extended release tablet 40 mEq, 40 mEq, Oral, PRN **OR** potassium bicarb-citric acid (EFFER-K) effervescent tablet 40 mEq, 40 mEq, Oral, PRN **OR** potassium chloride 10 mEq/100 mL IVPB (Peripheral Line), 10 mEq, IntraVENous, PRN  predniSONE (DELTASONE) tablet 20 mg, 20 mg, Oral, Daily  furosemide (LASIX) injection 40 mg, 40 mg, IntraVENous, BID  melatonin tablet 3 mg, 3 mg, Oral, Nightly PRN         Objective:  /73   Pulse 62   Temp 97.9 °F (36.6 °C) (Temporal)   Resp 18   Ht 5' 11\" (1.803 m)   Wt 138 lb 0.1 oz (62.6 kg)   SpO2 99%   BMI 19.25 kg/m²      Patient Vitals for the past 24 hrs:   BP Temp Temp src Pulse Resp SpO2 Weight   11/01/22 0819 109/73 97.9 °F (36.6 °C) Temporal 62 18 99 % --   11/01/22 0513 -- -- -- -- -- -- 138 lb 0.1 oz (62.6 kg)   11/01/22 0500 100/64 97.6 °F (36.4 °C) Temporal 50 16 96 % --   10/31/22 2006 116/69 98.1 °F (36.7 °C) Temporal 66 17 96 % --   10/31/22 1708 109/63 -- -- 65 -- -- --   10/31/22 1457 122/64 98.4 °F (36.9 °C) Temporal 64 18 95 % --   10/31/22 1327 -- -- -- -- -- 100 % --   10/31/22 0907 (!) 93/55 -- -- -- -- -- --   10/31/22 0904 (!) 91/55 97.6 °F (36.4 °C) Temporal 68 18 97 % --     Patient Vitals for the past 96 hrs (Last 3 readings):   Weight   11/01/22 0513 138 lb 0.1 oz (62.6 kg)   10/31/22 0623 140 lb 6.9 oz (63.7 kg)   10/31/22 0235 140 lb (63.5 kg)           Intake/Output Summary (Last 24 hours) at 11/1/2022 0836  Last data filed at 11/1/2022 0513  Gross per 24 hour   Intake 780 ml   Output 1950 ml   Net -1170 ml         Physical Exam: (2-7 system for EPF/Detailed, ?8 for Comprehensive)  /73   Pulse 62   Temp 97.9 °F (36.6 °C) (Temporal)   Resp 18   Ht 5' 11\" (1.803 m)   Wt 138 lb 0.1 oz (62.6 kg) SpO2 99%   BMI 19.25 kg/m²   Constitutional: vitals as above: alert, appears stated age and cooperative    Psychiatric: normal insight and judgment, oriented to person, place, time, and general circumstances    Head: Normocephalic, without obvious abnormality, atraumatic    Eyes:lids and lashes normal, conjunctivae and sclerae normal and pupils equal, round, reactive to light and accomodation    EMNT: external ears normal, nares midline    Neck: no carotid bruit, supple, symmetrical, trachea midline and thyroid not enlarged, symmetric, no tenderness/mass/nodules     Respiratory: crackles bilat, end exp wheezes  Cardiovascular: normal rate, regular rhythm, normal S1 and S2 and no murmurs    Gastrointestinal: soft, non-tender, non-distended, normal bowel sounds, no masses or organomegaly    Extremities: no clubbing, trace edema    Skin:No rashes or nodules noted.     Neurologic:negative      Labs:  Lab Results   Component Value Date    WBC 11.2 (H) 11/01/2022    HGB 8.9 (L) 11/01/2022    HCT 26.7 (L) 11/01/2022     11/01/2022    CHOL 197 10/15/2022    TRIG 28 10/15/2022    HDL 84 (H) 10/15/2022    LDLDIRECT 105 (H) 10/15/2022    ALT 55 (H) 11/01/2022    AST 38 (H) 11/01/2022     (L) 11/01/2022    K 4.4 11/01/2022    CL 92 (L) 11/01/2022    CREATININE 1.2 11/01/2022    BUN 25 (H) 11/01/2022    CO2 36 (H) 11/01/2022    TSH 13.48 (H) 10/31/2022    INR 0.93 08/01/2022    LABMICR YES 10/31/2022     Lab Results   Component Value Date    TROPONINI <0.01 10/31/2022       Recent Imaging Results are Reviewed:  XR CHEST PORTABLE    Result Date: 10/31/2022  EXAMINATION: ONE XRAY VIEW OF THE CHEST 10/31/2022 2:51 am COMPARISON: 10/14/2022 radiograph HISTORY: ORDERING SYSTEM PROVIDED HISTORY: chest pain dyspnea no fever no significant cough TECHNOLOGIST PROVIDED HISTORY: Reason for exam:->chest pain dyspnea no fever no significant cough FINDINGS: The heart and mediastinum are normal.  The lungs are lucent and hyperinflated. Stable pattern of reticular and ground-glass opacification bilaterally. This appears chronic. There are no significant skeletal findings. Spectrum of findings would favor COPD and chronic interstitial change. Stable appearance from prior imaging. XR CHEST PORTABLE    Result Date: 10/14/2022  EXAMINATION: ONE XRAY VIEW OF THE CHEST 10/14/2022 12:40 pm COMPARISON: 08/01/2022. HISTORY: ORDERING SYSTEM PROVIDED HISTORY: sob TECHNOLOGIST PROVIDED HISTORY: Reason for exam:->sob Reason for Exam: sob FINDINGS: The lungs are hyperexpanded without acute focal process. There is no effusion or pneumothorax. The cardiomediastinal silhouette is stable. The osseous structures are stable. Stable COPD. CT CHEST PULMONARY EMBOLISM W CONTRAST    Result Date: 10/14/2022  EXAMINATION: CTA OF THE CHEST 10/14/2022 1:38 pm TECHNIQUE: CTA of the chest was performed after the administration of intravenous contrast.  Multiplanar reformatted images are provided for review. MIP images are provided for review. Automated exposure control, iterative reconstruction, and/or weight based adjustment of the mA/kV was utilized to reduce the radiation dose to as low as reasonably achievable. COMPARISON: 08/01/2022 HISTORY: ORDERING SYSTEM PROVIDED HISTORY: sob - off anticoag TECHNOLOGIST PROVIDED HISTORY: Reason for exam:->sob - off anticoag Decision Support Exception - unselect if not a suspected or confirmed emergency medical condition->Emergency Medical Condition (MA) Reason for Exam: sob - off anticoag FINDINGS: Pulmonary Arteries: Pulmonary arteries are adequately opacified for evaluation. No evidence of intraluminal filling defect to suggest pulmonary embolism. Main pulmonary artery is normal in caliber. Mediastinum: No evidence of mediastinal lymphadenopathy. The heart and pericardium demonstrate no acute abnormality. There is no acute abnormality of the thoracic aorta. Lungs/pleura:  There are mild to moderate bilateral emphysematous changes. There is no lobar consolidation, pneumothorax or suspicious parenchymal mass. There is a trace left pleural effusion. Upper Abdomen: Limited images of the upper abdomen are unremarkable. Soft Tissues/Bones: No acute bone or soft tissue abnormality. 1. Negative for pulmonary embolus. 2. Emphysema with trace left pleural effusion. Lab Results   Component Value Date/Time    GLUCOSE 112 11/01/2022 05:00 AM     No results found for: POCGLU  /73   Pulse 62   Temp 97.9 °F (36.6 °C) (Temporal)   Resp 18   Ht 5' 11\" (1.803 m)   Wt 138 lb 0.1 oz (62.6 kg)   SpO2 99%   BMI 19.25 kg/m²     Assessment and Plan:  Principal Problem:    Acute heart failure, unspecified heart failure type ([address] -Established problem. Stable. -1400 cc  Plan: cont lasix. Try to resume entresto when possible  Active Problems:    Persistent atrial fibrillation ([address] -Established problem. Stable. sinus today  Plan: Continue present orders/plan. GERD (gastroesophageal reflux disease)  Plan: cont on ppi    Hypothyroid -Established problem. Stable. Plan: stay on thyroid replacement    Essential hypertension -Established problem. Stable.   109/73  Plan: resume meds when ok with cards      (Please note that portions of this note were completed with a voice recognition program.  Efforts were made to edit the dictations but occasionally words are mis-transcribed.)        Delia Ríos MD  11/1/2022

## 2022-11-01 NOTE — PROGRESS NOTES
CLINICAL PHARMACY NOTE: MEDS TO BEDS    Total # of Prescriptions Filled: 1   The following medications were delivered to the patient:  Furosemide     Additional Documentation:  Wife picked up in  op pharmcy

## 2022-11-01 NOTE — PROGRESS NOTES
Pt ok to d/c per hospitalist and cardiologist. Pt states that he plans to resume hospice @ d/c. Will reach out to palliative care RN to make aware.     Electronically signed by Paul Tom RN on 11/1/2022 at 12:29 PM

## 2022-11-01 NOTE — CONSULTS
[address] [facility], [facility] 1950    History:  Past Medical History:   Diagnosis Date    Bronchitis     CHF (congestive heart failure) ([facility]     COPD (chronic obstructive pulmonary disease) ([facility]        ECHO:  10/31/22      Summary   Technically difficult study due to thin body frame and poor acoustical   window. Left ventricular cavity size and wall thickness is normal. Ejection fraction   is visually estimated to be 60%. Abnormal septal motion. Normal diastolic function. E/e' = 7.5. Aortic valve appears sclerotic but opens adequately. The right ventricle is normal in size and function. TAPSE: 2.72 cm. RVS   velocity: 13.4 cm/s. ACE/ARB/ARNi: Meggan Pulido  (half tab) bid  BB: toprol xl 100 mg daily  Aldosterone Antagonist:  not on  SGLT2: not on        DM History: No      Last Hospital Admission: 10/14/22 with CHF  Code Status:  DNR CC  Discharge plans: discharging home with hospice    Family Present: codi Paris was admitted to the hospital with increased shortness of breath. HF is not a new diagnosis. He was discharged home with hospice after last admission but revoked services - did not feel it was a good match. He was not weighing himself. He tries to be mindful of sodium restrictions. He was not compliant with medications prior to admission. Patient provided with both written and verbal education on CHF signs/ symptoms, causes, discharge medications, daily weights, low sodium diet, activity, and follow-up. Pt to call if gains 3 pounds in one day or 5 pounds in one week. Mutually agreed upon goals were discussed such as calling the MD as soon as they recognize symptoms and weight gain, maintaining proper diet, taking medications as prescribed, joining cardiac rehab when able. Also reviewed importance of risk factor reduction. Patient provided with CHF Zone Management tool and CHF symptoms magnet.     Discussed importance of lifestyle changes:     PATIENT/CAREGIVER TEACHING:    Level of patient/caregiver understanding able to:   [ x] Verbalize understanding [ ] Demonstrate understanding [ ] Teach back   [ ] Needs reinforcement [ ] Other:       Time spent teachin mins    1. WEIGHT: Admit Weight: 140 lb (63.5 kg)      Today  Weight: 138 lb 0.1 oz (62.6 kg)   2. I/O   Intake/Output Summary (Last 24 hours) at 2022 1356  Last data filed at 2022 1228  Gross per 24 hour   Intake 1020 ml   Output 1450 ml   Net -430 ml       Recommendations: 1. Patient scheduled follow up with HF NP later this week  2. Educate further on fluid restriction 48 oz- 64 oz during inpatient stay so he can understand how to measure intake at home. 3. Continue to educate on S/S.   4. Emphasize daily weights, diet, and knowing when and who to call  5. Provided patient with CHF Resource Line for questions and concerns.          Macy De Luna RN 2022 1:56 PM

## 2022-11-01 NOTE — PROGRESS NOTES
Cardiovascular Progress Note      Chief Complaint:   Chief Complaint   Patient presents with    Chest Pain    Shortness of Breath     Impression/Recommendations:    Acute on chronic systolic CHF  NICM, recovered (EF 60%)  Normal coronaries 2016/nonischemic SPECT MPI 8/2022  PAF, declined DOAC 2/2 costs; currently on ASA and Amiodarone  PAD s/p fem/pop bypass 6/2022, Dr. Yahir Cooney  Severe COPD  GERD  DNR-CC      Net negative 1.3 L, Returned to baseline 138 lbs  Lasix 40 mg PO bid, restart Entresto  Continue beta blocker  Appreciate Palliative Care   OK to DC home. Reviewed home monitoring and to call prior to close CHF follow up for weight gain or questions. CHF education reinforced. ~salt restriction: 2,000 mg daily                         ~fluid restriction: 1500 ml daily                        ~medication compliance                        ~daily weights and notify of any significant weight gain/loss                        ~establish with CHF nurse                        ~outpatient follow-up with our CHF team         Interval History:  No events overnight. Return to baseline 3 L NC oxygen. No orthopnea. No LE edema. No chest tightness. Ready to go home. 10/31/22  BNP 1527  Troponin <0.01    EKG  10/31/22  Sinus bradycardia  Septal infarct , age undetermined     CXR 10/31/22  The heart and mediastinum are normal.  The lungs are lucent and hyperinflated. Stable pattern of reticular and ground-glass opacification bilaterally. This appears chronic. There are no significant skeletal findings. 10/31/22 TTE   Technically difficult study due to thin body frame and poor acoustical   window. Left ventricular cavity size and wall thickness is normal. Ejection fraction   is visually estimated to be 60%. Abnormal septal motion. Normal diastolic function. E/e' = 7.5. Aortic valve appears sclerotic but opens adequately. The right ventricle is normal in size and function.  TAPSE: 2.72 cm. RVS   velocity: 13.4 cm/s. SPECT 8/1/22: There is a small fixed apical defect that is likely bowel artifact rather    than scar.    -There is no ischemia.    -There is mild global hypokinesis with EF=47%. -Intermediate risk study related to EF < 50%.      [facility] 10/13/2016:  Coronary Findings   Vessel  Ostial  Proximal  Mid  Distal  Special    LM  0%  0%  0%  0%      LAD  0%  0%  0%  0%      RI              Cx  0%  0%  0%  0%      RCA  0%  0%  0%  0%         Ventriculography/Pressure Findings  LVEF %  LVEDP  MR    20%  8mmHg           Medications:  furosemide (LASIX) tablet 40 mg, BID  amiodarone (CORDARONE) tablet 100 mg, BID  ipratropium-albuterol (DUONEB) nebulizer solution 1 ampule, Q4H PRN  levothyroxine (SYNTHROID) tablet 50 mcg, Daily  metoprolol succinate (TOPROL XL) extended release tablet 100 mg, QPM  sacubitril-valsartan (ENTRESTO)  MG per tablet 0.5 tablet, BID  tamsulosin (FLOMAX) capsule 0.4 mg, Nightly  perflutren lipid microspheres (DEFINITY) injection 1.65 mg, ONCE PRN  sodium chloride flush 0.9 % injection 5-40 mL, 2 times per day  sodium chloride flush 0.9 % injection 10 mL, PRN  0.9 % sodium chloride infusion, PRN  ondansetron (ZOFRAN-ODT) disintegrating tablet 4 mg, Q8H PRN   Or  ondansetron (ZOFRAN) injection 4 mg, Q6H PRN  magnesium hydroxide (MILK OF MAGNESIA) 400 MG/5ML suspension 30 mL, Daily PRN  acetaminophen (TYLENOL) tablet 650 mg, Q6H PRN   Or  acetaminophen (TYLENOL) suppository 650 mg, Q6H PRN  enoxaparin (LOVENOX) injection 40 mg, Daily  hydrALAZINE (APRESOLINE) injection 10 mg, Q6H PRN  0.9 % sodium chloride bolus, PRN  potassium chloride (KLOR-CON M) extended release tablet 40 mEq, PRN   Or  potassium bicarb-citric acid (EFFER-K) effervescent tablet 40 mEq, PRN   Or  potassium chloride 10 mEq/100 mL IVPB (Peripheral Line), PRN  predniSONE (DELTASONE) tablet 20 mg, Daily  melatonin tablet 3 mg, Nightly PRN      I/O:     Intake/Output Summary (Last 24 hours) at 11/1/2022 1701  Last data filed at 11/1/2022 1228  Gross per 24 hour   Intake 1020 ml   Output 1450 ml   Net -430 ml         Physical Exam:    /73   Pulse 64   Temp 97.9 °F (36.6 °C) (Temporal)   Resp 20   Ht 5' 11\" (1.803 m)   Wt 138 lb 0.1 oz (62.6 kg)   SpO2 96%   BMI 19.25 kg/m²   Wt Readings from Last 3 Encounters:   11/01/22 138 lb 0.1 oz (62.6 kg)   10/24/22 139 lb 3.2 oz (63.1 kg)   10/17/22 137 lb 12.8 oz (62.5 kg)       GENERAL: Well developed, well nourished, no acute distress  NEUROLOGICAL: Alert and oriented x3  PSYCH: Normal mood and affect   SKIN: Warm and dry, without lesions  HEENT: Normocephalic, atraumatic, Sclera non-icteric, mucous membranes moist  NECK: supple, JVP normal, thyroid not enlarged   CAROTID: Normal upstroke, no bruits  CARDIAC: Normal PMI, regular rate and rhythm, normal S1S2, no murmur, rub  RESPIRATORY: Normal respiratory effort, clear to auscultation bilaterally  EXTREMITIES: No cyanosis, clubbing or edema, palpable pulses bilaterally   MUSCULOSKELETAL: No joint swelling or tenderness, no chest wall tenderness  GASTROINTESTINAL:  soft, non-tender, no bruit    Data Review:    CBC:   Recent Labs     10/31/22  0235 11/01/22  0500   WBC 7.5 11.2*   HGB 10.8* 8.9*   HCT 33.7* 26.7*   MCV 86.2 85.6    263       BMP:   Recent Labs     10/31/22  0235 11/01/22  0500   * 132*   K 4.9 4.4   CL 91* 92*   CO2 38* 36*   BUN 23* 25*   CREATININE 1.1 1.2   MG  --  2.00       LFTS:   Recent Labs     10/31/22  0235 11/01/22  0500   ALT 68* 55*   AST 58* 38*   ALKPHOS 66 55   PROT 6.1* 5.1*   AGRATIO 1.5 1.6   BILITOT <0.2 <0.2       Cardiac Enzymes:   Recent Labs     10/31/22  0235   TROPONINI <0.01       Delta Daily DO, [facility]  Interventional Cardiology     o: [phone]  [address], [address] [place], [address]      NOTE:  This report was transcribed using voice recognition software.   Every effort was made to ensure accuracy; however, inadvertent computerized transcription errors may be present.

## 2022-11-01 NOTE — PROGRESS NOTES
Pt being discharged home. IV d/jb per phan RN. Belongings collected. D/c instructions explained per previous RN. Pt stating no further needs or questions at this time. Pt transported to main entrance and safely assisted into vehicle.

## 2022-11-01 NOTE — PROGRESS NOTES
Nutrition Education    Provided heart failure diet education. Education included low sodium diet guidelines (3-4 gm Na+/day) and fluid restriction (64 oz/day). Reviewed foods to choose and foods to avoid, along with label reading and ways to add flavor to food. Pt currently tries to follow a low sodium diet at home and does not add salt to food. Reported tends to drink a lot of fluids. Pt states understanding of education. Time spent with patient: 10 minutes. Educated on 11/1  Learners: Patient  Readiness: Acceptance  Method: Explanation and Handout  Response: [employer] name and number provided.     Beatriz Johns MS, RD, LD  Contact Number: [phone]

## 2022-11-03 ENCOUNTER — TELEPHONE (OUTPATIENT)
Dept: OTHER | Age: 72
End: 2022-11-03

## 2022-11-03 ASSESSMENT — ENCOUNTER SYMPTOMS
GASTROINTESTINAL NEGATIVE: 1
SHORTNESS OF BREATH: 1

## 2022-11-03 NOTE — PROGRESS NOTES
[address]   Congestive Heart Failure    PrimaryCare Doctor:  Carlie Jeong III, DO    Chief Complaint:  SOB    History of Present Illness:  Doris Jules is a 71 y.o. male with PMH NICM, HFimpEF, COPD,  PVD s/p fem/pop bypass in June who presents today for hosp f/u. He was readmitted 10/31-11/1/22  Hospital Course:  Acute on chronic systolic CHF  NICM, recovered (EF 60%)  Normal coronaries 2016/nonischemic SPECT MPI 8/2022  PAF, declined DOAC 2/2 costs; currently on ASA and Amiodarone  PAD s/p fem/pop bypass 6/2022, Dr. Jae Winters  Severe COPD  GERD  DNR-CC     Net negative 1.3 L, Returned to baseline 138 lbs  Lasix 40 mg PO bid, restart Entresto  Continue beta blocker  Appreciate Palliative Care   OK to DC home. Reviewed home monitoring and to call prior to close CHF follow up for weight gain or questions. Since hospitalization: he is feeling about the same, still with SOB and fatigue but his wt has been stable and edema is improved. He denies dizziness, chest pain, palpitations, PND, exertional chest pressure/discomfort, fatigue, early saiety, syncope. His BP is low here again today. He has OV with EP per to discuss amio with COPD, and AC - he is not taking per choice, we discussed risk again today. He is now with Palliative care vs Hospice    Hosp d/c wt: [address] d/c wt: [address] wt 134    Baseline Weight: 134  Wt Readings from Last 3 Encounters:   11/04/22 137 lb 12.8 oz (62.5 kg)   11/01/22 138 lb 0.1 oz (62.6 kg)   10/24/22 139 lb 3.2 oz (63.1 kg)     Admit BNP: 1527  D/C BNP:nd    Hosp f/u phone call: 11/3/22    EF:60-65%  Cardiac Imaging:   10/31/22 TTE   Technically difficult study due to thin body frame and poor acoustical   window. Left ventricular cavity size and wall thickness is normal. Ejection fraction   is visually estimated to be 60%. Abnormal septal motion. Normal diastolic function. E/e' = 7.5. Aortic valve appears sclerotic but opens adequately.    The right ventricle is normal in size and function. TAPSE: 2.72 cm. RVS   velocity: 13.4 cm/s. ST 8/1/22: There is a small fixed apical defect that is likely bowel artifact rather    than scar.    -There is no ischemia.    -There is mild global hypokinesis with EF=47%. -Intermediate risk study related to EF < 50%. Echo 6/14/22:    Summary   Technically difficult examination due to body habitus and limited windows. Left ventricular systolic function is normal with ejection fraction   estimated at 60-65 %. No regional wall motion abnormalities are noted. Normal left ventricular wall thickness. Aortic valve appears sclerotic but opens adequately. Mild mitral regurgitation. Moderate tricuspid regurgitation. Systolic pulmonary artery pressure (SPAP) estimated at 45 mmHg (right atrial   pressure 3 mmHg), consistent with mild pulmonary hypertension    Echo 3/11/22:    Summary   Technically limited study due to patient body habitus and lung interference   as well as frequent PVCs. Estimated ejection fraction of 45-50%. Mildly diminished left ventricular systolic function. Image quality inadequate to rule out regional wall motion abnormalities. Mild septal left ventricular hypertrophy. Normal left ventricle size. Grade I diastolic dysfunction with normal LV filling pressures. The aortic valve appears sclerotic but opens well. Mild to moderate tricuspid valve regurgitation. The inferior vena cava is dilated with respiratory variation greater than   50% consistent with CVP 10-15 mmHg    Echo 4/1/21:   Summary   -Technically difficult exam due to body habitus. -Moderately reduced global systolic function with an ejection fraction   estimated at 40%. -Global hypokinesis with abnormal septal motion noted. -Grade II diastolic dysfunction with elevated LV filling pressures. Avg.   E/e'=5.4   -Normal function of all valves.     Echo 10/5/2018:  Summary   -Left ventricular cavity size is normal. -Normal left ventricular wall thickness.   -Overall left ventricular systolic function appears mildly to moderately   reduced with an ejection fraction on the 40-45% range.   -There is moderate diffuse global hypokinesis. -Abnormal (paradoxical) septal motion is present .   -Normal diastolic function. .E/e\"=6.45   -Trivial mitral and tricuspid regurgitation. [facility] 10/13/2016:  Coronary Findings   Vessel  Ostial  Proximal  Mid  Distal  Special    LM  0%  0%  0%  0%      LAD  0%  0%  0%  0%      RI              Cx  0%  0%  0%  0%      RCA  0%  0%  0%  0%         Ventriculography/Pressure Findings  LVEF %  LVEDP  MR    20%  8mmHg         Device: No     Activity: at baseline  Can you walk 1-2 blocks or do a moderate amount of house/yard work? No    NYHA Class: III     Sodium Restrictions: 3g  Fluid Restrictions: 48-64 oz/day  Sodium and fluid restriction compliance: fair    Pt Education: The patient has received education on the following topics: dietary sodium restriction, heart failure medications, the importance of physical activity, symptom management and weight monitoring     FLY No     Past Medical History:   has a past medical history of Bronchitis, CHF (congestive heart failure) ([facility] [address] and COPD (chronic obstructive pulmonary disease) ([facility] [address] Surgical History:   has a past surgical history that includes fracture surgery; Tonsillectomy; eye surgery; Cholecystectomy, laparoscopic (N/A, 3/17/2021); and femoral bypass (Left, 6/1/2021). Social History:   reports that he quit smoking about 6 years ago. His smoking use included cigarettes. He has a 30.00 pack-year smoking history. He quit smokeless tobacco use about 6 years ago. He reports current alcohol use of about 1.0 - 3.0 standard drink per week. He reports that he does not use drugs.    Family History:   Family History   Problem Relation Age of Onset    Heart Disease Mother     No Known Problems Father        HomeMedications:  Prior to Admission medications Medication Sig Start Date End Date Taking? Authorizing Provider   furosemide (LASIX) 40 MG tablet Take 1 tablet by mouth 2 times daily 11/1/22   Lukas Pollard MD   metoprolol succinate (TOPROL XL) 100 MG extended release tablet Take 1 tablet by mouth every evening 10/24/22   CHELSY Gamez CNP   sacubitril-valsartan (ENTRESTO)  MG per tablet Take 0.5 tablets by mouth 2 times daily 10/24/22   CHELSY Gamez CNP   levothyroxine (SYNTHROID) 50 MCG tablet Take 1 tablet by mouth Daily 10/18/22   Lukas Pollard MD   tamsulosin (FLOMAX) 0.4 MG capsule Take 0.4 mg by mouth nightly    Historical Provider, MD   dicyclomine (BENTYL) 10 MG capsule Take 1 capsule by mouth 4 times daily  Patient not taking: No sig reported 6/20/22 5/6/26  Jodie Campos MD   amiodarone (CORDARONE) 200 MG tablet Take 100 mg by mouth 2 times daily    Historical Provider, MD   OXYGEN Inhale 3 L into the lungs as needed     Historical Provider, MD   ipratropium-albuterol (Eneida Lefort) 0.5-2.5 (3) MG/3ML SOLN nebulizer solution 1 vial inh Q6hrs for next 10 days and then 1 vial inh Q4hrs PRN SOB or wheezing 3/12/22   Delwin Collet, MD        Allergies:  Codeine     ROS:   Review of Systems   Constitutional:  Positive for fatigue. Respiratory:  Positive for shortness of breath. Cardiovascular:  Positive for leg swelling. Gastrointestinal: Negative. Genitourinary: Negative. Musculoskeletal: Negative. Skin: Negative. Neurological: Negative. Hematological: Negative. Psychiatric/Behavioral: Negative. Physical Examination:    Vitals:    11/04/22 1337 11/04/22 1349   BP: (!) 86/38 (!) 90/52   Site: Right Upper Arm    Position: Sitting    Cuff Size: Medium Adult    Pulse: 64    SpO2: 95%    Weight: 137 lb 12.8 oz (62.5 kg)    Height: 5' 11\" (1.803 m)            Physical Exam  Vitals reviewed. Constitutional:       Appearance: Normal appearance. He is well-developed.    HENT:      Head: Normocephalic and atraumatic. Eyes:      Extraocular Movements: Extraocular movements intact. Pupils: Pupils are equal, round, and reactive to light. Cardiovascular:      Rate and Rhythm: Normal rate and regular rhythm. Heart sounds: Normal heart sounds. Pulmonary:      Effort: Pulmonary effort is normal.      Breath sounds: Normal breath sounds. Abdominal:      Palpations: Abdomen is soft. Musculoskeletal:         General: Normal range of motion. Cervical back: Normal range of motion and neck supple. Right lower leg: Edema present. Left lower leg: Edema present. Comments: Trace pitting feet and ankles   Skin:     General: Skin is warm and dry. Neurological:      General: No focal deficit present. Mental Status: He is alert and oriented to person, place, and time. Mental status is at baseline. Psychiatric:         Mood and Affect: Mood normal.         Behavior: Behavior normal.         Thought Content:  Thought content normal.         Judgment: Judgment normal.       Lab Data:    CBC:   Lab Results   Component Value Date/Time    WBC 11.2 11/01/2022 05:00 AM    WBC 7.5 10/31/2022 02:35 AM    WBC 8.4 10/17/2022 05:15 AM    RBC 3.13 11/01/2022 05:00 AM    RBC 3.90 10/31/2022 02:35 AM    RBC 4.01 10/17/2022 05:15 AM    HGB 8.9 11/01/2022 05:00 AM    HGB 10.8 10/31/2022 02:35 AM    HGB 11.0 10/17/2022 05:15 AM    HCT 26.7 11/01/2022 05:00 AM    HCT 33.7 10/31/2022 02:35 AM    HCT 34.4 10/17/2022 05:15 AM    MCV 85.6 11/01/2022 05:00 AM    MCV 86.2 10/31/2022 02:35 AM    MCV 85.7 10/17/2022 05:15 AM    RDW 17.1 11/01/2022 05:00 AM    RDW 17.4 10/31/2022 02:35 AM    RDW 17.5 10/17/2022 05:15 AM     11/01/2022 05:00 AM     10/31/2022 02:35 AM     10/17/2022 05:15 AM     BMP:  Lab Results   Component Value Date/Time     11/01/2022 05:00 AM     10/31/2022 02:35 AM     10/17/2022 05:15 AM    K 4.4 11/01/2022 05:00 AM    K 4.9 10/31/2022 02:35 AM    K 4.0 10/17/2022 05:15 AM    K 3.6 10/16/2022 04:38 AM    K 4.9 10/14/2022 12:06 PM    K 4.2 08/02/2022 04:55 AM    CL 92 11/01/2022 05:00 AM    CL 91 10/31/2022 02:35 AM    CL 90 10/17/2022 05:15 AM    CO2 36 11/01/2022 05:00 AM    CO2 38 10/31/2022 02:35 AM    CO2 37 10/17/2022 05:15 AM    PHOS 3.7 09/19/2016 02:57 PM    BUN 25 11/01/2022 05:00 AM    BUN 23 10/31/2022 02:35 AM    BUN 18 10/17/2022 05:15 AM    CREATININE 1.2 11/01/2022 05:00 AM    CREATININE 1.1 10/31/2022 02:35 AM    CREATININE 1.1 10/17/2022 05:15 AM     BNP:   Lab Results   Component Value Date/Time    PROBNP 1,527 10/31/2022 02:35 AM    PROBNP 974 10/17/2022 05:15 AM    PROBNP 871 10/16/2022 04:38 AM     Iron Studies:  No components found for: FE,  TIBC,  FERRITIN  Iron Deficiency Anemia:  No    IV Iron Therapy:  No  2017 ACC/AHA HF Guidelines:   intravenous iron replacement in patients with New York Heart Association (NYHA) class II and III HF and iron deficiency (ferritin <100 ng/ml or 100-300 ng/ml if transferrin saturation <20%), to improve functional status and QoL. Assessment/Plan:    Encounter Diagnoses        congestive heart failure (HCC) Compensated, labs next week    Nonischemic cardiomyopathy ([address] EF recovered, continue entresto and toprol    Hypotension Decrease entresto           atrial fibrillation (HCC) Rate improved with decreased BB dose, continue AMio no AC per pt choice,  schedule EP f/u to discuss Amio given COPD         Instructions:   Medications: decrease entresto to 24/26 pills, one twice a day continue other meds  Labs in one week  Follow up: as scheduled with Dr. Gely Mauricio, 4 weeks with [facility] CHF Resource Line: [phone]      I appreciate the opportunity of cooperating in the care of this individual.    Harjit Smith, APRN - CNP, CNP, 11/3/2022,9:24 AM    QUALITY MEASURES  1. Tobacco Cessation Counseling: NA  2. Retake of BP if >140/90:   NA  3.  Documentation to PCP/referring for new patient:  Sent to PCP at close of office visit  4. CAD patient on anti-platelet: NA  5. CAD patient on STATIN therapy:  NA  6. Patient with CHF and aFib on anticoagulation:  NA   7. Patient Education:Yes   8. BB for LVSD :  Yes   9. ACE/ARB for LVSD:  Yes   10.  Left Ventricular Ejection Fraction (LVEF) Assessment:  Yes

## 2022-11-03 NOTE — TELEPHONE ENCOUNTER
[address] FAILURE PROGRAM  TELEPHONE ENCOUNTER FORM    Jordan Varma 1950    Attempted to call patient for HF follow-up. No answer at this time.       Next MD/ Clinic appointment: He is scheduled with Gagan Klein NP tomorrow in follow up      [address] [place] [address] I-20, RN 11/3/2022 12:05 PM

## 2022-11-04 ENCOUNTER — OFFICE VISIT (OUTPATIENT)
Dept: CARDIOLOGY CLINIC | Age: 72
End: 2022-11-04

## 2022-11-04 ENCOUNTER — TELEPHONE (OUTPATIENT)
Dept: CARDIOLOGY CLINIC | Age: 72
End: 2022-11-04

## 2022-11-04 VITALS
HEART RATE: 64 BPM | SYSTOLIC BLOOD PRESSURE: 90 MMHG | WEIGHT: 137.8 LBS | HEIGHT: 71 IN | DIASTOLIC BLOOD PRESSURE: 52 MMHG | BODY MASS INDEX: 19.29 KG/M2 | OXYGEN SATURATION: 95 %

## 2022-11-04 DIAGNOSIS — I50.22 CHRONIC SYSTOLIC CONGESTIVE HEART FAILURE (HCC): Primary | ICD-10-CM

## 2022-11-04 DIAGNOSIS — I48.0 PAF (PAROXYSMAL ATRIAL FIBRILLATION) (HCC): ICD-10-CM

## 2022-11-04 DIAGNOSIS — I95.2 HYPOTENSION DUE TO DRUGS: ICD-10-CM

## 2022-11-04 DIAGNOSIS — I42.8 NONISCHEMIC CARDIOMYOPATHY (HCC): ICD-10-CM

## 2022-11-04 DIAGNOSIS — Z09 HOSPITAL DISCHARGE FOLLOW-UP: ICD-10-CM

## 2022-11-04 LAB
BLOOD CULTURE, ROUTINE: NORMAL
CULTURE, BLOOD 2: NORMAL

## 2022-11-04 RX ORDER — SACUBITRIL AND VALSARTAN 49; 51 MG/1; MG/1
0.5 TABLET, FILM COATED ORAL 2 TIMES DAILY
Qty: 56 TABLET | Refills: 0 | Status: SHIPPED | OUTPATIENT
Start: 2022-11-04

## 2022-11-04 RX ORDER — SACUBITRIL AND VALSARTAN 24; 26 MG/1; MG/1
1 TABLET, FILM COATED ORAL 2 TIMES DAILY
Qty: 60 TABLET | Refills: 3 | Status: SHIPPED | OUTPATIENT
Start: 2022-11-04

## 2022-11-04 NOTE — TELEPHONE ENCOUNTER
Tisha Agosto/[facility]. She is wanting to know if TERRY orders oxygen and hospital beds for pt's, or if she uses a certain company. Tisha states hospice will not work for [employer], but she would like to speak to someone about ordering equipment. Please advise. Thank you.

## 2022-11-04 NOTE — TELEPHONE ENCOUNTER
I do not order any of these things. Why won't hospice work for the patient? These orders would need to come from PCP.     TERRY

## 2022-11-07 NOTE — PROGRESS NOTES
Emergency Department  [address] [place] [address]  Phone:  [phone]  Fax:  [phone]                                    Westley Ness   MRN: [id number]    Department:   Emergency Department   Date of Visit:  6/26/2019           After Visit Summary Signature Page    I have received my discharge instructions, and my questions have been answered. I have discussed any challenges I see with this plan with the nurse or doctor.    ..........................................................................................................................................  Patient/Patient Representative Signature      ..........................................................................................................................................  Patient Representative Print Name and Relationship to Patient    ..................................................               ................................................  Date                                   Time    ..........................................................................................................................................  Reviewed by Signature/Title    ...................................................              ..............................................  Date                                               Time          22EPIC Rev 08/18        [facility]   Electrophysiology Consultation   Date: 11/7/2022  Reason for Consultation: Atrial Fibrillation  Consult Requesting Physician: SILVIANO Ni     CC: Atrial Fibrillation      HPI: Imtiaz Clifton is a 71 y.o. male with a past medical history of COPD paroxsymal AF, NICM, HFrEF with recovered LVEF, COPD, and PVD s/p fem/pop bypass. Patient was initially diagnosed with atrial fibrillation after presenting to the ED in [place] with atrial fibrillation and decompensated HF. On amiodarone and asa. He was cardioverted and has remained in sinus rhythm. Denies lightheadedness, dizziness, palpitations. Denies PND/orthopnea. Review of System:  [x] Full ROS obtained and negative except as mentioned in HPI  Burning with urination, denies hematuria  Denies BRBPR/melena    Prior to Admission medications    Medication Sig Start Date End Date Taking?  Authorizing Provider   sacubitril-valsartan (ENTRESTO) 24-26 MG per tablet Take 1 tablet by mouth 2 times daily 11/4/22   CHELSY Rubio CNP   sacubitril-valsartan (ENTRESTO) 49-51 MG per tablet Take 0.5 tablets by mouth 2 times daily 11/4/22   CHELSY Rubio CNP   furosemide (LASIX) 40 MG tablet Take 1 tablet by mouth 2 times daily 11/1/22   Marvin Enciso MD   metoprolol succinate (TOPROL XL) 100 MG extended release tablet Take 1 tablet by mouth every evening 10/24/22   CHELSY Rubio CNP   levothyroxine (SYNTHROID) 50 MCG tablet Take 1 tablet by mouth Daily 10/18/22   Marvin Enciso MD   tamsulosin (FLOMAX) 0.4 MG capsule Take 0.4 mg by mouth nightly    Historical Provider, MD   dicyclomine (BENTYL) 10 MG capsule Take 1 capsule by mouth 4 times daily  Patient not taking: No sig reported 6/20/22 3/6/45  Jennifer Ryder MD   amiodarone (CORDARONE) 200 MG tablet Take 100 mg by mouth 2 times daily    Historical Provider, MD   OXYGEN Inhale 3 L into the lungs as needed     Historical Provider, MD   ipratropium-albuterol (Daniella Paglou) 0.5-2.5 (3) MG/3ML SOLN nebulizer solution 1 vial inh Q6hrs for next 10 days and then 1 vial inh Q4hrs PRN SOB or wheezing  Patient taking differently: as needed 1 vial inh Q6hrs for next 10 days and then 1 vial inh Q4hrs PRN SOB or wheezing 3/12/22   Ca Kellogg MD       Past Medical History:   Diagnosis Date    Bronchitis     CHF (congestive heart failure) ([facility] [address]     COPD (chronic obstructive pulmonary disease) ([facility] [address]         Past Surgical History:   Procedure Laterality Date    CHOLECYSTECTOMY, LAPAROSCOPIC N/A 3/17/2021    LAPAROSCOPIC CHOLECYSTECTOMY WITH INTRAOPERATIVE CHOLANGIOGRAM performed by Lillian Penn MD at [address] [place] [address]      bilateral cataracts    FEMORAL BYPASS Left 6/1/2021    LEFT FEMORAL TO POPLITEAL BYPASS GRAFT (03551) performed by Jarred Ni MD at [facility]      LT elbow    TONSILLECTOMY         Allergies   Allergen Reactions    Codeine Anxiety and Other (See Comments)     Unknown reaction       Social History:  Reviewed. reports that he quit smoking about 6 years ago. His smoking use included cigarettes. He has a 30.00 pack-year smoking history. He quit smokeless tobacco use about 6 years ago. He reports current alcohol use of about 1.0 - 3.0 standard drink per week. He reports that he does not use drugs. Family History:  Reviewed. No family history of SCD.         Physical Examination:  Vitals:    11/14/22 1011   BP: 130/60   Pulse: 70      Wt Readings from Last 3 Encounters:   11/04/22 137 lb 12.8 oz (62.5 kg)   11/01/22 138 lb 0.1 oz (62.6 kg)   10/24/22 139 lb 3.2 oz (63.1 kg)     NAD  Moist oral mucosa, non icteric conjunctiva  Diminished aeration, no wheeze/rhonchi  Heart regular rate, regular rhythm, no lower extremity swelling,   Abd soft non tender  Strength grossly preserved, normal tone, gait assisted with walker  No focal neuro deficits  Appropriate mood and affect  No rashes or ecchymosis     Labs:  Lab Results   Component Value Date    ALT 55 (H) 11/01/2022    AST 38 (H) 11/01/2022     (L) 11/01/2022    K 4.4 11/01/2022    HGB 8.9 (L) 11/01/2022    CL 92 (L) 11/01/2022    CREATININE 1.2 11/01/2022    BUN 25 (H) 11/01/2022    TSH 13.48 (H) 10/31/2022    INR 0.93 08/01/2022       Imaging:  ECG 11/7/22  Sinus rhtyhm, QTcH 431,    Echo 10/31/2022   Summary   Technically difficult study due to thin body frame and poor acoustical   window. Left ventricular cavity size and wall thickness is normal. Ejection fraction   is visually estimated to be 60%. Abnormal septal motion. Normal diastolic function. E/e' = 7.5. Aortic valve appears sclerotic but opens adequately. The right ventricle is normal in size and function. TAPSE: 2.72 cm. RVS   velocity: 13.4 cm/s. Stress Test 08/02/2022   Summary    -There is a small fixed apical defect that is likely bowel artifact rather    than scar.    -There is no ischemia.    -There is mild global hypokinesis with EF=47%. -Intermediate risk study related to EF < 50%. Echo 06/14/2022   Summary   Technically difficult examination due to body habitus and limited windows. Left ventricular systolic function is normal with ejection fraction   estimated at 60-65 %. No regional wall motion abnormalities are noted. Normal left ventricular wall thickness. Aortic valve appears sclerotic but opens adequately. Mild mitral regurgitation. Moderate tricuspid regurgitation. Systolic pulmonary artery pressure (SPAP) estimated at 45 mmHg (right atrial   pressure 3 mmHg), consistent with mild pulmonary hypertension. Echo 03/11/2022  Summary   Technically limited study due to patient body habitus and lung interference   as well as frequent PVCs. Estimated ejection fraction of 45-50%. Mildly diminished left ventricular systolic function. Image quality inadequate to rule out regional wall motion abnormalities. Mild septal left ventricular hypertrophy.    Normal left ventricle size. Grade I diastolic dysfunction with normal LV filling pressures. The aortic valve appears sclerotic but opens well. Mild to moderate tricuspid valve regurgitation. The inferior vena cava is dilated with respiratory variation greater than   50% consistent with CVP 10-15 mmHg    Echo 04/01/2021   Summary   -Technically difficult exam due to body habitus. -Moderately reduced global systolic function with an ejection fraction   estimated at 40%. -Global hypokinesis with abnormal septal motion noted. -Grade II diastolic dysfunction with elevated LV filling pressures. Avg.   E/e'=5.4   -Normal function of all valves. Assessment/Plan:    Paroxsymal Atrial Fibrillation   - reviewed April admission records, personally verified EKG with atrial fibrillation, to be loaded into media  - in sinus rhythm today   - Continue Toprol  mg daily for rate control   - [id number] Score of 3 (age,chf,pvd)   - discussed role of long term anticoagulation in preventing stroke, start eliquis, discussed warfarin as alternative (cost)   - Discussed with the patient that if AF is recurrent, may resume Amiodarone or alternate options (antiarrythmic or ablation). Will place monitor at that time to further assess   - Discussed goal of rhythm control in early AF including continuing vs discontinuing amiodarone. Patient would like to d/c amiodarone and reassess rhythm control. Ablation vs restarting AAD should he have recurrence. - Consider monitor following at 1-2 months following cessation of amiodarone  - EKG if suspected recurrence  - stop asa while on anticoagulation     Nonischemic Cardiomyopathy   - follows with HF team   - EF 60% per echo 10/31/22  - EF previously 45-50% per Echo 03/11/22  - on Entresto 24-26 mg BID     Follow up in 3 months     Scribe attestation:  This note was scribed in the presence of Eugene Segura MD by Naty Jama LPN    Physician Attestation: I, Dr. Eugene Segura, confirm that the scribe's documentation has been prepared under my direction and personally reviewed by me in its entirety. I also confirm that the note above accurately reflects all work, treatment, procedures, and medical decision making performed by me. NOTE: This report was transcribed using voice recognition software. Every effort was made to ensure accuracy, however, inadvertent computerized transcription errors may be present.      Cabrera Gardner MD  [facility]   Office: [phone]  Fax: [phone]

## 2022-11-09 ENCOUNTER — TELEPHONE (OUTPATIENT)
Dept: PULMONOLOGY | Age: 72
End: 2022-11-09

## 2022-11-09 NOTE — DISCHARGE SUMMARY
[facility] -- Physician Discharge Summary     Bib Verdugo  [birth year]  MRN: [id number]    Admit Date: 10/31/2022  Discharge Date: 11/1/2022  3:48 PM    Attending MD: No att. providers found  Discharging MD: Clay Duong MD  PCP: DO Leigh Ann Gonzalez / Shawn Vazquez [id number] [phone]    Admission Diagnosis: Hypotension, unspecified hypotension type [I95.9]  Acute heart failure, unspecified heart failure type ([address] [I50.9]  Dyspnea, unspecified type [R06.00]  Chest pain, unspecified type [R07.9]  Acute on chronic congestive heart failure, unspecified heart failure type ([address] [I50.9]  DISCHARGE DIAGNOSIS: same    Full Hospital Problem List:  Active Hospital Problems    Diagnosis Date Noted    Acute heart failure, unspecified heart failure type ([address] [I50.9] 10/31/2022     Priority: Medium    Hypothyroid [E03.9] 10/16/2022     Priority: Medium    GERD (gastroesophageal reflux disease) [K21.9] 08/01/2022     Priority: Medium    Persistent atrial fibrillation ([address] [I48.19] 05/19/2022     Priority: Medium    Essential hypertension [I10] 11/24/2016           Hospital Course:  71 y.o. male who presents to the ED for chest pain and shortness of breath. This is the same discomfort that he had when he was recently admitted for CHF. He has been taking his diuretics but occasionally will miss some. His urine output has been normal.  He feels that he is about 7 or 8 pounds over his dry weight for the past 3 weeks. No nausea or vomiting. No fevers or chills. Occasional cough. No body aches. BNP is elevated but BP soft. this limits the treatments available to him     Per ER MD note - \"I discussed end-of-life care with him. He states that he would be okay with something like BiPAP. He would not want a central line. I did discuss that the utility of a central line would be for Levophed to increase his blood pressure and he understands this.   I do believe that he has medical decision-making capacity. We will respect his wishes and not go down that route. \"        To be admitted  Will try to give iv lasix  Will ask cards to see again    Pt does ok with iv diuresis  Placed on po lasix and entresto per dr ernandez  To be discharged; close f/u with Dr Sabra Watts in 1-2 wk      Consults made during Hospitalization:  IP CONSULT TO INTERNAL MEDICINE  IP CONSULT TO HEART FAILURE NURSE/COORDINATOR  IP CONSULT TO DIETITIAN  IP CONSULT TO Montserrat Briscoe    Treatment team at time of Discharge: Treatment Team: Consulting Physician: Kelton Gray RN; Consulting Physician: Julio Palacio MD    Imaging Results:  XR CHEST PORTABLE    Result Date: 10/31/2022  EXAMINATION: ONE XRAY VIEW OF THE CHEST 10/31/2022 2:51 am COMPARISON: 10/14/2022 radiograph HISTORY: ORDERING SYSTEM PROVIDED HISTORY: chest pain dyspnea no fever no significant cough TECHNOLOGIST PROVIDED HISTORY: Reason for exam:->chest pain dyspnea no fever no significant cough FINDINGS: The heart and mediastinum are normal.  The lungs are lucent and hyperinflated. Stable pattern of reticular and ground-glass opacification bilaterally. This appears chronic. There are no significant skeletal findings. Spectrum of findings would favor COPD and chronic interstitial change. Stable appearance from prior imaging. XR CHEST PORTABLE    Result Date: 10/14/2022  EXAMINATION: ONE XRAY VIEW OF THE CHEST 10/14/2022 12:40 pm COMPARISON: 08/01/2022. HISTORY: ORDERING SYSTEM PROVIDED HISTORY: sob TECHNOLOGIST PROVIDED HISTORY: Reason for exam:->sob Reason for Exam: sob FINDINGS: The lungs are hyperexpanded without acute focal process. There is no effusion or pneumothorax. The cardiomediastinal silhouette is stable. The osseous structures are stable. Stable COPD.      CT CHEST PULMONARY EMBOLISM W CONTRAST    Result Date: 10/14/2022  EXAMINATION: CTA OF THE CHEST 10/14/2022 1:38 pm TECHNIQUE: CTA of the chest was performed after the administration of intravenous contrast.  Multiplanar reformatted images are provided for review. MIP images are provided for review. Automated exposure control, iterative reconstruction, and/or weight based adjustment of the mA/kV was utilized to reduce the radiation dose to as low as reasonably achievable. COMPARISON: 08/01/2022 HISTORY: ORDERING SYSTEM PROVIDED HISTORY: sob - off anticoag TECHNOLOGIST PROVIDED HISTORY: Reason for exam:->sob - off anticoag Decision Support Exception - unselect if not a suspected or confirmed emergency medical condition->Emergency Medical Condition (MA) Reason for Exam: sob - off anticoag FINDINGS: Pulmonary Arteries: Pulmonary arteries are adequately opacified for evaluation. No evidence of intraluminal filling defect to suggest pulmonary embolism. Main pulmonary artery is normal in caliber. Mediastinum: No evidence of mediastinal lymphadenopathy. The heart and pericardium demonstrate no acute abnormality. There is no acute abnormality of the thoracic aorta. Lungs/pleura: There are mild to moderate bilateral emphysematous changes. There is no lobar consolidation, pneumothorax or suspicious parenchymal mass. There is a trace left pleural effusion. Upper Abdomen: Limited images of the upper abdomen are unremarkable. Soft Tissues/Bones: No acute bone or soft tissue abnormality. 1. Negative for pulmonary embolus. 2. Emphysema with trace left pleural effusion.          Discharge Exam:  See progress note from day of d/c    Disposition: home    Condition: stable    Discharge Medications:     Medication List        CHANGE how you take these medications      furosemide 40 MG tablet  Commonly known as: LASIX  Take 1 tablet by mouth 2 times daily  What changed: when to take this     ipratropium-albuterol 0.5-2.5 (3) MG/3ML Soln nebulizer solution  Commonly known as: DUONEB  1 vial inh Q6hrs for next 10 days and then 1 vial inh Q4hrs PRN SOB or wheezing  What changed:   when to take this  reasons to take this            CONTINUE taking these medications      amiodarone 200 MG tablet  Commonly known as: CORDARONE     levothyroxine 50 MCG tablet  Commonly known as: SYNTHROID  Take 1 tablet by mouth Daily     metoprolol succinate 100 MG extended release tablet  Commonly known as: TOPROL XL  Take 1 tablet by mouth every evening     OXYGEN     tamsulosin 0.4 MG capsule  Commonly known as: FLOMAX            STOP taking these medications      dicyclomine 10 MG capsule  Commonly known as: BENTYL     Entresto  MG per tablet  Generic drug: sacubitril-valsartan               Where to Get Your Medications        These medications were sent to [facility], [address] [place] [address], [address] [place] [address] Road      Phone: [phone]   furosemide 40 MG tablet            Allergies: Allergies   Allergen Reactions    Codeine Anxiety and Other (See Comments)     Unknown reaction       Follow up Instructions: Follow-up with PCP: Jaun Dunn III, DO in 2 wk .       Total time spent on day of discharge including face-to-face visit, examination, documentation, counseling, preparation of discharge plans and followup, and discharge medicine reconciliation and presciptions is 39 minutes    Signed:  Sofie Sky MD  11/9/2022

## 2022-11-09 NOTE — TELEPHONE ENCOUNTER
[facility] called and said they would like to get an order from our office to supply patient oxygen as Hospice is supplying it now and patient has revoked from hospice and they would like to  patients oxygen but they don't want to leave him with nothing.      [address]: [phone]  [phone]  Renee Rosario

## 2022-11-09 NOTE — TELEPHONE ENCOUNTER
[phone] Tisha informed pt will need to be seen for testing so he can be set back up with DME She will let pt know

## 2022-11-14 ENCOUNTER — HOSPITAL ENCOUNTER (OUTPATIENT)
Age: 72
Discharge: HOME | End: 2022-11-14
Payer: COMMERCIAL

## 2022-11-14 ENCOUNTER — OFFICE VISIT (OUTPATIENT)
Dept: CARDIOLOGY CLINIC | Age: 72
End: 2022-11-14
Payer: COMMERCIAL

## 2022-11-14 VITALS
HEART RATE: 70 BPM | DIASTOLIC BLOOD PRESSURE: 60 MMHG | SYSTOLIC BLOOD PRESSURE: 130 MMHG | HEIGHT: 71 IN | WEIGHT: 130 LBS | BODY MASS INDEX: 18.2 KG/M2

## 2022-11-14 DIAGNOSIS — R30.0 BURNING WITH URINATION: ICD-10-CM

## 2022-11-14 DIAGNOSIS — I48.19 PERSISTENT ATRIAL FIBRILLATION (HCC): Primary | ICD-10-CM

## 2022-11-14 DIAGNOSIS — R10.9 FLANK PAIN: ICD-10-CM

## 2022-11-14 LAB
BACTERIA: ABNORMAL /HPF
BILIRUBIN URINE: NEGATIVE
BLOOD, URINE: ABNORMAL
CLARITY: CLEAR
COLOR: YELLOW
COMMENT UA: ABNORMAL
EPITHELIAL CELLS, UA: 7 /HPF (ref 0–5)
GLUCOSE URINE: NEGATIVE MG/DL
HYALINE CASTS: 16 /LPF (ref 0–8)
KETONES, URINE: ABNORMAL MG/DL
LEUKOCYTE ESTERASE, URINE: ABNORMAL
MICROSCOPIC EXAMINATION: YES
NITRITE, URINE: NEGATIVE
PH UA: 6.5 (ref 5–8)
PROTEIN UA: ABNORMAL MG/DL
RBC UA: 4 /HPF (ref 0–4)
SPECIFIC GRAVITY UA: 1.01 (ref 1–1.03)
URINE REFLEX TO CULTURE: YES
URINE TYPE: ABNORMAL
UROBILINOGEN, URINE: 0.2 E.U./DL
WBC UA: 12 /HPF (ref 0–5)

## 2022-11-14 PROCEDURE — 3074F SYST BP LT 130 MM HG: CPT | Performed by: INTERNAL MEDICINE

## 2022-11-14 PROCEDURE — 99204 OFFICE O/P NEW MOD 45 MIN: CPT | Performed by: INTERNAL MEDICINE

## 2022-11-14 PROCEDURE — 87086 URINE CULTURE/COLONY COUNT: CPT

## 2022-11-14 PROCEDURE — 93000 ELECTROCARDIOGRAM COMPLETE: CPT | Performed by: INTERNAL MEDICINE

## 2022-11-14 PROCEDURE — G9692 HOSP RECD BY PT DUR MSMT PER: HCPCS | Performed by: INTERNAL MEDICINE

## 2022-11-14 PROCEDURE — 87077 CULTURE AEROBIC IDENTIFY: CPT

## 2022-11-14 PROCEDURE — 87186 SC STD MICRODIL/AGAR DIL: CPT

## 2022-11-14 PROCEDURE — 3078F DIAST BP <80 MM HG: CPT | Performed by: INTERNAL MEDICINE

## 2022-11-14 PROCEDURE — 81001 URINALYSIS AUTO W/SCOPE: CPT

## 2022-11-14 RX ORDER — CALCIUM CARBONATE 300MG(750)
TABLET,CHEWABLE ORAL
COMMUNITY

## 2022-11-17 LAB
ORGANISM: ABNORMAL
URINE CULTURE, ROUTINE: ABNORMAL
URINE CULTURE, ROUTINE: ABNORMAL

## 2022-11-18 ENCOUNTER — HOSPITAL ENCOUNTER (OUTPATIENT)
Dept: CT IMAGING | Age: 72
Discharge: HOME | End: 2022-11-18
Payer: COMMERCIAL

## 2022-11-18 DIAGNOSIS — R31.9 HEMATURIA SYNDROME: ICD-10-CM

## 2022-11-18 DIAGNOSIS — M54.50 LOW BACK PAIN, UNSPECIFIED BACK PAIN LATERALITY, UNSPECIFIED CHRONICITY, UNSPECIFIED WHETHER SCIATICA PRESENT: ICD-10-CM

## 2022-11-18 PROCEDURE — 74176 CT ABD & PELVIS W/O CONTRAST: CPT

## 2023-01-06 ENCOUNTER — HOSPITAL ENCOUNTER (INPATIENT)
Age: 73
LOS: 3 days | Discharge: HOME | DRG: 190 | End: 2023-01-09
Attending: EMERGENCY MEDICINE | Admitting: INTERNAL MEDICINE
Payer: COMMERCIAL

## 2023-01-06 ENCOUNTER — APPOINTMENT (OUTPATIENT)
Dept: GENERAL RADIOLOGY | Age: 73
DRG: 190 | End: 2023-01-06
Payer: COMMERCIAL

## 2023-01-06 DIAGNOSIS — J96.21 ACUTE ON CHRONIC RESPIRATORY FAILURE WITH HYPOXIA AND HYPERCAPNIA (HCC): ICD-10-CM

## 2023-01-06 DIAGNOSIS — J96.22 ACUTE ON CHRONIC RESPIRATORY FAILURE WITH HYPOXIA AND HYPERCAPNIA (HCC): ICD-10-CM

## 2023-01-06 DIAGNOSIS — J44.1 COPD EXACERBATION (HCC): Primary | ICD-10-CM

## 2023-01-06 LAB
A/G RATIO: 1.3 (ref 1.1–2.2)
ALBUMIN SERPL-MCNC: 3.5 G/DL (ref 3.4–5)
ALP BLD-CCNC: 105 U/L (ref 40–129)
ALT SERPL-CCNC: 13 U/L (ref 10–40)
ANION GAP SERPL CALCULATED.3IONS-SCNC: 4 MMOL/L (ref 3–16)
APTT: 28.2 SEC (ref 23–34.3)
AST SERPL-CCNC: 19 U/L (ref 15–37)
BASE EXCESS VENOUS: 13 MMOL/L (ref -3–3)
BASOPHILS ABSOLUTE: 0 K/UL (ref 0–0.2)
BASOPHILS RELATIVE PERCENT: 0.5 %
BILIRUB SERPL-MCNC: 0.4 MG/DL (ref 0–1)
BUN BLDV-MCNC: 13 MG/DL (ref 7–20)
CALCIUM SERPL-MCNC: 9 MG/DL (ref 8.3–10.6)
CARBOXYHEMOGLOBIN: 4.8 % (ref 0–1.5)
CHLORIDE BLD-SCNC: 86 MMOL/L (ref 99–110)
CO2: 40 MMOL/L (ref 21–32)
CREAT SERPL-MCNC: 0.9 MG/DL (ref 0.8–1.3)
EKG ATRIAL RATE: 80 BPM
EKG DIAGNOSIS: NORMAL
EKG P AXIS: 92 DEGREES
EKG P-R INTERVAL: 150 MS
EKG Q-T INTERVAL: 378 MS
EKG QRS DURATION: 92 MS
EKG QTC CALCULATION (BAZETT): 435 MS
EKG R AXIS: 73 DEGREES
EKG T AXIS: 88 DEGREES
EKG VENTRICULAR RATE: 80 BPM
EOSINOPHILS ABSOLUTE: 0 K/UL (ref 0–0.6)
EOSINOPHILS RELATIVE PERCENT: 0.6 %
GFR SERPL CREATININE-BSD FRML MDRD: >60 ML/MIN/{1.73_M2}
GLUCOSE BLD-MCNC: 104 MG/DL (ref 70–99)
HCO3 VENOUS: 39.5 MMOL/L (ref 23–29)
HCT VFR BLD CALC: 31.9 % (ref 40.5–52.5)
HEMOGLOBIN, VEN, REDUCED: 16 %
HEMOGLOBIN: 10.5 G/DL (ref 13.5–17.5)
INR BLD: 1 (ref 0.87–1.14)
LYMPHOCYTES ABSOLUTE: 1 K/UL (ref 1–5.1)
LYMPHOCYTES RELATIVE PERCENT: 12.1 %
MCH RBC QN AUTO: 29.2 PG (ref 26–34)
MCHC RBC AUTO-ENTMCNC: 33 G/DL (ref 31–36)
MCV RBC AUTO: 88.4 FL (ref 80–100)
METHEMOGLOBIN VENOUS: 0.1 %
MONOCYTES ABSOLUTE: 0.7 K/UL (ref 0–1.3)
MONOCYTES RELATIVE PERCENT: 8.6 %
NEUTROPHILS ABSOLUTE: 6.3 K/UL (ref 1.7–7.7)
NEUTROPHILS RELATIVE PERCENT: 78.2 %
O2 CONTENT, VEN: 12 VOL %
O2 SAT, VEN: 84 %
O2 THERAPY: ABNORMAL
PCO2, VEN: 59.9 MMHG (ref 40–50)
PDW BLD-RTO: 15 % (ref 12.4–15.4)
PH VENOUS: 7.43 (ref 7.35–7.45)
PLATELET # BLD: 170 K/UL (ref 135–450)
PMV BLD AUTO: 7.6 FL (ref 5–10.5)
PO2, VEN: 43.6 MMHG (ref 25–40)
POTASSIUM SERPL-SCNC: 4.1 MMOL/L (ref 3.5–5.1)
PRO-BNP: 400 PG/ML (ref 0–124)
PROCALCITONIN: 0.07 NG/ML (ref 0–0.15)
PROTHROMBIN TIME: 13.1 SEC (ref 11.7–14.5)
RAPID INFLUENZA  B AGN: NEGATIVE
RAPID INFLUENZA A AGN: NEGATIVE
RBC # BLD: 3.61 M/UL (ref 4.2–5.9)
SARS-COV-2, NAAT: NOT DETECTED
SODIUM BLD-SCNC: 130 MMOL/L (ref 136–145)
TCO2 CALC VENOUS: 93 MMOL/L
TOTAL PROTEIN: 6.1 G/DL (ref 6.4–8.2)
TROPONIN: <0.01 NG/ML
WBC # BLD: 8 K/UL (ref 4–11)

## 2023-01-06 PROCEDURE — 96372 THER/PROPH/DIAG INJ SC/IM: CPT

## 2023-01-06 PROCEDURE — G0378 HOSPITAL OBSERVATION PER HR: HCPCS

## 2023-01-06 PROCEDURE — 94640 AIRWAY INHALATION TREATMENT: CPT

## 2023-01-06 PROCEDURE — 2700000000 HC OXYGEN THERAPY PER DAY

## 2023-01-06 PROCEDURE — 93005 ELECTROCARDIOGRAM TRACING: CPT | Performed by: EMERGENCY MEDICINE

## 2023-01-06 PROCEDURE — 6370000000 HC RX 637 (ALT 250 FOR IP): Performed by: PHYSICIAN ASSISTANT

## 2023-01-06 PROCEDURE — 85730 THROMBOPLASTIN TIME PARTIAL: CPT

## 2023-01-06 PROCEDURE — 85610 PROTHROMBIN TIME: CPT

## 2023-01-06 PROCEDURE — 6370000000 HC RX 637 (ALT 250 FOR IP): Performed by: INTERNAL MEDICINE

## 2023-01-06 PROCEDURE — 6360000002 HC RX W HCPCS: Performed by: INTERNAL MEDICINE

## 2023-01-06 PROCEDURE — 80053 COMPREHEN METABOLIC PANEL: CPT

## 2023-01-06 PROCEDURE — 2580000003 HC RX 258: Performed by: INTERNAL MEDICINE

## 2023-01-06 PROCEDURE — 71045 X-RAY EXAM CHEST 1 VIEW: CPT

## 2023-01-06 PROCEDURE — 93010 ELECTROCARDIOGRAM REPORT: CPT | Performed by: INTERNAL MEDICINE

## 2023-01-06 PROCEDURE — 96374 THER/PROPH/DIAG INJ IV PUSH: CPT

## 2023-01-06 PROCEDURE — 99285 EMERGENCY DEPT VISIT HI MDM: CPT

## 2023-01-06 PROCEDURE — 84484 ASSAY OF TROPONIN QUANT: CPT

## 2023-01-06 PROCEDURE — 96376 TX/PRO/DX INJ SAME DRUG ADON: CPT

## 2023-01-06 PROCEDURE — 85025 COMPLETE CBC W/AUTO DIFF WBC: CPT

## 2023-01-06 PROCEDURE — 87635 SARS-COV-2 COVID-19 AMP PRB: CPT

## 2023-01-06 PROCEDURE — 84145 PROCALCITONIN (PCT): CPT

## 2023-01-06 PROCEDURE — 83880 ASSAY OF NATRIURETIC PEPTIDE: CPT

## 2023-01-06 PROCEDURE — 87804 INFLUENZA ASSAY W/OPTIC: CPT

## 2023-01-06 PROCEDURE — 82803 BLOOD GASES ANY COMBINATION: CPT

## 2023-01-06 PROCEDURE — 6360000002 HC RX W HCPCS: Performed by: PHYSICIAN ASSISTANT

## 2023-01-06 PROCEDURE — 1200000000 HC SEMI PRIVATE

## 2023-01-06 RX ORDER — ACETAMINOPHEN 650 MG/1
650 SUPPOSITORY RECTAL EVERY 6 HOURS PRN
Status: DISCONTINUED | OUTPATIENT
Start: 2023-01-06 | End: 2023-01-09 | Stop reason: HOSPADM

## 2023-01-06 RX ORDER — LEVOTHYROXINE SODIUM 0.07 MG/1
75 TABLET ORAL DAILY
Status: DISCONTINUED | OUTPATIENT
Start: 2023-01-06 | End: 2023-01-09 | Stop reason: HOSPADM

## 2023-01-06 RX ORDER — SODIUM CHLORIDE 9 MG/ML
25 INJECTION, SOLUTION INTRAVENOUS PRN
Status: DISCONTINUED | OUTPATIENT
Start: 2023-01-06 | End: 2023-01-09 | Stop reason: HOSPADM

## 2023-01-06 RX ORDER — LEVALBUTEROL INHALATION SOLUTION 1.25 MG/3ML
1.25 SOLUTION RESPIRATORY (INHALATION)
Status: DISCONTINUED | OUTPATIENT
Start: 2023-01-06 | End: 2023-01-09 | Stop reason: HOSPADM

## 2023-01-06 RX ORDER — TAMSULOSIN HYDROCHLORIDE 0.4 MG/1
0.4 CAPSULE ORAL NIGHTLY
Status: DISCONTINUED | OUTPATIENT
Start: 2023-01-06 | End: 2023-01-09 | Stop reason: HOSPADM

## 2023-01-06 RX ORDER — ASPIRIN 325 MG
325 TABLET ORAL ONCE
Status: COMPLETED | OUTPATIENT
Start: 2023-01-06 | End: 2023-01-06

## 2023-01-06 RX ORDER — ONDANSETRON 4 MG/1
4 TABLET, ORALLY DISINTEGRATING ORAL EVERY 8 HOURS PRN
Status: DISCONTINUED | OUTPATIENT
Start: 2023-01-06 | End: 2023-01-09 | Stop reason: HOSPADM

## 2023-01-06 RX ORDER — HYDRALAZINE HYDROCHLORIDE 20 MG/ML
10 INJECTION INTRAMUSCULAR; INTRAVENOUS EVERY 6 HOURS PRN
Status: DISCONTINUED | OUTPATIENT
Start: 2023-01-06 | End: 2023-01-09 | Stop reason: HOSPADM

## 2023-01-06 RX ORDER — 0.9 % SODIUM CHLORIDE 0.9 %
500 INTRAVENOUS SOLUTION INTRAVENOUS PRN
Status: DISCONTINUED | OUTPATIENT
Start: 2023-01-06 | End: 2023-01-09 | Stop reason: HOSPADM

## 2023-01-06 RX ORDER — METOPROLOL SUCCINATE 50 MG/1
100 TABLET, EXTENDED RELEASE ORAL 2 TIMES DAILY
Status: DISCONTINUED | OUTPATIENT
Start: 2023-01-06 | End: 2023-01-09 | Stop reason: HOSPADM

## 2023-01-06 RX ORDER — POTASSIUM CHLORIDE 7.45 MG/ML
10 INJECTION INTRAVENOUS PRN
Status: DISCONTINUED | OUTPATIENT
Start: 2023-01-06 | End: 2023-01-09 | Stop reason: HOSPADM

## 2023-01-06 RX ORDER — FUROSEMIDE 40 MG/1
40 TABLET ORAL 2 TIMES DAILY
Status: DISCONTINUED | OUTPATIENT
Start: 2023-01-06 | End: 2023-01-09 | Stop reason: HOSPADM

## 2023-01-06 RX ORDER — SODIUM CHLORIDE 0.9 % (FLUSH) 0.9 %
5-40 SYRINGE (ML) INJECTION EVERY 12 HOURS SCHEDULED
Status: DISCONTINUED | OUTPATIENT
Start: 2023-01-06 | End: 2023-01-09 | Stop reason: HOSPADM

## 2023-01-06 RX ORDER — PREDNISONE 20 MG/1
40 TABLET ORAL DAILY
Status: DISCONTINUED | OUTPATIENT
Start: 2023-01-09 | End: 2023-01-09 | Stop reason: HOSPADM

## 2023-01-06 RX ORDER — ONDANSETRON 2 MG/ML
4 INJECTION INTRAMUSCULAR; INTRAVENOUS EVERY 6 HOURS PRN
Status: DISCONTINUED | OUTPATIENT
Start: 2023-01-06 | End: 2023-01-09 | Stop reason: HOSPADM

## 2023-01-06 RX ORDER — IPRATROPIUM BROMIDE AND ALBUTEROL SULFATE 2.5; .5 MG/3ML; MG/3ML
1 SOLUTION RESPIRATORY (INHALATION) ONCE
Status: COMPLETED | OUTPATIENT
Start: 2023-01-06 | End: 2023-01-06

## 2023-01-06 RX ORDER — SACUBITRIL AND VALSARTAN 97; 103 MG/1; MG/1
1 TABLET, FILM COATED ORAL 2 TIMES DAILY
Status: ON HOLD | COMMUNITY
Start: 2022-12-10 | End: 2023-01-09 | Stop reason: HOSPADM

## 2023-01-06 RX ORDER — PREDNISONE 1 MG/1
5 TABLET ORAL DAILY
Status: ON HOLD | COMMUNITY
End: 2023-01-09 | Stop reason: HOSPADM

## 2023-01-06 RX ORDER — ENOXAPARIN SODIUM 100 MG/ML
40 INJECTION SUBCUTANEOUS DAILY
Status: DISCONTINUED | OUTPATIENT
Start: 2023-01-06 | End: 2023-01-09 | Stop reason: HOSPADM

## 2023-01-06 RX ORDER — METHYLPREDNISOLONE SODIUM SUCCINATE 125 MG/2ML
125 INJECTION, POWDER, LYOPHILIZED, FOR SOLUTION INTRAMUSCULAR; INTRAVENOUS ONCE
Status: COMPLETED | OUTPATIENT
Start: 2023-01-06 | End: 2023-01-06

## 2023-01-06 RX ORDER — IPRATROPIUM BROMIDE AND ALBUTEROL SULFATE 2.5; .5 MG/3ML; MG/3ML
1 SOLUTION RESPIRATORY (INHALATION)
Status: DISCONTINUED | OUTPATIENT
Start: 2023-01-06 | End: 2023-01-06 | Stop reason: ALTCHOICE

## 2023-01-06 RX ORDER — SODIUM CHLORIDE 0.9 % (FLUSH) 0.9 %
5-40 SYRINGE (ML) INJECTION PRN
Status: DISCONTINUED | OUTPATIENT
Start: 2023-01-06 | End: 2023-01-09 | Stop reason: HOSPADM

## 2023-01-06 RX ORDER — METHYLPREDNISOLONE SODIUM SUCCINATE 40 MG/ML
40 INJECTION, POWDER, LYOPHILIZED, FOR SOLUTION INTRAMUSCULAR; INTRAVENOUS EVERY 6 HOURS
Status: COMPLETED | OUTPATIENT
Start: 2023-01-06 | End: 2023-01-08

## 2023-01-06 RX ORDER — ACETAMINOPHEN 325 MG/1
650 TABLET ORAL EVERY 6 HOURS PRN
Status: DISCONTINUED | OUTPATIENT
Start: 2023-01-06 | End: 2023-01-09 | Stop reason: HOSPADM

## 2023-01-06 RX ORDER — POTASSIUM CHLORIDE 20 MEQ/1
40 TABLET, EXTENDED RELEASE ORAL PRN
Status: DISCONTINUED | OUTPATIENT
Start: 2023-01-06 | End: 2023-01-09 | Stop reason: HOSPADM

## 2023-01-06 RX ADMIN — METOPROLOL SUCCINATE 100 MG: 50 TABLET, EXTENDED RELEASE ORAL at 21:36

## 2023-01-06 RX ADMIN — METHYLPREDNISOLONE SODIUM SUCCINATE 40 MG: 40 INJECTION, POWDER, FOR SOLUTION INTRAMUSCULAR; INTRAVENOUS at 17:36

## 2023-01-06 RX ADMIN — ENOXAPARIN SODIUM 40 MG: 100 INJECTION SUBCUTANEOUS at 17:36

## 2023-01-06 RX ADMIN — METHYLPREDNISOLONE SODIUM SUCCINATE 40 MG: 40 INJECTION, POWDER, FOR SOLUTION INTRAMUSCULAR; INTRAVENOUS at 20:13

## 2023-01-06 RX ADMIN — ASPIRIN 325 MG: 325 TABLET ORAL at 10:21

## 2023-01-06 RX ADMIN — SODIUM CHLORIDE, PRESERVATIVE FREE 10 ML: 5 INJECTION INTRAVENOUS at 20:14

## 2023-01-06 RX ADMIN — METHYLPREDNISOLONE SODIUM SUCCINATE 125 MG: 125 INJECTION, POWDER, FOR SOLUTION INTRAMUSCULAR; INTRAVENOUS at 10:23

## 2023-01-06 RX ADMIN — LEVALBUTEROL 1.25 MG: 1.25 SOLUTION RESPIRATORY (INHALATION) at 21:16

## 2023-01-06 RX ADMIN — IPRATROPIUM BROMIDE AND ALBUTEROL SULFATE 1 AMPULE: .5; 3 SOLUTION RESPIRATORY (INHALATION) at 10:25

## 2023-01-06 RX ADMIN — TAMSULOSIN HYDROCHLORIDE 0.4 MG: 0.4 CAPSULE ORAL at 20:13

## 2023-01-06 RX ADMIN — SACUBITRIL AND VALSARTAN 1 TABLET: 49; 51 TABLET, FILM COATED ORAL at 21:36

## 2023-01-06 ASSESSMENT — ENCOUNTER SYMPTOMS
WHEEZING: 0
NAUSEA: 0
ABDOMINAL PAIN: 0
DIARRHEA: 0
CHEST TIGHTNESS: 1
COUGH: 1
VOMITING: 0
SHORTNESS OF BREATH: 1
RHINORRHEA: 0

## 2023-01-06 ASSESSMENT — PAIN - FUNCTIONAL ASSESSMENT: PAIN_FUNCTIONAL_ASSESSMENT: NONE - DENIES PAIN

## 2023-01-06 NOTE — ED PROVIDER NOTES
I independently saw performed a substantive portion of the visit (history, physical, and MDM) on Ulises Welch. All diagnostic, treatment, and disposition decisions were made by myself in conjunction with the advanced practice provider. I have participated in the medical decision making and directed the treatment plan and disposition of the patient. For further details of [address] emergency department encounter, please see the advanced practice provider's documentation. Ana Madrigal MD, am the primary physician provider of record. CHIEF COMPLAINT  Chief Complaint   Patient presents with    Shortness of Breath     Came by  EMS from home with c/o SOB since Wed with exertion with productive yellow cough. Baseline 3L O2. A&O. Briefly, Ulises Welch is a 71 y.o. male  who presents to the ED complaining of worsening cough with yellowish sputum and SOB with wheezing. He has a COPD history. Has some chest discomfort. Has 3L [place] oxygen baseline. This feels worse than his usual COPD exacerbation. FOCUSED PHYSICAL EXAMINATION  /64   Pulse 80   Temp 98.9 °F (37.2 °C) (Oral)   Resp 23   Ht 5' 11\" (1.803 m)   Wt 128 lb (58.1 kg)   SpO2 100%   BMI 17.85 kg/m²    Focused physical examination notable for mild acute distress, well-appearing, well-nourished, normal speech and mentation without obvious facial droop, no obvious rash. No obvious cranial nerve deficits on my initial exam. Diffusely diminished with poor air exchange and wheezing throughout, wet sounding cough but no focal rales. RRR. +chest wall ttp. No leg swelling. Dyspnea in conversation.       EKG performed in ED:  The 12 lead EKG was interpreted by me independent of a cardiologist as follows:  Rate: normal with a rate of 80  Rhythm: sinus with PVC's  Axis: normal  Intervals: normal DE, narrow QRS, normal QTc  ST segments: no ST elevations or depressions  T waves: no abnormal inversions  Non-specific T wave changes: not present  Prior EKG comparison: EKG dated 4/19/22 is significantly different due to afib now sinus        ED COURSE/MDM  Patient seen and evaluated. Old records reviewed. Labs and imaging reviewed. After initial evaluation, differential diagnostic considerations included but not limited to: acute coronary syndrome, pulmonary embolism, COPD/asthma, pneumonia, sepsis, pericardial tamponade, pneumothorax, CHF, thoracic aortic dissection, anxiety      The patient's ED workup was notable for concern for acute COPD exacerbation with hypoxia and hypercapnia. Infection was considered however COVID and flu are negative, he does not have a confluent infiltrate and no leukocytosis or fever is identified either. His creatinine is normal today. He is stable on nasal cannula oxygen as well as breathing treatments and IV Solu-Medrol being administered. Troponin is negative and symptoms are not really consistent with acute coronary syndrome. He is tachycardic and his history is not suggestive of pulmonary embolism so I do not feel that this warrants further evaluation at this time. He is somewhat improving after breathing treatments were administered here. He will be admitted to the hospital for further respiratory treatment and evaluation. Is this patient to be included in the SEP-1 Core Measure? No   Exclusion criteria - the patient is NOT to be included for SEP-1 Core Measure due to:   Infection is not suspected      Consults:  None    History obtained from: Patient and Significant other    Pertinent social determinants of health: None applicable    Chronic conditions potentially affecting care:  COPD    Review of other records:  Inpatient notes from previous visit at this facility from admission in November 2022    Reassessment:  See [facility] for details of medications given and reassessment    During the patient's ED course, the patient was given:  Medications methylPREDNISolone sodium (SOLU-MEDROL) injection 125 mg (125 mg IntraVENous Given 1/6/23 1023)   ipratropium-albuterol (DUONEB) nebulizer solution 1 ampule (1 ampule Inhalation Given 1/6/23 1025)   aspirin tablet 325 mg (325 mg Oral Given 1/6/23 1021)        CLINICAL IMPRESSION  1. COPD exacerbation ([address]    2. Acute on chronic respiratory failure with hypoxia and hypercapnia ([facility])        Blood pressure 103/64, pulse 80, temperature 98.9 °F (37.2 °C), temperature source Oral, resp. rate 23, height 5' 11\" (1.803 m), weight 128 lb (58.1 kg), SpO2 100 %. [address] Rashad Vargas was admitted in fair condition. The plan is to admit to the hospital at this time under the PCP's admitting service. Dr. Xiomara Luque accepted the patient and will take over the patient's care. Critical care time:  The total critical care time I independently spent while evaluating and treating this patient was 35 minutes. This excludes time spent doing separately billable procedures. This includes time at the bedside, data interpretation, medication management, obtaining critical history from collateral sources if the patient is unable to provide it directly, and physician consultation. Specifics of interventions taken and potentially life-threatening diagnostic considerations are listed above in the medical decision making. If this was a shared visit with an CARO, the time in this attestation is non-concurrent critical care time out of the total shared critical care time provided by the CARO and myself. DISCLAIMER: This chart was created using Dragon dictation software. Efforts were made by me to ensure accuracy, however some errors may be present due to limitations of this technology and occasionally words are not transcribed correctly.           Hilda Bro MD  01/06/23 8101

## 2023-01-06 NOTE — PROGRESS NOTES
Pt admitted to room 3309 from ED. VSS. Pt A&O. POC updated with pt, all questions answered. Oriented pt to room and call light. Call light and bedside table within reach. Instructed to call out with any needs, bed alarm active.

## 2023-01-06 NOTE — ED PROVIDER NOTES
[facility]        Pt Name: Amos Romo  MRN: [id number]  [place] 1950  Date of evaluation: 1/6/2023  Provider: Ny Allen PA-C  PCP: Richard Oglesby DO  Note Started: 10:07 AM EST 1/6/23       I have seen and evaluated this patient with my supervising physician Nelsy Ma MD.      [address]       Chief Complaint   Patient presents with    Shortness of Breath     Came by [facility] from home with c/o SOB since Wed with exertion with productive yellow cough. Baseline 3L O2. A&O. HISTORY OF PRESENT ILLNESS: 1 or more Elements     History From: patient  Limitations to history : None    Amos Romo is a 71 y.o. male who presents for evaluation of shortness of breath that started yesterday. Patient does have history of CHF and COPD is on 3 L nasal cannula at baseline. Does have productive cough. No fevers or chills. No known sick contacts with similar symptoms. States that he noticed shortness of breath worse with exertion yesterday and today was having difficulty even coming downstairs. No other complaints or concerns at this time. Nursing Notes were all reviewed and agreed with or any disagreements were addressed in the HPI. REVIEW OF SYSTEMS :      Review of Systems   Constitutional:  Negative for appetite change, chills and fever. HENT:  Negative for congestion and rhinorrhea. Respiratory:  Positive for cough, chest tightness and shortness of breath. Negative for wheezing. Cardiovascular:  Negative for chest pain. Gastrointestinal:  Negative for abdominal pain, diarrhea, nausea and vomiting. Genitourinary:  Negative for difficulty urinating, dysuria and hematuria. Musculoskeletal:  Negative for neck pain and neck stiffness. Skin:  Negative for rash. Neurological:  Negative for headaches. Positives and Pertinent negatives as per HPI.      SURGICAL HISTORY     Past Surgical History:   Procedure Laterality Date    CHOLECYSTECTOMY, LAPAROSCOPIC N/A 3/17/2021    LAPAROSCOPIC CHOLECYSTECTOMY WITH INTRAOPERATIVE CHOLANGIOGRAM performed by Kj Kellogg MD at [address] [place] [address]      bilateral cataracts    FEMORAL BYPASS Left 2021    LEFT FEMORAL TO POPLITEAL BYPASS GRAFT (10147) performed by Chanda Elizabeth MD at [facility]      LT elbow    TONSILLECTOMY         CURRENTMEDICATIONS       Previous Medications    APIXABAN (ELIQUIS) 5 MG TABS TABLET    Take 1 tablet by mouth 2 times daily    APIXABAN (ELIQUIS) 5 MG TABS TABLET    Take 1 tablet by mouth 2 times daily    FUROSEMIDE (LASIX) 40 MG TABLET    Take 1 tablet by mouth 2 times daily    IPRATROPIUM-ALBUTEROL (DUONEB) 0.5-2.5 (3) MG/3ML SOLN NEBULIZER SOLUTION    1 vial inh Q6hrs for next 10 days and then 1 vial inh Q4hrs PRN SOB or wheezing    LEVOTHYROXINE (SYNTHROID) 50 MCG TABLET    Take 1 tablet by mouth Daily    MAGNESIUM 400 MG TABS    Take by mouth    METOPROLOL SUCCINATE (TOPROL XL) 100 MG EXTENDED RELEASE TABLET    Take 1 tablet by mouth every evening    OXYGEN    Inhale 3 L into the lungs as needed     SACUBITRIL-VALSARTAN (ENTRESTO) 24-26 MG PER TABLET    Take 1 tablet by mouth 2 times daily    SACUBITRIL-VALSARTAN (ENTRESTO) 49-51 MG PER TABLET    Take 0.5 tablets by mouth 2 times daily    TAMSULOSIN (FLOMAX) 0.4 MG CAPSULE    Take 0.4 mg by mouth nightly       ALLERGIES     Codeine    FAMILYHISTORY       Family History   Problem Relation Age of Onset    Heart Disease Mother     No Known Problems Father         SOCIAL HISTORY       Social History     Tobacco Use    Smoking status: Former     Packs/day: 1.00     Years: 30.00     Pack years: 30.00     Types: Cigarettes     Quit date: 2016     Years since quittin.3    Smokeless tobacco: Former     Quit date: 2016   Vaping Use    Vaping Use: Never used   Substance Use Topics    Alcohol use:  Yes     Alcohol/week: 1.0 - 3.0 standard drink     Types: 1 - 3 Cans of beer per week     Comment: daily    Drug use: No       SCREENINGS        [place] Coma Scale  Eye Opening: Spontaneous  Best Verbal Response: Oriented  Best Motor Response: Obeys commands  [place] Coma Scale Score: 15                CIWA Assessment  BP: 126/64  Heart Rate: 67           PHYSICAL EXAM  1 or more Elements     ED Triage Vitals [01/06/23 0952]   BP Temp Temp Source Heart Rate Resp SpO2 Height Weight   114/73 98.9 °F (37.2 °C) Oral 78 24 96 % 5' 11\" (1.803 m) 128 lb (58.1 kg)       Physical Exam  Vitals and nursing note reviewed. Constitutional:       General: He is not in acute distress. Appearance: He is well-developed. He is ill-appearing. He is not toxic-appearing or diaphoretic. HENT:      Head: Normocephalic and atraumatic. Right Ear: External ear normal.      Left Ear: External ear normal.      Nose: Nose normal.   Eyes:      General:         Right eye: No discharge. Left eye: No discharge. Cardiovascular:      Rate and Rhythm: Normal rate and regular rhythm. Heart sounds: Normal heart sounds. Pulmonary:      Effort: Pulmonary effort is normal. Tachypnea present. No respiratory distress. Breath sounds: Decreased air movement present. Abdominal:      General: There is no distension. Palpations: Abdomen is soft. Tenderness: There is no abdominal tenderness. Musculoskeletal:         General: Normal range of motion. Cervical back: Normal range of motion and neck supple. Skin:     General: Skin is warm and dry. Neurological:      Mental Status: He is alert and oriented to person, place, and time.    Psychiatric:         Behavior: Behavior normal.       Severely diminished aeration    DIAGNOSTIC RESULTS   LABS:    Labs Reviewed   CBC WITH AUTO DIFFERENTIAL - Abnormal; Notable for the following components:       Result Value    RBC 3.61 (*)     Hemoglobin 10.5 (*)     Hematocrit 31.9 (*)     All other components within normal limits   COMPREHENSIVE METABOLIC PANEL - Abnormal; Notable for the following components:    Sodium 130 (*)     Chloride 86 (*)     CO2 40 (*)     Glucose 104 (*)     Total Protein 6.1 (*)     All other components within normal limits   BRAIN NATRIURETIC PEPTIDE - Abnormal; Notable for the following components:    Pro- (*)     All other components within normal limits   BLOOD GAS, VENOUS - Abnormal; Notable for the following components:    pCO2, Shun 59.9 (*)     pO2, Shun 43.6 (*)     HCO3, Venous 39.5 (*)     Base Excess, Shun 13.0 (*)     Carboxyhemoglobin 4.8 (*)     All other components within normal limits   COVID-19, RAPID   RAPID INFLUENZA A/B ANTIGENS   TROPONIN   PROTIME-INR   APTT       When ordered only abnormal lab results are displayed. All other labs were within normal range or not returned as of this dictation. EKG: When ordered, EKG's are interpreted by the Emergency Department Physician in the absence of a cardiologist.  Please see their note for interpretation of EKG. RADIOLOGY:   Non-plain film images such as CT, Ultrasound and MRI are read by the radiologist. Plain radiographic images are visualized and preliminarily interpreted by the ED Provider with the below findings:    Emphysema    Interpretation per the Radiologist below, if available at the time of this note:    XR CHEST PORTABLE   Final Result   No acute process. Emphysematous changes again noted in both lungs right greater than left           No results found. No results found. PROCEDURES   Unless otherwise noted below, none     Procedures    CRITICAL CARE TIME (.cctime)   There was a high probability of life-threatening clinical deterioration in the patient's condition requiring my urgent intervention.   I personally saw the patient and independently provided 31 minutes of non-concurrent critical care out of the total shared critical care time provided, excluding separately reportable procedures. PAST MEDICAL HISTORY      has a past medical history of Bronchitis, CHF (congestive heart failure) ([facility] [address] and COPD (chronic obstructive pulmonary disease) ([facility] [address] [facility] EMERGENCY DEPARTMENT COURSE and DIFFERENTIAL DIAGNOSIS/MDM:   Vitals:    Vitals:    01/06/23 0958 01/06/23 1027 01/06/23 1028 01/06/23 1043   BP: 115/87  130/69 126/64   Pulse: 75 77 73 67   Resp: 17 21 15 16   Temp:       TempSrc:       SpO2: 100% 96% 100% 100%   Weight:       Height:           Patient was given the following medications:  Medications   methylPREDNISolone sodium (SOLU-MEDROL) injection 125 mg (125 mg IntraVENous Given 1/6/23 1023)   ipratropium-albuterol (DUONEB) nebulizer solution 1 ampule (1 ampule Inhalation Given 1/6/23 1025)   aspirin tablet 325 mg (325 mg Oral Given 1/6/23 1021)             Is this patient to be included in the SEP-1 Core Measure due to severe sepsis or septic shock? No   Exclusion criteria - the patient is NOT to be included for SEP-1 Core Measure due to: Infection is not suspected    Chronic Conditions affecting care:    has a past medical history of Bronchitis, CHF (congestive heart failure) ([facility] [address] and COPD (chronic obstructive pulmonary disease) ([facility] [address] [facility] CONSULTS: (Who and What was discussed)  None      Social Determinants : None    Records Reviewed (Source): [facility]    CC/HPI Summary, DDx, ED Course, and Reassessment: Patient presents for evaluation of increasing shortness of breath. On exam, he does appear short of breath, tachypneic and unable to speak in complete sentences but is in no acute distress and nontoxic. Satting at 96% on baseline 3 L. He is severely diminished aeration bilaterally. Chest is nontender and abdomen is benign. He was given IV Solu-Medrol,  DuoNeb breathing treatment for symptomatic relief and will be reevaluated. Please see attending note for EKG interpretation.       Disposition Considerations (tests considered but not done, Admit vs D/C, Shared Decision Making, Pt Expectation of Test or Tx.): CBC and CMP are unremarkable aside from CO2 of 40, PCO2 59.9. Troponin is negative. . Coags within normal limits. Chest x-ray shows no acute process. He is emphysematous changes noted bilaterally. He will be admitted for further evaluation and management of suspected acute COPD exacerbation with respiratory failure with hypoxia and hypercapnia, increasing oxygen requirement. I spoke with the admitting physician, Dr. Emmanuel Braxton, who will resume care the patient at this time. Patient was informed and agreeable. Stable for admission. I am the Primary Clinician of Record. FINAL IMPRESSION      1. COPD exacerbation ([facility] [address]    2. Acute on chronic respiratory failure with hypoxia and hypercapnia ([facility] [address]          DISPOSITION/PLAN     DISPOSITION Decision To Admit 01/06/2023 11:26:38 AM      PATIENT REFERRED TO:  No follow-up provider specified.     DISCHARGE MEDICATIONS:  New Prescriptions    No medications on file       DISCONTINUED MEDICATIONS:  Discontinued Medications    No medications on file              (Please note that portions of this note were completed with a voice recognition program.  Efforts were made to edit the dictations but occasionally words are mis-transcribed.)    Lazara Marrero PA-C (electronically signed)       Rajani Vyas PA-C  01/06/23 1984

## 2023-01-06 NOTE — H&P
History and Physical  Dr. Bronwyn Mcnamara  1/6/2023    PCP: Kolton Ross DO    Cc:   Chief Complaint   Patient presents with    Shortness of Breath     Came by [facility] from home with c/o SOB since Wed with exertion with productive yellow cough. Baseline 3L O2. A&O.        HPI:  Ernestina Knox is a 71 y.o. male who has a past medical history of Bronchitis, CHF (congestive heart failure) ([facility] [address] and COPD (chronic obstructive pulmonary disease) ([facility] [address] Patient presents with COPD exacerbation ([facility] [address] HPI  (1-3 for expanded problem focused, ?4 for detailed/comprehensive)      71 y.o. male who presents for evaluation of shortness of breath that started yesterday. Patient does have history of CHF and COPD is on 3 L nasal cannula at baseline. Does have productive cough. No fevers or chills. No known sick contacts with similar symptoms. States that he noticed shortness of breath worse with exertion yesterday and today was having difficulty even coming downstairs. Review of old chart shows multiple admissions late 2022, both here as well at OSH. Pt was previously enrolled in hospice for CHF, though he rescinded this in winter 2022 as his EF improved. Labs are reviewed by self - CBC and CMP are unremarkable aside from CO2 of 40, PCO2 59.9. Troponin is negative. . Coags within normal limits. .  CXR reviewed by self; I do not see any infiltrate. He has continued o2 requirement in ER and is working hard to breath, especially after speaking. Given this respiratory distress/failure, to be admitted for further treatment. Problem list of hospitalization thus far:   Active Hospital Problems    Diagnosis     COPD exacerbation (HCC) [J44.1]      Priority: Medium    Persistent atrial fibrillation (HCC) [I48.19]      Priority: Medium    Acute respiratory failure with hypoxemia (HCC) [J96.01]      Priority: Medium    Hyponatremia [E87.1]     Essential hypertension [I10]          Review of Systems: (1 system for EPF, 2-9 for detailed, 10+ for comprehensive)  Constitutional: Negative for chills and fever. Positive for fatigue  HENT: Negative for dental problem, nosebleeds and rhinorrhea. Eyes: Negative for photophobia and visual disturbance. Respiratory: Negative for cough, chest tightness and complains of shortness of breath. Cardiovascular: Negative for chest pain and leg swelling. Gastrointestinal: Negative for diarrhea, nausea and vomiting. Endocrine: Negative for polydipsia and polyphagia. Genitourinary: Negative for frequency, hematuria and urgency. Musculoskeletal: Negative for back pain and myalgias. Skin: Negative for rash. Allergic/Immunologic: Negative for food allergies. Neurological: Negative for dizziness, seizures, syncope and facial asymmetry. Hematological: Negative for adenopathy. Psychiatric/Behavioral: Negative for dysphoric mood. The patient is not nervous/anxious.              Past Medical History:   Past Medical History:   Diagnosis Date    Bronchitis     CHF (congestive heart failure) (HCC)     COPD (chronic obstructive pulmonary disease) (HCC)        Past Surgical History:   Past Surgical History:   Procedure Laterality Date    CHOLECYSTECTOMY, LAPAROSCOPIC N/A 3/17/2021    LAPAROSCOPIC CHOLECYSTECTOMY WITH INTRAOPERATIVE CHOLANGIOGRAM performed by Danny Pham MD at [address] [place] [address]      bilateral cataracts    FEMORAL BYPASS Left 6/1/2021    LEFT FEMORAL TO POPLITEAL BYPASS GRAFT (47410) performed by Ozzie Mahan MD at [address] elbow    TONSILLECTOMY         Social History:   Social History       Tobacco History       Smoking Status  Former Quit Date  9/2/2016 Smoking Frequency  1 pack/day for 30.00 years (30.00 pk-yrs) Smoking Tobacco Type  Cigarettes quit in 9/2/2016      Smokeless Tobacco Use  Former Quit Date  9/5/2016              Alcohol History       Alcohol Use Status  Yes Drinks/Week  1-3 Cans of beer per week Amount  1.0 - 3.0 standard drink of alcohol/wk Comment  daily              Drug Use       Drug Use Status  No              Sexual Activity       Sexually Active  Yes Partners  Female Comment  monogamous                    Fam History:   Family History   Problem Relation Age of Onset    Heart Disease Mother     No Known Problems Father        PFSH: The above PMHx, PSHx, SocHx, FamHx has been reviewed by myself. (1 area for detailed, 2-3 for comprehensive)      Code Status: Prior    Meds - following list of home medications fromelectronic chart has been reviewed by myself  Prior to Admission medications    Medication Sig Start Date End Date Taking?  Authorizing Provider   apixaban (ELIQUIS) 5 MG TABS tablet Take 1 tablet by mouth 2 times daily  Patient not taking: Reported on 1/6/2023 11/15/22   Jayden Strong MD   Magnesium 400 MG TABS Take by mouth    Historical Provider, MD   apixaban (ELIQUIS) 5 MG TABS tablet Take 1 tablet by mouth 2 times daily  Patient not taking: Reported on 1/6/2023 11/14/22   Jayden Strong MD   sacubitril-valsartan (ENTRESTO) 24-26 MG per tablet Take 1 tablet by mouth 2 times daily 11/4/22   CHELSY Gustafson CNP   sacubitril-valsartan (ENTRESTO) 49-51 MG per tablet Take 0.5 tablets by mouth 2 times daily 11/4/22   CHELSY Gustafson CNP   furosemide (LASIX) 40 MG tablet Take 1 tablet by mouth 2 times daily 11/1/22   Demario Pearl MD   metoprolol succinate (TOPROL XL) 100 MG extended release tablet Take 1 tablet by mouth every evening 10/24/22   CHELSY Gustafson CNP   levothyroxine (SYNTHROID) 50 MCG tablet Take 1 tablet by mouth Daily 10/18/22   Demario Pearl MD   tamsulosin (FLOMAX) 0.4 MG capsule Take 0.4 mg by mouth nightly    Historical Provider, MD   dicyclomine (BENTYL) 10 MG capsule Take 1 capsule by mouth 4 times daily  Patient not taking: No sig reported 6/20/22 8/8/01  Miriam Mauricio MD   OXYGEN Inhale 3 L into the lungs as needed Historical Provider, MD   ipratropium-albuterol (DUONEB) 0.5-2.5 (3) MG/3ML SOLN nebulizer solution 1 vial inh Q6hrs for next 10 days and then 1 vial inh Q4hrs PRN SOB or wheezing  Patient taking differently: as needed 1 vial inh Q6hrs for next 10 days and then 1 vial inh Q4hrs PRN SOB or wheezing 3/12/22   Elina Mason MD         Allergies   Allergen Reactions    Codeine Anxiety and Other (See Comments)     Unknown reaction             EXAM: (2-7 system for EPF/Detailed, ?8 for Comprehensive)  /64   Pulse 67   Temp 98.9 °F (37.2 °C) (Oral)   Resp 16   Ht 5' 11\" (1.803 m)   Wt 128 lb (58.1 kg)   SpO2 100%   BMI 17.85 kg/m²   Constitutional: vitals as above: alert, appears stated age and cooperative    Psychiatric: normal insight and judgment, oriented to person, place, time, and general circumstances    Head: Normocephalic, without obvious abnormality, atraumatic    Eyes:lids and lashes normal, conjunctivae and sclerae normal and pupils equal, round, reactive to light and accomodation    EMNT: external ears normal, nares midline    Neck: no carotid bruit, supple, symmetrical, trachea midline and thyroid not enlarged, symmetric, no tenderness/mass/nodules     Respiratory: wheezes bilaterally with labored respiratory effort, some use of accessory muscles  Cardiovascular: normal rate, regular rhythm, normal S1 and S2 and no murmurs    Gastrointestinal: soft, non-tender, non-distended, normal bowel sounds, no masses or organomegaly    Extremities: no clubbing, no edema    Skin:No rashes or nodules noted.     Neurologic:negative         LABS:  Labs Reviewed   CBC WITH AUTO DIFFERENTIAL - Abnormal; Notable for the following components:       Result Value    RBC 3.61 (*)     Hemoglobin 10.5 (*)     Hematocrit 31.9 (*)     All other components within normal limits   COMPREHENSIVE METABOLIC PANEL - Abnormal; Notable for the following components:    Sodium 130 (*)     Chloride 86 (*)     CO2 40 (*) Glucose 104 (*)     Total Protein 6.1 (*)     All other components within normal limits   BRAIN NATRIURETIC PEPTIDE - Abnormal; Notable for the following components:    Pro- (*)     All other components within normal limits   BLOOD GAS, VENOUS - Abnormal; Notable for the following components:    pCO2, Shun 59.9 (*)     pO2, Shun 43.6 (*)     HCO3, Venous 39.5 (*)     Base Excess, Shun 13.0 (*)     Carboxyhemoglobin 4.8 (*)     All other components within normal limits   COVID-19, RAPID   RAPID INFLUENZA A/B ANTIGENS   TROPONIN   PROTIME-INR   APTT         IMAGING:  Imaging results from computerized chart. Any imaging that has been independently reviewed as noted elsewhere in chart. XR CHEST PORTABLE    Result Date: 1/6/2023  EXAMINATION: ONE XRAY VIEW OF THE CHEST 1/6/2023 11:10 am COMPARISON: 10/31/2022 HISTORY: ORDERING SYSTEM PROVIDED HISTORY: sob TECHNOLOGIST PROVIDED HISTORY: Reason for exam:->sob Reason for Exam: short of breath FINDINGS: The lungs are without acute focal process. Emphysematous changes. There is no effusion or pneumothorax. The cardiomediastinal silhouette is stable. The osseous structures are stable. No acute process. Emphysematous changes again noted in both lungs right greater than left         EKG:   EKG interpreted by self - it shows sinus at [de-identified]  Old chart reviewed, EKG dated 10/31/22 is reviewed, there is not difference noted. Old study shows sinus at 62    Lab Results   Component Value Date/Time    GLUCOSE 104 01/06/2023 10:33 AM     No results found for: POCGLU  /64   Pulse 67   Temp 98.9 °F (37.2 °C) (Oral)   Resp 16   Ht 5' 11\" (1.803 m)   Wt 128 lb (58.1 kg)   SpO2 100%   BMI 17.85 kg/m²     MEDICAL DECISION MAKING:    Principal Problem:    COPD exacerbation ([address] -Established problem. Unstable. Pt with known copd, but having what appears to be severe/life threatening exacerbation. Using accessory muscles, struggling to speak full sentences.  Pco2 almost 60 on abg. Plan: admit, iv steroids, o2 to keeps sats up. Consult his pulmonology group. Consider repeat imaging. Active Problems:    Acute respiratory distress (HCC) -New Problem to me. Pt with significant issues with breathing  Plan: as above    atrial fibrillation ([address] -Established problem. Stable. Actually in sinus now  Plan: Continue present orders/plan. Essential hypertension -Established problem. Stable. 126/64  Plan: Pt home BP meds reviewed and will be continued. IV Hydralazine ordered for control of extremely high blood pressures. Will monitor labs to assess Creat/K for possible complications of medications. Hyponatremia - New Problem to me. 130 NA on admit  Plan: ivf ordered. Repeat labs ordered in AM          Diagnoses as listed above, designated as new or established and plan outlined for each. Case discussed with: Isela Frye, ER    MDM Determination    PROBLEM  High: life threatening unstable chronic illness (I.e. severe COPD, asthma) or acute illness (i.e. MI, PE, SEMER, stroke,severe resp distress, acute change in neurologic status, suicidal ideation)  PLUS  high: at least 2 of: personally interpret study, discuss with external specialist, review/order 3+ tests - cbc, bmp, cxr    OR TIME BASED  N/A - see problem based determination above           The patient is being placed in inpatient status with the expectation of requiring a hospital stay spanning at least two midnights for care and treatment of the problems noted in the problem list.  This determination is also based on thepatients comorbidities and past medical history, the severity and timing of the signs and symptoms upon presentation.     (Please note that portions of this note were completed with a voice recognition program.  Efforts were made to edit the dictations but occasionally words are mis-transcribed.)      Electronically signed by: Rocky Nguyen MD 1/6/2023

## 2023-01-07 ENCOUNTER — APPOINTMENT (OUTPATIENT)
Dept: GENERAL RADIOLOGY | Age: 73
DRG: 190 | End: 2023-01-07
Payer: COMMERCIAL

## 2023-01-07 LAB
A/G RATIO: 1.4 (ref 1.1–2.2)
ALBUMIN SERPL-MCNC: 3.6 G/DL (ref 3.4–5)
ALP BLD-CCNC: 99 U/L (ref 40–129)
ALT SERPL-CCNC: 12 U/L (ref 10–40)
ANION GAP SERPL CALCULATED.3IONS-SCNC: 3 MMOL/L (ref 3–16)
AST SERPL-CCNC: 14 U/L (ref 15–37)
BASOPHILS ABSOLUTE: 0 K/UL (ref 0–0.2)
BASOPHILS RELATIVE PERCENT: 0.1 %
BILIRUB SERPL-MCNC: 0.4 MG/DL (ref 0–1)
BUN BLDV-MCNC: 19 MG/DL (ref 7–20)
C-REACTIVE PROTEIN: 5.5 MG/L (ref 0–5.1)
CALCIUM SERPL-MCNC: 9 MG/DL (ref 8.3–10.6)
CHLORIDE BLD-SCNC: 91 MMOL/L (ref 99–110)
CO2: 36 MMOL/L (ref 21–32)
CREAT SERPL-MCNC: 0.9 MG/DL (ref 0.8–1.3)
EOSINOPHILS ABSOLUTE: 0 K/UL (ref 0–0.6)
EOSINOPHILS RELATIVE PERCENT: 0 %
GFR SERPL CREATININE-BSD FRML MDRD: >60 ML/MIN/{1.73_M2}
GLUCOSE BLD-MCNC: 148 MG/DL (ref 70–99)
HCT VFR BLD CALC: 30.3 % (ref 40.5–52.5)
HEMOGLOBIN: 10.1 G/DL (ref 13.5–17.5)
LYMPHOCYTES ABSOLUTE: 0.4 K/UL (ref 1–5.1)
LYMPHOCYTES RELATIVE PERCENT: 3.3 %
MCH RBC QN AUTO: 29 PG (ref 26–34)
MCHC RBC AUTO-ENTMCNC: 33.2 G/DL (ref 31–36)
MCV RBC AUTO: 87.2 FL (ref 80–100)
MONOCYTES ABSOLUTE: 0.1 K/UL (ref 0–1.3)
MONOCYTES RELATIVE PERCENT: 1.2 %
NEUTROPHILS ABSOLUTE: 12.3 K/UL (ref 1.7–7.7)
NEUTROPHILS RELATIVE PERCENT: 95.4 %
PDW BLD-RTO: 15.3 % (ref 12.4–15.4)
PLATELET # BLD: 201 K/UL (ref 135–450)
PMV BLD AUTO: 8.1 FL (ref 5–10.5)
POTASSIUM REFLEX MAGNESIUM: 4.8 MMOL/L (ref 3.5–5.1)
POTASSIUM SERPL-SCNC: 4.8 MMOL/L (ref 3.5–5.1)
PROCALCITONIN: 0.07 NG/ML (ref 0–0.15)
RBC # BLD: 3.47 M/UL (ref 4.2–5.9)
SODIUM BLD-SCNC: 130 MMOL/L (ref 136–145)
TOTAL PROTEIN: 6.1 G/DL (ref 6.4–8.2)
WBC # BLD: 12.9 K/UL (ref 4–11)

## 2023-01-07 PROCEDURE — 6360000002 HC RX W HCPCS: Performed by: INTERNAL MEDICINE

## 2023-01-07 PROCEDURE — 84145 PROCALCITONIN (PCT): CPT

## 2023-01-07 PROCEDURE — 1200000000 HC SEMI PRIVATE

## 2023-01-07 PROCEDURE — G0378 HOSPITAL OBSERVATION PER HR: HCPCS

## 2023-01-07 PROCEDURE — 80053 COMPREHEN METABOLIC PANEL: CPT

## 2023-01-07 PROCEDURE — 85025 COMPLETE CBC W/AUTO DIFF WBC: CPT

## 2023-01-07 PROCEDURE — 2580000003 HC RX 258: Performed by: INTERNAL MEDICINE

## 2023-01-07 PROCEDURE — 94760 N-INVAS EAR/PLS OXIMETRY 1: CPT

## 2023-01-07 PROCEDURE — 94761 N-INVAS EAR/PLS OXIMETRY MLT: CPT

## 2023-01-07 PROCEDURE — 87205 SMEAR GRAM STAIN: CPT

## 2023-01-07 PROCEDURE — 87077 CULTURE AEROBIC IDENTIFY: CPT

## 2023-01-07 PROCEDURE — 36415 COLL VENOUS BLD VENIPUNCTURE: CPT

## 2023-01-07 PROCEDURE — 99223 1ST HOSP IP/OBS HIGH 75: CPT | Performed by: INTERNAL MEDICINE

## 2023-01-07 PROCEDURE — 94640 AIRWAY INHALATION TREATMENT: CPT

## 2023-01-07 PROCEDURE — 2700000000 HC OXYGEN THERAPY PER DAY

## 2023-01-07 PROCEDURE — 87070 CULTURE OTHR SPECIMN AEROBIC: CPT

## 2023-01-07 PROCEDURE — 96376 TX/PRO/DX INJ SAME DRUG ADON: CPT

## 2023-01-07 PROCEDURE — 71046 X-RAY EXAM CHEST 2 VIEWS: CPT

## 2023-01-07 PROCEDURE — 86140 C-REACTIVE PROTEIN: CPT

## 2023-01-07 PROCEDURE — 87449 NOS EACH ORGANISM AG IA: CPT

## 2023-01-07 PROCEDURE — 96372 THER/PROPH/DIAG INJ SC/IM: CPT

## 2023-01-07 PROCEDURE — 6370000000 HC RX 637 (ALT 250 FOR IP): Performed by: INTERNAL MEDICINE

## 2023-01-07 RX ORDER — ARFORMOTEROL TARTRATE 15 UG/2ML
15 SOLUTION RESPIRATORY (INHALATION) 2 TIMES DAILY
Status: DISCONTINUED | OUTPATIENT
Start: 2023-01-07 | End: 2023-01-09 | Stop reason: HOSPADM

## 2023-01-07 RX ORDER — IPRATROPIUM BROMIDE AND ALBUTEROL SULFATE 2.5; .5 MG/3ML; MG/3ML
1 SOLUTION RESPIRATORY (INHALATION)
Status: DISCONTINUED | OUTPATIENT
Start: 2023-01-07 | End: 2023-01-07

## 2023-01-07 RX ORDER — BUDESONIDE 0.5 MG/2ML
0.5 INHALANT ORAL 2 TIMES DAILY
Status: DISCONTINUED | OUTPATIENT
Start: 2023-01-07 | End: 2023-01-09 | Stop reason: HOSPADM

## 2023-01-07 RX ORDER — IPRATROPIUM BROMIDE AND ALBUTEROL SULFATE 2.5; .5 MG/3ML; MG/3ML
1 SOLUTION RESPIRATORY (INHALATION) EVERY 4 HOURS PRN
Status: DISCONTINUED | OUTPATIENT
Start: 2023-01-07 | End: 2023-01-09 | Stop reason: HOSPADM

## 2023-01-07 RX ADMIN — SACUBITRIL AND VALSARTAN 1 TABLET: 49; 51 TABLET, FILM COATED ORAL at 20:27

## 2023-01-07 RX ADMIN — SODIUM CHLORIDE, PRESERVATIVE FREE 10 ML: 5 INJECTION INTRAVENOUS at 08:42

## 2023-01-07 RX ADMIN — METHYLPREDNISOLONE SODIUM SUCCINATE 40 MG: 40 INJECTION, POWDER, FOR SOLUTION INTRAMUSCULAR; INTRAVENOUS at 20:27

## 2023-01-07 RX ADMIN — BUDESONIDE 500 MCG: 0.5 SUSPENSION RESPIRATORY (INHALATION) at 21:05

## 2023-01-07 RX ADMIN — METHYLPREDNISOLONE SODIUM SUCCINATE 40 MG: 40 INJECTION, POWDER, FOR SOLUTION INTRAMUSCULAR; INTRAVENOUS at 16:04

## 2023-01-07 RX ADMIN — SACUBITRIL AND VALSARTAN 1 TABLET: 49; 51 TABLET, FILM COATED ORAL at 08:41

## 2023-01-07 RX ADMIN — TAMSULOSIN HYDROCHLORIDE 0.4 MG: 0.4 CAPSULE ORAL at 20:27

## 2023-01-07 RX ADMIN — ENOXAPARIN SODIUM 40 MG: 100 INJECTION SUBCUTANEOUS at 08:41

## 2023-01-07 RX ADMIN — METHYLPREDNISOLONE SODIUM SUCCINATE 40 MG: 40 INJECTION, POWDER, FOR SOLUTION INTRAMUSCULAR; INTRAVENOUS at 08:41

## 2023-01-07 RX ADMIN — SODIUM CHLORIDE, PRESERVATIVE FREE 10 ML: 5 INJECTION INTRAVENOUS at 03:28

## 2023-01-07 RX ADMIN — METHYLPREDNISOLONE SODIUM SUCCINATE 40 MG: 40 INJECTION, POWDER, FOR SOLUTION INTRAMUSCULAR; INTRAVENOUS at 03:28

## 2023-01-07 RX ADMIN — METOPROLOL SUCCINATE 100 MG: 50 TABLET, EXTENDED RELEASE ORAL at 08:41

## 2023-01-07 RX ADMIN — LEVALBUTEROL 1.25 MG: 1.25 SOLUTION RESPIRATORY (INHALATION) at 21:04

## 2023-01-07 RX ADMIN — LEVALBUTEROL 1.25 MG: 1.25 SOLUTION RESPIRATORY (INHALATION) at 07:56

## 2023-01-07 RX ADMIN — FUROSEMIDE 40 MG: 40 TABLET ORAL at 08:41

## 2023-01-07 RX ADMIN — LEVALBUTEROL 1.25 MG: 1.25 SOLUTION RESPIRATORY (INHALATION) at 15:17

## 2023-01-07 RX ADMIN — ARFORMOTEROL TARTRATE 15 MCG: 15 SOLUTION RESPIRATORY (INHALATION) at 21:04

## 2023-01-07 RX ADMIN — LEVALBUTEROL 1.25 MG: 1.25 SOLUTION RESPIRATORY (INHALATION) at 11:23

## 2023-01-07 RX ADMIN — SODIUM CHLORIDE, PRESERVATIVE FREE 10 ML: 5 INJECTION INTRAVENOUS at 20:28

## 2023-01-07 RX ADMIN — LEVOTHYROXINE SODIUM 75 MCG: 0.07 TABLET ORAL at 06:06

## 2023-01-07 RX ADMIN — METOPROLOL SUCCINATE 100 MG: 50 TABLET, EXTENDED RELEASE ORAL at 20:27

## 2023-01-07 ASSESSMENT — PAIN SCALES - GENERAL
PAINLEVEL_OUTOF10: 0

## 2023-01-07 NOTE — CONSULTS
PULMONARY AND CRITICAL CARE CONSULTATION NOTE    CONSULTING PHYSICIAN:      REASON FOR CONSULT:   Chief Complaint   Patient presents with    Shortness of Breath     Came by [facility] from home with c/o SOB since Wed with exertion with productive yellow cough. Baseline 3L O2. A&O.        DATE OF CONSULT: 1/7/2023    HISTORY OF PRESENT ILLNESS: 71y.o. year old male with past medical history of severe COPD, FEV1 33% on home oxygen at 3 L/min, nonischemic cardiomyopathy who presented to hospital with complaints of increased shortness of breath. Patient follows up with Dr. Clarke Silveira in the pulmonary clinic. At baseline, he takes oxygen at 3 L/min. At some point in time, patient was in hospice but lately he rescinded hospice as his EF improved. Patient stated that he was taking albuterol nebulization up to 3 times a day but he stopped doing that because of tremors and feeling jittery. He was then using albuterol inhaler which gave him same side effects and he quit using that. He is not on any maintenance inhalers likely due to cost.    REVIEW OF SYSTEMS:   CONSTITUTIONAL SYMPTOMS: The patient denies fever, fatigue, night sweats, weight loss or weight gain. HEENT: No vision changes. No tinnitus, Denies sinus pain. No hoarseness, or dysphagia. NECK: Patient denies swelling in the neck. CARDIOVASCULAR: Denies chest pain, palpitation, syncope. RESPIRATORY: See above. GASTROINTESTINAL: Denies nausea, abdominal pain or change in bowel function. GENITOURINARY: Denies obstructive symptoms. No history of incontinence. BREASTS: No masses or lumps in the breasts. SKIN: No rashes or itching. MUSCULOSKELETAL: Denies weakness or bone pain. NEUROLOGICAL: No headaches or seizures. PSYCHIATRIC: Denies mood swings or depression. ENDOCRINE: Denies heat or cold intolerance or excessive thirst.  HEMATOLOGIC/LYMPHATIC: Denies easy bruising or lymph node swelling.   ALLERGIC/IMMUNOLOGIC: No environmental allergies. PAST MEDICAL HISTORY:   Past Medical History:   Diagnosis Date    Bronchitis     CHF (congestive heart failure) ([facility])     COPD (chronic obstructive pulmonary disease) ([facility]        PAST SURGICAL HISTORY:   Past Surgical History:   Procedure Laterality Date    CHOLECYSTECTOMY, LAPAROSCOPIC N/A 3/17/2021    LAPAROSCOPIC CHOLECYSTECTOMY WITH INTRAOPERATIVE CHOLANGIOGRAM performed by Gloria Victoria MD at [address] [place] [address]      bilateral cataracts    FEMORAL BYPASS Left 2021    LEFT FEMORAL TO POPLITEAL BYPASS GRAFT ([id number]) performed by Francie Grossman MD at [facility]      LT elbow    TONSILLECTOMY         SOCIAL HISTORY:   Social History     Tobacco Use    Smoking status: Former     Packs/day: 1.00     Years: 30.00     Pack years: 30.00     Types: Cigarettes     Quit date: 2016     Years since quittin.3    Smokeless tobacco: Former     Quit date: 2016   Vaping Use    Vaping Use: Never used   Substance Use Topics    Alcohol use: Yes     Alcohol/week: 1.0 - 3.0 standard drink     Types: 1 - 3 Cans of beer per week     Comment: daily    Drug use: No       FAMILY HISTORY:   Family History   Problem Relation Age of Onset    Heart Disease Mother     No Known Problems Father        MEDICATIONS:     No current facility-administered medications on file prior to encounter.      Current Outpatient Medications on File Prior to Encounter   Medication Sig Dispense Refill    predniSONE (DELTASONE) 5 MG tablet Take 5 mg by mouth daily      ALBUTEROL SULFATE HFA IN Inhale into the lungs every 4 hours as needed      ENTRESTO  MG per tablet Take 1 tablet by mouth 2 times daily      apixaban (ELIQUIS) 5 MG TABS tablet Take 1 tablet by mouth 2 times daily (Patient not taking: Reported on 2023) 28 tablet 0    Magnesium 400 MG TABS Take 200 mg by mouth nightly      apixaban (ELIQUIS) 5 MG TABS tablet Take 1 tablet by mouth 2 times daily (Patient not taking: Reported on 1/6/2023) 180 tablet 1    sacubitril-valsartan (ENTRESTO) 24-26 MG per tablet Take 1 tablet by mouth 2 times daily 60 tablet 3    sacubitril-valsartan (ENTRESTO) 49-51 MG per tablet Take 0.5 tablets by mouth 2 times daily (Patient taking differently: Take 1 tablet by mouth 2 times daily) 56 tablet 0    furosemide (LASIX) 40 MG tablet Take 1 tablet by mouth 2 times daily (Patient taking differently: Take 60 mg by mouth daily as needed) 60 tablet 1    metoprolol succinate (TOPROL XL) 100 MG extended release tablet Take 1 tablet by mouth every evening (Patient taking differently: Take 100 mg by mouth 2 times daily) 30 tablet 1    levothyroxine (SYNTHROID) 50 MCG tablet Take 1 tablet by mouth Daily (Patient taking differently: Take 75 mcg by mouth Daily) 30 tablet 1    tamsulosin (FLOMAX) 0.4 MG capsule Take 0.4 mg by mouth nightly      [DISCONTINUED] dicyclomine (BENTYL) 10 MG capsule Take 1 capsule by mouth 4 times daily (Patient not taking: No sig reported) 360 capsule 1    OXYGEN Inhale 3 L into the lungs as needed       ipratropium-albuterol (DUONEB) 0.5-2.5 (3) MG/3ML SOLN nebulizer solution 1 vial inh Q6hrs for next 10 days and then 1 vial inh Q4hrs PRN SOB or wheezing (Patient not taking: Reported on 1/6/2023) 360 mL 1        furosemide  40 mg Oral BID    levothyroxine  75 mcg Oral Daily    metoprolol succinate  100 mg Oral BID    sacubitril-valsartan  1 tablet Oral BID    tamsulosin  0.4 mg Oral Nightly    sodium chloride flush  5-40 mL IntraVENous 2 times per day    enoxaparin  40 mg SubCUTAneous Daily    methylPREDNISolone  40 mg IntraVENous Q6H    Followed by    Michael Morse ON 1/9/2023] predniSONE  40 mg Oral Daily    levalbuterol  1.25 mg Nebulization Q4H WA      sodium chloride       sodium chloride flush, sodium chloride, ondansetron **OR** ondansetron, magnesium hydroxide, acetaminophen **OR** acetaminophen, hydrALAZINE, sodium chloride, potassium chloride **OR** potassium alternative oral replacement **OR** potassium chloride    ALLERGIES:   Allergies as of 01/06/2023 - Fully Reviewed 01/06/2023   Allergen Reaction Noted    Codeine Anxiety and Other (See Comments) 06/30/2014      OBJECTIVE:   height is 5' 11\" (1.803 m) and weight is 125 lb 6.4 oz (56.9 kg). His oral temperature is 98.1 °F (36.7 °C). His blood pressure is 112/72 and his pulse is 74. His respiration is 18 and oxygen saturation is 97%. I/O this shift:  In: 240 [P.O.:240]  Out: -      PHYSICAL EXAM:  CONSTITUTIONAL: He is a 71y.o.-year-old who appears his stated age. He is alert and oriented x 3 and in no acute distress. HEENT: PERRL No scleral icterus. No thrush, atraumatic, normocephalic. NECK: Supple, without cervical or supraclavicular lymphadenopathy:  CARDIOVASCULAR: S1 S2 RRR. Without murmer  RESPIRATORY & CHEST: Decreased breath sounds bilaterally. No wheezing, no crackles. GASTROINTESTINAL & ABDOMEN: Soft, nontender, positive bowel sounds in all quadrants, non-distended, without hepatosplenomegaly. GENITOURINARY: Deferred. MUSCULOSKELETAL: No tenderness to palpation of the axial skeleton. There is no clubbing. No cyanosis. No edema of the lower extremities. SKIN OF BODY: No rash or jaundice. PSYCHIATRIC EVALUATION: Normal affect. Patient answers questions appropriately. HEMATOLOGIC/LYMPHATIC/ IMMUNOLOGIC: No palpable lymphadenopathy. NEUROLOGIC: Alert and oriented x 3. Groslly non-focal. Motor strength is 5+/5 in all muscle groups. The patient has a normal sensorium globally.       LABS:  Lab Results   Component Value Date    WBC 12.9 (H) 01/07/2023    HGB 10.1 (L) 01/07/2023    HCT 30.3 (L) 01/07/2023     01/07/2023    CHOL 166 11/01/2022    TRIG 40 11/01/2022    HDL 65 (H) 11/01/2022    LDLDIRECT 105 (H) 10/15/2022    ALT 12 01/07/2023    AST 14 (L) 01/07/2023     (L) 01/07/2023    K 4.8 01/07/2023    K 4.8 01/07/2023    CL 91 (L) 01/07/2023    CREATININE 0.9 01/07/2023    BUN 19 01/07/2023    CO2 36 (H) 01/07/2023 TSH 13.48 (H) 10/31/2022    INR 1.00 01/06/2023       Lab Results   Component Value Date    GLUCOSE 148 (H) 01/07/2023    CALCIUM 9.0 01/07/2023     (L) 01/07/2023    K 4.8 01/07/2023    K 4.8 01/07/2023    CO2 36 (H) 01/07/2023    CL 91 (L) 01/07/2023    BUN 19 01/07/2023    CREATININE 0.9 01/07/2023       IMAGING:  I reviewed the chest x-ray and my interpretation is as follows. No infiltrates noted. IMPRESSION:   Acute exacerbation of COPD  Chronic hypoxic respiratory failure  Nonischemic cardiomyopathy    RECOMMENDATION:   Patient presented with COPD with acute exacerbation. Patient has severe COPD with FEV1 33%. On 3 L/min FiO2 which is his baseline. Continue Solu-Medrol  I will add duo nebs, Pulmicort and Brovana nebs. Patient is not on any maintenance inhalers. He needs to be on maintenance inhalers upon discharge. He should be discharged from Xopenex inhaler as well as Xopenex nebulization upon discharge as albuterol causes tremors and palpitations. Lola Flores MD  Pulmonary Critical Care and Sleep Medicine  1/7/2023, 12:15 PM    This note was completed using dragon medical speech recognition software. Grammatical errors, random word insertions, pronoun errors and incomplete sentences are occasional consequences of this technology due to software limitations. If there are questions or concerns about the content of this note of information contained within the body of this dictation they should be addressed with the provider for clarification.

## 2023-01-07 NOTE — PROGRESS NOTES
Progress Note - Dr. Penelope Duvall - Internal Medicine  PCP: DO Leigh Ann Sommers / Angie Lewis [id number] Day: 1  Code Status: Full Code  Current Diet: ADULT DIET; Regular        CC: follow up on medical issues    Subjective:   Destinee Camacho is a 71 y.o. male. Pt seen and examined  Chart reviewed since last visit, labs and imaging below      Breathing a little easier,  Still wheezing but much better  Continues on 3-4L o2      Review of Systems: (1 system for EPF, 2-9 for detailed, 10+ for comprehensive)  Constitutional: Negative for chills and fever. HENT: Negative for dental problem, nosebleeds and rhinorrhea. Eyes: Negative for photophobia and visual disturbance. Respiratory: Negative for cough, chest tightness and complains of shortness of breath. Cardiovascular: Negative for chest pain and leg swelling. Gastrointestinal: Negative for diarrhea, nausea and vomiting. Endocrine: Negative for polydipsia and polyphagia. Genitourinary: Negative for frequency, hematuria and urgency. Musculoskeletal: Negative for back pain and myalgias. Skin: Negative for rash. Allergic/Immunologic: Negative for food allergies. Neurological: Negative for dizziness, seizures, syncope and facial asymmetry. Hematological: Negative for adenopathy. Psychiatric/Behavioral: Negative for dysphoric mood. The patient is not nervous/anxious. I have reviewed the patient's medical and social history in detail and updated the computerized patient record. To recap: He  has a past medical history of Bronchitis, CHF (congestive heart failure) ([facility] [address] and COPD (chronic obstructive pulmonary disease) ([facility] [address] . He  has a past surgical history that includes fracture surgery; Tonsillectomy; eye surgery; Cholecystectomy, laparoscopic (N/A, 3/17/2021); and femoral bypass (Left, 6/1/2021). Moshe Smiley He  reports that he quit smoking about 6 years ago.  His smoking use included cigarettes. He has a 30.00 pack-year smoking history. He quit smokeless tobacco use about 6 years ago. He reports current alcohol use of about 1.0 - 3.0 standard drink per week. He reports that he does not use drugs. .        Active Hospital Problems    Diagnosis Date Noted    COPD exacerbation ([facility] [J44.1] 01/06/2023     Priority: Medium    Acute on chronic combined systolic and diastolic CHF (congestive heart failure) ([facility] [I50.43] 10/15/2022     Priority: Medium    Persistent atrial fibrillation ([facility] [I48.19] 05/19/2022     Priority: Medium    Acute respiratory failure with hypoxemia ([facility]) [J96.01]      Priority: Medium    Hyponatremia [E87.1] 04/30/2021    Essential hypertension [I10] 11/24/2016       Current Facility-Administered Medications: furosemide (LASIX) tablet 40 mg, 40 mg, Oral, BID  levothyroxine (SYNTHROID) tablet 75 mcg, 75 mcg, Oral, Daily  metoprolol succinate (TOPROL XL) extended release tablet 100 mg, 100 mg, Oral, BID  sacubitril-valsartan (ENTRESTO) 49-51 MG per tablet 1 tablet, 1 tablet, Oral, BID  tamsulosin (FLOMAX) capsule 0.4 mg, 0.4 mg, Oral, Nightly  sodium chloride flush 0.9 % injection 5-40 mL, 5-40 mL, IntraVENous, 2 times per day  sodium chloride flush 0.9 % injection 5-40 mL, 5-40 mL, IntraVENous, PRN  0.9 % sodium chloride infusion, 25 mL, IntraVENous, PRN  ondansetron (ZOFRAN-ODT) disintegrating tablet 4 mg, 4 mg, Oral, Q8H PRN **OR** ondansetron (ZOFRAN) injection 4 mg, 4 mg, IntraVENous, Q6H PRN  magnesium hydroxide (MILK OF MAGNESIA) 400 MG/5ML suspension 30 mL, 30 mL, Oral, Daily PRN  enoxaparin (LOVENOX) injection 40 mg, 40 mg, SubCUTAneous, Daily  acetaminophen (TYLENOL) tablet 650 mg, 650 mg, Oral, Q6H PRN **OR** acetaminophen (TYLENOL) suppository 650 mg, 650 mg, Rectal, Q6H PRN  methylPREDNISolone sodium (SOLU-MEDROL) injection 40 mg, 40 mg, IntraVENous, Q6H **FOLLOWED BY** [START ON 1/9/2023] predniSONE (DELTASONE) tablet 40 mg, 40 mg, Oral, Daily  hydrALAZINE (APRESOLINE) injection 10 mg, 10 mg, IntraVENous, Q6H PRN  0.9 % sodium chloride bolus, 500 mL, IntraVENous, PRN  potassium chloride (KLOR-CON M) extended release tablet 40 mEq, 40 mEq, Oral, PRN **OR** potassium bicarb-citric acid (EFFER-K) effervescent tablet 40 mEq, 40 mEq, Oral, PRN **OR** potassium chloride 10 mEq/100 mL IVPB (Peripheral Line), 10 mEq, IntraVENous, PRN  levalbuterol (XOPENEX) nebulizer solution 1.25 mg, 1.25 mg, Nebulization, Q4H WA         Objective:  /72   Pulse 81   Temp 98.1 °F (36.7 °C)   Resp 16   Ht 5' 11\" (1.803 m)   Wt 125 lb 6.4 oz (56.9 kg)   SpO2 97%   BMI 17.49 kg/m²      Patient Vitals for the past 24 hrs:   BP Temp Temp src Pulse Resp SpO2 Height Weight   01/07/23 0830 112/72 98.1 °F (36.7 °C) -- 81 16 97 % -- --   01/07/23 0757 -- -- -- 80 18 90 % -- --   01/07/23 0326 114/71 97.8 °F (36.6 °C) Oral 78 20 96 % -- --   01/07/23 0045 -- -- -- -- -- -- -- 125 lb 6.4 oz (56.9 kg)   01/06/23 2336 98/60 98.2 °F (36.8 °C) Oral 66 18 96 % -- --   01/06/23 2230 -- -- -- 99 -- -- -- --   01/06/23 2116 -- -- -- 97 18 97 % -- --   01/06/23 1944 116/71 98 °F (36.7 °C) Oral 86 16 95 % -- --   01/06/23 1846 126/63 97.8 °F (36.6 °C) Oral 95 14 96 % -- 122 lb 12.8 oz (55.7 kg)   01/06/23 1730 116/62 -- -- 81 18 95 % -- --   01/06/23 1715 110/71 -- -- 83 16 97 % -- --   01/06/23 1700 123/71 -- -- 86 (!) 31 98 % -- --   01/06/23 1645 -- -- -- 84 18 100 % -- --   01/06/23 1643 110/67 -- -- 84 22 100 % -- --   01/06/23 1628 112/74 -- -- 82 21 97 % -- --   01/06/23 1613 103/66 -- -- 87 17 96 % -- --   01/06/23 1558 108/68 -- -- 83 24 99 % -- --   01/06/23 1543 95/65 -- -- 84 24 98 % -- --   01/06/23 1528 111/61 -- -- 84 23 98 % -- --   01/06/23 1513 107/61 -- -- 81 21 97 % -- --   01/06/23 1458 101/65 -- -- 81 22 97 % -- --   01/06/23 1443 96/62 -- -- 85 20 95 % -- --   01/06/23 1428 107/70 -- -- 87 27 97 % -- --   01/06/23 1413 -- -- -- 80 25 100 % -- --   01/06/23 1358 122/62 -- -- 79 15 99 % -- --   01/06/23 1343 120/71 -- -- 72 14 100 % -- --   01/06/23 1328 119/71 -- -- 73 21 100 % -- --   01/06/23 1313 114/65 -- -- 79 21 98 % -- --   01/06/23 1301 -- -- -- 80 23 100 % -- --   01/06/23 1300 103/64 -- -- 80 17 -- -- --   01/06/23 1258 -- -- -- 75 29 90 % -- --   01/06/23 1218 106/65 -- -- 75 19 94 % -- --   01/06/23 1158 96/65 -- -- 73 17 97 % -- --   01/06/23 1143 (!) 111/59 -- -- 81 17 93 % -- --   01/06/23 1133 110/63 -- -- 79 15 95 % -- --   01/06/23 1043 126/64 -- -- 67 16 100 % -- --   01/06/23 1028 130/69 -- -- 73 15 100 % -- --   01/06/23 1027 -- -- -- 77 21 96 % -- --   01/06/23 0958 115/87 -- -- 75 17 100 % -- --   01/06/23 0952 114/73 98.9 °F (37.2 °C) Oral 78 24 96 % 5' 11\" (1.803 m) 128 lb (58.1 kg)     Patient Vitals for the past 96 hrs (Last 3 readings):   Weight   01/07/23 0045 125 lb 6.4 oz (56.9 kg)   01/06/23 1846 122 lb 12.8 oz (55.7 kg)   01/06/23 0952 128 lb (58.1 kg)           Intake/Output Summary (Last 24 hours) at 1/7/2023 0940  Last data filed at 1/7/2023 8746  Gross per 24 hour   Intake --   Output 275 ml   Net -275 ml         Physical Exam: (2-7 system for EPF/Detailed, ?8 for Comprehensive)  /72   Pulse 81   Temp 98.1 °F (36.7 °C)   Resp 16   Ht 5' 11\" (1.803 m)   Wt 125 lb 6.4 oz (56.9 kg)   SpO2 97%   BMI 17.49 kg/m²   Constitutional: vitals as above: alert, appears stated age and cooperative    Psychiatric: normal insight and judgment, oriented to person, place, time, and general circumstances    Head: Normocephalic, without obvious abnormality, atraumatic    Eyes:lids and lashes normal, conjunctivae and sclerae normal and pupils equal, round, reactive to light and accomodation    EMNT: external ears normal, nares midline    Neck: no carotid bruit, supple, symmetrical, trachea midline and thyroid not enlarged, symmetric, no tenderness/mass/nodules     Respiratory: wheezing bilaterally with normal respiratory effort    Cardiovascular: normal rate, regular rhythm, normal S1 and S2 and no murmurs    Gastrointestinal: soft, non-tender, non-distended, normal bowel sounds, no masses or organomegaly    Extremities: no clubbing, no edema    Skin:No rashes or nodules noted. Neurologic:negative         Labs:  Lab Results   Component Value Date    WBC 12.9 (H) 01/07/2023    HGB 10.1 (L) 01/07/2023    HCT 30.3 (L) 01/07/2023     01/07/2023    CHOL 166 11/01/2022    TRIG 40 11/01/2022    HDL 65 (H) 11/01/2022    LDLDIRECT 105 (H) 10/15/2022    ALT 12 01/07/2023    AST 14 (L) 01/07/2023     (L) 01/07/2023    K 4.8 01/07/2023    K 4.8 01/07/2023    CL 91 (L) 01/07/2023    CREATININE 0.9 01/07/2023    BUN 19 01/07/2023    CO2 36 (H) 01/07/2023    TSH 13.48 (H) 10/31/2022    INR 1.00 01/06/2023    LABMICR YES 11/14/2022     Lab Results   Component Value Date    TROPONINI <0.01 01/06/2023       Recent Imaging Results are Reviewed:  XR CHEST PORTABLE    Result Date: 1/6/2023  EXAMINATION: ONE XRAY VIEW OF THE CHEST 1/6/2023 11:10 am COMPARISON: 10/31/2022 HISTORY: ORDERING SYSTEM PROVIDED HISTORY: sob TECHNOLOGIST PROVIDED HISTORY: Reason for exam:->sob Reason for Exam: short of breath FINDINGS: The lungs are without acute focal process. Emphysematous changes. There is no effusion or pneumothorax. The cardiomediastinal silhouette is stable. The osseous structures are stable. No acute process. Emphysematous changes again noted in both lungs right greater than left       Lab Results   Component Value Date/Time    GLUCOSE 148 01/07/2023 05:58 AM     No results found for: POCGLU  /72   Pulse 81   Temp 98.1 °F (36.7 °C)   Resp 16   Ht 5' 11\" (1.803 m)   Wt 125 lb 6.4 oz (56.9 kg)   SpO2 97%   BMI 17.49 kg/m²     Assessment and Plan:  Principal Problem:    COPD exacerbation ([address] -Established problem. Improving. Sounds better. Procal low at 0.07  Plan: cont iv steroids, HHN tx.  Await pulm alexa.  He is on Duoneb at home but no controlling meds; likely needs to add. Active Problems:    Acute respiratory failure with hypoxemia ([address] -Established problem. Stable. Plan: cont o2    atrial fibrillation ([address] -Established problem. Stable. in sinus  Plan: cont to monitor on tele    Acute on chronic combined systolic and diastolic CHF (congestive heart failure) ([address] -Established problem. Stable. Plan: Continue present orders/plan. Essential hypertension-Established problem. Stable. 112/72  Plan: Continue medications. Continue to check BP q shift. CBC and BMP ordered to monitor for disease progression, medication side effect. Hyponatremia -Established problem. Na 130, still low  Plan: cont fluids.  Repeat labs in AM      Case discussed with: RN, pulm  Tests ordered/reviewed: cbc, bmp, procalcitonin          (Please note that portions of this note were completed with a voice recognition program.  Efforts were made to edit the dictations but occasionally words are mis-transcribed.)        Sherry English MD  1/7/2023

## 2023-01-07 NOTE — PLAN OF CARE
Problem: Discharge Planning  Goal: Discharge to home or other facility with appropriate resources  Outcome: Progressing     Problem: Safety - Adult  Goal: Free from fall injury  Outcome: Progressing     Problem: Risk for Elopement  Goal: Patient will not exit the unit/facility without proper excort  Outcome: Progressing     Problem: Pain  Goal: Verbalizes/displays adequate comfort level or baseline comfort level  Outcome: Progressing

## 2023-01-08 LAB
ANION GAP SERPL CALCULATED.3IONS-SCNC: 6 MMOL/L (ref 3–16)
BASOPHILS ABSOLUTE: 0 K/UL (ref 0–0.2)
BASOPHILS RELATIVE PERCENT: 0.1 %
BUN BLDV-MCNC: 26 MG/DL (ref 7–20)
CALCIUM SERPL-MCNC: 9.1 MG/DL (ref 8.3–10.6)
CHLORIDE BLD-SCNC: 92 MMOL/L (ref 99–110)
CO2: 35 MMOL/L (ref 21–32)
CREAT SERPL-MCNC: 1 MG/DL (ref 0.8–1.3)
EOSINOPHILS ABSOLUTE: 0 K/UL (ref 0–0.6)
EOSINOPHILS RELATIVE PERCENT: 0 %
GFR SERPL CREATININE-BSD FRML MDRD: >60 ML/MIN/{1.73_M2}
GLUCOSE BLD-MCNC: 131 MG/DL (ref 70–99)
HCT VFR BLD CALC: 33 % (ref 40.5–52.5)
HEMOGLOBIN: 10.6 G/DL (ref 13.5–17.5)
L. PNEUMOPHILA SEROGP 1 UR AG: NORMAL
LYMPHOCYTES ABSOLUTE: 0.4 K/UL (ref 1–5.1)
LYMPHOCYTES RELATIVE PERCENT: 2.1 %
MCH RBC QN AUTO: 28.2 PG (ref 26–34)
MCHC RBC AUTO-ENTMCNC: 32.1 G/DL (ref 31–36)
MCV RBC AUTO: 87.9 FL (ref 80–100)
MONOCYTES ABSOLUTE: 0.3 K/UL (ref 0–1.3)
MONOCYTES RELATIVE PERCENT: 1.4 %
NEUTROPHILS ABSOLUTE: 19.3 K/UL (ref 1.7–7.7)
NEUTROPHILS RELATIVE PERCENT: 96.4 %
PDW BLD-RTO: 15.6 % (ref 12.4–15.4)
PLATELET # BLD: 213 K/UL (ref 135–450)
PMV BLD AUTO: 8.2 FL (ref 5–10.5)
POTASSIUM SERPL-SCNC: 4.6 MMOL/L (ref 3.5–5.1)
RBC # BLD: 3.75 M/UL (ref 4.2–5.9)
SODIUM BLD-SCNC: 133 MMOL/L (ref 136–145)
WBC # BLD: 20.1 K/UL (ref 4–11)

## 2023-01-08 PROCEDURE — 2700000000 HC OXYGEN THERAPY PER DAY

## 2023-01-08 PROCEDURE — 6360000002 HC RX W HCPCS: Performed by: INTERNAL MEDICINE

## 2023-01-08 PROCEDURE — 94761 N-INVAS EAR/PLS OXIMETRY MLT: CPT

## 2023-01-08 PROCEDURE — 99223 1ST HOSP IP/OBS HIGH 75: CPT | Performed by: INTERNAL MEDICINE

## 2023-01-08 PROCEDURE — 96372 THER/PROPH/DIAG INJ SC/IM: CPT

## 2023-01-08 PROCEDURE — 97530 THERAPEUTIC ACTIVITIES: CPT

## 2023-01-08 PROCEDURE — 97161 PT EVAL LOW COMPLEX 20 MIN: CPT

## 2023-01-08 PROCEDURE — 1200000000 HC SEMI PRIVATE

## 2023-01-08 PROCEDURE — 96376 TX/PRO/DX INJ SAME DRUG ADON: CPT

## 2023-01-08 PROCEDURE — 36415 COLL VENOUS BLD VENIPUNCTURE: CPT

## 2023-01-08 PROCEDURE — 6370000000 HC RX 637 (ALT 250 FOR IP): Performed by: INTERNAL MEDICINE

## 2023-01-08 PROCEDURE — G0378 HOSPITAL OBSERVATION PER HR: HCPCS

## 2023-01-08 PROCEDURE — 85025 COMPLETE CBC W/AUTO DIFF WBC: CPT

## 2023-01-08 PROCEDURE — 2580000003 HC RX 258: Performed by: INTERNAL MEDICINE

## 2023-01-08 PROCEDURE — 97165 OT EVAL LOW COMPLEX 30 MIN: CPT

## 2023-01-08 PROCEDURE — 94640 AIRWAY INHALATION TREATMENT: CPT

## 2023-01-08 PROCEDURE — 97535 SELF CARE MNGMENT TRAINING: CPT

## 2023-01-08 PROCEDURE — 80048 BASIC METABOLIC PNL TOTAL CA: CPT

## 2023-01-08 RX ADMIN — ARFORMOTEROL TARTRATE 15 MCG: 15 SOLUTION RESPIRATORY (INHALATION) at 19:55

## 2023-01-08 RX ADMIN — SODIUM CHLORIDE, PRESERVATIVE FREE 10 ML: 5 INJECTION INTRAVENOUS at 03:17

## 2023-01-08 RX ADMIN — SODIUM CHLORIDE, PRESERVATIVE FREE 10 ML: 5 INJECTION INTRAVENOUS at 08:20

## 2023-01-08 RX ADMIN — SACUBITRIL AND VALSARTAN 1 TABLET: 49; 51 TABLET, FILM COATED ORAL at 08:20

## 2023-01-08 RX ADMIN — METOPROLOL SUCCINATE 100 MG: 50 TABLET, EXTENDED RELEASE ORAL at 20:23

## 2023-01-08 RX ADMIN — SODIUM CHLORIDE, PRESERVATIVE FREE 10 ML: 5 INJECTION INTRAVENOUS at 20:23

## 2023-01-08 RX ADMIN — TAMSULOSIN HYDROCHLORIDE 0.4 MG: 0.4 CAPSULE ORAL at 20:23

## 2023-01-08 RX ADMIN — LEVOTHYROXINE SODIUM 75 MCG: 0.07 TABLET ORAL at 06:02

## 2023-01-08 RX ADMIN — BUDESONIDE 500 MCG: 0.5 SUSPENSION RESPIRATORY (INHALATION) at 09:03

## 2023-01-08 RX ADMIN — METOPROLOL SUCCINATE 100 MG: 50 TABLET, EXTENDED RELEASE ORAL at 08:20

## 2023-01-08 RX ADMIN — LEVALBUTEROL 1.25 MG: 1.25 SOLUTION RESPIRATORY (INHALATION) at 09:03

## 2023-01-08 RX ADMIN — FUROSEMIDE 40 MG: 40 TABLET ORAL at 17:53

## 2023-01-08 RX ADMIN — ENOXAPARIN SODIUM 40 MG: 100 INJECTION SUBCUTANEOUS at 08:23

## 2023-01-08 RX ADMIN — METHYLPREDNISOLONE SODIUM SUCCINATE 40 MG: 40 INJECTION, POWDER, FOR SOLUTION INTRAMUSCULAR; INTRAVENOUS at 03:17

## 2023-01-08 RX ADMIN — LEVALBUTEROL 1.25 MG: 1.25 SOLUTION RESPIRATORY (INHALATION) at 12:12

## 2023-01-08 RX ADMIN — ARFORMOTEROL TARTRATE 15 MCG: 15 SOLUTION RESPIRATORY (INHALATION) at 09:03

## 2023-01-08 RX ADMIN — METHYLPREDNISOLONE SODIUM SUCCINATE 40 MG: 40 INJECTION, POWDER, FOR SOLUTION INTRAMUSCULAR; INTRAVENOUS at 08:20

## 2023-01-08 RX ADMIN — LEVALBUTEROL 1.25 MG: 1.25 SOLUTION RESPIRATORY (INHALATION) at 19:56

## 2023-01-08 RX ADMIN — SACUBITRIL AND VALSARTAN 1 TABLET: 49; 51 TABLET, FILM COATED ORAL at 20:23

## 2023-01-08 RX ADMIN — FUROSEMIDE 40 MG: 40 TABLET ORAL at 08:20

## 2023-01-08 RX ADMIN — BUDESONIDE 500 MCG: 0.5 SUSPENSION RESPIRATORY (INHALATION) at 19:56

## 2023-01-08 RX ADMIN — LEVALBUTEROL 1.25 MG: 1.25 SOLUTION RESPIRATORY (INHALATION) at 16:11

## 2023-01-08 ASSESSMENT — PAIN SCALES - GENERAL
PAINLEVEL_OUTOF10: 0

## 2023-01-08 NOTE — PROGRESS NOTES
[facility] [address]   Phone: [phone]    Physical Therapy    [x] Initial Evaluation            [] Daily Treatment Note         [] Discharge Summary      Patient: Rashad Reza   :    MRN: [id number]   Date of Service:  2023  Admitting Diagnosis: COPD exacerbation [facility])  Current Admission Summary: Rashad Reza is a 71 y.o. male who presents for evaluation of shortness of breath that started yesterday. Patient does have history of CHF and COPD is on 3 L nasal cannula at baseline. Does have productive cough. No fevers or chills. No known sick contacts with similar symptoms. States that he noticed shortness of breath worse with exertion yesterday and today was having difficulty even coming downstairs. No other complaints or concerns at this time  Past Medical History:  has a past medical history of Bronchitis, CHF (congestive heart failure) ([facility] [address] and COPD (chronic obstructive pulmonary disease) ([facility] [address] Past Surgical History:  has a past surgical history that includes fracture surgery; Tonsillectomy; eye surgery; Cholecystectomy, laparoscopic (N/A, 3/17/2021); and femoral bypass (Left, 2021). Discharge Recommendations: Rashad Reza scored a 23/24 on the AM-PAC short mobility form. At this time, no further PT is recommended upon discharge due to presenting similar to baseline function. Recommend patient returns to prior setting with prior services.      DME Required For Discharge: patient has all required DME for discharge  Precautions/Restrictions: high fall risk  Weight Bearing Restrictions: no restrictions  [] Right Upper Extremity  [] Left Upper Extremity [] Right Lower Extremity  [] Left Lower Extremity     Required Braces/Orthotics: no braces required   [] Right  [] Left  Positional Restrictions:no positional restrictions    Pre-Admission Information   Lives With: spouse    Type of Home: house  Home Layout: tri-level; about 8 steps to upper level and 5 down to lower level- R side going to upper level and R going down to lower level  Home Access: level entry  Bathroom Layout: walker accessible, walk in shower  Bathroom Equipment: shower chair, hand held shower head  Toilet Height: standard height, elevated height  Home Equipment: rollator - 4 wheeled walker, oxygen, transport chair  Transfer Assistance: Independent without use of device  Ambulation Assistance:modified independent with use of 4WRW in community   ADL Assistance: independent with all ADL's  IADL Assistance:  shares responibilities with wife   Active :        [x] Yes  [] No  Hand Dominance: [] Left  [x] Right  Current Employment: retired.   Occupation: [employer]  Hobbies: wood working   Recent Falls: No falls    Examination   Vision:   Vision Gross Assessment: Impaired and Vision Corrective Device: wears glasses for reading  Hearing:   [place]  Observation:   General Observation:  Pt SOB with all activity, requiring increased time for catching breath throughout   Posture:   Rounded shoulders, forward flexed tripod position in sitting  Sensation:   WFL  Proprioception:    WFL  ROM:   (B) LE AROM WFL  Strength:   (B) LE strength grossly WFL  Therapist Clinical Decision Making (Complexity): low complexity  Clinical Presentation: stable      Subjective  General: Pt supine in bed upon arrival, agreeable to PT/OT eval.   Pain: 0/10  Pain Interventions: not applicable       Functional Mobility  Bed Mobility  Supine to Sit: Independent  Sit to Supine: Independent  Scooting: modified independent  Comments:  Transfers  Sit to stand transfer: modified independent  Stand to sit transfer: modified independent  Comments:  Ambulation  Surface:level surface  Assistive Device: rolling walker  Assistance: supervision  Distance: 10' +10' to/from bathroom  Gait Mechanics: decreased gait speed, NBOS, heavy use of UE on RW  Comments:    Stair Mobility  Stair mobility not completed on this date.  Comments:  Wheelchair Mobility:  No w/c mobility completed on this date. Comments:  Balance  Static Sitting Balance: good: independent with functional balance in unsupported position  Dynamic Sitting Balance: good: independent with functional balance in unsupported position  Static Standing Balance: good: independent with functional balance in unsupported position  Dynamic Standing Balance: fair (-): maintains balance at supervision with use of UE support  Comments:    Other Therapeutic Interventions  Pt completed toileting and donning/doffing socks- see OT notes for details. Functional Outcomes  AM-PAC Inpatient Mobility Raw Score : 23              Cognition  WFL  Orientation:    alert and oriented x 4  Command Following:   [facility]    Education  Barriers To Learning: none  Patient Education: patient educated on goals, PT role and benefits, plan of care, HEP, general safety, energy conservation, disease specific education- educated on monitoring SPO2 at home with activity, educated on exercises to improve endurance and increase strength for functional mobility  Learning Assessment:  patient verbalizes and demonstrates understanding    Assessment  Activity Tolerance: Poor, pt on 3L of O2 which is baseline; pt O2 dropping to upper 80's with activity and resting in low 90's at rest; pt SOB with all activity including PT/OT ROM/strength assessment sitting EOB, pt requiring increased rest time and education on pursed lip breathing. Further ambulation held due to pt fatigue. Impairments Requiring Therapeutic Intervention: decreased functional mobility, decreased endurance, decreased posture  Prognosis: good  Clinical Assessment: Pt presenting similar to baseline function secondary to admission for COPD exacerbation. Pt completing all mobility at [place] and supervision with use of RW. Pt with poor activity tolerance due to decreased endurance and increased SOB with activity.  Pt lives in [address] [place] [address] [address] and has to navigate multiple stairs on a daily basis. Pt educated on energy conservation techniques and methods to build endurance at home with exercise. Pt would continue to benefit from skilled PT services during admission to prevent further deconditioning during length of stay. No further PT services are recommended following discharge at this time.    Safety Interventions: patient left in bed, bed alarm in place, chair alarm in place, patient at risk for falls, and nurse notified    Plan  Frequency: 3-5 x/per week  Current Treatment Recommendations: strengthening, functional mobility training, gait training, stair training, endurance training, patient/caregiver education, ADL/self-care training, home exercise program, and safety education    Goals  Patient Goals: Get stronger and gain weight    Short Term Goals:  Time Frame: Time of discharge   Patient will ambulate 75 ft with use of LRAD at [facility]  Patient will ascend/descend 8 stairs with (R) ascending handrail at modified independent    Therapy Session Time      Individual Group Co-treatment   Time In     0723   Time Out     0750   Minutes     27     Timed Code Treatment Minutes:   12 minutes   Total Treatment Minutes:  27 minutes        Electronically Signed By: MARCUS Branham [place], DPT [id number]

## 2023-01-08 NOTE — PLAN OF CARE
Problem: Safety - Adult  Goal: Free from fall injury  1/7/2023 2228 by Quirino Martins RN  Outcome: Progressing     Problem: ABCDS Injury Assessment  Goal: Absence of physical injury  Outcome: Progressing

## 2023-01-08 NOTE — CONSULTS
[address]   Cardiologyy Consultation   Date: 1/8/2023  Reason for Consultation: Heart failure   Consult Requesting Physician: Carlos Emmanuel MD     CC: Shortness of breath     HPI: Kirk Alarcon is a 71 y.o. male history of COPD, chronic hypoxic respiratory failure on home O2, paroxysmal atrial fibrillation, nonischemic cardiomyopathy with reduced LVEF (45 > 60%) that is recovered, PAD femoral-popliteal bypass, electrophysiology clinic visit in Nov following hospitalization for decompensated heart failure and atrial fibrillation R[place] in [place]. Presents to the emergency department with 1 week progressive dyspnea on exertion. Denies cough, fevers, chills. Denies swelling, in fact has been losing weight. Denies PND/orthopnea. Denies palpitations or racing heart. Patient previously using nebulizers, as needed albuterol inhalers give him an uneasy, shaky feeling. Not currently on LABA/LAMA. Complete 10 point ROS performed and negative unless noted in above HPI or below     Prior to Admission medications    Medication Sig Start Date End Date Taking?  Authorizing Provider   predniSONE (DELTASONE) 5 MG tablet Take 5 mg by mouth daily   Yes Historical Provider, MD   ALBUTEROL SULFATE HFA IN Inhale into the lungs every 4 hours as needed   Yes Historical Provider, MD   ENTRESTO  MG per tablet Take 1 tablet by mouth 2 times daily 12/10/22   Historical Provider, MD   apixaban (ELIQUIS) 5 MG TABS tablet Take 1 tablet by mouth 2 times daily  Patient not taking: Reported on 1/6/2023 11/15/22   Minda Atkinson MD   Magnesium 400 MG TABS Take 200 mg by mouth nightly    Historical Provider, MD   apixaban (ELIQUIS) 5 MG TABS tablet Take 1 tablet by mouth 2 times daily  Patient not taking: Reported on 1/6/2023 11/14/22   Minda Atkinson MD   sacubitril-valsartan (ENTRESTO) 24-26 MG per tablet Take 1 tablet by mouth 2 times daily 11/4/22   CHELSY Ludwig - CNP   sacubitril-valsartan (ENTRESTO) 49-51 MG per tablet Take 0.5 tablets by mouth 2 times daily  Patient taking differently: Take 1 tablet by mouth 2 times daily 11/4/22   Pedro Hatchet, APRN - CNP   furosemide (LASIX) 40 MG tablet Take 1 tablet by mouth 2 times daily  Patient taking differently: Take 60 mg by mouth daily as needed 11/1/22   Jack Tobin MD   metoprolol succinate (TOPROL XL) 100 MG extended release tablet Take 1 tablet by mouth every evening  Patient taking differently: Take 100 mg by mouth 2 times daily 10/24/22   Pedro Hatchet, APRN - CNP   levothyroxine (SYNTHROID) 50 MCG tablet Take 1 tablet by mouth Daily  Patient taking differently: Take 75 mcg by mouth Daily 10/18/22   Jack Tobin MD   tamsulosin (FLOMAX) 0.4 MG capsule Take 0.4 mg by mouth nightly    Historical Provider, MD   dicyclomine (BENTYL) 10 MG capsule Take 1 capsule by mouth 4 times daily  Patient not taking: No sig reported 6/20/22 4/9/43  Oumou Moore MD   OXYGEN Inhale 3 L into the lungs as needed     Historical Provider, MD   ipratropium-albuterol (DUONEB) 0.5-2.5 (3) MG/3ML SOLN nebulizer solution 1 vial inh Q6hrs for next 10 days and then 1 vial inh Q4hrs PRN SOB or wheezing  Patient not taking: Reported on 1/6/2023 3/12/22   Rony Ayon MD       Past Medical History:   Diagnosis Date    Bronchitis     CHF (congestive heart failure) ([facility])     COPD (chronic obstructive pulmonary disease) ([facility] [address]         Past Surgical History:   Procedure Laterality Date    CHOLECYSTECTOMY, LAPAROSCOPIC N/A 3/17/2021    LAPAROSCOPIC CHOLECYSTECTOMY WITH INTRAOPERATIVE CHOLANGIOGRAM performed by Casa Hudson MD at [address] [facility]      bilateral cataracts    FEMORAL BYPASS Left 6/1/2021    LEFT FEMORAL TO POPLITEAL BYPASS GRAFT (09661) performed by Clemencia Roy MD at [facility]      LT elbow    TONSILLECTOMY         Allergies   Allergen Reactions    Codeine Anxiety and Other (See Comments)     Unknown reaction Social History:  Reviewed. reports that he quit smoking about 6 years ago. His smoking use included cigarettes. He has a 30.00 pack-year smoking history. He quit smokeless tobacco use about 6 years ago. He reports current alcohol use of about 1.0 - 3.0 standard drink per week. He reports that he does not use drugs. Family History:  Reviewed. Reviewed. No family history of SCD. Physical Examination:  BP (!) 149/79   Pulse 71   Temp 97.2 °F (36.2 °C)   Resp 18   Ht 5' 11\" (1.803 m)   Wt 129 lb (58.5 kg)   SpO2 99%   BMI 17.99 kg/m²   Temp  Av.9 °F (36.6 °C)  Min: 97.2 °F (36.2 °C)  Max: 98.3 °F (36.8 °C)  Pulse  Av.3  Min: 68  Max: 82  BP  Min: 96/61  Max: 149/79  SpO2  Av.9 %  Min: 94 %  Max: 99 %    Intake/Output Summary (Last 24 hours) at 2023 0842  Last data filed at 2023 0151  Gross per 24 hour   Intake 240 ml   Output 325 ml   Net -85 ml     No acute distress  Moist mucosa, nonicteric conjunctiva  Diminished lung sounds throughout, no wheeze, crackles, rhonchi, nonlabored, talks in full sentences, no accessory muscle use  Heart is regular rate, regular rhythm, no murmurs, no lower extremity swelling, no dependent edema through hips, extremities are warm and well perfused, no jugular venous distention with JVP to just above clavicle at 45 degrees  Abdomen is soft nontender  Strength is grossly preserved  No focal neurodeficits  Appropriate mood and affect  No rashes or ecchymoses    Sodium 133  Potassium 4.6  Creatinine 1  proBNP 400  Troponin negative  TSH 13.48 10/31/22  Free T4 110  WBC 20  Hemoglobin 10    Influenza negative, COVID-negative    Chest x-ray 2023  No acute process, emphysematous changes      ECHO 10/31/22   Summary   Technically difficult study due to thin body frame and poor acoustical   window. Left ventricular cavity size and wall thickness is normal. Ejection fraction   is visually estimated to be 60%. Abnormal septal motion. Normal diastolic function. E/e' = 7.5. Aortic valve appears sclerotic but opens adequately. The right ventricle is normal in size and function. TAPSE: 2.72 cm. RVS   velocity: 13.4 cm/s. EKG   Sinus rhythm PVC    Stress MPI 8/2/22      Summary    -There is a small fixed apical defect that is likely bowel artifact rather    than scar.    -There is no ischemia.    -There is mild global hypokinesis with EF=47%. -Intermediate risk study related to EF < 50%. Assessment/Plan:   78-year-old male history of nonischemic cardiomyopathy, recovered LVEF to 60%, paroxysmal atrial fibrillation presents with dyspnea on exertion    Acute COPD exacerbation  - appears compensated, euvolemic  - no CP process on CXR, BNP mildly elevated  - presentation no consistent with HF, secondary to COPD, breathing improving with inhaler/nebulizers  - Pulm following    Paroxsymal Atrial fibrillation   - currently in sinus  - [id number], lifelong anticoagulation recommended, continue apixaban   - monitor as outpatient off amiodarone    Compensated systolic congestive heart failure with recovered LVEF  - continue metop, entresto    Cardiology will sign off at this time, routine follow up OP     NOTE: This report was transcribed using voice recognition software. Every effort was made to ensure accuracy, however, inadvertent computerized transcription errors may be present.      Edy Lainez MD  [address]   Office: [phone]  Fax: [phone]

## 2023-01-08 NOTE — PROGRESS NOTES
Progress Note - Dr. Deepa Crowder - Internal Medicine  PCP: Edwin Ayala DO [facility] / [place] Richardngmandy 81 Day: 2  Code Status: Full Code  Current Diet: ADULT DIET; Regular        CC: follow up on medical issues    Subjective:   Cornell Han is a 71 y.o. male. Pt seen and examined  Chart reviewed since last visit, labs and imaging below      Breathing a little easier,   wheezing is better  Continues on 3-4L o2    Awaiting xray     Review of Systems: (1 system for EPF, 2-9 for detailed, 10+ for comprehensive)  Constitutional: Negative for chills and fever. HENT: Negative for dental problem, nosebleeds and rhinorrhea. Eyes: Negative for photophobia and visual disturbance. Respiratory: Negative for cough, chest tightness and complains of shortness of breath. Cardiovascular: Negative for chest pain and leg swelling. Gastrointestinal: Negative for diarrhea, nausea and vomiting. Endocrine: Negative for polydipsia and polyphagia. Genitourinary: Negative for frequency, hematuria and urgency. Musculoskeletal: Negative for back pain and myalgias. Skin: Negative for rash. Allergic/Immunologic: Negative for food allergies. Neurological: Negative for dizziness, seizures, syncope and facial asymmetry. Hematological: Negative for adenopathy. Psychiatric/Behavioral: Negative for dysphoric mood. The patient is not nervous/anxious. I have reviewed the patient's medical and social history in detail and updated the computerized patient record. To recap: He  has a past medical history of Bronchitis, CHF (congestive heart failure) ([facility] [address] and COPD (chronic obstructive pulmonary disease) ([facility] [address] . He  has a past surgical history that includes fracture surgery; Tonsillectomy; eye surgery; Cholecystectomy, laparoscopic (N/A, 3/17/2021); and femoral bypass (Left, 6/1/2021). Lord Hidden He  reports that he quit smoking about 6 years ago.  His smoking use included cigarettes. He has a 30.00 pack-year smoking history. He quit smokeless tobacco use about 6 years ago. He reports current alcohol use of about 1.0 - 3.0 standard drink per week. He reports that he does not use drugs. .        Active Hospital Problems    Diagnosis Date Noted    COPD exacerbation ([facility] [J44.1] 01/06/2023     Priority: Medium    Acute on chronic combined systolic and diastolic CHF (congestive heart failure) ([facility] [I50.43] 10/15/2022     Priority: Medium    Persistent atrial fibrillation ([facility] [I48.19] 05/19/2022     Priority: Medium    Acute respiratory failure with hypoxemia (HCC) [J96.01]      Priority: Medium    Hyponatremia [E87.1] 04/30/2021    Essential hypertension [I10] 11/24/2016       Current Facility-Administered Medications: budesonide (PULMICORT) nebulizer suspension 500 mcg, 0.5 mg, Nebulization, BID  arformoterol tartrate (BROVANA) nebulizer solution 15 mcg, 15 mcg, Nebulization, BID  ipratropium-albuterol (DUONEB) nebulizer solution 1 ampule, 1 ampule, Inhalation, Q4H PRN  furosemide (LASIX) tablet 40 mg, 40 mg, Oral, BID  levothyroxine (SYNTHROID) tablet 75 mcg, 75 mcg, Oral, Daily  metoprolol succinate (TOPROL XL) extended release tablet 100 mg, 100 mg, Oral, BID  sacubitril-valsartan (ENTRESTO) 49-51 MG per tablet 1 tablet, 1 tablet, Oral, BID  tamsulosin (FLOMAX) capsule 0.4 mg, 0.4 mg, Oral, Nightly  sodium chloride flush 0.9 % injection 5-40 mL, 5-40 mL, IntraVENous, 2 times per day  sodium chloride flush 0.9 % injection 5-40 mL, 5-40 mL, IntraVENous, PRN  0.9 % sodium chloride infusion, 25 mL, IntraVENous, PRN  ondansetron (ZOFRAN-ODT) disintegrating tablet 4 mg, 4 mg, Oral, Q8H PRN **OR** ondansetron (ZOFRAN) injection 4 mg, 4 mg, IntraVENous, Q6H PRN  magnesium hydroxide (MILK OF MAGNESIA) 400 MG/5ML suspension 30 mL, 30 mL, Oral, Daily PRN  enoxaparin (LOVENOX) injection 40 mg, 40 mg, SubCUTAneous, Daily  acetaminophen (TYLENOL) tablet 650 mg, 650 mg, Oral, Q6H PRN **OR** acetaminophen (TYLENOL) suppository 650 mg, 650 mg, Rectal, Q6H PRN  [COMPLETED] methylPREDNISolone sodium (SOLU-MEDROL) injection 40 mg, 40 mg, IntraVENous, Q6H **FOLLOWED BY** [START ON 1/9/2023] predniSONE (DELTASONE) tablet 40 mg, 40 mg, Oral, Daily  hydrALAZINE (APRESOLINE) injection 10 mg, 10 mg, IntraVENous, Q6H PRN  0.9 % sodium chloride bolus, 500 mL, IntraVENous, PRN  potassium chloride (KLOR-CON M) extended release tablet 40 mEq, 40 mEq, Oral, PRN **OR** potassium bicarb-citric acid (EFFER-K) effervescent tablet 40 mEq, 40 mEq, Oral, PRN **OR** potassium chloride 10 mEq/100 mL IVPB (Peripheral Line), 10 mEq, IntraVENous, PRN  levalbuterol (XOPENEX) nebulizer solution 1.25 mg, 1.25 mg, Nebulization, Q4H WA         Objective:  BP (!) 149/79   Pulse 71   Temp 97.2 °F (36.2 °C) (Oral)   Resp 18   Ht 5' 11\" (1.803 m)   Wt 129 lb (58.5 kg)   SpO2 98%   BMI 17.99 kg/m²      Patient Vitals for the past 24 hrs:   BP Temp Temp src Pulse Resp SpO2 Weight   01/08/23 0904 -- -- -- 71 18 98 % --   01/08/23 0815 (!) 149/79 97.2 °F (36.2 °C) Oral 71 18 99 % --   01/08/23 0314 121/80 97.8 °F (36.6 °C) Oral 68 20 98 % 129 lb (58.5 kg)   01/07/23 2335 119/74 98 °F (36.7 °C) Oral 76 16 97 % --   01/07/23 2230 -- -- -- 70 -- -- --   01/07/23 2106 -- -- -- 74 22 99 % --   01/07/23 1943 (!) 110/59 98.3 °F (36.8 °C) Oral 82 16 97 % --   01/07/23 1600 (!) 123/54 98.2 °F (36.8 °C) Oral 82 16 95 % --   01/07/23 1520 -- -- -- 76 18 94 % --   01/07/23 1245 96/61 97.9 °F (36.6 °C) Oral 80 16 96 % --   01/07/23 1125 -- -- -- 74 18 97 % --       Patient Vitals for the past 96 hrs (Last 3 readings):   Weight   01/08/23 0314 129 lb (58.5 kg)   01/07/23 0045 125 lb 6.4 oz (56.9 kg)   01/06/23 1846 122 lb 12.8 oz (55.7 kg)             Intake/Output Summary (Last 24 hours) at 1/8/2023 1012  Last data filed at 1/8/2023 0151  Gross per 24 hour   Intake 240 ml   Output 325 ml   Net -85 ml           Physical Exam: (2-7 system for EPF/Detailed, ?8 for Comprehensive)  BP (!) 149/79   Pulse 71   Temp 97.2 °F (36.2 °C) (Oral)   Resp 18   Ht 5' 11\" (1.803 m)   Wt 129 lb (58.5 kg)   SpO2 98%   BMI 17.99 kg/m²   Constitutional: vitals as above: alert, appears stated age and cooperative    Psychiatric: normal insight and judgment, oriented to person, place, time, and general circumstances    Head: Normocephalic, without obvious abnormality, atraumatic    Eyes:lids and lashes normal, conjunctivae and sclerae normal and pupils equal, round, reactive to light and accomodation    EMNT: external ears normal, nares midline    Neck: no carotid bruit, supple, symmetrical, trachea midline and thyroid not enlarged, symmetric, no tenderness/mass/nodules     Respiratory: wheezing bilaterally with normal respiratory effort    Cardiovascular: normal rate, regular rhythm, normal S1 and S2 and no murmurs    Gastrointestinal: soft, non-tender, non-distended, normal bowel sounds, no masses or organomegaly    Extremities: no clubbing, no edema    Skin:No rashes or nodules noted.     Neurologic:negative         Labs:  Lab Results   Component Value Date    WBC 20.1 (H) 01/08/2023    HGB 10.6 (L) 01/08/2023    HCT 33.0 (L) 01/08/2023     01/08/2023    CHOL 166 11/01/2022    TRIG 40 11/01/2022    HDL 65 (H) 11/01/2022    LDLDIRECT 105 (H) 10/15/2022    ALT 12 01/07/2023    AST 14 (L) 01/07/2023     (L) 01/08/2023    K 4.6 01/08/2023    CL 92 (L) 01/08/2023    CREATININE 1.0 01/08/2023    BUN 26 (H) 01/08/2023    CO2 35 (H) 01/08/2023    TSH 13.48 (H) 10/31/2022    INR 1.00 01/06/2023    LABMICR YES 11/14/2022     Lab Results   Component Value Date    TROPONINI <0.01 01/06/2023       Recent Imaging Results are Reviewed:  XR CHEST PORTABLE    Result Date: 1/6/2023  EXAMINATION: ONE XRAY VIEW OF THE CHEST 1/6/2023 11:10 am COMPARISON: 10/31/2022 HISTORY: ORDERING SYSTEM PROVIDED HISTORY: sob TECHNOLOGIST PROVIDED HISTORY: Reason for exam:->sob Reason for Exam: short of breath FINDINGS: The lungs are without acute focal process. Emphysematous changes. There is no effusion or pneumothorax. The cardiomediastinal silhouette is stable. The osseous structures are stable. No acute process. Emphysematous changes again noted in both lungs right greater than left       Lab Results   Component Value Date/Time    GLUCOSE 131 01/08/2023 06:22 AM     No results found for: POCGLU  BP (!) 149/79   Pulse 71   Temp 97.2 °F (36.2 °C) (Oral)   Resp 18   Ht 5' 11\" (1.803 m)   Wt 129 lb (58.5 kg)   SpO2 98%   BMI 17.99 kg/m²     Assessment and Plan:  Principal Problem:    COPD exacerbation ([address] -Established problem. Improving. Sounds better. Procal low at 0.07  Plan: cont iv steroids, HHN tx. Appreciate pulm eval.  He is on Duoneb at home but no controlling meds; this has been changed to xopenex as pt was not taking albuterol 2/2 side effects. Active Problems:    Acute respiratory failure with hypoxemia ([address] -Established problem. Stable. Plan: cont o2    atrial fibrillation ([address] -Established problem. Stable. in sinus  Plan: cont to monitor on tele    Acute on chronic combined systolic and diastolic CHF (congestive heart failure) ([address] -Established problem. Stable. Pt wanted to be seen by his cards group  Plan: Continue present orders/plan per Dr Marlene Hurst. Bnp ordered. Essential hypertension-Established problem. Stable. 149/79  Plan: Continue medications. Continue to check BP q shift. CBC and BMP ordered to monitor for disease progression, medication side effect. Hyponatremia -Established problem. Na 133, still low but improving  Plan: cont fluids.  Repeat labs in AM    Disp - if remains stable, hopefully home 1/9    Case discussed with: cards  Tests ordered/reviewed: cbc, bmp, cxr, bnp          (Please note that portions of this note were completed with a voice recognition program.  Efforts were made to edit the dictations but occasionally words are mis-transcribed.)        Lukas Pollard MD  1/8/2023

## 2023-01-08 NOTE — PROGRESS NOTES
[address] [facility],[address] Msb Department   Phone: [phone]    Occupational Therapy    [x] Initial Evaluation            [] Daily Treatment Note         [x] Discharge Summary      Patient: George Herrera   :    MRN: [id number]   Date of Service:  2023    Admitting Diagnosis:  COPD exacerbation [facility])  Current Admission Summary:    Shortness of Breath       Came by [facility] from home with c/o SOB since Wed with exertion with productive yellow cough. Baseline 3L O2. A&O. HISTORY OF PRESENT ILLNESS: 1 or more Elements      History From: patient  Limitations to history : None     George Herrera is a 71 y.o. male who presents for evaluation of shortness of breath that started yesterday. Patient does have history of CHF and COPD is on 3 L nasal cannula at baseline. Does have productive cough. No fevers or chills. No known sick contacts with similar symptoms. States that he noticed shortness of breath worse with exertion yesterday and today was having difficulty even coming downstairs. No other complaints or concerns at this time. Past Medical History:  has a past medical history of Bronchitis, CHF (congestive heart failure) ([facility] and COPD (chronic obstructive pulmonary disease) ([facility] Past Surgical History:  has a past surgical history that includes fracture surgery; Tonsillectomy; eye surgery; Cholecystectomy, laparoscopic (N/A, 3/17/2021); and femoral bypass (Left, 2021). Discharge Recommendations: George Herrera scored a 23/24 on the AM-PAC ADL Inpatient form. At this time, no further OT is recommended upon discharge due to patient at independent level. Recommend patient returns to prior setting with prior services.       DME Required For Discharge: No DME required    Precautions/Restrictions: high fall risk  Positional Restrictions:no positional restrictions      Pre-Admission Information   Lives With: spouse Type of Home: house  Home Layout: tri-level; about 8 steps to upper level and 5 down to lower level- R side going to upper level and R going down to lower level  Home Access: level entry  Bathroom Layout: walker accessible, walk in shower  Bathroom Equipment: shower chair, hand held shower head  Toilet Height: standard height, elevated height  Home Equipment: rolling walker, oxygen, transport chair  Transfer Assistance: Independent without use of device  Ambulation Assistance:modified independent with use of 4WRW in community   ADL Assistance: independent with all ADL's  IADL Assistance:  shares responibilities with wife   Active :        [x] Yes                 [] No  Hand Dominance: [] Left                 [x] Right  Current Employment: retired. Occupation: [employer]  Hobbies: wood working   Recent Falls: No falls     Examination   Vision:   Vision Gross Assessment: Impaired and Vision Corrective Device: wears glasses for reading  Hearing:   WFL    Observation:   General Observation:  3L of O2, sats 94% at beginning of session,   Posture:   Easily SOB, on 3L, takes rest breaks between minimal activity, After ambulation from bathroom patient needed rest break, sats dropped to 87% on 3L but returned to above 90% within 1 minute  Sensation:   denies numbness and tingling  ROM:   (B) UE ROM WFL  Strength:   (B) UE gross strength WFL    Therapist Clinical Decision Making (Complexity): low complexity  Clinical Presentation: stable      Subjective  General: Patient supine in bed, pleasant and cooperative. Pain: 0/10  Pain Interventions: repositioned         Activities of Daily Living  Basic Activities of Daily Living  Feeding: Independent  Grooming: Independent  Toileting: Independent. General Comments: declined bathing and dressing,   Instrumental Activities of Daily Living  No IADL completed on this date.     Functional Mobility  Bed Mobility  Supine to Sit: modified independent  Sit to Supine: modified independent  Comments:  Transfers  Sit to stand transfer:modified independent  Stand to sit transfer: modified independent  Toilet transfer: modified independent  Toilet transfer comments: mod indep in/out of bathroom, 3L of O2  Comments:  Functional Mobility:  Sitting Balance: Independent. Functional Mobility: .  modified independent  Functional Mobility Comment: modified indep with RW to/from bathroom. Easily SOB but no LOB or difficulty with mobility     Other Therapeutic Interventions-- patient declined walking further this date except to/from bathroom. See PT for gait analysis,     Functional Outcomes  AM-PAC Inpatient Daily Activity Raw Score: 23    Cognition  WFL  Orientation:    A&O x 4  Command Following:   [facility]     Education  Barriers To Learning: none  Patient Education: Patient educated on discharge recommendations  Learning Assessment:  Patient verbalized and demonstrates understanding    Assessment  Impairments Requiring Therapeutic Intervention: none - eval with same day discharge  Prognosis: good without need for therapy intervention  Clinical Assessment: Patient presenting at independent level for completion of required self care tasks for return to home. Eval with d/c at this time. No therapy services indicated. Safety Interventions: patient left in bed, bed alarm in place, call light within reach, and nurse notified    Plan  Frequency: Eval with same day discharge. No follow up required. Current Treatment Recommendations: Not applicable, evaluation completed with same day discharge. Goals  Patient eval with same day discharge. No goals set as patient is at baseline self care status.       Therapy Session Time     Individual Group Co-treatment   Time In    1109   Time Out    5737   Minutes    27        Timed Code Treatment Minutes:  12   Total Treatment Minutes:  27       Electronically Signed By: BEN Kimball/ELAINE [phone]

## 2023-01-09 VITALS
HEART RATE: 64 BPM | SYSTOLIC BLOOD PRESSURE: 117 MMHG | OXYGEN SATURATION: 100 % | RESPIRATION RATE: 16 BRPM | TEMPERATURE: 97.7 F | DIASTOLIC BLOOD PRESSURE: 76 MMHG | BODY MASS INDEX: 17.94 KG/M2 | HEIGHT: 71 IN | WEIGHT: 128.1 LBS

## 2023-01-09 LAB
ANION GAP SERPL CALCULATED.3IONS-SCNC: 4 MMOL/L (ref 3–16)
BASOPHILS ABSOLUTE: 0 K/UL (ref 0–0.2)
BASOPHILS RELATIVE PERCENT: 0 %
BUN BLDV-MCNC: 31 MG/DL (ref 7–20)
CALCIUM SERPL-MCNC: 8.2 MG/DL (ref 8.3–10.6)
CHLORIDE BLD-SCNC: 92 MMOL/L (ref 99–110)
CO2: 39 MMOL/L (ref 21–32)
CREAT SERPL-MCNC: 1.1 MG/DL (ref 0.8–1.3)
CULTURE, RESPIRATORY: ABNORMAL
CULTURE, RESPIRATORY: ABNORMAL
EOSINOPHILS ABSOLUTE: 0 K/UL (ref 0–0.6)
EOSINOPHILS RELATIVE PERCENT: 0 %
GFR SERPL CREATININE-BSD FRML MDRD: >60 ML/MIN/{1.73_M2}
GLUCOSE BLD-MCNC: 103 MG/DL (ref 70–99)
GRAM STAIN RESULT: ABNORMAL
HCT VFR BLD CALC: 31.8 % (ref 40.5–52.5)
HEMOGLOBIN: 10.2 G/DL (ref 13.5–17.5)
LYMPHOCYTES ABSOLUTE: 0.8 K/UL (ref 1–5.1)
LYMPHOCYTES RELATIVE PERCENT: 5.5 %
MCH RBC QN AUTO: 28.1 PG (ref 26–34)
MCHC RBC AUTO-ENTMCNC: 32.1 G/DL (ref 31–36)
MCV RBC AUTO: 87.7 FL (ref 80–100)
MONOCYTES ABSOLUTE: 0.8 K/UL (ref 0–1.3)
MONOCYTES RELATIVE PERCENT: 5.3 %
NEUTROPHILS ABSOLUTE: 12.6 K/UL (ref 1.7–7.7)
NEUTROPHILS RELATIVE PERCENT: 89.2 %
ORGANISM: ABNORMAL
PDW BLD-RTO: 15.8 % (ref 12.4–15.4)
PLATELET # BLD: 199 K/UL (ref 135–450)
PMV BLD AUTO: 7.8 FL (ref 5–10.5)
POTASSIUM SERPL-SCNC: 4.4 MMOL/L (ref 3.5–5.1)
PRO-BNP: 1356 PG/ML (ref 0–124)
RBC # BLD: 3.63 M/UL (ref 4.2–5.9)
SODIUM BLD-SCNC: 135 MMOL/L (ref 136–145)
WBC # BLD: 14.1 K/UL (ref 4–11)

## 2023-01-09 PROCEDURE — 80048 BASIC METABOLIC PNL TOTAL CA: CPT

## 2023-01-09 PROCEDURE — 6360000002 HC RX W HCPCS: Performed by: INTERNAL MEDICINE

## 2023-01-09 PROCEDURE — G0378 HOSPITAL OBSERVATION PER HR: HCPCS

## 2023-01-09 PROCEDURE — 85025 COMPLETE CBC W/AUTO DIFF WBC: CPT

## 2023-01-09 PROCEDURE — 2580000003 HC RX 258: Performed by: INTERNAL MEDICINE

## 2023-01-09 PROCEDURE — 94640 AIRWAY INHALATION TREATMENT: CPT

## 2023-01-09 PROCEDURE — 83880 ASSAY OF NATRIURETIC PEPTIDE: CPT

## 2023-01-09 PROCEDURE — 96372 THER/PROPH/DIAG INJ SC/IM: CPT

## 2023-01-09 PROCEDURE — 94761 N-INVAS EAR/PLS OXIMETRY MLT: CPT

## 2023-01-09 PROCEDURE — 2700000000 HC OXYGEN THERAPY PER DAY

## 2023-01-09 PROCEDURE — 36415 COLL VENOUS BLD VENIPUNCTURE: CPT

## 2023-01-09 PROCEDURE — 6370000000 HC RX 637 (ALT 250 FOR IP): Performed by: INTERNAL MEDICINE

## 2023-01-09 RX ORDER — METOPROLOL SUCCINATE 100 MG/1
100 TABLET, EXTENDED RELEASE ORAL 2 TIMES DAILY
Qty: 60 TABLET | Refills: 0
Start: 2023-01-09

## 2023-01-09 RX ORDER — LEVOTHYROXINE SODIUM 0.05 MG/1
75 TABLET ORAL DAILY
Qty: 30 TABLET | Refills: 0
Start: 2023-01-09

## 2023-01-09 RX ORDER — LEVALBUTEROL INHALATION SOLUTION 1.25 MG/3ML
1.25 SOLUTION RESPIRATORY (INHALATION)
Qty: 360 ML | Refills: 2 | Status: SHIPPED | OUTPATIENT
Start: 2023-01-09

## 2023-01-09 RX ORDER — PREDNISONE 10 MG/1
TABLET ORAL
Qty: 40 TABLET | Refills: 0 | Status: SHIPPED | OUTPATIENT
Start: 2023-01-09 | End: 2023-01-19

## 2023-01-09 RX ORDER — LEVALBUTEROL TARTRATE 45 UG/1
1 AEROSOL, METERED ORAL EVERY 4 HOURS PRN
Qty: 1 EACH | Refills: 5 | Status: SHIPPED | OUTPATIENT
Start: 2023-01-09 | End: 2024-01-09

## 2023-01-09 RX ADMIN — SACUBITRIL AND VALSARTAN 1 TABLET: 49; 51 TABLET, FILM COATED ORAL at 09:11

## 2023-01-09 RX ADMIN — ARFORMOTEROL TARTRATE 15 MCG: 15 SOLUTION RESPIRATORY (INHALATION) at 09:00

## 2023-01-09 RX ADMIN — FUROSEMIDE 40 MG: 40 TABLET ORAL at 09:11

## 2023-01-09 RX ADMIN — METOPROLOL SUCCINATE 100 MG: 50 TABLET, EXTENDED RELEASE ORAL at 09:11

## 2023-01-09 RX ADMIN — LEVOTHYROXINE SODIUM 75 MCG: 0.07 TABLET ORAL at 05:19

## 2023-01-09 RX ADMIN — BUDESONIDE 500 MCG: 0.5 SUSPENSION RESPIRATORY (INHALATION) at 09:01

## 2023-01-09 RX ADMIN — ENOXAPARIN SODIUM 40 MG: 100 INJECTION SUBCUTANEOUS at 09:12

## 2023-01-09 RX ADMIN — SODIUM CHLORIDE, PRESERVATIVE FREE 10 ML: 5 INJECTION INTRAVENOUS at 09:12

## 2023-01-09 RX ADMIN — LEVALBUTEROL 1.25 MG: 1.25 SOLUTION RESPIRATORY (INHALATION) at 09:00

## 2023-01-09 RX ADMIN — PREDNISONE 40 MG: 20 TABLET ORAL at 09:11

## 2023-01-09 ASSESSMENT — PAIN SCALES - GENERAL: PAINLEVEL_OUTOF10: 0

## 2023-01-09 NOTE — PLAN OF CARE
Problem: Discharge Planning  Goal: Discharge to home or other facility with appropriate resources  Outcome: Progressing     Problem: Safety - Adult  Goal: Free from fall injury  Outcome: Progressing     Problem: Risk for Elopement  Goal: Patient will not exit the unit/facility without proper excort  Outcome: Progressing     Problem: Pain  Goal: Verbalizes/displays adequate comfort level or baseline comfort level  Outcome: Progressing     Problem: ABCDS Injury Assessment  Goal: Absence of physical injury  Outcome: Progressing

## 2023-01-09 NOTE — PROGRESS NOTES
CLINICAL PHARMACY NOTE: MEDS TO BEDS    Total # of Prescriptions Filled: 2   The following medications were delivered to the patient:  Prednisone  symbicort    Additional Documentation:  Wife picked up

## 2023-01-09 NOTE — DISCHARGE SUMMARY
[facility] -- Physician Discharge Summary     Doris Jules  [birth year]  MRN: [id number]    Admit Date: 1/6/2023  Discharge Date: No discharge date for patient encounter. Attending MD: Bakari Rosario MD  Discharging MD: Bakari Rosario MD  PCP:  DO Leigh Ann Castro / Sherren Ruth [id number] [phone]    Admission Diagnosis: COPD exacerbation [facility]) [J44.1]  Acute on chronic respiratory failure with hypoxia and hypercapnia ([facility] [J96.21, J96.22]  DISCHARGE DIAGNOSIS: same    Full Hospital Problem List:  Active Hospital Problems    Diagnosis Date Noted    COPD exacerbation ([facility] [J44.1] 01/06/2023     Priority: Medium    Acute on chronic combined systolic and diastolic CHF (congestive heart failure) ([address] [I50.43] 10/15/2022     Priority: Medium    Persistent atrial fibrillation ([facility] [I48.19] 05/19/2022     Priority: Medium    Acute respiratory failure with hypoxemia ([address] [J96.01]      Priority: Medium    Hyponatremia [E87.1] 04/30/2021    Essential hypertension [I10] 11/24/2016           Hospital Course:      71 y.o. male who presents for evaluation of shortness of breath that started yesterday. Patient does have history of CHF and COPD is on 3 L nasal cannula at baseline. Does have productive cough. No fevers or chills. No known sick contacts with similar symptoms. States that he noticed shortness of breath worse with exertion yesterday and today was having difficulty even coming downstairs. Review of old chart shows multiple admissions late 2022, both here as well at OSH. Pt was previously enrolled in hospice for CHF, though he rescinded this in winter 2022 as his EF improved. Labs are reviewed by self - CBC and CMP are unremarkable aside from CO2 of 40, PCO2 59.9. Troponin is negative. . Coags within normal limits. .  CXR reviewed by self; I do not see any infiltrate.   He has continued o2 requirement in ER and is working hard to breath, especially after speaking. Given this respiratory distress/failure, to be admitted for further treatment. Seen by pulm. They recommend changing albuterol to xopenex  Pt has apparently not been taking albuterol 2/2 shaking  Also he is not on steroid inhaler; started on meds here  To go home with dulera and xopenex hhn soln'    Seen by cards - they do not recommend changes    Consults made during Hospitalization:  IP CONSULT TO PULMONOLOGY  IP CONSULT TO CARDIOLOGY    Treatment team at time of Discharge: Treatment Team: Attending Provider: Buena Lesches, MD; Consulting Physician: Buena Lesches, MD; Utilization Reviewer: Barbie Cool LPN; Consulting Physician: Arminda Lofton MD; Respiratory Therapist (Day): Carlton Hennessy RCP; Registered Nurse: Jayshree Hobbs RN    Imaging Results:  XR CHEST (2 VW)    Result Date: 1/7/2023  EXAMINATION: TWO XRAY VIEWS OF THE CHEST 1/7/2023 9:50 am COMPARISON: Chest x-ray dated 01/06/2023 HISTORY: ORDERING SYSTEM PROVIDED HISTORY: cough TECHNOLOGIST PROVIDED HISTORY: Reason for exam:->cough Reason for Exam: cough FINDINGS: Hyperinflated emphysematous lungs. No focal lung opacity. No pleural effusion or pneumothorax. Cardiac silhouette is unremarkable. Degenerative disease of the visualized osseous structures. No focal lung opacity. XR CHEST PORTABLE    Result Date: 1/6/2023  EXAMINATION: ONE XRAY VIEW OF THE CHEST 1/6/2023 11:10 am COMPARISON: 10/31/2022 HISTORY: ORDERING SYSTEM PROVIDED HISTORY: sob TECHNOLOGIST PROVIDED HISTORY: Reason for exam:->sob Reason for Exam: short of breath FINDINGS: The lungs are without acute focal process. Emphysematous changes. There is no effusion or pneumothorax. The cardiomediastinal silhouette is stable. The osseous structures are stable. No acute process.  Emphysematous changes again noted in both lungs right greater than left         Discharge Exam:  BP (!) 142/79   Pulse 75   Temp 98 °F (36.7 °C) (Oral)   Resp 20   Ht 5' 11\" (1.803 m)   Wt 128 lb 1.6 oz (58.1 kg)   SpO2 96%   BMI 17.87 kg/m²   General appearance: alert, appears stated age, and cooperative  Head: Normocephalic, without obvious abnormality, atraumatic  Lungs: clear to auscultation bilaterally  Heart: regular rate and rhythm, S1, S2 normal, no murmur, click, rub or gallop  Abdomen: soft, non-tender; bowel sounds normal; no masses,  no organomegaly  Extremities: extremities normal, atraumatic, no cyanosis or edema  Skin: Skin color, texture, turgor normal. No rashes or lesions    Disposition: home    Condition: stable    Discharge Medications:     Medication List        START taking these medications      levalbuterol 1.25 MG/3ML nebulizer solution  Commonly known as: XOPENEX  Take 3 mLs by nebulization every 4 hours (while awake)     levalbuterol 45 MCG/ACT inhaler  Commonly known as: Xopenex HFA  Inhale 1 puff into the lungs every 4 hours as needed for Wheezing     mometasone-formoterol 200-5 MCG/ACT inhaler  Commonly known as: Dulera  Inhale 2 puffs into the lungs in the morning and 2 puffs in the evening. CHANGE how you take these medications      Entresto 24-26 MG per tablet  Generic drug: sacubitril-valsartan  Take 1 tablet by mouth 2 times daily  What changed: Another medication with the same name was removed. Continue taking this medication, and follow the directions you see here.      predniSONE 10 MG tablet  Commonly known as: DELTASONE  4 tab daily x 4d, then 3 tab daily x 4d, then 2 tab daily x 4d, then 1 tab daily x 4d  What changed:   medication strength  how much to take  how to take this  when to take this  additional instructions            CONTINUE taking these medications      levothyroxine 50 MCG tablet  Commonly known as: SYNTHROID  Take 1.5 tablets by mouth Daily     Magnesium 400 MG Tabs     metoprolol succinate 100 MG extended release tablet  Commonly known as: TOPROL XL  Take 1 tablet by mouth 2 times daily     OXYGEN     tamsulosin 0.4 MG capsule  Commonly known as: FLOMAX            STOP taking these medications      ALBUTEROL SULFATE HFA IN     apixaban 5 MG Tabs tablet  Commonly known as: Eliquis     dicyclomine 10 MG capsule  Commonly known as: BENTYL     ipratropium-albuterol 0.5-2.5 (3) MG/3ML Soln nebulizer solution  Commonly known as: DUONEB            ASK your doctor about these medications      furosemide 40 MG tablet  Commonly known as: LASIX  Take 1 tablet by mouth 2 times daily               Where to Get Your Medications        These medications were sent to [facility], [address] [place] [address], [address] [place] [address] Road      Phone: [phone]   levalbuterol 1.25 MG/3ML nebulizer solution  levalbuterol 45 MCG/ACT inhaler  mometasone-formoterol 200-5 MCG/ACT inhaler  predniSONE 10 MG tablet       Information about where to get these medications is not yet available    Ask your nurse or doctor about these medications  levothyroxine 50 MCG tablet  metoprolol succinate 100 MG extended release tablet           Allergies: Allergies   Allergen Reactions    Codeine Anxiety and Other (See Comments)     Unknown reaction       Follow up Instructions: Follow-up with PCP: Trini Cavanaugh III, DO in 2 wk .       Total time spent on day of discharge including face-to-face visit, examination, documentation, counseling, preparation of discharge plans and followup, and discharge medicine reconciliation and presciptions is 37 minutes    Signed:  Clay Duong MD  1/9/2023

## 2023-01-09 NOTE — PROGRESS NOTES
Pt awake in bed. VSS, assessment complete and medications given. Denies any needs. Call light in reach and bed alarm engaged.

## 2023-01-09 NOTE — DISCHARGE INSTRUCTIONS
Your information:  Name: Armand Cordoba  : 1950    Your instructions: Follow-up with PCP: Monika Hathaway III, DO in 2 wk . What to do after you leave the hospital:    Recommended diet: regular diet    Recommended activity: activity as tolerated        The following personal items were collected during your admission and were returned to you:    Belongings  Dental Appliances: None  Vision - Corrective Lenses: Eyeglasses, At bedside  Hearing Aid: None  Clothing: Footwear, Socks, Undergarments, At home, Pants  Jewelry: None  Electronic Devices: Cell Phone, , At bedside  Weapons (Notify Protective Services/Security): None  Home Medications: None  Valuables Given To: Patient  Provide Name(s) of Who Valuable(s) Were Given To: pt    Information obtained by:  By signing below, I understand that if any problems occur once I leave the hospital I am to contact PCP. I understand and acknowledge receipt of the instructions indicated above. Chronic Obstructive Pulmonary Disease (COPD): Care Instructions  Overview     Chronic obstructive pulmonary disease (COPD) is a lung disease that makes it hard to breathe. With COPD, the airways that lead to the lungs are narrowed, and the tiny air sacs in the lungs are damaged and lose their stretch. People with COPD have decreased airflow in and out of the lungs, which makes it hard to breathe. The airways also can get clogged with thick mucus. Cigarette smoking is a major cause of COPD. Although there is no cure for COPD, you can slow its progress. Following your treatment plan and taking care of yourself can help you feel better and live longer. Follow-up care is a key part of your treatment and safety. Be sure to make and go to all appointments, and call your doctor if you are having problems. It's also a good idea to know your test results and keep a list of the medicines you take. How can you care for yourself at home?   Staying healthy    Do not smoke. This is the most important step you can take to prevent more damage to your lungs. If you need help quitting, talk to your doctor about stop-smoking programs and medicines. These can increase your chances of quitting for good. Avoid infections such as COVID-19, colds, and the flu. Wash your hands often. Get the pneumococcal and whooping cough (pertussis) vaccines. If you have had these vaccines before, ask your doctor if you need another dose. Get the flu vaccine every fall. Stay up to date on your COVID-19 vaccines. Avoid secondhand smoke and air pollution. Try to stay inside with your windows closed when air pollution is bad. Medicines and oxygen therapy    Take your medicines exactly as prescribed. Call your doctor if you think you are having a problem with your medicine. You may be taking medicines such as:  Bronchodilators. These help open your airways and make breathing easier. They are either short-acting (work for 4 to 9 hours) or long-acting (work for 12 to 24 hours). You inhale most bronchodilators, so they start to act quickly. Always carry your quick-relief inhaler with you in case you need it. Corticosteroids (prednisone, budesonide). These reduce airway inflammation. They come in inhaled or pill form. Ask your doctor or pharmacist if you are using each type of inhaler correctly. With correct use, the medicine is more likely to get to your lungs. See if a spacer is right for you. A spacer may also help you get more inhaled medicine to your lungs. If you use one, ask how to use it properly. Do not take any vitamins, over-the-counter medicine, or herbal products without talking to your doctor first.     If your doctor prescribed antibiotics, take them as directed. Do not stop taking them just because you feel better. You need to take the full course of antibiotics. If you use oxygen therapy, use the flow rate your doctor has recommended.  Don't change it without talking to your doctor first. Oxygen therapy boosts the amount of oxygen in your blood and helps you breathe easier. Activity    Get regular exercise. Walking is an easy way to get exercise. Start out slowly, and walk a little more each day. Pay attention to your breathing. You are exercising too hard if you can't talk while you exercise. Take short rest breaks when doing household chores and other activities. Learn breathing methods--such as breathing through pursed lips--to help you become less short of breath. If your doctor has not set you up with a pulmonary rehabilitation program, ask if rehab is right for you. Rehab includes exercise programs, education about your disease and how to manage it, help with diet and other changes, and emotional support. Diet    Eat regular, healthy meals. Use bronchodilators about 1 hour before you eat to make it easier to eat. Eat several smaller, frequent meals to prevent getting too full. A full stomach can push on the muscle that helps you breathe (your diaphragm) and make it harder to breathe. Drink beverages at the end of the meal.     Avoid foods that are hard to chew. Eat foods that contain protein so you don't lose muscle mass. Talk with your doctor if you gain too much weight or if you lose weight without trying. Mental health    Talk to your family, friends, or a therapist about your feelings. Some people feel frightened, angry, hopeless, helpless, and even guilty. Talking openly about feelings may help you cope. If these feelings last, talk to your doctor. When should you call for help? Call 911 anytime you think you may need emergency care. For example, call if:    You have severe trouble breathing. You have severe chest pain. Call your doctor now or seek immediate medical care if:    You have new or worse trouble breathing. You have new or worse chest pain. You cough up blood. You have a fever.    Watch closely for changes in your health, and be sure to contact your doctor if:    You cough more deeply or more often, especially if you notice more mucus or a change in the color of your mucus. You have new or worse swelling in your legs or belly. You have feelings of anxiety or depression. You need to use your antibiotic or steroid pills. You are not getting better as expected. Where can you learn more? Go to http://www.woods.com/ and enter X915 to learn more about \"Chronic Obstructive Pulmonary Disease (COPD): Care Instructions. \"  Current as of: March 9, 2022               Content Version: 13.5  © 2940-0478 [employer], [employer]. Care instructions adapted under license by [facility] ([facility]). If you have questions about a medical condition or this instruction, always ask your healthcare professional. [address] any warranty or liability for your use of this information. All home medications have been reviewed, questions answered and patient voiced understanding. We thank you for choosing [facility]. We hope that you always had a positive experience and that we always provided the very best care during your stay. We hope that you will always come back to [facility].

## 2023-01-09 NOTE — PROGRESS NOTES
Discharge instructions, medications and follow up appointments reviewed with pt. Pt denies any questions at this time. IV removed intact with no complications and dry dressing applied. All pt belongings gathered and put into bags. Wife at bedside to drive pt home.

## 2023-01-31 ENCOUNTER — OFFICE VISIT (OUTPATIENT)
Dept: PULMONOLOGY | Age: 73
End: 2023-01-31
Payer: COMMERCIAL

## 2023-01-31 VITALS — OXYGEN SATURATION: 98 % | HEART RATE: 82 BPM

## 2023-01-31 DIAGNOSIS — J96.11 CHRONIC HYPOXEMIC RESPIRATORY FAILURE (HCC): ICD-10-CM

## 2023-01-31 DIAGNOSIS — J43.2 CENTRILOBULAR EMPHYSEMA (HCC): ICD-10-CM

## 2023-01-31 DIAGNOSIS — J44.9 COPD, VERY SEVERE (HCC): Primary | ICD-10-CM

## 2023-01-31 DIAGNOSIS — I42.8 NONISCHEMIC CARDIOMYOPATHY (HCC): Chronic | ICD-10-CM

## 2023-01-31 PROCEDURE — 99214 OFFICE O/P EST MOD 30 MIN: CPT | Performed by: INTERNAL MEDICINE

## 2023-01-31 PROCEDURE — G9692 HOSP RECD BY PT DUR MSMT PER: HCPCS | Performed by: INTERNAL MEDICINE

## 2023-01-31 RX ORDER — BUDESONIDE AND FORMOTEROL FUMARATE DIHYDRATE 160; 4.5 UG/1; UG/1
2 AEROSOL RESPIRATORY (INHALATION) 2 TIMES DAILY
COMMUNITY

## 2023-01-31 RX ORDER — BUDESONIDE, GLYCOPYRROLATE, AND FORMOTEROL FUMARATE 160; 9; 4.8 UG/1; UG/1; UG/1
2 AEROSOL, METERED RESPIRATORY (INHALATION) 2 TIMES DAILY
Qty: 1 EACH | Refills: 5 | Status: SHIPPED | OUTPATIENT
Start: 2023-01-31

## 2023-01-31 NOTE — PROGRESS NOTES
[facility]  Progress Note  Ale Floyd MD       Anna Beverly   YOB: 1950    Date of Visit:  1/31/2023    Assessment/Plan:  1. SOB (shortness of breath) and Cough  Worse  He was in hospice for a time but is not any longer  He is still very SOB  He does have cardiac issues but EF has actually improved from previous  Most of his SOB is coming from advanced COPD and Emphysema    2. COPD, severe (HCC)/Emphysema  PFT 5/17:  Spirometry reveals decreased FVC at 3.4 liters which is 72% predicted. FEV1 is decreased at 1.17 liters which is 33% predicted. FEV1/FVC ratio is reduced at 35%. Lung volumes reveal increased total lung capacity at 129% predicted. Residual volume is increased at 212% predicted. Diffusion capacity is normal.     IMPRESSION:  Very severe obstructive lung disease with hyperinflation. Medication Management:  The patient benefits from the medical regimen and reports no complications. Symbicort ==> Breztri 2 puffs bid  Continue Xpenex prn    He asked about the [place] valve. I reviewed recent CT imaging from 10/22 and my impression is homogenous emphysema that is unlikely to benefit from [place]. 3. Nonischemic cardiomyopathy ([address]  Improved  EF now 60%  Follows with Cardiology    4. Tobacco use  Remains a nonsmoker    5. Chronic Hypoxemic Reparatory Failure  Oxygen saturation on room air at rest in the office today is 84%. The patient would benefit from supplemental oxygen with exertion and sleep  The patient is independently mobile within the home and would benefit from a portable oxygen concentrator  Get overnight oximetry on 3 lpm to see if he needs more with sleep. FOLLOW UP: 6 months      Chief Complaint   Patient presents with    Shortness of Breath     Was recently in hospital for        HPI  The patient presents with a chief complaint of severe shortness of breath related to very severe  COPD of many years duration. He has mild associated cough. Exertion is a modifying factor. He was hospitalized in January '23 with a COPD exacerbation. He is on Symbicort and Xopenex. Trelegy was too expensive. Review of Systems  No Chest pain, Nausea or vomiting reported    Physical Exam:  Well developed, well nourished  Alert and oriented  Sclera is clear  No cervical adenopathy  No JVD. Chest examination is clear. Cardiac examination reveals regular rate and rhythm without murmur, gallop or rub. The abdomen is soft, nontender and nondistended. There is no clubbing, cyanosis or edema of the extremities. There is no obvious skin rash. No focal neuro deficicts  Normal mood and affect    Allergies   Allergen Reactions    Codeine Anxiety and Other (See Comments)     Unknown reaction     Prior to Visit Medications    Medication Sig Taking?  Authorizing Provider   budesonide-formoterol (SYMBICORT) 160-4.5 MCG/ACT AERO Inhale 2 puffs into the lungs 2 times daily Yes Historical Provider, MD   metoprolol succinate (TOPROL XL) 100 MG extended release tablet Take 1 tablet by mouth 2 times daily Yes Nisreen Akers MD   levothyroxine (SYNTHROID) 50 MCG tablet Take 1.5 tablets by mouth Daily Yes Nisreen Akers MD   levalbuterol Glo Coad) 1.25 MG/3ML nebulizer solution Take 3 mLs by nebulization every 4 hours (while awake) Yes MD kirti Brizuelabuteronaseem Cody Coad HFA) 45 MCG/ACT inhaler Inhale 1 puff into the lungs every 4 hours as needed for Wheezing Yes Nisreen Akers MD   sacubitril-valsartan (ENTRESTO) 24-26 MG per tablet Take 1 tablet by mouth 2 times daily Yes CHELSY Lopez - CNP   tamsulosin (FLOMAX) 0.4 MG capsule Take 0.4 mg by mouth nightly Yes Historical Provider, MD   OXYGEN Inhale 3 L into the lungs as needed  Yes Historical Provider, MD   Magnesium 400 MG TABS Take 200 mg by mouth nightly  Historical Provider, MD   furosemide (LASIX) 40 MG tablet Take 1 tablet by mouth 2 times daily  Patient taking differently: Take 60 mg by mouth daily as needed  Richie Núñez MD   dicyclomine (BENTYL) 10 MG capsule Take 1 capsule by mouth 4 times daily  Patient not taking: No sig reported  Zoe Kahn MD       There were no vitals filed for this visit. There is no height or weight on file to calculate BMI.      Wt Readings from Last 3 Encounters:   23 128 lb 1.6 oz (58.1 kg)   22 130 lb (59 kg)   22 137 lb 12.8 oz (62.5 kg)     BP Readings from Last 3 Encounters:   23 117/76   22 130/60   22 (!) 90/52        Social History     Tobacco Use   Smoking Status Former    Packs/day: 1.00    Years: 30.00    Pack years: 30.00    Types: Cigarettes    Quit date: 2016    Years since quittin.4   Smokeless Tobacco Former    Quit date: 2016

## 2023-02-01 ENCOUNTER — TELEPHONE (OUTPATIENT)
Dept: PULMONOLOGY | Age: 73
End: 2023-02-01

## 2023-02-01 NOTE — TELEPHONE ENCOUNTER
Pt called and wants to know if he should be taking BREZTRI and SYMBICORT  At the same time.      Ph # [phone]

## 2023-02-03 NOTE — PROGRESS NOTES
[address]   Electrophysiology Follow up     Date:2/13/2023    I had the privilege of visiting Landry Pham in the office. CC: AF     HPI: Landry Pham is a 71 y.o. male  with a past medical history of COPD paroxsymal AF, NICM, HFrEF with recovered LVEF, COPD, chronic hypoxic respiratory failure, and PVD s/p fem/pop bypass. Patient was initially diagnosed with atrial fibrillation after presenting to the ED in [place] with atrial fibrillation and decompensated HF. Underwent successful DCCV. Admit for acute on chronic hypoxic respiratory failure. Abdulaziz Bearden presents to the office today for follow up. On home 02, no change in chronic dyspnea, denies palpitations or racing heart. Has been taking Eliquis intermittently due to cost.     Review of System:  [x] Full ROS obtained and negative except as mentioned in HPI      Prior to Admission medications    Medication Sig Start Date End Date Taking?  Authorizing Provider   budesonide-formoterol (SYMBICORT) 160-4.5 MCG/ACT AERO Inhale 2 puffs into the lungs 2 times daily    Historical Provider, MD   Budeson-Glycopyrrol-Formoterol (BREZTRI AEROSPHERE) 160-9-4.8 MCG/ACT AERO Inhale 2 puffs into the lungs 2 times daily 1/31/23   Anne-Marie Read MD   metoprolol succinate (TOPROL XL) 100 MG extended release tablet Take 1 tablet by mouth 2 times daily 1/9/23   Moncho Jackson MD   levothyroxine (SYNTHROID) 50 MCG tablet Take 1.5 tablets by mouth Daily 1/9/23   Moncho Jackson MD   levalbuterol Leyda Eason) 1.25 MG/3ML nebulizer solution Take 3 mLs by nebulization every 4 hours (while awake) 1/9/23   Moncho Jackson MD   levalbuterol Leyda Biccaprice HFA) 45 MCG/ACT inhaler Inhale 1 puff into the lungs every 4 hours as needed for Wheezing 1/9/23 1/9/24  Moncho Jackson MD   Magnesium 400 MG TABS Take 200 mg by mouth nightly    Historical Provider, MD   sacubitril-valsartan (ENTRESTO) 24-26 MG per tablet Take 1 tablet by mouth 2 times daily 11/4/22   Lynn Engel APRN - CNP   furosemide (LASIX) 40 MG tablet Take 1 tablet by mouth 2 times daily  Patient taking differently: Take 60 mg by mouth daily as needed 11/1/22   Immanuel Farooq MD   tamsulosin (FLOMAX) 0.4 MG capsule Take 0.4 mg by mouth nightly    Historical Provider, MD   dicyclomine (BENTYL) 10 MG capsule Take 1 capsule by mouth 4 times daily  Patient not taking: No sig reported 6/20/22 5/8/40  Junito Jaramillo MD   OXYGEN Inhale 3 L into the lungs as needed     Historical Provider, MD       Past Medical History:   Diagnosis Date    Bronchitis     CHF (congestive heart failure) [address]     COPD (chronic obstructive pulmonary disease) [address]         Past Surgical History:   Procedure Laterality Date    CHOLECYSTECTOMY, LAPAROSCOPIC N/A 3/17/2021    LAPAROSCOPIC CHOLECYSTECTOMY WITH INTRAOPERATIVE CHOLANGIOGRAM performed by Linwood Urena MD at [address] [place] [address]      bilateral cataracts    FEMORAL BYPASS Left 6/1/2021    LEFT FEMORAL TO POPLITEAL BYPASS GRAFT (97864) [place] [address]      LT elbow    TONSILLECTOMY         Allergies   Allergen Reactions    Codeine Anxiety and Other (See Comments)     Unknown reaction       Social History:  Reviewed. reports that he quit smoking about 6 years ago. His smoking use included cigarettes. He has a 30.00 pack-year smoking history. He quit smokeless tobacco use about 6 years ago. He reports current alcohol use of about 1.0 - 3.0 standard drink per week. He reports that he does not use drugs. Family History:  Reviewed. No family history of SCD.         Physical Examination:  Vitals:    02/13/23 1438   BP: 104/60      Wt Readings from Last 3 Encounters:   01/09/23 128 lb 1.6 oz (58.1 kg)   11/14/22 130 lb (59 kg)   11/04/22 137 lb 12.8 oz (62.5 kg)     No acute distress  Moist mucosa, nonicteric conjunctiva  Diminished lung sounds, nonlabored, no rhonchi or wheeze  Heart is regular rate, regular rhythm, no murmurs, no lower extremity swelling, no jugular venous distention  Abdomen is rounded, soft, nontender  Strength is grossly preserved, normal tone  No focal neurologic deficits  Appropriate mood and affect  No rashes or ecchymoses      Labs:  Lab Results   Component Value Date    ALT 12 01/07/2023    AST 14 (L) 01/07/2023     (L) 01/09/2023    K 4.4 01/09/2023    HGB 10.2 (L) 01/09/2023    CL 92 (L) 01/09/2023    CREATININE 1.1 01/09/2023    BUN 31 (H) 01/09/2023    TSH 13.48 (H) 10/31/2022    INR 1.00 01/06/2023     Imaging:  ECG 2/13/2023  SR with PVC's    Echo 10/31/22   Summary   Technically difficult study due to thin body frame and poor acoustical   window. Left ventricular cavity size and wall thickness is normal. Ejection fraction   is visually estimated to be 60%. Abnormal septal motion. Normal diastolic function. E/e' = 7.5. Aortic valve appears sclerotic but opens adequately. The right ventricle is normal in size and function. TAPSE: 2.72 cm. RVS   velocity: 13.4 cm/s. Chest CT 10/14/22  Impression   1. Negative for pulmonary embolus. 2. Emphysema with trace left pleural effusion. Stress Test 08/02/22   Summary    -There is a small fixed apical defect that is likely bowel artifact rather    than scar.    -There is no ischemia.    -There is mild global hypokinesis with EF=47%. -Intermediate risk study related to EF < 50%. Echo 03/11/22  Summary   Technically limited study due to patient body habitus and lung interference   as well as frequent PVCs. Estimated ejection fraction of 45-50%. Mildly diminished left ventricular systolic function. Image quality inadequate to rule out regional wall motion abnormalities. Mild septal left ventricular hypertrophy. Normal left ventricle size. Grade I diastolic dysfunction with normal LV filling pressures. The aortic valve appears sclerotic but opens well. Mild to moderate tricuspid valve regurgitation.    The inferior vena cava is dilated with respiratory variation greater than   50% consistent with CVP 10-15 mmHg      Assessment:    Paroxysmal Atrial Fibrillation   - dx in [place]  - ECG today sinus rhythm with PVC's   - Continue Toprol  mg QD  - not currently on Eliquis 5 mg due to cost , will assistant patient with samples today and provide assistance application, needs chronic anticoagulation, [id number] Score of 3 (age,chf,pvd), can also consider warfarin  - remains paroxsymal, no AF on most recent admission to hospital for HF, continue to monitor, should he have recurrence then aggressive rhythm control strategy due to symptoms and HF admission, we discussed AAD and/or ablation. Nonischemic Cardiomyopathy, recovered LVEF  - EF of 60% per echo 10/31/22  - EF previously 45-5-% per echo 3/11/22  - Continue on Toprol  mg QD and Entresto 24-26 mg BID, follows with HF team     Eliquis refilled, payment assistance provided, 6 month follow up     Scribe attestation: This note was scribed in the presence of Jessenia Mckeon MD by Darius Aquino LPN    Physician Attestation: I, Dr. Jessenia Mckeon, confirm that the scribe's documentation has been prepared under my direction and personally reviewed by me in its entirety. I also confirm that the note above accurately reflects all work, treatment, procedures, and medical decision making performed by me. NOTE: This report was transcribed using voice recognition software. Every effort was made to ensure accuracy, however, inadvertent computerized transcription errors may be present.      Jessenia Mckeon MD  [facility]   Office: [phone]  Fax: [phone]

## 2023-02-13 ENCOUNTER — OFFICE VISIT (OUTPATIENT)
Dept: CARDIOLOGY CLINIC | Age: 73
End: 2023-02-13

## 2023-02-13 VITALS
DIASTOLIC BLOOD PRESSURE: 60 MMHG | HEIGHT: 71 IN | WEIGHT: 126 LBS | SYSTOLIC BLOOD PRESSURE: 104 MMHG | BODY MASS INDEX: 17.64 KG/M2

## 2023-02-13 DIAGNOSIS — I48.19 PERSISTENT ATRIAL FIBRILLATION (HCC): Primary | ICD-10-CM

## 2023-03-07 ENCOUNTER — HOSPITAL ENCOUNTER (OUTPATIENT)
Age: 73
Discharge: HOME | End: 2023-03-07
Payer: COMMERCIAL

## 2023-03-07 LAB
HCT VFR BLD CALC: 34.1 % (ref 40.5–52.5)
HEMOGLOBIN: 11.1 G/DL (ref 13.5–17.5)
MCH RBC QN AUTO: 29.3 PG (ref 26–34)
MCHC RBC AUTO-ENTMCNC: 32.5 G/DL (ref 31–36)
MCV RBC AUTO: 90.2 FL (ref 80–100)
OSMOLALITY URINE: 443 MOSM/KG (ref 390–1070)
OSMOLALITY: 291 MOSM/KG (ref 280–301)
PDW BLD-RTO: 15.5 % (ref 12.4–15.4)
PLATELET # BLD: 234 K/UL (ref 135–450)
PMV BLD AUTO: 8.9 FL (ref 5–10.5)
RBC # BLD: 3.78 M/UL (ref 4.2–5.9)
SODIUM URINE: 64 MMOL/L
WBC # BLD: 5.5 K/UL (ref 4–11)

## 2023-03-07 PROCEDURE — 84300 ASSAY OF URINE SODIUM: CPT

## 2023-03-07 PROCEDURE — 84443 ASSAY THYROID STIM HORMONE: CPT

## 2023-03-07 PROCEDURE — 83930 ASSAY OF BLOOD OSMOLALITY: CPT

## 2023-03-07 PROCEDURE — 80053 COMPREHEN METABOLIC PANEL: CPT

## 2023-03-07 PROCEDURE — 85027 COMPLETE CBC AUTOMATED: CPT

## 2023-03-07 PROCEDURE — 86141 C-REACTIVE PROTEIN HS: CPT

## 2023-03-07 PROCEDURE — 36415 COLL VENOUS BLD VENIPUNCTURE: CPT

## 2023-03-07 PROCEDURE — 83935 ASSAY OF URINE OSMOLALITY: CPT

## 2023-03-07 PROCEDURE — 84439 ASSAY OF FREE THYROXINE: CPT

## 2023-03-07 PROCEDURE — 83090 ASSAY OF HOMOCYSTEINE: CPT

## 2023-03-07 PROCEDURE — 84481 FREE ASSAY (FT-3): CPT

## 2023-03-07 PROCEDURE — 80061 LIPID PANEL: CPT

## 2023-03-08 LAB
A/G RATIO: 1.5 (ref 1.1–2.2)
ALBUMIN SERPL-MCNC: 3.9 G/DL (ref 3.4–5)
ALP BLD-CCNC: 91 U/L (ref 40–129)
ALT SERPL-CCNC: 13 U/L (ref 10–40)
ANION GAP SERPL CALCULATED.3IONS-SCNC: 11 MMOL/L (ref 3–16)
AST SERPL-CCNC: 18 U/L (ref 15–37)
BILIRUB SERPL-MCNC: <0.2 MG/DL (ref 0–1)
BUN BLDV-MCNC: 15 MG/DL (ref 7–20)
C-REACTIVE PROTEIN, HIGH SENSITIVITY: 1.03 MG/L (ref 0.16–3)
CALCIUM SERPL-MCNC: 9.1 MG/DL (ref 8.3–10.6)
CHLORIDE BLD-SCNC: 96 MMOL/L (ref 99–110)
CHOLESTEROL, TOTAL: 211 MG/DL (ref 0–199)
CO2: 28 MMOL/L (ref 21–32)
CREAT SERPL-MCNC: 0.9 MG/DL (ref 0.8–1.3)
GFR SERPL CREATININE-BSD FRML MDRD: >60 ML/MIN/{1.73_M2}
GLUCOSE BLD-MCNC: 94 MG/DL (ref 70–99)
HDLC SERPL-MCNC: 86 MG/DL (ref 40–60)
HOMOCYSTEINE: 11 UMOL/L (ref 0–10)
LDL CHOLESTEROL CALCULATED: 118 MG/DL
POTASSIUM SERPL-SCNC: 4.9 MMOL/L (ref 3.5–5.1)
SODIUM BLD-SCNC: 135 MMOL/L (ref 136–145)
T3 FREE: 1.9 PG/ML (ref 2.3–4.2)
T4 FREE: 1.7 NG/DL (ref 0.9–1.8)
TOTAL PROTEIN: 6.5 G/DL (ref 6.4–8.2)
TRIGL SERPL-MCNC: 36 MG/DL (ref 0–150)
TSH SERPL DL<=0.05 MIU/L-ACNC: 2.35 UIU/ML (ref 0.27–4.2)
VLDLC SERPL CALC-MCNC: 7 MG/DL

## 2023-06-01 ENCOUNTER — OFFICE VISIT (OUTPATIENT)
Dept: CARDIOLOGY CLINIC | Age: 73
End: 2023-06-01

## 2023-06-01 VITALS
BODY MASS INDEX: 18.34 KG/M2 | HEART RATE: 50 BPM | DIASTOLIC BLOOD PRESSURE: 64 MMHG | WEIGHT: 131 LBS | OXYGEN SATURATION: 85 % | HEIGHT: 71 IN | SYSTOLIC BLOOD PRESSURE: 106 MMHG

## 2023-06-01 DIAGNOSIS — I48.19 PERSISTENT ATRIAL FIBRILLATION (HCC): Primary | ICD-10-CM

## 2023-06-01 RX ORDER — FUROSEMIDE 40 MG/1
TABLET ORAL
Qty: 30 TABLET | Refills: 1 | Status: SHIPPED | OUTPATIENT
Start: 2023-06-01

## 2023-06-16 ENCOUNTER — PATIENT MESSAGE (OUTPATIENT)
Dept: CARDIOLOGY CLINIC | Age: 73
End: 2023-06-16

## 2023-06-19 ENCOUNTER — TELEPHONE (OUTPATIENT)
Dept: PHARMACY | Age: 73
End: 2023-06-19

## 2023-06-19 RX ORDER — WARFARIN SODIUM 5 MG/1
5 TABLET ORAL DAILY
Qty: 30 TABLET | Refills: 5 | Status: SHIPPED | OUTPATIENT
Start: 2023-06-19

## 2023-06-19 NOTE — TELEPHONE ENCOUNTER
Verified Rosamaria Almonte signed ref. Need to find out if pt has p/u warfarin RF yet to schedule initial appt.

## 2023-06-19 NOTE — TELEPHONE ENCOUNTER
Received signed ref from [facility]. Re faxed asking to verify MDs signature.

## 2023-06-20 NOTE — TELEPHONE ENCOUNTER
Lvm asking pt to call CC to schedule initial appt, need to find out if he p/u warfarin yet and start date

## 2023-06-22 NOTE — TELEPHONE ENCOUNTER
Patient returned out call. He has the warfarin however, he has not decided if he will be taking it or not. He states he has an appt with Dr. Meggan Colvin in a couple of weeks and will discuss with the physician at that time.

## 2023-08-22 ENCOUNTER — HOSPITAL ENCOUNTER (INPATIENT)
Age: 73
LOS: 3 days | Discharge: HOME | End: 2023-08-25
Attending: EMERGENCY MEDICINE | Admitting: SURGERY
Payer: COMMERCIAL

## 2023-08-22 ENCOUNTER — APPOINTMENT (OUTPATIENT)
Dept: GENERAL RADIOLOGY | Age: 73
End: 2023-08-22
Payer: COMMERCIAL

## 2023-08-22 DIAGNOSIS — I50.9 ACUTE CONGESTIVE HEART FAILURE, UNSPECIFIED HEART FAILURE TYPE (HCC): Primary | ICD-10-CM

## 2023-08-22 DIAGNOSIS — R06.02 SHORTNESS OF BREATH: ICD-10-CM

## 2023-08-22 PROBLEM — I50.43 CHF (CONGESTIVE HEART FAILURE), NYHA CLASS I, ACUTE ON CHRONIC, COMBINED (HCC): Status: ACTIVE | Noted: 2023-08-22

## 2023-08-22 LAB
ANION GAP SERPL CALCULATED.3IONS-SCNC: 2 MMOL/L (ref 3–16)
APTT BLD: 35 SEC (ref 22.7–35.9)
BASOPHILS # BLD: 0 K/UL (ref 0–0.2)
BASOPHILS NFR BLD: 0.4 %
BUN SERPL-MCNC: 11 MG/DL (ref 7–20)
CALCIUM SERPL-MCNC: 9.2 MG/DL (ref 8.3–10.6)
CHLORIDE SERPL-SCNC: 84 MMOL/L (ref 99–110)
CO2 SERPL-SCNC: 41 MMOL/L (ref 21–32)
CREAT SERPL-MCNC: 0.6 MG/DL (ref 0.8–1.3)
DEPRECATED RDW RBC AUTO: 13.3 % (ref 12.4–15.4)
EOSINOPHIL # BLD: 0 K/UL (ref 0–0.6)
EOSINOPHIL NFR BLD: 0.5 %
GFR SERPLBLD CREATININE-BSD FMLA CKD-EPI: >60 ML/MIN/{1.73_M2}
GLUCOSE SERPL-MCNC: 94 MG/DL (ref 70–99)
HCT VFR BLD AUTO: 32.2 % (ref 40.5–52.5)
HGB BLD-MCNC: 10.6 G/DL (ref 13.5–17.5)
INR PPP: 1.09 (ref 0.84–1.16)
LYMPHOCYTES # BLD: 0.9 K/UL (ref 1–5.1)
LYMPHOCYTES NFR BLD: 17 %
MAGNESIUM SERPL-MCNC: 1.9 MG/DL (ref 1.8–2.4)
MCH RBC QN AUTO: 30.2 PG (ref 26–34)
MCHC RBC AUTO-ENTMCNC: 33 G/DL (ref 31–36)
MCV RBC AUTO: 91.4 FL (ref 80–100)
MONOCYTES # BLD: 0.7 K/UL (ref 0–1.3)
MONOCYTES NFR BLD: 11.8 %
NEUTROPHILS # BLD: 3.9 K/UL (ref 1.7–7.7)
NEUTROPHILS NFR BLD: 70.3 %
NT-PROBNP SERPL-MCNC: ABNORMAL PG/ML (ref 0–124)
PLATELET # BLD AUTO: 213 K/UL (ref 135–450)
PMV BLD AUTO: 8.1 FL (ref 5–10.5)
POTASSIUM SERPL-SCNC: 4.8 MMOL/L (ref 3.5–5.1)
PROCALCITONIN SERPL IA-MCNC: 0.04 NG/ML (ref 0–0.15)
PROTHROMBIN TIME: 14.1 SEC (ref 11.5–14.8)
RBC # BLD AUTO: 3.52 M/UL (ref 4.2–5.9)
SODIUM SERPL-SCNC: 127 MMOL/L (ref 136–145)
TROPONIN, HIGH SENSITIVITY: 40 NG/L (ref 0–22)
TROPONIN, HIGH SENSITIVITY: 46 NG/L (ref 0–22)
WBC # BLD AUTO: 5.5 K/UL (ref 4–11)

## 2023-08-22 PROCEDURE — 6370000000 HC RX 637 (ALT 250 FOR IP): Performed by: EMERGENCY MEDICINE

## 2023-08-22 PROCEDURE — 1200000000 HC SEMI PRIVATE

## 2023-08-22 PROCEDURE — 80048 BASIC METABOLIC PNL TOTAL CA: CPT

## 2023-08-22 PROCEDURE — 84145 PROCALCITONIN (PCT): CPT

## 2023-08-22 PROCEDURE — 99285 EMERGENCY DEPT VISIT HI MDM: CPT

## 2023-08-22 PROCEDURE — 83880 ASSAY OF NATRIURETIC PEPTIDE: CPT

## 2023-08-22 PROCEDURE — 83735 ASSAY OF MAGNESIUM: CPT

## 2023-08-22 PROCEDURE — 85730 THROMBOPLASTIN TIME PARTIAL: CPT

## 2023-08-22 PROCEDURE — 84484 ASSAY OF TROPONIN QUANT: CPT

## 2023-08-22 PROCEDURE — 96374 THER/PROPH/DIAG INJ IV PUSH: CPT

## 2023-08-22 PROCEDURE — 93005 ELECTROCARDIOGRAM TRACING: CPT | Performed by: EMERGENCY MEDICINE

## 2023-08-22 PROCEDURE — 71045 X-RAY EXAM CHEST 1 VIEW: CPT

## 2023-08-22 PROCEDURE — 6360000002 HC RX W HCPCS: Performed by: EMERGENCY MEDICINE

## 2023-08-22 PROCEDURE — 85610 PROTHROMBIN TIME: CPT

## 2023-08-22 PROCEDURE — 85025 COMPLETE CBC W/AUTO DIFF WBC: CPT

## 2023-08-22 PROCEDURE — 36415 COLL VENOUS BLD VENIPUNCTURE: CPT

## 2023-08-22 RX ORDER — FUROSEMIDE 10 MG/ML
40 INJECTION INTRAMUSCULAR; INTRAVENOUS ONCE
Status: COMPLETED | OUTPATIENT
Start: 2023-08-22 | End: 2023-08-22

## 2023-08-22 RX ORDER — FLUOXETINE HYDROCHLORIDE 40 MG/1
40 CAPSULE ORAL DAILY
COMMUNITY

## 2023-08-22 RX ORDER — IPRATROPIUM BROMIDE AND ALBUTEROL SULFATE 2.5; .5 MG/3ML; MG/3ML
1 SOLUTION RESPIRATORY (INHALATION) EVERY 6 HOURS PRN
COMMUNITY

## 2023-08-22 RX ORDER — NITROGLYCERIN 0.4 MG/1
0.4 TABLET SUBLINGUAL ONCE
Status: DISCONTINUED | OUTPATIENT
Start: 2023-08-22 | End: 2023-08-25 | Stop reason: HOSPADM

## 2023-08-22 RX ORDER — SODIUM CHLORIDE 1 G/1
1 TABLET ORAL ONCE
Status: COMPLETED | OUTPATIENT
Start: 2023-08-22 | End: 2023-08-22

## 2023-08-22 RX ADMIN — Medication 1 G: at 20:44

## 2023-08-22 RX ADMIN — FUROSEMIDE 40 MG: 10 INJECTION, SOLUTION INTRAMUSCULAR; INTRAVENOUS at 19:37

## 2023-08-22 ASSESSMENT — ENCOUNTER SYMPTOMS
RHINORRHEA: 0
SHORTNESS OF BREATH: 1
ABDOMINAL PAIN: 0
DIARRHEA: 0
CONSTIPATION: 0
EYE REDNESS: 0
BACK PAIN: 0
EYE PAIN: 0
VOMITING: 0
NAUSEA: 0
COUGH: 1

## 2023-08-22 ASSESSMENT — PAIN - FUNCTIONAL ASSESSMENT: PAIN_FUNCTIONAL_ASSESSMENT: NONE - DENIES PAIN

## 2023-08-22 ASSESSMENT — LIFESTYLE VARIABLES
HOW MANY STANDARD DRINKS CONTAINING ALCOHOL DO YOU HAVE ON A TYPICAL DAY: PATIENT DOES NOT DRINK
HOW OFTEN DO YOU HAVE A DRINK CONTAINING ALCOHOL: NEVER

## 2023-08-22 ASSESSMENT — PAIN SCALES - GENERAL: PAINLEVEL_OUTOF10: 0

## 2023-08-22 NOTE — ED PROVIDER NOTES
[address] [facility]        Pt Name: Valerie Dennison  MRN: [id number]  [address] [facility] 1950  Date of evaluation: 2023  Provider: Maris Nails DO  PCP: Saranya Reyes III, DO  Note Started: 5:22 PM EDT 23    CHIEF COMPLAINT      SOB    HISTORY OF PRESENT ILLNESS: 1 or more Elements     Chief Complaint   Patient presents with    Shortness of Breath     Pt w c/o SOB from . Pt on 3 lpm via NC (baseline) Pt w Dx emphysema     History from : Patient  Limitations to history : None    Valerie Dennison is a 68 y.o. male who presents to the emergency department secondary to concern for shortness of breath. Reports has been going on for few days and getting worse. He has a history of COPD and heart failure and uses 3 L of oxygen at baseline. Patient currently requiring 3 L of oxygen per nasal cannula in the ED. Patient reports that he stopped taking his Lasix for the last for 5 days because she keeps having to go to the bathroom. Denies any chest pain or fever. Admits to a cough productive of clear sputum. Patient's symptoms are exacerbated when he lies flat and better when he leans forward. Past medical history noted below. Aside from what is stated above denies any other symptoms or modifying factors. reports that he quit smoking about 6 years ago. His smoking use included cigarettes. He has a 30.00 pack-year smoking history. He quit smokeless tobacco use about 6 years ago. He reports that he does not currently use alcohol after a past usage of about 1.0 - 3.0 standard drink per week. He reports that he does not use drugs. Nursing Notes were all reviewed and agreed with or any disagreements addressed in HPI/MDM. REVIEW OF SYSTEMS :    Review of Systems   Constitutional:  Negative for chills and fever. HENT:  Negative for congestion and rhinorrhea. Eyes:  Negative for pain and redness. Respiratory:  Positive for cough and shortness of breath.

## 2023-08-23 PROBLEM — I48.91 ATRIAL FIBRILLATION WITH RAPID VENTRICULAR RESPONSE (HCC): Status: ACTIVE | Noted: 2023-08-23

## 2023-08-23 PROBLEM — D64.9 ANEMIA: Status: ACTIVE | Noted: 2023-08-23

## 2023-08-23 LAB
ANION GAP SERPL CALCULATED.3IONS-SCNC: 7 MMOL/L (ref 3–16)
BUN SERPL-MCNC: 10 MG/DL (ref 7–20)
CALCIUM SERPL-MCNC: 9 MG/DL (ref 8.3–10.6)
CHLORIDE SERPL-SCNC: 82 MMOL/L (ref 99–110)
CHOLEST SERPL-MCNC: 169 MG/DL (ref 0–199)
CO2 SERPL-SCNC: 40 MMOL/L (ref 21–32)
CREAT SERPL-MCNC: 0.6 MG/DL (ref 0.8–1.3)
EKG ATRIAL RATE: 141 BPM
EKG ATRIAL RATE: 73 BPM
EKG DIAGNOSIS: NORMAL
EKG DIAGNOSIS: NORMAL
EKG P AXIS: 73 DEGREES
EKG P-R INTERVAL: 118 MS
EKG Q-T INTERVAL: 316 MS
EKG Q-T INTERVAL: 378 MS
EKG QRS DURATION: 88 MS
EKG QRS DURATION: 90 MS
EKG QTC CALCULATION (BAZETT): 416 MS
EKG QTC CALCULATION (BAZETT): 484 MS
EKG R AXIS: 69 DEGREES
EKG R AXIS: 81 DEGREES
EKG T AXIS: 62 DEGREES
EKG T AXIS: 68 DEGREES
EKG VENTRICULAR RATE: 141 BPM
EKG VENTRICULAR RATE: 73 BPM
GFR SERPLBLD CREATININE-BSD FMLA CKD-EPI: >60 ML/MIN/{1.73_M2}
GLUCOSE SERPL-MCNC: 96 MG/DL (ref 70–99)
HDLC SERPL-MCNC: 83 MG/DL (ref 40–60)
IRON SATN MFR SERPL: 28 % (ref 20–50)
IRON SERPL-MCNC: 82 UG/DL (ref 59–158)
LDLC SERPL CALC-MCNC: 78 MG/DL
MAGNESIUM SERPL-MCNC: 1.9 MG/DL (ref 1.8–2.4)
POTASSIUM SERPL-SCNC: 3.8 MMOL/L (ref 3.5–5.1)
SODIUM SERPL-SCNC: 129 MMOL/L (ref 136–145)
TIBC SERPL-MCNC: 292 UG/DL (ref 260–445)
TRIGL SERPL-MCNC: 42 MG/DL (ref 0–150)
VLDLC SERPL CALC-MCNC: 8 MG/DL

## 2023-08-23 PROCEDURE — 6370000000 HC RX 637 (ALT 250 FOR IP): Performed by: SURGERY

## 2023-08-23 PROCEDURE — 80061 LIPID PANEL: CPT

## 2023-08-23 PROCEDURE — 6360000002 HC RX W HCPCS: Performed by: SURGERY

## 2023-08-23 PROCEDURE — 2580000003 HC RX 258: Performed by: SURGERY

## 2023-08-23 PROCEDURE — 97530 THERAPEUTIC ACTIVITIES: CPT

## 2023-08-23 PROCEDURE — 83550 IRON BINDING TEST: CPT

## 2023-08-23 PROCEDURE — 93010 ELECTROCARDIOGRAM REPORT: CPT | Performed by: INTERNAL MEDICINE

## 2023-08-23 PROCEDURE — 6370000000 HC RX 637 (ALT 250 FOR IP): Performed by: HOSPITALIST

## 2023-08-23 PROCEDURE — 97161 PT EVAL LOW COMPLEX 20 MIN: CPT

## 2023-08-23 PROCEDURE — 94640 AIRWAY INHALATION TREATMENT: CPT

## 2023-08-23 PROCEDURE — 94761 N-INVAS EAR/PLS OXIMETRY MLT: CPT

## 2023-08-23 PROCEDURE — 83735 ASSAY OF MAGNESIUM: CPT

## 2023-08-23 PROCEDURE — 99222 1ST HOSP IP/OBS MODERATE 55: CPT | Performed by: INTERNAL MEDICINE

## 2023-08-23 PROCEDURE — 6370000000 HC RX 637 (ALT 250 FOR IP): Performed by: INTERNAL MEDICINE

## 2023-08-23 PROCEDURE — 93005 ELECTROCARDIOGRAM TRACING: CPT | Performed by: HOSPITALIST

## 2023-08-23 PROCEDURE — 97165 OT EVAL LOW COMPLEX 30 MIN: CPT

## 2023-08-23 PROCEDURE — 2700000000 HC OXYGEN THERAPY PER DAY

## 2023-08-23 PROCEDURE — 83540 ASSAY OF IRON: CPT

## 2023-08-23 PROCEDURE — 99223 1ST HOSP IP/OBS HIGH 75: CPT | Performed by: INTERNAL MEDICINE

## 2023-08-23 PROCEDURE — 36415 COLL VENOUS BLD VENIPUNCTURE: CPT

## 2023-08-23 PROCEDURE — 80048 BASIC METABOLIC PNL TOTAL CA: CPT

## 2023-08-23 PROCEDURE — 1200000000 HC SEMI PRIVATE

## 2023-08-23 RX ORDER — MIDODRINE HYDROCHLORIDE 5 MG/1
2.5 TABLET ORAL
Status: DISCONTINUED | OUTPATIENT
Start: 2023-08-23 | End: 2023-08-25 | Stop reason: HOSPADM

## 2023-08-23 RX ORDER — DIGOXIN 0.25 MG/ML
125 INJECTION INTRAMUSCULAR; INTRAVENOUS ONCE
Status: DISCONTINUED | OUTPATIENT
Start: 2023-08-23 | End: 2023-08-25 | Stop reason: ALTCHOICE

## 2023-08-23 RX ORDER — DILTIAZEM HYDROCHLORIDE 5 MG/ML
20 INJECTION INTRAVENOUS ONCE
Status: DISCONTINUED | OUTPATIENT
Start: 2023-08-23 | End: 2023-08-23

## 2023-08-23 RX ORDER — POLYETHYLENE GLYCOL 3350 17 G/17G
17 POWDER, FOR SOLUTION ORAL DAILY PRN
Status: DISCONTINUED | OUTPATIENT
Start: 2023-08-23 | End: 2023-08-25 | Stop reason: HOSPADM

## 2023-08-23 RX ORDER — SODIUM CHLORIDE 0.9 % (FLUSH) 0.9 %
5-40 SYRINGE (ML) INJECTION PRN
Status: DISCONTINUED | OUTPATIENT
Start: 2023-08-23 | End: 2023-08-25 | Stop reason: HOSPADM

## 2023-08-23 RX ORDER — SODIUM CHLORIDE 9 MG/ML
INJECTION, SOLUTION INTRAVENOUS PRN
Status: DISCONTINUED | OUTPATIENT
Start: 2023-08-23 | End: 2023-08-25 | Stop reason: HOSPADM

## 2023-08-23 RX ORDER — LEVOTHYROXINE SODIUM 0.15 MG/1
75 TABLET ORAL DAILY
Status: DISCONTINUED | OUTPATIENT
Start: 2023-08-23 | End: 2023-08-25 | Stop reason: HOSPADM

## 2023-08-23 RX ORDER — FLUOXETINE HYDROCHLORIDE 20 MG/1
40 CAPSULE ORAL DAILY
Status: DISCONTINUED | OUTPATIENT
Start: 2023-08-23 | End: 2023-08-25 | Stop reason: HOSPADM

## 2023-08-23 RX ORDER — ACETAMINOPHEN 325 MG/1
650 TABLET ORAL EVERY 6 HOURS PRN
Status: DISCONTINUED | OUTPATIENT
Start: 2023-08-23 | End: 2023-08-25 | Stop reason: HOSPADM

## 2023-08-23 RX ORDER — MAGNESIUM SULFATE IN WATER 40 MG/ML
2000 INJECTION, SOLUTION INTRAVENOUS PRN
Status: DISCONTINUED | OUTPATIENT
Start: 2023-08-23 | End: 2023-08-25 | Stop reason: HOSPADM

## 2023-08-23 RX ORDER — POTASSIUM CHLORIDE 7.45 MG/ML
10 INJECTION INTRAVENOUS PRN
Status: DISCONTINUED | OUTPATIENT
Start: 2023-08-23 | End: 2023-08-25 | Stop reason: HOSPADM

## 2023-08-23 RX ORDER — HYDRALAZINE HYDROCHLORIDE 20 MG/ML
10 INJECTION INTRAMUSCULAR; INTRAVENOUS EVERY 4 HOURS PRN
Status: DISCONTINUED | OUTPATIENT
Start: 2023-08-23 | End: 2023-08-25 | Stop reason: HOSPADM

## 2023-08-23 RX ORDER — ONDANSETRON 2 MG/ML
4 INJECTION INTRAMUSCULAR; INTRAVENOUS EVERY 6 HOURS PRN
Status: DISCONTINUED | OUTPATIENT
Start: 2023-08-23 | End: 2023-08-25 | Stop reason: HOSPADM

## 2023-08-23 RX ORDER — SODIUM CHLORIDE 0.9 % (FLUSH) 0.9 %
5-40 SYRINGE (ML) INJECTION EVERY 12 HOURS SCHEDULED
Status: DISCONTINUED | OUTPATIENT
Start: 2023-08-23 | End: 2023-08-25 | Stop reason: HOSPADM

## 2023-08-23 RX ORDER — ONDANSETRON 4 MG/1
4 TABLET, ORALLY DISINTEGRATING ORAL EVERY 8 HOURS PRN
Status: DISCONTINUED | OUTPATIENT
Start: 2023-08-23 | End: 2023-08-25 | Stop reason: HOSPADM

## 2023-08-23 RX ORDER — TAMSULOSIN HYDROCHLORIDE 0.4 MG/1
0.4 CAPSULE ORAL NIGHTLY
Status: DISCONTINUED | OUTPATIENT
Start: 2023-08-23 | End: 2023-08-25 | Stop reason: HOSPADM

## 2023-08-23 RX ORDER — ACETAMINOPHEN 650 MG/1
650 SUPPOSITORY RECTAL EVERY 6 HOURS PRN
Status: DISCONTINUED | OUTPATIENT
Start: 2023-08-23 | End: 2023-08-25 | Stop reason: HOSPADM

## 2023-08-23 RX ORDER — FUROSEMIDE 10 MG/ML
40 INJECTION INTRAMUSCULAR; INTRAVENOUS 2 TIMES DAILY
Status: DISCONTINUED | OUTPATIENT
Start: 2023-08-23 | End: 2023-08-25

## 2023-08-23 RX ORDER — METOPROLOL SUCCINATE 50 MG/1
100 TABLET, EXTENDED RELEASE ORAL DAILY
Status: DISCONTINUED | OUTPATIENT
Start: 2023-08-23 | End: 2023-08-25 | Stop reason: HOSPADM

## 2023-08-23 RX ORDER — POTASSIUM CHLORIDE 20 MEQ/1
40 TABLET, EXTENDED RELEASE ORAL PRN
Status: DISCONTINUED | OUTPATIENT
Start: 2023-08-23 | End: 2023-08-25 | Stop reason: HOSPADM

## 2023-08-23 RX ADMIN — Medication 2 PUFF: at 08:13

## 2023-08-23 RX ADMIN — APIXABAN 5 MG: 5 TABLET, FILM COATED ORAL at 08:31

## 2023-08-23 RX ADMIN — TIOTROPIUM BROMIDE INHALATION SPRAY 2 PUFF: 3.12 SPRAY, METERED RESPIRATORY (INHALATION) at 08:13

## 2023-08-23 RX ADMIN — FLUOXETINE 40 MG: 20 CAPSULE ORAL at 08:30

## 2023-08-23 RX ADMIN — MIDODRINE HYDROCHLORIDE 2.5 MG: 5 TABLET ORAL at 12:12

## 2023-08-23 RX ADMIN — LEVOTHYROXINE SODIUM 75 MCG: 0.15 TABLET ORAL at 06:04

## 2023-08-23 RX ADMIN — APIXABAN 5 MG: 5 TABLET, FILM COATED ORAL at 01:48

## 2023-08-23 RX ADMIN — SACUBITRIL AND VALSARTAN 1 TABLET: 24; 26 TABLET, FILM COATED ORAL at 01:49

## 2023-08-23 RX ADMIN — SACUBITRIL AND VALSARTAN 0.5 TABLET: 24; 26 TABLET, FILM COATED ORAL at 20:17

## 2023-08-23 RX ADMIN — TAMSULOSIN HYDROCHLORIDE 0.4 MG: 0.4 CAPSULE ORAL at 20:17

## 2023-08-23 RX ADMIN — METOPROLOL SUCCINATE 100 MG: 50 TABLET, FILM COATED, EXTENDED RELEASE ORAL at 08:30

## 2023-08-23 RX ADMIN — TAMSULOSIN HYDROCHLORIDE 0.4 MG: 0.4 CAPSULE ORAL at 01:49

## 2023-08-23 RX ADMIN — HYDRALAZINE HYDROCHLORIDE 10 MG: 20 INJECTION INTRAMUSCULAR; INTRAVENOUS at 02:03

## 2023-08-23 RX ADMIN — FUROSEMIDE 40 MG: 10 INJECTION, SOLUTION INTRAMUSCULAR; INTRAVENOUS at 18:39

## 2023-08-23 RX ADMIN — Medication 10 ML: at 08:30

## 2023-08-23 RX ADMIN — Medication 2 PUFF: at 20:08

## 2023-08-23 RX ADMIN — Medication 10 ML: at 20:17

## 2023-08-23 RX ADMIN — APIXABAN 5 MG: 5 TABLET, FILM COATED ORAL at 20:17

## 2023-08-23 ASSESSMENT — PAIN SCALES - GENERAL: PAINLEVEL_OUTOF10: 0

## 2023-08-23 NOTE — PROGRESS NOTES
[address] [facility] Department   Phone: [phone]    Occupational Therapy    [x] Initial Evaluation            [] Daily Treatment Note         [] Discharge Summary      Patient: Morenita Duong   :    MRN: [id number]   Date of Service:  2023    Admitting Diagnosis:  CHF (congestive heart failure), NYHA class I, acute on chronic, combined [facility])  Current Admission Summary: Per H&P \"72 y.o. male who presents to the emergency department secondary to concern for shortness of breath. Reports has been going on for few days and getting worse. He has a history of COPD and heart failure and uses 3 L of oxygen at baseline. Patient currently requiring 3 L of oxygen per nasal cannula in the ED. Patient reports that he stopped taking his Lasix for the last for 5 days because she keeps having to go to the bathroom. Denies any chest pain or fever. Admits to a cough productive of clear sputum. Patient's symptoms are exacerbated when he lies flat and better when he leans forward. \"  Past Medical History:  has a past medical history of Bronchitis, CHF (congestive heart failure) ([address]), and COPD (chronic obstructive pulmonary disease) ([address]). Past Surgical History:  has a past surgical history that includes fracture surgery; Tonsillectomy; eye surgery; Cholecystectomy, laparoscopic (N/A, 3/17/2021); and femoral bypass (Left, 2021). Discharge Recommendations: Morenita Duong scored a 19/24 on the AM-PAC ADL Inpatient form. Current research shows that an AM-PAC score of 18 or greater is typically associated with a discharge to the patient's home setting. Based on the patient's AM-PAC score, and their current ADL deficits, it is recommended that the patient have 2-3 sessions per week of Occupational Therapy at d/c to increase the patient's independence.   At this time, this patient demonstrates the endurance and safety to discharge home with home health OT services  (home vs Timed Code Treatment Minutes: 9 Minutes  Total Treatment Minutes:  24 minutes total       Electronically Signed By: Inder Lindsey OT, Inder Lindsey OTR/ELAINE [id number]

## 2023-08-23 NOTE — PROGRESS NOTES
Pharmacy Home Medication Reconciliation Note    A medication reconciliation has been completed for Cher Varela 1950    Pharmacy: [facility]. [place]    Information provided by: Patient    The patient's home medication list is as follows: No current facility-administered medications on file prior to encounter.      Current Outpatient Medications on File Prior to Encounter   Medication Sig Dispense Refill    apixaban (ELIQUIS) 5 MG TABS tablet Take 1 tablet by mouth 2 times daily      FLUoxetine (PROZAC) 40 MG capsule Take 1 capsule by mouth daily      ipratropium 0.5 mg-albuterol 2.5 mg (DUONEB) 0.5-2.5 (3) MG/3ML SOLN nebulizer solution Inhale 3 mLs into the lungs every 6 hours as needed for Shortness of Breath      warfarin (COUMADIN) 5 MG tablet Take 1 tablet by mouth daily (Patient not taking: Reported on 8/22/2023) 30 tablet 5    Multiple Vitamin (MULTIVITAMIN ADULT PO) Take by mouth      furosemide (LASIX) 40 MG tablet One tablet by mouth 3 days a week 30 tablet 1    Budeson-Glycopyrrol-Formoterol (BREZTRI AEROSPHERE) 160-9-4.8 MCG/ACT AERO Inhale 2 puffs into the lungs 2 times daily 1 each 5    metoprolol succinate (TOPROL XL) 100 MG extended release tablet Take 1 tablet by mouth 2 times daily (Patient taking differently: Take 1 tablet by mouth 2 times daily Pt takes once a day) 60 tablet 0    levothyroxine (SYNTHROID) 50 MCG tablet Take 1.5 tablets by mouth Daily 30 tablet 0    levalbuterol (XOPENEX) 1.25 MG/3ML nebulizer solution Take 3 mLs by nebulization every 4 hours (while awake) (Patient not taking: Reported on 8/22/2023) 360 mL 2    levalbuterol (XOPENEX HFA) 45 MCG/ACT inhaler Inhale 1 puff into the lungs every 4 hours as needed for Wheezing 1 each 5    Magnesium 400 MG TABS Take 200 mg by mouth nightly (Patient not taking: Reported on 8/22/2023)      sacubitril-valsartan (ENTRESTO) 24-26 MG per tablet Take 1 tablet by mouth 2 times daily 60 tablet 3    tamsulosin (FLOMAX) 0.4 MG capsule Take 1 capsule by mouth nightly      [DISCONTINUED] dicyclomine (BENTYL) 10 MG capsule Take 1 capsule by mouth 4 times daily (Patient not taking: No sig reported) 360 capsule 1    OXYGEN Inhale 3 L into the lungs as needed          Patient is no longer taking Xopenex 1.25/3ML, Bentyl, Magnesium 400, or Warfarin 5    Timing of last doses updated.     Thank you,  Yan Contreras, JEFFERYhT

## 2023-08-23 NOTE — PROGRESS NOTES
[address] [facility] Department   Phone: [phone]    Physical Therapy    [x] Initial Evaluation            [] Daily Treatment Note         [] Discharge Summary      Patient: Katy Mcneal   :    MRN: [id number]   Date of Service:  2023  Admitting Diagnosis: CHF (congestive heart failure), NYHA class I, acute on chronic, combined [facility])  Current Admission Summary: 68 y.o. male who presents to the emergency department secondary to concern for shortness of breath. Reports has been going on for few days and getting worse. He has a history of COPD and heart failure and uses 3 L of oxygen at baseline. Patient currently requiring 3 L of oxygen per nasal cannula in the ED. Patient reports that he stopped taking his Lasix for the last for 5 days because she keeps having to go to the bathroom. Denies any chest pain or fever. Admits to a cough productive of clear sputum. Patient's symptoms are exacerbated when he lies flat and better when he leans forward. Past Medical History:  has a past medical history of Bronchitis, CHF (congestive heart failure) ([address]), and COPD (chronic obstructive pulmonary disease) ([address]). Past Surgical History:  has a past surgical history that includes fracture surgery; Tonsillectomy; eye surgery; Cholecystectomy, laparoscopic (N/A, 3/17/2021); and femoral bypass (Left, 2021). Discharge Recommendations: Katy Mcneal scored a 17/24 on the AM-PAC short mobility form. Current research shows that an AM-PAC score of 18 or greater is typically associated with a discharge to the patient's home setting. Based on the patient's AM-PAC score and their current functional mobility deficits, it is recommended that the patient have 2-3 sessions per week of Physical Therapy at d/c to increase the patient's independence.   At this time, this patient demonstrates the endurance and safety to discharge home with HHPT  and a follow up treatment O2  Posture: Forward head, rounded shoulders  Sensation:   denies numbness and tingling  Proprioception:    WFL  Tone:   Normotonic  Coordination Testing:   WFL    ROM:   (B) LE AROM WFL  Strength:   (B) LE strength grossly WFL  Therapist Clinical Decision Making (Complexity): low complexity  Clinical Presentation: stable      Subjective  General: Patient semi reclined in bed upon arrival. Patient is agreeable to PT/OT eval.   Pain: 0/10  Pain Interventions: repositioned        Functional Mobility  Bed Mobility:  Supine to Sit: stand by assistance  Scooting: stand by assistance  Comments: Patient completes bed mobility with HOB slightly and use of R bed rail  Transfers:  Sit to stand transfer: contact guard assistance  Stand to sit transfer: contact guard assistance  Comments: Patient completes STS from EOB with no AD placed in front  Ambulation:  Surface:level surface  Assistive Device: no device  Assistance: contact guard assistance  Distance: 10 ft  Gait Mechanics: Decreased step height/length, decreased gill, NBOS  Comments: Patient appears fearful with ambulation, declining additional ambulation due to SOB  Stair Mobility:  Stair mobility not completed on this date. Comments:  Wheelchair Mobility:  No w/c mobility completed on this date.   Comments:  Balance:  Static Sitting Balance: good: independent with functional balance in unsupported position  Dynamic Sitting Balance: fair (+): maintains balance at SBA/supervision without use of UE support  Static Standing Balance: fair: maintains balance at CGA without use of UE support  Dynamic Standing Balance: fair: maintains balance at CGA without use of UE support  Comments:    Other Therapeutic Interventions  See OT note for ADLs    Functional Outcomes  AM-PAC Inpatient Mobility Raw Score : 17 AM-PAC Inpatient Mobility without Stair Climbing Raw Score : 17            Cognition  WFL  Orientation:    alert and oriented x 4  Command Following:

## 2023-08-23 NOTE — PROGRESS NOTES
Physician Progress Note      PATIENT:               Carlton Rehman  CSN #:                  [id number]  :                       1950  ADMIT DATE:       2023 5:21 PM  [address] [facility] DATE:  RESPONDING  PROVIDER #:        Mary Nayak MD          QUERY TEXT:    Patient was admitted with SOB, noted to have atrial fibrillation in H&P on    and is maintained on Eliquis. If possible, please document in progress   notes and discharge summary if you are evaluating and/or treating any of the   following: The medical record reflects the following:  Risk Factors: Age 68 male, HTN, CHF. Clinical Indicators: \"atrial fibrillation in H&P on  and is maintained on   Eliquis\". Treatment: Eliquis. Thank you,  Jenelle Styles RN, CCDS, [facility], [place]  Certified Clinical Documentation   [phone]  you can also reach me by perfect serve. Options provided:  -- Secondary hypercoagulable state in a patient with atrial fibrillation  -- Other - I will add my own diagnosis  -- Disagree - Not applicable / Not valid  -- Disagree - Clinically unable to determine / Unknown  -- Refer to Clinical Documentation Reviewer    PROVIDER RESPONSE TEXT:    This patient has secondary hypercoagulable state in a patient with atrial   fibrillation. Query created by: Shar Johnson on 2023 10:02 AM      QUERY TEXT:    Pt admitted with SOB. Pt noted to have anion gap 2, co2 41. If possible,   please document in the progress notes and discharge summary if you are   evaluating and/or treating any of the following: The medical record reflects the following:  Risk Factors: emphesema  Clinical Indicators:   -anion gap 2,  - co2 41,  40   - Chloride 84, - 82  Treatment: Abnormal lab values, IV fluids. Thank you,  Jenelle Styles RN, ROBINS, [facility], [place]  Certified Clinical Documentation   [phone]  you can also reach me by perfect serve.   Options provided:  -- Metabolic Alkalosis  -- Other - I will add my own diagnosis  -- Disagree - Not applicable / Not valid  -- Disagree - Clinically unable to determine / Unknown  -- Refer to Clinical Documentation Reviewer    PROVIDER RESPONSE TEXT:    This patient has metabolic alkalosis. Query created by: Raisa Whitten on 8/23/2023 10:04 AM      QUERY TEXT:    Patient was admitted with SOB, noted to have atrial fibrillation in H&P on   8/22 and is maintained on Eliquis. If possible, please document in progress   notes and discharge summary further specificity regarding the type of atrial   fibrillation: The medical record reflects the following:  Risk Factors: Age 68 male, HTN, CHF. Clinical Indicators: \"atrial fibrillation in H&P on 8/22 and is maintained on   Eliquis\". Treatment: Eliquis. Thank you,  Nely Garrett RN, CCDS, [facility]  Certified Clinical Documentation   [phone]  you can also reach me by perfect serve. Chronic: nonspecific term that could be referring to paroxysmal, persistent,   or permanent  Longstanding persistent: persistent and continuous, lasting > 1 year. Paroxysmal - self-terminating or intermittent; resolves with or without   intervention within 7 days of onset; may recur with various frequency. Persistent - Fails to terminate within 7 days; Often requires meds or   cardioversion to restore to NSR. Permanent - longstanding & persistent; Medication has been ineffective in   restoring NSR &/or cardioversion is contraindicated    Definitions per MS-DRG Training Guide and Quick Reference Guide, [facility] 5   Diseases and Disorders of the Circulatory System; 2019; . Software content   from the [employer]?  Advanced CDI Transformation Program  Options provided:  -- Paroxysmal Atrial Fibrillation  -- Longstanding Persistent Atrial Fibrillation  -- Permanent Atrial Fibrillation  -- Persistent Atrial Fibrillation  -- Chronic Atrial Fibrillation, unspecified  -- Other - I will add my own

## 2023-08-23 NOTE — PROGRESS NOTES
Rapid called d/t patient heart rate stationed in the 130s to 150s, while patient is resting. 20mg Ditiazem given heartrate is now in the low 80s.

## 2023-08-23 NOTE — CONSULTS
[facility]   Electrophysiology Consultation     Date: 8/23/2023  Reason for Consultation: Atrial fibrillation   Consult Requesting Physician: Danielle Gold MD     CC: Shortness of breath      HPI:   Rosa Elena Clifton is a 68 y.o. male history of COPD, paroxysmal atrial fibrillation, nonischemic cardiomyopathy, heart failure with reduced ejection fraction now recovered, peripheral vascular disease with fem/pop bypass. Patient follows in electrophysiology clinic, last seen 6/1/2023, he had had no subsequent recurrence of atrial fibrillation, we discussed antiarrhythmic medication and/or ablation should he have recurrence. He is currently admitted for worsening shortness of breath over the past couple days, has been frustrated with increased urination frequency on Lasix and so this was held. While in hospital he had an episode of atrial fibrillation and RVR, started approximately 630 and terminated spontaneously at approximately 1130 today. A rapid was called, heart rates were in the 160s, he did not feel racing heart but did feel some chest pressure and shortness of breath during this. He was administered IV diltiazem with improvement in heart rates to the 90s to 100s. He is not had recurrence. He can occasionally feel a irregular heartbeat in his ears (pulsation sound), and checks his pulse ox on a daily basis and heart rates have been in the 70s to 80s, has not noted any elevated heart rates. Review of System:  Complete 10 point ROS performed and negative unless noted in above HPI or below    Prior to Admission medications    Medication Sig Start Date End Date Taking?  Authorizing Provider   apixaban (ELIQUIS) 5 MG TABS tablet Take 1 tablet by mouth 2 times daily   Yes Historical Provider, MD   FLUoxetine (PROZAC) 40 MG capsule Take 1 capsule by mouth daily   Yes Historical Provider, MD   ipratropium 0.5 mg-albuterol 2.5 mg (DUONEB) 0.5-2.5 (3) MG/3ML SOLN nebulizer solution Inhale 3 mLs into the lungs every 6 hours as needed for Shortness of Breath   Yes Historical Provider, MD   warfarin (COUMADIN) 5 MG tablet Take 1 tablet by mouth daily  Patient not taking: Reported on 8/22/2023 6/19/23   Aroldo Dotson MD   Multiple Vitamin (MULTIVITAMIN ADULT PO) Take by mouth    Historical Provider, MD   furosemide (LASIX) 40 MG tablet One tablet by mouth 3 days a week 6/1/23   Aroldo Dotson MD   Budeson-Glycopyrrol-Formoterol (BREZTRI AEROSPHERE) 160-9-4.8 MCG/ACT AERO Inhale 2 puffs into the lungs 2 times daily 1/31/23   Laura Bass MD   metoprolol succinate (TOPROL XL) 100 MG extended release tablet Take 1 tablet by mouth 2 times daily  Patient taking differently: Take 1 tablet by mouth 2 times daily Pt takes once a day 1/9/23   Raza Padilla MD   levothyroxine (SYNTHROID) 50 MCG tablet Take 1.5 tablets by mouth Daily 1/9/23   Raza Padilla MD   levalbuterol Tommy Mckeon) 1.25 MG/3ML nebulizer solution Take 3 mLs by nebulization every 4 hours (while awake)  Patient not taking: Reported on 8/22/2023 1/9/23   Raza Padilla MD   levalbuterol Cyndia Dila HFA) 45 MCG/ACT inhaler Inhale 1 puff into the lungs every 4 hours as needed for Wheezing 1/9/23 1/9/24  Raza Padilla MD   Magnesium 400 MG TABS Take 200 mg by mouth nightly  Patient not taking: Reported on 8/22/2023    Historical Provider, MD   sacubitril-valsartan (ENTRESTO) 24-26 MG per tablet Take 1 tablet by mouth 2 times daily 11/4/22   CHELSY Kennedy - CNP   tamsulosin (FLOMAX) 0.4 MG capsule Take 1 capsule by mouth nightly    Historical Provider, MD   dicyclomine (BENTYL) 10 MG capsule Take 1 capsule by mouth 4 times daily  Patient not taking: No sig reported 6/20/22 [birth year]  Guero Wilkinson MD   OXYGEN Inhale 3 L into the lungs as needed     Historical Provider, MD       Past Medical History:   Diagnosis Date    Bronchitis     CHF (congestive heart failure) ([address])     COPD (chronic obstructive pulmonary disease) ([address])

## 2023-08-23 NOTE — SIGNIFICANT EVENT
Called to rapid response. Patient in A.fib and HR was up to the 150s-160s  Patient complains of some slight chest pressure and can tell HR is up. Pressure is on the lower end but MAP is 65. I had pharmacy draw up 20 mg of  IV diltiazem and we gae 10 mg IV push  Heart rate came back down to the 70s and then slowly came back up to the low 90s-100s  Patient says he really cannot tell if the pressure has eased up because of how SOB he feels  (He is admitted for CHF)  Repeat blood pressure is approx the same with a MAP of 64  The remaining 10 mg of diltiazem is being labeled and will be kept at bedside. The patient is due for oral metoprolol at 9 am I have asked that we give this now. EKG shows a.fib with RVR, there is some slight st depression in the lateral and inferior leads. If pressure does not ease up will consider giving SL nitro. 33 minutes of critical care time spent.

## 2023-08-23 NOTE — ED NOTES
ED TO INPATIENT SBAR HANDOFF    Patient Name: Rosa Elena Clifton   :    68 y.o. MRN:  [id number]  Preferred Name  Patrice Mccauley  ED Room #:  ED-0021/21  Family/Caregiver Present yes   Restraints no   Sitter no   Sepsis Risk Score Sepsis Risk Score: 1.34    Situation  Code Status: Prior No additional code details. Allergies: Codeine  Weight: Patient Vitals for the past 96 hrs (Last 3 readings):   Weight   23 1730 127 lb (57.6 kg)     Arrived from: home  Chief Complaint:   Chief Complaint   Patient presents with    Shortness of Breath     Pt w c/o SOB from . Pt on 3 lpm via NC (baseline) Pt w Dx emphysema     Hospital Problem/Diagnosis:  Principal Problem:    CHF (congestive heart failure), NYHA class I, acute on chronic, combined ([address])  Resolved Problems:    * No resolved hospital problems. *    Imaging:   XR CHEST PORTABLE   Preliminary Result   1. Mildly prominent cardiac silhouette, increased in size from previous   radiographs, which could be technical or related to cardiomegaly or   pericardial fluid. 2.  No acute findings in the lungs. COPD.            Abnormal labs:   Abnormal Labs Reviewed   CBC WITH AUTO DIFFERENTIAL - Abnormal; Notable for the following components:       Result Value    RBC 3.52 (*)     Hemoglobin 10.6 (*)     Hematocrit 32.2 (*)     Lymphocytes Absolute 0.9 (*)     All other components within normal limits   BASIC METABOLIC PANEL - Abnormal; Notable for the following components:    Sodium 127 (*)     Chloride 84 (*)     CO2 41 (*)     Anion Gap 2 (*)     Creatinine 0.6 (*)     All other components within normal limits   TROPONIN - Abnormal; Notable for the following components:    Troponin, High Sensitivity 40 (*)     All other components within normal limits   BRAIN NATRIURETIC PEPTIDE - Abnormal; Notable for the following components:    Pro-BNP 13,899 (*)     All other components within normal limits     Critical values: yes     Abnormal Assessment Findings:

## 2023-08-23 NOTE — PROGRESS NOTES
Md Alisa Lewis notified for patient heart rate being in the 130s-150s. Writer was told \"If asymptomatic nothing else diagnostic wise, but day team will need to figure out rate control\".

## 2023-08-23 NOTE — PROGRESS NOTES
4 Eyes Skin Assessment     NAME:  Neri Garcia OF BIRTH:  1950  MEDICAL RECORD NUMBER:  [id number]    The patient is being assessed for  Admission    I agree that at least one RN has performed a thorough Head to Toe Skin Assessment on the patient. ALL assessment sites listed below have been assessed. Areas assessed by both nurses:    Head, Face, Ears, Shoulders, Back, Chest, Arms, Elbows, Hands, Sacrum. Buttock, Coccyx, Ischium, Legs. Feet and Heels, and Under Medical Devices         Does the Patient have a Wound?  No noted wound(s)       Alli Prevention initiated by RN: Yes  Wound Care Orders initiated by RN: No    Pressure Injury (Stage 3,4, Unstageable, DTI, NWPT, and Complex wounds) if present, place Wound referral order by RN under : No    New Ostomies, if present place, Ostomy referral order under : No     Nurse 1 eSignature: Electronically signed by Aki Webb RN on 8/23/23 at 6:11 AM EDT    **SHARE this note so that the co-signing nurse can place an eSignature**    Nurse 2 eSignature: Electronically signed by Jaspreet Rayo RN on 8/23/23 at 6:54 AM EDT

## 2023-08-23 NOTE — H&P
V2.0  History and Physical      Name:  Italia Arciniega /Age/Sex: 989  (68 y.o. male)   MRN & CSN:  [id number] & [id number] Encounter Date/Time: 2023 9:23 PM EDT   Location:  ED- PCP: [address] Day: 1    Assessment and Plan:       Hospital Problems             Last Modified POA    * (Principal) CHF (congestive heart failure), NYHA class I, acute on chronic, combined ([address]) 2023 Yes       Assessment/Plan:    # Heart Failure (combined), Acute on Chronic  Patient presents with symptoms and evidence of volume overload. Trigger likely medication noncompliance. Last TTE EF 60%. - CBC, CMP, BNP, Lactate  - BMP, Mg BID  - Strict I/O, daily standing weights, 1.8 L fluid restriction, Na-resticted diet  - Lasix 40 IV BID  - CHF nurse consult  - Cardiology consult    Chronic:  Afib - Eliquis  HTN  COPD, advanced  PAD, LLE bypass graft stenosis    Dispo: med/surg tele  Ppx:  indications for ulcer and DVT ppx reviewed and medications ordered as needed       History from:     Patient, direct communication with ER provider, EMR    History of Present Illness:     Chief Complaint: shortness of breath  Italia Arciniega is a 68 y.o. male who presents with dyspnea, orthopnea. Has been skipping his Lasix doses due to having to pee all the time when he is taking it. Has not taken Lasix for 5 days. Patient denies fever/chills/n/v/d/AP/CP/dysuria. Review of Systems:        Pertinent positives and negatives discussed in HPI     Objective:   No intake or output data in the 24 hours ending 23   Vitals:   Vitals:    23 1937 23   BP: (!) 163/89 (!) 155/93 (!) 171/93 (!) 165/90   Pulse:  71 75 79   Resp:  17 15 17   Temp:       TempSrc:       SpO2:  100% 100% 92%   Weight:       Height:           Medications Prior to Admission     Prior to Admission medications    Medication Sig Start Date End Date Taking?  Authorizing Provider THE CHEST 8/22/2023 5:48 pm COMPARISON: 1/7/2023 and 1/6/2023 HISTORY: ORDERING SYSTEM PROVIDED HISTORY: sob TECHNOLOGIST PROVIDED HISTORY: Reason for exam:->sob Reason for Exam: SOB FINDINGS: Cardiac silhouette is mildly prominent, increased in size from the previous radiographs. Mediastinal contours are grossly stable. The lungs are hyperinflated with scattered interstitial thickening, which is unchanged. There is no focal airspace consolidation, pneumothorax, or pleural effusion. No free air beneath the diaphragm. 1.  Mildly prominent cardiac silhouette, increased in size from previous radiographs, which could be technical or related to cardiomegaly or pericardial fluid. 2.  No acute findings in the lungs. COPD.          Electronically signed by Samantha Prajapati MD on 8/22/2023 at 9:23 PM

## 2023-08-24 LAB
ANION GAP SERPL CALCULATED.3IONS-SCNC: 5 MMOL/L (ref 3–16)
BUN SERPL-MCNC: 14 MG/DL (ref 7–20)
CALCIUM SERPL-MCNC: 8.9 MG/DL (ref 8.3–10.6)
CHLORIDE SERPL-SCNC: 83 MMOL/L (ref 99–110)
CO2 SERPL-SCNC: 40 MMOL/L (ref 21–32)
CREAT SERPL-MCNC: 0.9 MG/DL (ref 0.8–1.3)
GFR SERPLBLD CREATININE-BSD FMLA CKD-EPI: >60 ML/MIN/{1.73_M2}
GLUCOSE SERPL-MCNC: 95 MG/DL (ref 70–99)
LV EF: 48 %
LVEF MODALITY: NORMAL
MAGNESIUM SERPL-MCNC: 1.8 MG/DL (ref 1.8–2.4)
NT-PROBNP SERPL-MCNC: 3548 PG/ML (ref 0–124)
POTASSIUM SERPL-SCNC: 4.3 MMOL/L (ref 3.5–5.1)
SODIUM SERPL-SCNC: 128 MMOL/L (ref 136–145)

## 2023-08-24 PROCEDURE — 83735 ASSAY OF MAGNESIUM: CPT

## 2023-08-24 PROCEDURE — 99233 SBSQ HOSP IP/OBS HIGH 50: CPT | Performed by: CLINICAL NURSE SPECIALIST

## 2023-08-24 PROCEDURE — 6370000000 HC RX 637 (ALT 250 FOR IP): Performed by: SURGERY

## 2023-08-24 PROCEDURE — 93306 TTE W/DOPPLER COMPLETE: CPT

## 2023-08-24 PROCEDURE — 6370000000 HC RX 637 (ALT 250 FOR IP): Performed by: INTERNAL MEDICINE

## 2023-08-24 PROCEDURE — 94640 AIRWAY INHALATION TREATMENT: CPT

## 2023-08-24 PROCEDURE — 36415 COLL VENOUS BLD VENIPUNCTURE: CPT

## 2023-08-24 PROCEDURE — 83880 ASSAY OF NATRIURETIC PEPTIDE: CPT

## 2023-08-24 PROCEDURE — 99232 SBSQ HOSP IP/OBS MODERATE 35: CPT | Performed by: NURSE PRACTITIONER

## 2023-08-24 PROCEDURE — 97535 SELF CARE MNGMENT TRAINING: CPT

## 2023-08-24 PROCEDURE — 80048 BASIC METABOLIC PNL TOTAL CA: CPT

## 2023-08-24 PROCEDURE — 2580000003 HC RX 258: Performed by: SURGERY

## 2023-08-24 PROCEDURE — 94761 N-INVAS EAR/PLS OXIMETRY MLT: CPT

## 2023-08-24 PROCEDURE — 6360000002 HC RX W HCPCS: Performed by: SURGERY

## 2023-08-24 PROCEDURE — 2700000000 HC OXYGEN THERAPY PER DAY

## 2023-08-24 PROCEDURE — 1200000000 HC SEMI PRIVATE

## 2023-08-24 RX ADMIN — Medication 2 PUFF: at 10:05

## 2023-08-24 RX ADMIN — APIXABAN 5 MG: 5 TABLET, FILM COATED ORAL at 10:04

## 2023-08-24 RX ADMIN — FUROSEMIDE 40 MG: 10 INJECTION, SOLUTION INTRAMUSCULAR; INTRAVENOUS at 18:02

## 2023-08-24 RX ADMIN — FUROSEMIDE 40 MG: 10 INJECTION, SOLUTION INTRAMUSCULAR; INTRAVENOUS at 10:04

## 2023-08-24 RX ADMIN — SACUBITRIL AND VALSARTAN 0.5 TABLET: 24; 26 TABLET, FILM COATED ORAL at 10:04

## 2023-08-24 RX ADMIN — Medication 10 ML: at 20:56

## 2023-08-24 RX ADMIN — METOPROLOL SUCCINATE 100 MG: 50 TABLET, FILM COATED, EXTENDED RELEASE ORAL at 10:05

## 2023-08-24 RX ADMIN — TAMSULOSIN HYDROCHLORIDE 0.4 MG: 0.4 CAPSULE ORAL at 20:55

## 2023-08-24 RX ADMIN — LEVOTHYROXINE SODIUM 75 MCG: 0.15 TABLET ORAL at 05:20

## 2023-08-24 RX ADMIN — Medication 2 PUFF: at 20:30

## 2023-08-24 RX ADMIN — SACUBITRIL AND VALSARTAN 0.5 TABLET: 24; 26 TABLET, FILM COATED ORAL at 20:55

## 2023-08-24 RX ADMIN — FLUOXETINE 40 MG: 20 CAPSULE ORAL at 10:05

## 2023-08-24 RX ADMIN — Medication 10 ML: at 10:05

## 2023-08-24 RX ADMIN — APIXABAN 5 MG: 5 TABLET, FILM COATED ORAL at 20:55

## 2023-08-24 RX ADMIN — TIOTROPIUM BROMIDE INHALATION SPRAY 2 PUFF: 3.12 SPRAY, METERED RESPIRATORY (INHALATION) at 10:05

## 2023-08-24 ASSESSMENT — PAIN SCALES - GENERAL
PAINLEVEL_OUTOF10: 0
PAINLEVEL_OUTOF10: 0

## 2023-08-24 NOTE — PROGRESS NOTES
[address] [facility] Department   Phone: [phone]    Occupational Therapy    [] Initial Evaluation            [x] Daily Treatment Note         [] Discharge Summary      Patient: Sandie Hebert   : 3540   MRN: [id number]   Date of Service:  2023    Admitting Diagnosis:  CHF (congestive heart failure), NYHA class I, acute on chronic, combined [facility])  Current Admission Summary: Per H&P \"72 y.o. male who presents to the emergency department secondary to concern for shortness of breath. Reports has been going on for few days and getting worse. He has a history of COPD and heart failure and uses 3 L of oxygen at baseline. Patient currently requiring 3 L of oxygen per nasal cannula in the ED. Patient reports that he stopped taking his Lasix for the last for 5 days because she keeps having to go to the bathroom. Denies any chest pain or fever. Admits to a cough productive of clear sputum. Patient's symptoms are exacerbated when he lies flat and better when he leans forward. \"  Past Medical History:  has a past medical history of Bronchitis, CHF (congestive heart failure) ([address]), and COPD (chronic obstructive pulmonary disease) ([address]). Past Surgical History:  has a past surgical history that includes fracture surgery; Tonsillectomy; eye surgery; Cholecystectomy, laparoscopic (N/A, 3/17/2021); and femoral bypass (Left, 2021). Discharge Recommendations: Sandie Hebert scored a 19/24 on the AM-PAC ADL Inpatient form. Current research shows that an AM-PAC score of 18 or greater is typically associated with a discharge to the patient's home setting. Based on the patient's AM-PAC score, and their current ADL deficits, it is recommended that the patient have 2-3 sessions per week of Occupational Therapy at d/c to increase the patient's independence.   At this time, this patient demonstrates the endurance and safety to discharge home with home health OT services  (home vs Inpatient Daily Activity Raw Score: 19    Cognition  WFL  Orientation:    alert and oriented x 4  Command Following:   [facility] [address]     Education  Barriers To Learning: none  Patient Education: patient educated on goals, OT role and benefits, plan of care, energy conservation, transfer training, discharge recommendations  Learning Assessment:  patient verbalizes and demonstrates understanding    Assessment  Activity Tolerance: limited by SOB and fatigue, 02 WFL throughout however pt SOB with ax; 3L O2 throughout   Impairments Requiring Therapeutic Intervention: decreased functional mobility, decreased ADL status, decreased safety awareness, decreased endurance, decreased balance, decreased IADL  Prognosis: good  Clinical Assessment: Pt is currently functioning below occupational baseline and demo the deficits listed above. Pt is currently requiring CGA for mobility/transfers and is significantly limited by decreased endurance and balance. Pt would benefit from continued skilled OT services to address these deficits and increase IND, safety, and ease with all occupational pursuits. Safety Interventions: patient left in bed, bed alarm in place, call light within reach, gait belt, and nurse notified    Plan  Frequency: 3-5 x/per week  Current Treatment Recommendations: strengthening, balance training, functional mobility training, transfer training, endurance training, patient/caregiver education, ADL/self-care training, IADL training, and safety education    Goals  Patient Goals: to return home   Short Term Goals:  Time Frame: by d/c  Patient will complete upper body ADL at modified independent   Patient will complete lower body ADL at modified independent   Patient will complete toileting at modified independent   Patient will complete functional transfers at modified independent   Patient will complete functional mobility at modified independent      Above goals reviewed on 8/24/2023.   All goals are ongoing at this time

## 2023-08-24 NOTE — PROGRESS NOTES
[address] [facility] Department   Phone: [phone]    Physical Therapy    [] Initial Evaluation            [x] Daily Treatment Note         [] Discharge Summary      Patient: Emmanuelle Croft   :    MRN: [id number]   Date of Service:  2023  Admitting Diagnosis: CHF (congestive heart failure), NYHA class I, acute on chronic, combined [facility])  Current Admission Summary: 68 y.o. male who presents to the emergency department secondary to concern for shortness of breath. Reports has been going on for few days and getting worse. He has a history of COPD and heart failure and uses 3 L of oxygen at baseline. Patient currently requiring 3 L of oxygen per nasal cannula in the ED. Patient reports that he stopped taking his Lasix for the last for 5 days because she keeps having to go to the bathroom. Denies any chest pain or fever. Admits to a cough productive of clear sputum. Patient's symptoms are exacerbated when he lies flat and better when he leans forward. Past Medical History:  has a past medical history of Bronchitis, CHF (congestive heart failure) ([address]), and COPD (chronic obstructive pulmonary disease) ([address]). Past Surgical History:  has a past surgical history that includes fracture surgery; Tonsillectomy; eye surgery; Cholecystectomy, laparoscopic (N/A, 3/17/2021); and femoral bypass (Left, 2021). Discharge Recommendations: Emmanuelle Croft scored a 17/24 on the AM-PAC short mobility form. Current research shows that an AM-PAC score of 18 or greater is typically associated with a discharge to the patient's home setting. Based on the patient's AM-PAC score and their current functional mobility deficits, it is recommended that the patient have 2-3 sessions per week of Physical Therapy at d/c to increase the patient's independence.   At this time, this patient demonstrates the endurance and safety to discharge home with HHPT  and a follow up treatment Electronically Signed By: Yvan Rubio, [address], DPT, [id number]

## 2023-08-24 NOTE — PLAN OF CARE
Please Triage  New Patient     What is the reason for the patients appointment? ED Fu Hydronephrosis nausea       What office location does the patient prefer? Any location     Imaging/Lab Results:    Do we accept the patient's insurance or is the patient Self-Pay? [email or web address] [facility]     Insurance Provider:  Plan Type/Number:  Member ID#: Has the patient had any previous Urologist(s)? no    Have patient records been requested? If not are records showing in Epic:  Records in epic     Has the patient had any outside testing done? no    Does the patient have a personal history of cancer?  No    Pt call back- [phone] David Kiersten (mother) Problem: Pain  Goal: Verbalizes/displays adequate comfort level or baseline comfort level  8/24/2023 1129 by Raymond Lenz RN  Outcome: Progressing  8/23/2023 2216 by Jamel Toro RN  Outcome: Progressing     Problem: Safety - Adult  Goal: Free from fall injury  8/24/2023 1129 by Raymond Lenz RN  Outcome: Progressing  8/23/2023 2216 by Jamel Toro RN  Outcome: Progressing     Problem: Skin/Tissue Integrity  Goal: Absence of new skin breakdown  Description: 1. Monitor for areas of redness and/or skin breakdown  2. Assess vascular access sites hourly  3. Every 4-6 hours minimum:  Change oxygen saturation probe site  4. Every 4-6 hours:  If on nasal continuous positive airway pressure, respiratory therapy assess nares and determine need for appliance change or resting period.   8/24/2023 1129 by Raymond Lenz RN  Outcome: Progressing  8/23/2023 2216 by Jamel Toro RN  Outcome: Progressing

## 2023-08-24 NOTE — PROGRESS NOTES
findings in the lungs. COPD. CBC:   Recent Labs     08/22/23  1745   WBC 5.5   HGB 10.6*        BMP:    Recent Labs     08/22/23  1745 08/23/23  0556 08/24/23  0554   * 129* 128*   K 4.8 3.8 4.3   CL 84* 82* 83*   CO2 41* 40* 40*   BUN 11 10 14   CREATININE 0.6* 0.6* 0.9   GLUCOSE 94 96 95     Hepatic: No results for input(s): AST, ALT, ALB, BILITOT, ALKPHOS in the last 72 hours. Lipids:   Lab Results   Component Value Date/Time    CHOL 169 08/23/2023 05:56 AM    HDL 83 08/23/2023 05:56 AM    TRIG 42 08/23/2023 05:56 AM     Hemoglobin A1C: No results found for: LABA1C  TSH:   Lab Results   Component Value Date/Time    TSH 2.35 03/07/2023 03:58 PM     Troponin: No results found for: TROPONINT  Lactic Acid: No results for input(s): LACTA in the last 72 hours. BNP:   Recent Labs     08/22/23  1745 08/24/23  0554   PROBNP 13,899* 3,548*     UA:  Lab Results   Component Value Date/Time    NITRU Negative 11/14/2022 02:59 PM    COLORU Yellow 11/14/2022 02:59 PM    PHUR 6.5 11/14/2022 02:59 PM    WBCUA 12 11/14/2022 02:59 PM    RBCUA 4 11/14/2022 02:59 PM    BACTERIA None Seen 11/14/2022 02:59 PM    CLARITYU Clear 11/14/2022 02:59 PM    SPECGRAV 1.015 11/14/2022 02:59 PM    LEUKOCYTESUR MODERATE 11/14/2022 02:59 PM    UROBILINOGEN 0.2 11/14/2022 02:59 PM    BILIRUBINUR Negative 11/14/2022 02:59 PM    BLOODU TRACE 11/14/2022 02:59 PM    GLUCOSEU Negative 11/14/2022 02:59 PM    KETUA TRACE 11/14/2022 02:59 PM     Urine Cultures:   Lab Results   Component Value Date/Time    LABURIN  11/14/2022 05:52 PM     <50,000 CFU/ml mixed skin/urogenital coby. No further workup    LABURIN 75,000 CFU/ml 11/14/2022 05:52 PM     Blood Cultures:   Lab Results   Component Value Date/Time    BC No Growth after 4 days of incubation. 10/31/2022 03:07 AM     Lab Results   Component Value Date/Time    BLOODCULT2 No Growth after 4 days of incubation.  10/31/2022 03:07 AM     Organism:   Lab Results   Component Value Date/Time

## 2023-08-24 NOTE — CONSULTS
Nutrition Education    Provided heart failure diet education. Education included low sodium diet guidelines (3-4 gm Na+/day) and fluid restriction (64 oz/day). Reviewed foods to choose and foods to avoid, along with label reading and ways to add flavor to food. Pt does not currently follow a low sodium diet at home. Pt states understanding of education. Time spent with patient: 10 minutes.       Andria Welch MS, RD, LD  Contact Number: [phone]

## 2023-08-24 NOTE — PROGRESS NOTES
[facility]   Electrophysiology Progress Note     Date: 8/24/2023  Admit Date: 8/22/2023     Reason for consultation: Atrial fibrillation    Chief Complaint:   Chief Complaint   Patient presents with    Shortness of Breath     Pt w c/o SOB from Sunday. Pt on 3 lpm via NC (baseline) Pt w Dx emphysema       History of Present Illness: History obtained from patient and medical record. Shannan Marquis is a 68 y.o. male with a past medical history of COPD, PAF, NICM, HFrEF with recovered EF, COPD, chronic hypoxic respiratoy failure, PVD with s/p fem-pop bypass. Interval Hx: Today, he is resting comfortably in bed with no complaints. He reports he slept well overnight. He denies any chest pain, dyspnea at rest, palpitations or dizziness. He was seen and examined. Clinical notes reviewed. Telemetry reviewed and shows sinus rhythm with PVCs. No new complaints today. No major events overnight.        Assessment and Plan:     Paroxysmal Atrial Fibrillation  - in sinus rhythm on telemetry with PVCs  - converted spontaneously 8/22 ~11:30a  - continue Toprol  mg daily  - continue Eliquis 5mg BID  - TSH- 2.35 (3/7/23)  - K- 4.3, Mg- 1.8  -  2 week Holter monitor at discharge and follow up with Dr Emery Tran (appt 10/3/2023)    [id number] Score for Atrial Fibrillation Stroke Risk   Risk   Factors  Component Value   C CHF Yes 1   H HTN Yes 1   A2 Age >= 75 No,  (74 y.o.) 0   D DM No 0   S2 Prior Stroke/TIA No 0   V Vascular Disease Yes 1   A Age 77-78 Yes,  (74 y.o.) 1   Sc Sex male 0    [id number]  Score  4   Score last updated 8/24/23 9:42 AM EDT      HFrEF Nonischemic Cardiomyopathy  - EF- 60%  - appears compensated  - repeat ECHO today  - Pro BNP- 3548  - Cr- 0.9, K- 4.3  - continue BB, Lasix, Entresto  - Daily weights, monitor I&Os  - HF following     COPD  - on oxygen @ home 3L NC  - stable on 2L currently  - continue respiratory medications    Chronic Hypoxic Respiratory Failure  - on oxygen @

## 2023-08-25 VITALS
WEIGHT: 119 LBS | HEART RATE: 82 BPM | RESPIRATION RATE: 16 BRPM | HEIGHT: 70 IN | OXYGEN SATURATION: 89 % | DIASTOLIC BLOOD PRESSURE: 70 MMHG | TEMPERATURE: 96.9 F | BODY MASS INDEX: 17.04 KG/M2 | SYSTOLIC BLOOD PRESSURE: 104 MMHG

## 2023-08-25 DIAGNOSIS — I48.91 ATRIAL FIBRILLATION, UNSPECIFIED TYPE (HCC): Primary | ICD-10-CM

## 2023-08-25 LAB
ANION GAP SERPL CALCULATED.3IONS-SCNC: 5 MMOL/L (ref 3–16)
BUN SERPL-MCNC: 19 MG/DL (ref 7–20)
CALCIUM SERPL-MCNC: 9.2 MG/DL (ref 8.3–10.6)
CHLORIDE SERPL-SCNC: 85 MMOL/L (ref 99–110)
CO2 SERPL-SCNC: 39 MMOL/L (ref 21–32)
CREAT SERPL-MCNC: 0.8 MG/DL (ref 0.8–1.3)
GFR SERPLBLD CREATININE-BSD FMLA CKD-EPI: >60 ML/MIN/{1.73_M2}
GLUCOSE SERPL-MCNC: 101 MG/DL (ref 70–99)
MAGNESIUM SERPL-MCNC: 1.8 MG/DL (ref 1.8–2.4)
NT-PROBNP SERPL-MCNC: 1512 PG/ML (ref 0–124)
POTASSIUM SERPL-SCNC: 3.6 MMOL/L (ref 3.5–5.1)
SODIUM SERPL-SCNC: 129 MMOL/L (ref 136–145)

## 2023-08-25 PROCEDURE — 80048 BASIC METABOLIC PNL TOTAL CA: CPT

## 2023-08-25 PROCEDURE — 94640 AIRWAY INHALATION TREATMENT: CPT

## 2023-08-25 PROCEDURE — 97110 THERAPEUTIC EXERCISES: CPT

## 2023-08-25 PROCEDURE — 2580000003 HC RX 258: Performed by: SURGERY

## 2023-08-25 PROCEDURE — 83735 ASSAY OF MAGNESIUM: CPT

## 2023-08-25 PROCEDURE — 6370000000 HC RX 637 (ALT 250 FOR IP): Performed by: SURGERY

## 2023-08-25 PROCEDURE — 6370000000 HC RX 637 (ALT 250 FOR IP): Performed by: INTERNAL MEDICINE

## 2023-08-25 PROCEDURE — 94761 N-INVAS EAR/PLS OXIMETRY MLT: CPT

## 2023-08-25 PROCEDURE — 83880 ASSAY OF NATRIURETIC PEPTIDE: CPT

## 2023-08-25 PROCEDURE — 2700000000 HC OXYGEN THERAPY PER DAY

## 2023-08-25 PROCEDURE — 99232 SBSQ HOSP IP/OBS MODERATE 35: CPT | Performed by: CLINICAL NURSE SPECIALIST

## 2023-08-25 PROCEDURE — 6370000000 HC RX 637 (ALT 250 FOR IP): Performed by: CLINICAL NURSE SPECIALIST

## 2023-08-25 PROCEDURE — 97116 GAIT TRAINING THERAPY: CPT

## 2023-08-25 PROCEDURE — 6360000002 HC RX W HCPCS: Performed by: SURGERY

## 2023-08-25 PROCEDURE — 36415 COLL VENOUS BLD VENIPUNCTURE: CPT

## 2023-08-25 RX ORDER — FUROSEMIDE 40 MG/1
40 TABLET ORAL
Status: DISCONTINUED | OUTPATIENT
Start: 2023-08-25 | End: 2023-08-25 | Stop reason: HOSPADM

## 2023-08-25 RX ORDER — FUROSEMIDE 40 MG/1
40 TABLET ORAL
Qty: 12 TABLET | Refills: 0 | Status: SHIPPED | OUTPATIENT
Start: 2023-08-25

## 2023-08-25 RX ORDER — MIDODRINE HYDROCHLORIDE 2.5 MG/1
TABLET ORAL
Qty: 30 TABLET | Refills: 0 | Status: SHIPPED | OUTPATIENT
Start: 2023-08-25

## 2023-08-25 RX ORDER — METOPROLOL SUCCINATE 100 MG/1
100 TABLET, EXTENDED RELEASE ORAL DAILY
Qty: 30 TABLET | Refills: 3 | Status: SHIPPED | OUTPATIENT
Start: 2023-08-25

## 2023-08-25 RX ORDER — SACUBITRIL AND VALSARTAN 24; 26 MG/1; MG/1
0.5 TABLET, FILM COATED ORAL 2 TIMES DAILY
Qty: 30 TABLET | Refills: 0 | Status: SHIPPED | OUTPATIENT
Start: 2023-08-25

## 2023-08-25 RX ADMIN — FLUOXETINE 40 MG: 20 CAPSULE ORAL at 09:25

## 2023-08-25 RX ADMIN — MIDODRINE HYDROCHLORIDE 2.5 MG: 5 TABLET ORAL at 09:25

## 2023-08-25 RX ADMIN — METOPROLOL SUCCINATE 100 MG: 50 TABLET, FILM COATED, EXTENDED RELEASE ORAL at 09:25

## 2023-08-25 RX ADMIN — LEVOTHYROXINE SODIUM 75 MCG: 0.15 TABLET ORAL at 06:13

## 2023-08-25 RX ADMIN — SACUBITRIL AND VALSARTAN 0.5 TABLET: 24; 26 TABLET, FILM COATED ORAL at 09:24

## 2023-08-25 RX ADMIN — Medication 10 ML: at 09:27

## 2023-08-25 RX ADMIN — Medication 2 PUFF: at 09:33

## 2023-08-25 RX ADMIN — FUROSEMIDE 40 MG: 10 INJECTION, SOLUTION INTRAMUSCULAR; INTRAVENOUS at 09:25

## 2023-08-25 RX ADMIN — MIDODRINE HYDROCHLORIDE 2.5 MG: 5 TABLET ORAL at 12:48

## 2023-08-25 RX ADMIN — APIXABAN 5 MG: 5 TABLET, FILM COATED ORAL at 09:26

## 2023-08-25 RX ADMIN — TIOTROPIUM BROMIDE INHALATION SPRAY 2 PUFF: 3.12 SPRAY, METERED RESPIRATORY (INHALATION) at 09:33

## 2023-08-25 ASSESSMENT — PAIN SCALES - GENERAL: PAINLEVEL_OUTOF10: 0

## 2023-08-25 NOTE — PROGRESS NOTES
[address] [facility] Department   Phone: [phone]    Physical Therapy    [] Initial Evaluation            [x] Daily Treatment Note         [] Discharge Summary      Patient: José Miguel Waggoner   : 8448   MRN: [id number]   Date of Service:  2023  Admitting Diagnosis: CHF (congestive heart failure), NYHA class I, acute on chronic, combined [facility])  Current Admission Summary: 68 y.o. male who presents to the emergency department secondary to concern for shortness of breath. Reports has been going on for few days and getting worse. He has a history of COPD and heart failure and uses 3 L of oxygen at baseline. Patient currently requiring 3 L of oxygen per nasal cannula in the ED. Patient reports that he stopped taking his Lasix for the last for 5 days because she keeps having to go to the bathroom. Denies any chest pain or fever. Admits to a cough productive of clear sputum. Patient's symptoms are exacerbated when he lies flat and better when he leans forward. Past Medical History:  has a past medical history of Bronchitis, CHF (congestive heart failure) ([address]), and COPD (chronic obstructive pulmonary disease) ([address]). Past Surgical History:  has a past surgical history that includes fracture surgery; Tonsillectomy; eye surgery; Cholecystectomy, laparoscopic (N/A, 3/17/2021); and femoral bypass (Left, 2021). Discharge Recommendations: José Miguel Waggoner scored a 20/24 on the AM-PAC short mobility form. Current research shows that an AM-PAC score of 18 or greater is typically associated with a discharge to the patient's home setting. Based on the patient's AM-PAC score and their current functional mobility deficits, it is recommended that the patient have 2-3 sessions per week of Physical Therapy at d/c to increase the patient's independence.   At this time, this patient demonstrates the endurance and safety to discharge home with HHPT  and a follow up treatment

## 2023-08-25 NOTE — CARE COORDINATION
08/25/23 1149   IMM Letter   IMM Letter given to Patient/Family/Significant other/Guardian/POA/by: Letter given to Magali Mukherjee by Tushar Da Silva RN   IMM Letter date given: 08/25/23   IMM Letter time given: 1145     Electronically signed by Efren Schroeder RN on 8/25/2023 at 11:49 AM

## 2023-08-25 NOTE — CARE COORDINATION
[address] home care referral. Spoke with pt and re: home care plan of care/services. Agreeable. Demographic's verified. Will follow for home care.

## 2023-08-25 NOTE — PROGRESS NOTES
CLINICAL PHARMACY NOTE: MEDS TO BEDS    Total # of Prescriptions Filled: 2   The following medications were delivered to the patient:  METOPROLOL SUCCINATE ER 100MG  ENTRESTO 24-26MG    Additional Documentation:  Patients wife picked up in outpatient pharmacy = signed  [address]

## 2023-08-25 NOTE — PLAN OF CARE
Problem: Pain  Goal: Verbalizes/displays adequate comfort level or baseline comfort level  8/25/2023 1503 by Jay Calderon RN  Outcome: Completed     Problem: Safety - Adult  Goal: Free from fall injury  8/25/2023 1503 by Jay Calderon RN  Outcome: Completed     Problem: Skin/Tissue Integrity  Goal: Absence of new skin breakdown  Description: 1. Monitor for areas of redness and/or skin breakdown  2. Assess vascular access sites hourly  3. Every 4-6 hours minimum:  Change oxygen saturation probe site  4. Every 4-6 hours:  If on nasal continuous positive airway pressure, respiratory therapy assess nares and determine need for appliance change or resting period.   8/25/2023 1503 by Jay Calderon RN  Outcome: Completed     Problem: Chronic Conditions and Co-morbidities  Goal: Patient's chronic conditions and co-morbidity symptoms are monitored and maintained or improved  8/25/2023 1503 by Jay Calderon, RN  Outcome: Completed

## 2023-08-25 NOTE — CONSULTS
[address] [facility] 1950    History:  Past Medical History:   Diagnosis Date    Bronchitis     CHF (congestive heart failure) ([address])     COPD (chronic obstructive pulmonary disease) ([address])        ECHO:  8/24/23      Summary   Normal left ventricle size and wall thickness. Systolic function appears to   be globally mildly reduced with an estimated ejection fraction of 45-50%. ACE/ARB/ARNi: [place] 24-26 -mg- half tablet bid  BB: toprol xl 100 mg daily  Aldosterone Antagonist: Consider adding aldactone but concerns with compliance  SGLT2: Could consider adding jardiance if continues with fluid overload as an outpatient    History of sleep apnea: No  [place] Screen ordered: Yes    DM History: No    Last Hospital Admission:1/6/23 with copd exacerbation    Code Status: full   Discharge plans: home with [facility]    Family Present: no    Joseph Sawyer was admitted to the hospital with incrased shortness of breath. Patient has hx HF and follows with HF NP as outpatient. He does nto weigh daily but has noticed gradual decline in weight over last few months. He did have some lower leg edema. Patient did not call with changes. Patient tries to limit sodium but does not follow a specific restriction. He states compliance with medications and follow up appointments. Patient provided with both written and verbal education on CHF signs/ symptoms, causes, discharge medications, daily weights, low sodium diet, activity, and follow-up. Pt to call if gains 3 pounds in one day or 5 pounds in one week. Mutually agreed upon goals were discussed such as calling the MD as soon as they recognize symptoms and weight gain, maintaining proper diet, taking medications as prescribed, joining cardiac rehab when able. Also reviewed importance of risk factor reduction. Patient provided with CHF Zone Management tool and CHF symptoms magnet.     Discussed importance of lifestyle changes:

## 2023-08-25 NOTE — CARE COORDINATION
Discharge Planning Note Re: [address]     CM/SW noted consult for discharge planning. Chart reviewed. Noted recommendations for home health care. CM met with patient . Introduced self and explained role of CM and discharge planning. Patient is agreeable to home health on dc. Wade of choice list was provided with basic dialogue that supports the patient's individualized plan of care/goals, treatment preferences and shares   the quality data associated with the   providers. [x] Yes [] No.     Patient has reviewed the provided list and made selections. Referral made to [facility])   [facility] [phone] accepted patient. Pending [facility] order for  [x]RN [x]PT  [x]OT  []HHA  []SW  []  SLP    Pt declines PT/OT. CM/SW will follow-up on referrals and provide any additional documentation necessary to facilitate placement.       Electronically signed by Efren Schroeder RN on 8/25/2023 at 11:31 AM

## 2023-08-25 NOTE — CARE COORDINATION
Aware of discharge with home care. Home care orders sent to [facility]. Discharge planner notified.

## 2023-08-25 NOTE — PLAN OF CARE
Problem: Pain  Goal: Verbalizes/displays adequate comfort level or baseline comfort level  Outcome: Adequate for Discharge     Problem: Safety - Adult  Goal: Free from fall injury  Outcome: Adequate for Discharge     Problem: Skin/Tissue Integrity  Goal: Absence of new skin breakdown  Description: 1. Monitor for areas of redness and/or skin breakdown  2. Assess vascular access sites hourly  3. Every 4-6 hours minimum:  Change oxygen saturation probe site  4. Every 4-6 hours:  If on nasal continuous positive airway pressure, respiratory therapy assess nares and determine need for appliance change or resting period.   Outcome: Adequate for Discharge     Problem: Chronic Conditions and Co-morbidities  Goal: Patient's chronic conditions and co-morbidity symptoms are monitored and maintained or improved  Outcome: Adequate for Discharge

## 2023-08-25 NOTE — CARE COORDINATION
Case Management Assessment  Initial Evaluation    Date/Time of Evaluation: 8/25/2023 11:40 AM  Assessment Completed by: Gogo Griffin RN    If patient is discharged prior to next notation, then this note serves as note for discharge by case management. Patient Name: Yaritza Mcclellan                   YOB: 1950  Diagnosis: Shortness of breath [R06.02]  CHF (congestive heart failure), NYHA class I, acute on chronic, combined ([address] [facility]) [I50.43]  Acute congestive heart failure, unspecified heart failure type [facility]) [I50.9]                   Date / Time: 8/22/2023  5:21 PM    Patient Admission Status: Inpatient   Readmission Risk (Low < 19, Mod (19-27), High > 27): Readmission Risk Score: 16.5    Current PCP: Kerry Park III, DO  PCP verified by ? Yes    Chart Reviewed: Yes      History Provided by: Patient  Patient Orientation: Alert and Oriented    Patient Cognition: Alert    Hospitalization in the last 30 days (Readmission):  No    If yes, Readmission Assessment in  Navigator will be completed. Advance Directives:      Code Status: Full Code   Patient's Primary Decision Maker is: Legal Next of Kin    Primary Decision Maker: Tin Delatorre \"Carson\" - Spouse - [phone]    Discharge Planning:    Patient lives with: Spouse/Significant Other Type of Home: House  Primary Care Giver: Self  Patient Support Systems include: Spouse/Significant Other   Current Financial resources: Medicare  Current community resources: None  Current services prior to admission: Durable Medical Equipment, Oxygen Therapy            Current DME: Oxygen Therapy (Comment), Shower Chair, Walker, Wheelchair, Other (Comment) (electric scooter)            Type of Home Care services:  None    ADLS  Prior functional level: Independent in ADLs/IADLs, Cooking, Housework, Shopping, Assistance with the following:  Current functional level:  Independent in ADLs/IADLs, Cooking, Housework, Shopping, Assistance with the providers was provided to:     Patient Representative Name:       The Patient and/or Patient Representative Agree with the Discharge Plan?       Yolanda Thao RN  Case Management Department  Electronically signed by Yolanda Thao RN on 8/25/2023 at 11:43 AM

## 2023-08-25 NOTE — PROGRESS NOTES
CLINICAL PHARMACY NOTE: MEDS TO BEDS    Total # of Prescriptions Filled: 3   The following medications were delivered to the patient:  Metoprolol  mg  Entresto 24-26 mg  Midodrine 2.5 mg    Additional Documentation:  Patient wife picked up from Outpatient Pharmacy=signed  Isaiah Rust CPhT

## 2023-08-25 NOTE — DISCHARGE INSTR - DIET

## 2023-08-25 NOTE — PROGRESS NOTES
Patient DC instruction completed with patient and family, all questions answered at this time, Peripheral IV removed, no further inpt nursing needs at this time, awaiting medications from outpt pharmacy, care ongoing

## 2023-08-28 ENCOUNTER — TELEPHONE (OUTPATIENT)
Dept: OTHER | Age: 73
End: 2023-08-28

## 2023-08-28 NOTE — TELEPHONE ENCOUNTER
[address] [facility] [address] FAILURE PROGRAM  TELEPHONE ENCOUNTER FORM    Estephanie Phoenix 1950    ASSESSMENT:   Baseline weight: 119 lbs  Current weight: 116 lbs  Patient weighing daily: Yes  What are your symptoms of heart failure: dyspnea, edema  Are you having any symptoms:  No  Patient knows who to call with symptoms: Yes  Diet history:      Patient states sodium limitation is : 3 G      Patient states fluid limitation is 64 oz  Patient following diet as instructed: Yes  Medication history: taking medications as instructed Yes; medication side effects noted No  Patient is being active at home: Yes  Patient knows date and time of follow up appointment: Yes   Patient has transportation to appointments: Yes    RECOMMENDATIONS:   Medication: taking as prescribed  Diet: no added salt diet  MD/ Clinic appointment: 9/1 with Mayte Kelley NP  Other: Doing well. Encouraged patient to call with any questions or concerns.

## 2023-08-29 ASSESSMENT — ENCOUNTER SYMPTOMS: GASTROINTESTINAL NEGATIVE: 1

## 2023-08-29 NOTE — PROGRESS NOTES
[address] [facility]   Congestive Heart Failure    PrimaryCare Doctor:  Sarthak Joshi III, DO    Chief Complaint:  SOB    History of Present Illness:  Claribel Stevenson is a 68 y.o. male with PMH NICM, HFimpEF, COPD,  PVD s/p fem/pop bypass in June who presents today for hosp f/u. He was admitted 8/22/23 to 8/26/23  Hospital Course: Acute on chronic CHF exacerbation  -Treated with IV diuresis with improvement symptom noncompliance of medical reason. Patient transition to p.o. Lasix on discharge hemoglobin stable hemodynamically stable on room air     Afib with RVr   Rate nos contorlled now   Continue toperolol  Eval by ep cardiology outpatient follow-up with possible cardioversion     Hyponatremia   Due to volume overolaod  Improved     Anemia  - likely CKD    Since hospitalization: he is feeling better, wt is stable and SOB is at baseline. He denies dizziness, chest pain, palpitations, PND, exertional chest pressure/discomfort, fatigue, early saiety, syncope, or edema. Hosp d/c wt: [facility] d/c wt:  [address] wt 118    Baseline Weight: 115-120  Wt Readings from Last 3 Encounters:   09/01/23 118 lb (53.5 kg)   08/25/23 119 lb (54 kg)   06/01/23 131 lb (59.4 kg)     Admit BNP: 13k  D/C FAS:0660    Hosp f/u phone call: 8/28/23    EF:45-50%  Cardiac Imaging: Echo 8/22/23:    Summary   Normal left ventricle size and wall thickness. Systolic function appears to   be globally mildly reduced with an estimated ejection fraction of 45-50%. 10/31/22 TTE   Technically difficult study due to thin body frame and poor acoustical   window. Left ventricular cavity size and wall thickness is normal. Ejection fraction   is visually estimated to be 60%. Abnormal septal motion. Normal diastolic function. E/e' = 7.5. Aortic valve appears sclerotic but opens adequately. The right ventricle is normal in size and function. TAPSE: 2.72 cm. RVS   velocity: 13.4 cm/s. ST 8/1/22:    There is a small fixed apical

## 2023-08-30 ENCOUNTER — TELEPHONE (OUTPATIENT)
Dept: CARDIOLOGY CLINIC | Age: 73
End: 2023-08-30

## 2023-08-30 NOTE — TELEPHONE ENCOUNTER
Laisha from [facility] called to get a clarification if the Pt is to wear a 30 days monitor are a 14 day. Per Abraham Melo it is a 14 day monitor ordered in the Hospital by Haleigh Correa.   AURY

## 2023-09-01 ENCOUNTER — OFFICE VISIT (OUTPATIENT)
Dept: CARDIOLOGY CLINIC | Age: 73
End: 2023-09-01

## 2023-09-01 ENCOUNTER — NURSE ONLY (OUTPATIENT)
Dept: CARDIOLOGY CLINIC | Age: 73
End: 2023-09-01

## 2023-09-01 VITALS
WEIGHT: 118 LBS | BODY MASS INDEX: 16.89 KG/M2 | SYSTOLIC BLOOD PRESSURE: 90 MMHG | OXYGEN SATURATION: 93 % | DIASTOLIC BLOOD PRESSURE: 60 MMHG | HEART RATE: 55 BPM | HEIGHT: 70 IN

## 2023-09-01 DIAGNOSIS — I48.19 PERSISTENT ATRIAL FIBRILLATION (HCC): ICD-10-CM

## 2023-09-01 DIAGNOSIS — I50.23 ACUTE ON CHRONIC SYSTOLIC HEART FAILURE, NYHA CLASS 3 (HCC): Primary | ICD-10-CM

## 2023-09-01 DIAGNOSIS — I95.2 HYPOTENSION DUE TO DRUGS: ICD-10-CM

## 2023-09-01 DIAGNOSIS — I42.8 NONISCHEMIC CARDIOMYOPATHY (HCC): Chronic | ICD-10-CM

## 2023-09-01 DIAGNOSIS — Z09 HOSPITAL DISCHARGE FOLLOW-UP: ICD-10-CM

## 2023-09-01 PROBLEM — I50.43 CHF (CONGESTIVE HEART FAILURE), NYHA CLASS I, ACUTE ON CHRONIC, COMBINED (HCC): Status: RESOLVED | Noted: 2023-08-22 | Resolved: 2023-09-01

## 2023-09-01 PROBLEM — I50.9 ACUTE CONGESTIVE HEART FAILURE (HCC): Status: RESOLVED | Noted: 2022-10-31 | Resolved: 2023-09-01

## 2023-09-01 ASSESSMENT — ENCOUNTER SYMPTOMS: RESPIRATORY NEGATIVE: 1

## 2023-09-01 NOTE — PROGRESS NOTES
Rafael placed on the patient here in clinic today . Reviewed proper care and instructions with the patient here in clinic.      SN :  [id number]

## 2023-09-01 NOTE — PATIENT INSTRUCTIONS
Instructions:   Medications: no change in meds  Labs in 2 weeks  Follow up: Pan Harrington in November        [address]: [phone]

## 2023-09-06 ENCOUNTER — TELEPHONE (OUTPATIENT)
Dept: CARDIOLOGY CLINIC | Age: 73
End: 2023-09-06

## 2023-09-06 ENCOUNTER — HOSPITAL ENCOUNTER (OUTPATIENT)
Age: 73
Setting detail: SPECIMEN
Discharge: HOME | End: 2023-09-06
Payer: COMMERCIAL

## 2023-09-06 LAB
ANION GAP SERPL CALCULATED.3IONS-SCNC: 6 MMOL/L (ref 3–16)
BUN SERPL-MCNC: 12 MG/DL (ref 7–20)
CALCIUM SERPL-MCNC: 8.9 MG/DL (ref 8.3–10.6)
CHLORIDE SERPL-SCNC: 83 MMOL/L (ref 99–110)
CO2 SERPL-SCNC: 36 MMOL/L (ref 21–32)
CREAT SERPL-MCNC: 0.7 MG/DL (ref 0.8–1.3)
GFR SERPLBLD CREATININE-BSD FMLA CKD-EPI: >60 ML/MIN/{1.73_M2}
GLUCOSE SERPL-MCNC: 96 MG/DL (ref 70–99)
NT-PROBNP SERPL-MCNC: 2099 PG/ML (ref 0–124)
POTASSIUM SERPL-SCNC: 5.4 MMOL/L (ref 3.5–5.1)
SODIUM SERPL-SCNC: 125 MMOL/L (ref 136–145)

## 2023-09-06 PROCEDURE — 83880 ASSAY OF NATRIURETIC PEPTIDE: CPT

## 2023-09-06 PROCEDURE — 80048 BASIC METABOLIC PNL TOTAL CA: CPT

## 2023-09-06 PROCEDURE — 36415 COLL VENOUS BLD VENIPUNCTURE: CPT

## 2023-09-06 NOTE — TELEPHONE ENCOUNTER
----- Message from CHELSY Mills CNP sent at 9/6/2023 11:22 AM EDT -----  Labs show high potassium, sodium dropping again and fluid number is a little higher, how is he feeling, wt increase? Have him increase lasix to 4 times a week, make sure he is not using no salt or eating high potassium food. See if PCP could change prozac to different med because it also lowers sodium recheck labs in 2 weeks. Albaro Sic to reach patient LMOM to call the office about lab results and medication changes and new lab orders.

## 2023-09-13 LAB
ANION GAP SERPL CALCULATED.3IONS-SCNC: 7 MMOL/L (ref 3–16)
BUN SERPL-MCNC: 12 MG/DL (ref 7–20)
CALCIUM SERPL-MCNC: 9.3 MG/DL (ref 8.3–10.6)
CHLORIDE SERPL-SCNC: 82 MMOL/L (ref 99–110)
CO2 SERPL-SCNC: 35 MMOL/L (ref 21–32)
CREAT SERPL-MCNC: 0.8 MG/DL (ref 0.8–1.3)
GFR SERPLBLD CREATININE-BSD FMLA CKD-EPI: >60 ML/MIN/{1.73_M2}
GLUCOSE SERPL-MCNC: 115 MG/DL (ref 70–99)
NT-PROBNP SERPL-MCNC: 1549 PG/ML (ref 0–124)
POTASSIUM SERPL-SCNC: 4.6 MMOL/L (ref 3.5–5.1)
SODIUM SERPL-SCNC: 124 MMOL/L (ref 136–145)

## 2023-09-15 ENCOUNTER — TELEPHONE (OUTPATIENT)
Dept: CARDIOLOGY CLINIC | Age: 73
End: 2023-09-15

## 2023-09-15 NOTE — TELEPHONE ENCOUNTER
Prozac was decreased to 20mg, then 10mg starting tomorrow 9/16  for 2 weeks then stop it. He will be off of it Sept 30. Instructed not to increase oral sodium.

## 2023-09-15 NOTE — TELEPHONE ENCOUNTER
----- Message from CHELSY Thompson CNP sent at 9/14/2023 11:19 AM EDT -----  Sodium is still low, did PCP stop prozac? Carlos Dodge to reach patient [facility] for patient to return our call.

## 2023-09-19 ENCOUNTER — HOSPITAL ENCOUNTER (OUTPATIENT)
Age: 73
Setting detail: SPECIMEN
Discharge: HOME | End: 2023-09-19
Payer: COMMERCIAL

## 2023-09-19 ENCOUNTER — TELEPHONE (OUTPATIENT)
Dept: CARDIOLOGY CLINIC | Age: 73
End: 2023-09-19

## 2023-09-19 LAB
ANION GAP SERPL CALCULATED.3IONS-SCNC: 8 MMOL/L (ref 3–16)
BUN SERPL-MCNC: 12 MG/DL (ref 7–20)
CALCIUM SERPL-MCNC: 9.1 MG/DL (ref 8.3–10.6)
CHLORIDE SERPL-SCNC: 86 MMOL/L (ref 99–110)
CO2 SERPL-SCNC: 36 MMOL/L (ref 21–32)
CREAT SERPL-MCNC: 0.8 MG/DL (ref 0.8–1.3)
GFR SERPLBLD CREATININE-BSD FMLA CKD-EPI: >60 ML/MIN/{1.73_M2}
GLUCOSE SERPL-MCNC: 128 MG/DL (ref 70–99)
NT-PROBNP SERPL-MCNC: 1794 PG/ML (ref 0–124)
POTASSIUM SERPL-SCNC: 4.8 MMOL/L (ref 3.5–5.1)
SODIUM SERPL-SCNC: 130 MMOL/L (ref 136–145)

## 2023-09-19 PROCEDURE — 36415 COLL VENOUS BLD VENIPUNCTURE: CPT

## 2023-09-19 PROCEDURE — 80048 BASIC METABOLIC PNL TOTAL CA: CPT

## 2023-09-19 PROCEDURE — 83880 ASSAY OF NATRIURETIC PEPTIDE: CPT

## 2023-09-19 NOTE — TELEPHONE ENCOUNTER
----- Message from CHELSY Pitts CNP sent at 9/19/2023  1:54 PM EDT -----  Sodium is getting better, no new orders. Cecelia Bajwa to patient he verbalized understanding.

## 2023-10-24 ENCOUNTER — OFFICE VISIT (OUTPATIENT)
Dept: CARDIOLOGY CLINIC | Age: 73
End: 2023-10-24
Payer: COMMERCIAL

## 2023-10-24 VITALS
HEART RATE: 64 BPM | SYSTOLIC BLOOD PRESSURE: 100 MMHG | DIASTOLIC BLOOD PRESSURE: 60 MMHG | BODY MASS INDEX: 17.53 KG/M2 | WEIGHT: 122.2 LBS

## 2023-10-24 DIAGNOSIS — I48.0 PAROXYSMAL ATRIAL FIBRILLATION (HCC): ICD-10-CM

## 2023-10-24 DIAGNOSIS — I48.19 PERSISTENT ATRIAL FIBRILLATION (HCC): Primary | ICD-10-CM

## 2023-10-24 PROCEDURE — 3074F SYST BP LT 130 MM HG: CPT | Performed by: INTERNAL MEDICINE

## 2023-10-24 PROCEDURE — 3078F DIAST BP <80 MM HG: CPT | Performed by: INTERNAL MEDICINE

## 2023-10-24 PROCEDURE — 93000 ELECTROCARDIOGRAM COMPLETE: CPT | Performed by: INTERNAL MEDICINE

## 2023-10-24 PROCEDURE — G9692 HOSP RECD BY PT DUR MSMT PER: HCPCS | Performed by: INTERNAL MEDICINE

## 2023-10-24 PROCEDURE — 99214 OFFICE O/P EST MOD 30 MIN: CPT | Performed by: INTERNAL MEDICINE

## 2023-10-24 RX ORDER — ARIPIPRAZOLE 2 MG/1
TABLET ORAL
COMMUNITY
Start: 2023-10-16

## 2023-10-24 NOTE — PROGRESS NOTES
[address] [facility]   Electrophysiology Follow up     Date:2/13/2023    I had the privilege of visiting Cleveland Momin in the office. CC: ***    HPI: Cleveland Momin is a 68 y.o. male  with a past medical history of COPD paroxsymal AF, NICM, HFrEF with recovered LVEF, COPD, chronic hypoxic respiratory failure, and PVD s/p fem/pop bypass. Patient initially presented in decompensated heart failure and acute on chronic hypoxic respiratory failure, found to be in atrial fibrillation with RVR. Was successfully cardioverted at that time. Amiodarone discontinued as he remained in sinus rhythm and planned for monitor to reassess rhythm. 08/22/23 Patient admitted to [facility] for worsening shortness of breath. Noted to have an episode of atrial fibrillation with rapid rate. Given IV Diltiazem with rate improvement. Offered Tikosyn loading and patient opted to proceed with monitor . BB increased. Monitor revealed 2.19% AF burden     Raven Munguia presents to the office today for follow up. Continuous NC Oxygen; sl LE Edema bilaterally. Follows with Dr Joan Pradhan        Review of System:  [x] Full ROS obtained and negative except as mentioned in HPI      Prior to Admission medications    Medication Sig Start Date End Date Taking?  Authorizing Provider   ARIPiprazole (ABILIFY) 2 MG tablet  10/16/23  Yes Provider, MD Calvin   metoprolol succinate (TOPROL XL) 100 MG extended release tablet Take 1 tablet by mouth daily 8/25/23  Yes Karen Gillespie MD   sacubitril-valsartan (ENTRESTO) 24-26 MG per tablet Take 0.5 tablets by mouth 2 times daily 8/25/23  Yes CHELSY Elmore - CNS   furosemide (LASIX) 40 MG tablet Take 1 tablet by mouth three times a week 8/25/23  Yes Chetanjoie Bush APRN - CNS   midodrine (PROAMATINE) 2.5 MG tablet If BP <120 8/25/23  Yes Chetan Bush APRN - CNS   apixaban (ELIQUIS) 5 MG TABS tablet Take 1 tablet by mouth 2 times daily   Yes Provider, Calvin, MD   FLUoxetine (PROZAC) 40 MG
motion. Normal diastolic function. E/e' = 7.5. Aortic valve appears sclerotic but opens adequately. The right ventricle is normal in size and function. TAPSE: 2.72 cm. RVS   velocity: 13.4 cm/s. Chest CT 10/14/22  Impression   1. Negative for pulmonary embolus. 2. Emphysema with trace left pleural effusion. Stress Test 08/02/22   Summary    -There is a small fixed apical defect that is likely bowel artifact rather    than scar.    -There is no ischemia.    -There is mild global hypokinesis with EF=47%. -Intermediate risk study related to EF < 50%. Echo 03/11/22  Summary   Technically limited study due to patient body habitus and lung interference   as well as frequent PVCs. Estimated ejection fraction of 45-50%. Mildly diminished left ventricular systolic function. Image quality inadequate to rule out regional wall motion abnormalities. Mild septal left ventricular hypertrophy. Normal left ventricle size. Grade I diastolic dysfunction with normal LV filling pressures. The aortic valve appears sclerotic but opens well. Mild to moderate tricuspid valve regurgitation.    The inferior vena cava is dilated with respiratory variation greater than   50% consistent with CVP 10-15 mmHg    Assessment/plan:    Paroxysmal Atrial Fibrillation   - dx in [place]  - ECG today shows sinus rhythm  - 2.19% AF burden (5.5-hour episode, heart rate 134) on 09/01/23 holter  - Continue Toprol  mg QD  - CHADS2 Vascor 3, continue Eliquis  - Previously on amiodarone  - We discussed management of atrial fibrillation, seems he is largely asymptomatic with regards to palpitations or racing heart though I do have concerns for worsening heart failure especially with elevated heart rates in atrial fibrillation and risk of having increased burden, previously offered him Tikosyn though he did not want to start additional medications or be hospitalized (additional concerns for cost), will continue

## 2023-10-25 ENCOUNTER — TELEPHONE (OUTPATIENT)
Dept: CARDIOLOGY CLINIC | Age: 73
End: 2023-10-25

## 2023-10-25 NOTE — TELEPHONE ENCOUNTER
Tried to reach patient to get current weight and ask CHF questions, LMOM for patient to return our call.     Patient weight yesterday 121 per [facility] courtney notes, he doesn't weigh everyday but will start back getting his daily weight

## 2023-10-25 NOTE — TELEPHONE ENCOUNTER
Leslie Ye called to inform [facility] that the Pt has resendiz 4 lbs, has a productive cough, clear mucus, no swelling, had a upper respiratory episode. Was able to use his nebulizer. Pt is still taking is water pills as directed. Please advise.   Thank you

## 2023-10-27 RX ORDER — FUROSEMIDE 40 MG/1
TABLET ORAL
Qty: 36 TABLET | Refills: 3 | Status: SHIPPED | OUTPATIENT
Start: 2023-10-27

## 2023-10-27 NOTE — TELEPHONE ENCOUNTER
Requested Prescriptions     Pending Prescriptions Disp Refills    furosemide (LASIX) 40 MG tablet [Pharmacy Med Name: FUROSEMIDE 40 MG TABLET] 36 tablet 3     Sig: TAKE ONE TABLET BY MOUTH THREE DAYS A WEEK          Last Office Visit: 9/1/2023     Next Office Visit: 11/14/2023         Last Labs: [phone]

## 2023-11-15 ASSESSMENT — ENCOUNTER SYMPTOMS: GASTROINTESTINAL NEGATIVE: 1

## 2023-11-15 NOTE — PROGRESS NOTES
[facility]   Congestive Heart Failure    PrimaryCare Doctor:  Ivon Duong DO    Chief Complaint:  SOB    History of Present Illness:  Nino Blackmon is a 68 y.o. male with PMH NICM, HFimpEF, COPD,  PVD s/p fem/pop bypass in June who presents today for chf f/u. At hosp f/u visit: no change  EP OV 10/24: no change in meds  Phone Call 10/25: 4lb wt gain, lasix increased    Today: he is feeling ok, his wt is back to baseline and he reports some episodes of PND. He denies dizziness, chest pain, palpitations, PND, exertional chest pressure/discomfort, fatigue, early saiety, syncope, or edema. Today's Home wt 119    Baseline Weight: 115-120  Wt Readings from Last 3 Encounters:   11/16/23 56.2 kg (124 lb)   10/24/23 55.4 kg (122 lb 3.2 oz)   09/01/23 53.5 kg (118 lb)       EF:45-50%  Cardiac Imaging: Echo 8/22/23:    Summary   Normal left ventricle size and wall thickness. Systolic function appears to   be globally mildly reduced with an estimated ejection fraction of 45-50%. 10/31/22 TTE   Technically difficult study due to thin body frame and poor acoustical   window. Left ventricular cavity size and wall thickness is normal. Ejection fraction   is visually estimated to be 60%. Abnormal septal motion. Normal diastolic function. E/e' = 7.5. Aortic valve appears sclerotic but opens adequately. The right ventricle is normal in size and function. TAPSE: 2.72 cm. RVS   velocity: 13.4 cm/s. ST 8/1/22: There is a small fixed apical defect that is likely bowel artifact rather    than scar.    -There is no ischemia.    -There is mild global hypokinesis with EF=47%. -Intermediate risk study related to EF < 50%. Echo 6/14/22:    Summary   Technically difficult examination due to body habitus and limited windows. Left ventricular systolic function is normal with ejection fraction   estimated at 60-65 %. No regional wall motion abnormalities are noted.    Normal left ventricular wall

## 2023-11-16 ENCOUNTER — OFFICE VISIT (OUTPATIENT)
Dept: CARDIOLOGY CLINIC | Age: 73
End: 2023-11-16
Payer: COMMERCIAL

## 2023-11-16 ENCOUNTER — HOSPITAL ENCOUNTER (OUTPATIENT)
Age: 73
Discharge: HOME | End: 2023-11-16
Payer: COMMERCIAL

## 2023-11-16 VITALS
DIASTOLIC BLOOD PRESSURE: 48 MMHG | HEIGHT: 70 IN | WEIGHT: 124 LBS | SYSTOLIC BLOOD PRESSURE: 100 MMHG | BODY MASS INDEX: 17.75 KG/M2

## 2023-11-16 DIAGNOSIS — I10 ESSENTIAL HYPERTENSION: Chronic | ICD-10-CM

## 2023-11-16 DIAGNOSIS — I48.91 ATRIAL FIBRILLATION WITH RVR (HCC): ICD-10-CM

## 2023-11-16 DIAGNOSIS — E87.1 HYPONATREMIA: ICD-10-CM

## 2023-11-16 DIAGNOSIS — I42.8 NONISCHEMIC CARDIOMYOPATHY (HCC): Chronic | ICD-10-CM

## 2023-11-16 DIAGNOSIS — I50.42 CHRONIC COMBINED SYSTOLIC AND DIASTOLIC CONGESTIVE HEART FAILURE (HCC): Primary | ICD-10-CM

## 2023-11-16 DIAGNOSIS — I50.23 ACUTE ON CHRONIC SYSTOLIC HEART FAILURE, NYHA CLASS 3 (HCC): ICD-10-CM

## 2023-11-16 PROBLEM — I48.19 PERSISTENT ATRIAL FIBRILLATION (HCC): Status: RESOLVED | Noted: 2022-05-19 | Resolved: 2023-11-16

## 2023-11-16 PROBLEM — I48.0 PAROXYSMAL ATRIAL FIBRILLATION (HCC): Status: RESOLVED | Noted: 2022-10-15 | Resolved: 2023-11-16

## 2023-11-16 LAB
ANION GAP SERPL CALCULATED.3IONS-SCNC: 1 MMOL/L (ref 3–16)
BUN SERPL-MCNC: 11 MG/DL (ref 7–20)
CALCIUM SERPL-MCNC: 8.7 MG/DL (ref 8.3–10.6)
CHLORIDE SERPL-SCNC: 84 MMOL/L (ref 99–110)
CO2 SERPL-SCNC: 43 MMOL/L (ref 21–32)
CREAT SERPL-MCNC: 0.7 MG/DL (ref 0.8–1.3)
GFR SERPLBLD CREATININE-BSD FMLA CKD-EPI: >60 ML/MIN/{1.73_M2}
GLUCOSE SERPL-MCNC: 94 MG/DL (ref 70–99)
NT-PROBNP SERPL-MCNC: 3014 PG/ML (ref 0–124)
POTASSIUM SERPL-SCNC: 5 MMOL/L (ref 3.5–5.1)
SODIUM SERPL-SCNC: 128 MMOL/L (ref 136–145)

## 2023-11-16 PROCEDURE — G9692 HOSP RECD BY PT DUR MSMT PER: HCPCS | Performed by: NURSE PRACTITIONER

## 2023-11-16 PROCEDURE — 83880 ASSAY OF NATRIURETIC PEPTIDE: CPT

## 2023-11-16 PROCEDURE — 36415 COLL VENOUS BLD VENIPUNCTURE: CPT

## 2023-11-16 PROCEDURE — 80048 BASIC METABOLIC PNL TOTAL CA: CPT

## 2023-11-16 PROCEDURE — 3078F DIAST BP <80 MM HG: CPT | Performed by: NURSE PRACTITIONER

## 2023-11-16 PROCEDURE — 3074F SYST BP LT 130 MM HG: CPT | Performed by: NURSE PRACTITIONER

## 2023-11-16 PROCEDURE — 99214 OFFICE O/P EST MOD 30 MIN: CPT | Performed by: NURSE PRACTITIONER

## 2023-11-16 ASSESSMENT — ENCOUNTER SYMPTOMS: SHORTNESS OF BREATH: 1

## 2023-11-16 NOTE — PATIENT INSTRUCTIONS
Instructions:   Medications: no change in meds  Labs today  Follow up: Pan Harrington in January        [address]: [phone]

## 2023-11-17 ENCOUNTER — TELEPHONE (OUTPATIENT)
Dept: CARDIOLOGY CLINIC | Age: 73
End: 2023-11-17

## 2023-11-17 NOTE — TELEPHONE ENCOUNTER
----- Message from CHELSY Colunga CNP sent at 11/17/2023  8:50 AM EST -----  BNP is elevated, please ask him to increase lasix to daily for the next week.  Harris Srinivasan

## 2023-11-29 ENCOUNTER — TELEPHONE (OUTPATIENT)
Dept: CARDIOLOGY CLINIC | Age: 73
End: 2023-11-29

## 2023-11-29 NOTE — TELEPHONE ENCOUNTER
When he increased his lasix to daily back on nov 17 did he feel better and if so then do the lasix daily   Check blood work in 2 weeks orders are in chart

## 2023-11-29 NOTE — TELEPHONE ENCOUNTER
Tried to reach patient [facility] for him to return our call about the weight gain    Current weight 122 yesterday 119, no swelling, no sob more than normal. Taking furosemide 40 mg three days a week, taking entresto half a tablet bid, no salty foods, watching fluids, no fast food

## 2023-11-30 NOTE — TELEPHONE ENCOUNTER
Tried to reach patient [facility] with Lasix instructions and lab orders asked patient to return our call. Spoke to [facility] norberto sherman and he will let the patient know about lasix change and labs needed in 2 weeks. faxed orders to [facility] received

## 2023-12-13 ENCOUNTER — HOSPITAL ENCOUNTER (OUTPATIENT)
Age: 73
Setting detail: SPECIMEN
Discharge: HOME | End: 2023-12-13
Payer: COMMERCIAL

## 2023-12-13 LAB
ANION GAP SERPL CALCULATED.3IONS-SCNC: -2 MMOL/L (ref 3–16)
BUN SERPL-MCNC: 11 MG/DL (ref 7–20)
CALCIUM SERPL-MCNC: 9.4 MG/DL (ref 8.3–10.6)
CHLORIDE SERPL-SCNC: 86 MMOL/L (ref 99–110)
CO2 SERPL-SCNC: 48 MMOL/L (ref 21–32)
CREAT SERPL-MCNC: 0.6 MG/DL (ref 0.8–1.3)
GFR SERPLBLD CREATININE-BSD FMLA CKD-EPI: >60 ML/MIN/{1.73_M2}
GLUCOSE SERPL-MCNC: 107 MG/DL (ref 70–99)
NT-PROBNP SERPL-MCNC: 3714 PG/ML (ref 0–124)
POTASSIUM SERPL-SCNC: 4.6 MMOL/L (ref 3.5–5.1)
SODIUM SERPL-SCNC: 132 MMOL/L (ref 136–145)

## 2023-12-13 PROCEDURE — 36415 COLL VENOUS BLD VENIPUNCTURE: CPT

## 2023-12-13 PROCEDURE — 80048 BASIC METABOLIC PNL TOTAL CA: CPT

## 2023-12-13 PROCEDURE — 83880 ASSAY OF NATRIURETIC PEPTIDE: CPT

## 2023-12-28 ENCOUNTER — HOSPITAL ENCOUNTER (INPATIENT)
Age: 73
LOS: 7 days | Discharge: HOSPICE | DRG: 190 | End: 2024-01-04
Attending: EMERGENCY MEDICINE | Admitting: STUDENT IN AN ORGANIZED HEALTH CARE EDUCATION/TRAINING PROGRAM
Payer: COMMERCIAL

## 2023-12-28 ENCOUNTER — APPOINTMENT (OUTPATIENT)
Dept: GENERAL RADIOLOGY | Age: 73
DRG: 190 | End: 2023-12-28
Payer: COMMERCIAL

## 2023-12-28 DIAGNOSIS — I50.9 CONGESTIVE HEART FAILURE, UNSPECIFIED HF CHRONICITY, UNSPECIFIED HEART FAILURE TYPE (HCC): ICD-10-CM

## 2023-12-28 DIAGNOSIS — J44.1 COPD WITH ACUTE EXACERBATION (HCC): ICD-10-CM

## 2023-12-28 DIAGNOSIS — J18.9 COMMUNITY ACQUIRED PNEUMONIA, UNSPECIFIED LATERALITY: ICD-10-CM

## 2023-12-28 DIAGNOSIS — J96.21 ACUTE ON CHRONIC RESPIRATORY FAILURE WITH HYPOXIA AND HYPERCAPNIA (HCC): Primary | ICD-10-CM

## 2023-12-28 DIAGNOSIS — J96.22 ACUTE ON CHRONIC RESPIRATORY FAILURE WITH HYPOXIA AND HYPERCAPNIA (HCC): Primary | ICD-10-CM

## 2023-12-28 LAB
ALBUMIN SERPL-MCNC: 4.2 G/DL (ref 3.4–5)
ALBUMIN/GLOB SERPL: 1.6 {RATIO} (ref 1.1–2.2)
ALP SERPL-CCNC: 55 U/L (ref 40–129)
ALT SERPL-CCNC: 24 U/L (ref 10–40)
ANION GAP SERPL CALCULATED.3IONS-SCNC: 3 MMOL/L (ref 3–16)
AST SERPL-CCNC: 60 U/L (ref 15–37)
BASE EXCESS BLDV CALC-SCNC: 15.6 MMOL/L (ref -3–3)
BASOPHILS # BLD: 0 K/UL (ref 0–0.2)
BASOPHILS NFR BLD: 0.2 %
BILIRUB SERPL-MCNC: <0.2 MG/DL (ref 0–1)
BUN SERPL-MCNC: 13 MG/DL (ref 7–20)
CALCIUM SERPL-MCNC: 8.9 MG/DL (ref 8.3–10.6)
CHLORIDE SERPL-SCNC: 86 MMOL/L (ref 99–110)
CO2 BLDV-SCNC: 108 MMOL/L
CO2 SERPL-SCNC: 43 MMOL/L (ref 21–32)
COHGB MFR BLDV: 5 % (ref 0–1.5)
CREAT SERPL-MCNC: 0.7 MG/DL (ref 0.8–1.3)
DEPRECATED RDW RBC AUTO: 13.3 % (ref 12.4–15.4)
DO-HGB MFR BLDV: 10 %
EKG ATRIAL RATE: 79 BPM
EKG DIAGNOSIS: NORMAL
EKG P AXIS: 88 DEGREES
EKG P-R INTERVAL: 148 MS
EKG Q-T INTERVAL: 360 MS
EKG QRS DURATION: 88 MS
EKG QTC CALCULATION (BAZETT): 412 MS
EKG R AXIS: 73 DEGREES
EKG T AXIS: 80 DEGREES
EKG VENTRICULAR RATE: 79 BPM
EOSINOPHIL # BLD: 0 K/UL (ref 0–0.6)
EOSINOPHIL NFR BLD: 0.1 %
FLUAV RNA RESP QL NAA+PROBE: NOT DETECTED
FLUBV RNA RESP QL NAA+PROBE: NOT DETECTED
GFR SERPLBLD CREATININE-BSD FMLA CKD-EPI: >60 ML/MIN/{1.73_M2}
GLUCOSE SERPL-MCNC: 114 MG/DL (ref 70–99)
HCO3 BLDV-SCNC: 45.3 MMOL/L (ref 23–29)
HCT VFR BLD AUTO: 35.2 % (ref 40.5–52.5)
HGB BLD-MCNC: 11.8 G/DL (ref 13.5–17.5)
LACTATE BLDV-SCNC: 0.9 MMOL/L (ref 0.4–1.9)
LYMPHOCYTES # BLD: 0.6 K/UL (ref 1–5.1)
LYMPHOCYTES NFR BLD: 3.3 %
MCH RBC QN AUTO: 31.5 PG (ref 26–34)
MCHC RBC AUTO-ENTMCNC: 33.5 G/DL (ref 31–36)
MCV RBC AUTO: 94.1 FL (ref 80–100)
METHGB MFR BLDV: 0 %
MONOCYTES # BLD: 1.2 K/UL (ref 0–1.3)
MONOCYTES NFR BLD: 7.2 %
NEUTROPHILS # BLD: 15.2 K/UL (ref 1.7–7.7)
NEUTROPHILS NFR BLD: 89.2 %
NT-PROBNP SERPL-MCNC: 2876 PG/ML (ref 0–124)
O2 CT VFR BLDV CALC: 14 VOL %
O2 THERAPY: ABNORMAL
PCO2 BLDV: 86.2 MMHG (ref 40–50)
PH BLDV: 7.33 [PH] (ref 7.35–7.45)
PLATELET # BLD AUTO: 200 K/UL (ref 135–450)
PMV BLD AUTO: 8.4 FL (ref 5–10.5)
PO2 BLDV: 55.4 MMHG (ref 25–40)
POTASSIUM SERPL-SCNC: 4.3 MMOL/L (ref 3.5–5.1)
POTASSIUM SERPL-SCNC: 5.7 MMOL/L (ref 3.5–5.1)
PROCALCITONIN SERPL IA-MCNC: 0.1 NG/ML (ref 0–0.15)
PROT SERPL-MCNC: 6.9 G/DL (ref 6.4–8.2)
RBC # BLD AUTO: 3.75 M/UL (ref 4.2–5.9)
SAO2 % BLDV: 90 %
SARS-COV-2 RNA RESP QL NAA+PROBE: NOT DETECTED
SODIUM SERPL-SCNC: 132 MMOL/L (ref 136–145)
TROPONIN, HIGH SENSITIVITY: 37 NG/L (ref 0–22)
TROPONIN, HIGH SENSITIVITY: 49 NG/L (ref 0–22)
WBC # BLD AUTO: 17 K/UL (ref 4–11)

## 2023-12-28 PROCEDURE — 94640 AIRWAY INHALATION TREATMENT: CPT

## 2023-12-28 PROCEDURE — 6370000000 HC RX 637 (ALT 250 FOR IP): Performed by: STUDENT IN AN ORGANIZED HEALTH CARE EDUCATION/TRAINING PROGRAM

## 2023-12-28 PROCEDURE — 2700000000 HC OXYGEN THERAPY PER DAY

## 2023-12-28 PROCEDURE — 93010 ELECTROCARDIOGRAM REPORT: CPT | Performed by: INTERNAL MEDICINE

## 2023-12-28 PROCEDURE — 84484 ASSAY OF TROPONIN QUANT: CPT

## 2023-12-28 PROCEDURE — 87040 BLOOD CULTURE FOR BACTERIA: CPT

## 2023-12-28 PROCEDURE — 83605 ASSAY OF LACTIC ACID: CPT

## 2023-12-28 PROCEDURE — 6360000002 HC RX W HCPCS: Performed by: EMERGENCY MEDICINE

## 2023-12-28 PROCEDURE — 6370000000 HC RX 637 (ALT 250 FOR IP)

## 2023-12-28 PROCEDURE — 84132 ASSAY OF SERUM POTASSIUM: CPT

## 2023-12-28 PROCEDURE — 82803 BLOOD GASES ANY COMBINATION: CPT

## 2023-12-28 PROCEDURE — 99285 EMERGENCY DEPT VISIT HI MDM: CPT

## 2023-12-28 PROCEDURE — 93005 ELECTROCARDIOGRAM TRACING: CPT | Performed by: EMERGENCY MEDICINE

## 2023-12-28 PROCEDURE — 85025 COMPLETE CBC W/AUTO DIFF WBC: CPT

## 2023-12-28 PROCEDURE — 87636 SARSCOV2 & INF A&B AMP PRB: CPT

## 2023-12-28 PROCEDURE — 87449 NOS EACH ORGANISM AG IA: CPT

## 2023-12-28 PROCEDURE — 2500000003 HC RX 250 WO HCPCS: Performed by: STUDENT IN AN ORGANIZED HEALTH CARE EDUCATION/TRAINING PROGRAM

## 2023-12-28 PROCEDURE — 93005 ELECTROCARDIOGRAM TRACING: CPT | Performed by: STUDENT IN AN ORGANIZED HEALTH CARE EDUCATION/TRAINING PROGRAM

## 2023-12-28 PROCEDURE — 2580000003 HC RX 258: Performed by: EMERGENCY MEDICINE

## 2023-12-28 PROCEDURE — 2580000003 HC RX 258: Performed by: STUDENT IN AN ORGANIZED HEALTH CARE EDUCATION/TRAINING PROGRAM

## 2023-12-28 PROCEDURE — 2580000003 HC RX 258: Performed by: NURSE PRACTITIONER

## 2023-12-28 PROCEDURE — 96374 THER/PROPH/DIAG INJ IV PUSH: CPT

## 2023-12-28 PROCEDURE — 87205 SMEAR GRAM STAIN: CPT

## 2023-12-28 PROCEDURE — 94761 N-INVAS EAR/PLS OXIMETRY MLT: CPT

## 2023-12-28 PROCEDURE — 87070 CULTURE OTHR SPECIMN AEROBIC: CPT

## 2023-12-28 PROCEDURE — 36415 COLL VENOUS BLD VENIPUNCTURE: CPT

## 2023-12-28 PROCEDURE — 83880 ASSAY OF NATRIURETIC PEPTIDE: CPT

## 2023-12-28 PROCEDURE — 6360000002 HC RX W HCPCS: Performed by: NURSE PRACTITIONER

## 2023-12-28 PROCEDURE — 84145 PROCALCITONIN (PCT): CPT

## 2023-12-28 PROCEDURE — 1200000000 HC SEMI PRIVATE

## 2023-12-28 PROCEDURE — 80053 COMPREHEN METABOLIC PANEL: CPT

## 2023-12-28 PROCEDURE — 71045 X-RAY EXAM CHEST 1 VIEW: CPT

## 2023-12-28 PROCEDURE — 96375 TX/PRO/DX INJ NEW DRUG ADDON: CPT

## 2023-12-28 RX ORDER — LANOLIN ALCOHOL/MO/W.PET/CERES
200 CREAM (GRAM) TOPICAL NIGHTLY
Status: DISCONTINUED | OUTPATIENT
Start: 2023-12-28 | End: 2024-01-04 | Stop reason: HOSPADM

## 2023-12-28 RX ORDER — MIDODRINE HYDROCHLORIDE 5 MG/1
2.5 TABLET ORAL
Status: DISCONTINUED | OUTPATIENT
Start: 2023-12-28 | End: 2024-01-03

## 2023-12-28 RX ORDER — IPRATROPIUM BROMIDE AND ALBUTEROL SULFATE 2.5; .5 MG/3ML; MG/3ML
1 SOLUTION RESPIRATORY (INHALATION)
Status: DISCONTINUED | OUTPATIENT
Start: 2023-12-28 | End: 2023-12-28

## 2023-12-28 RX ORDER — GUAIFENESIN/DEXTROMETHORPHAN 100-10MG/5
5 SYRUP ORAL EVERY 4 HOURS PRN
Status: DISCONTINUED | OUTPATIENT
Start: 2023-12-28 | End: 2024-01-04 | Stop reason: HOSPADM

## 2023-12-28 RX ORDER — POLYETHYLENE GLYCOL 3350 17 G/17G
17 POWDER, FOR SOLUTION ORAL DAILY PRN
Status: DISCONTINUED | OUTPATIENT
Start: 2023-12-28 | End: 2024-01-04 | Stop reason: HOSPADM

## 2023-12-28 RX ORDER — METOPROLOL TARTRATE 1 MG/ML
5 INJECTION, SOLUTION INTRAVENOUS ONCE
Status: COMPLETED | OUTPATIENT
Start: 2023-12-28 | End: 2023-12-28

## 2023-12-28 RX ORDER — SODIUM CHLORIDE 9 MG/ML
INJECTION, SOLUTION INTRAVENOUS CONTINUOUS
Status: ACTIVE | OUTPATIENT
Start: 2023-12-28 | End: 2023-12-29

## 2023-12-28 RX ORDER — IPRATROPIUM BROMIDE AND ALBUTEROL SULFATE 2.5; .5 MG/3ML; MG/3ML
1 SOLUTION RESPIRATORY (INHALATION)
Status: DISCONTINUED | OUTPATIENT
Start: 2023-12-28 | End: 2023-12-29

## 2023-12-28 RX ORDER — LEVOTHYROXINE SODIUM 0.07 MG/1
75 TABLET ORAL DAILY
COMMUNITY
Start: 2023-11-08

## 2023-12-28 RX ORDER — IPRATROPIUM BROMIDE AND ALBUTEROL SULFATE 2.5; .5 MG/3ML; MG/3ML
SOLUTION RESPIRATORY (INHALATION)
Status: COMPLETED
Start: 2023-12-28 | End: 2023-12-28

## 2023-12-28 RX ORDER — IPRATROPIUM BROMIDE AND ALBUTEROL SULFATE 2.5; .5 MG/3ML; MG/3ML
1 SOLUTION RESPIRATORY (INHALATION) ONCE
Status: COMPLETED | OUTPATIENT
Start: 2023-12-28 | End: 2023-12-28

## 2023-12-28 RX ORDER — FUROSEMIDE 10 MG/ML
20 INJECTION INTRAMUSCULAR; INTRAVENOUS ONCE
Status: COMPLETED | OUTPATIENT
Start: 2023-12-28 | End: 2023-12-28

## 2023-12-28 RX ORDER — METHYLPREDNISOLONE SODIUM SUCCINATE 125 MG/2ML
INJECTION, POWDER, LYOPHILIZED, FOR SOLUTION INTRAMUSCULAR; INTRAVENOUS
Status: DISPENSED
Start: 2023-12-28 | End: 2023-12-28

## 2023-12-28 RX ORDER — ALBUTEROL SULFATE 2.5 MG/3ML
2.5 SOLUTION RESPIRATORY (INHALATION) EVERY 4 HOURS PRN
Status: DISCONTINUED | OUTPATIENT
Start: 2023-12-28 | End: 2024-01-04 | Stop reason: HOSPADM

## 2023-12-28 RX ORDER — ACETAMINOPHEN 325 MG/1
650 TABLET ORAL EVERY 6 HOURS PRN
Status: DISCONTINUED | OUTPATIENT
Start: 2023-12-28 | End: 2024-01-04 | Stop reason: HOSPADM

## 2023-12-28 RX ORDER — ONDANSETRON 2 MG/ML
4 INJECTION INTRAMUSCULAR; INTRAVENOUS EVERY 6 HOURS PRN
Status: DISCONTINUED | OUTPATIENT
Start: 2023-12-28 | End: 2024-01-04 | Stop reason: HOSPADM

## 2023-12-28 RX ORDER — 0.9 % SODIUM CHLORIDE 0.9 %
500 INTRAVENOUS SOLUTION INTRAVENOUS ONCE
Status: COMPLETED | OUTPATIENT
Start: 2023-12-28 | End: 2023-12-28

## 2023-12-28 RX ORDER — SODIUM CHLORIDE 9 MG/ML
INJECTION, SOLUTION INTRAVENOUS PRN
Status: DISCONTINUED | OUTPATIENT
Start: 2023-12-28 | End: 2024-01-04 | Stop reason: HOSPADM

## 2023-12-28 RX ORDER — ARIPIPRAZOLE 5 MG/1
10 TABLET ORAL DAILY
Status: DISCONTINUED | OUTPATIENT
Start: 2023-12-28 | End: 2024-01-04 | Stop reason: HOSPADM

## 2023-12-28 RX ORDER — ACETAMINOPHEN 650 MG/1
650 SUPPOSITORY RECTAL EVERY 6 HOURS PRN
Status: DISCONTINUED | OUTPATIENT
Start: 2023-12-28 | End: 2024-01-04 | Stop reason: HOSPADM

## 2023-12-28 RX ORDER — PREDNISONE 20 MG/1
40 TABLET ORAL DAILY
Status: DISCONTINUED | OUTPATIENT
Start: 2023-12-31 | End: 2023-12-31

## 2023-12-28 RX ORDER — POLYETHYLENE GLYCOL 3350 17 G/17G
17 POWDER ORAL DAILY PRN
COMMUNITY
Start: 2022-10-27

## 2023-12-28 RX ORDER — SODIUM CHLORIDE 0.9 % (FLUSH) 0.9 %
5-40 SYRINGE (ML) INJECTION PRN
Status: DISCONTINUED | OUTPATIENT
Start: 2023-12-28 | End: 2024-01-04 | Stop reason: HOSPADM

## 2023-12-28 RX ORDER — METOPROLOL SUCCINATE 50 MG/1
100 TABLET, EXTENDED RELEASE ORAL DAILY
Status: DISCONTINUED | OUTPATIENT
Start: 2023-12-28 | End: 2023-12-28 | Stop reason: ALTCHOICE

## 2023-12-28 RX ORDER — FUROSEMIDE 40 MG/1
40 TABLET ORAL
Status: DISCONTINUED | OUTPATIENT
Start: 2023-12-29 | End: 2024-01-04 | Stop reason: HOSPADM

## 2023-12-28 RX ORDER — GUAIFENESIN 600 MG/1
600 TABLET, EXTENDED RELEASE ORAL 2 TIMES DAILY
Status: DISCONTINUED | OUTPATIENT
Start: 2023-12-28 | End: 2024-01-04 | Stop reason: HOSPADM

## 2023-12-28 RX ORDER — SODIUM CHLORIDE 0.9 % (FLUSH) 0.9 %
5-40 SYRINGE (ML) INJECTION EVERY 12 HOURS SCHEDULED
Status: DISCONTINUED | OUTPATIENT
Start: 2023-12-28 | End: 2024-01-04 | Stop reason: HOSPADM

## 2023-12-28 RX ORDER — ONDANSETRON 4 MG/1
4 TABLET, ORALLY DISINTEGRATING ORAL EVERY 8 HOURS PRN
Status: DISCONTINUED | OUTPATIENT
Start: 2023-12-28 | End: 2024-01-04 | Stop reason: HOSPADM

## 2023-12-28 RX ORDER — ARIPIPRAZOLE 10 MG/1
10 TABLET ORAL DAILY
COMMUNITY
Start: 2023-12-19

## 2023-12-28 RX ORDER — LEVOTHYROXINE SODIUM 0.07 MG/1
75 TABLET ORAL DAILY
Status: DISCONTINUED | OUTPATIENT
Start: 2023-12-28 | End: 2024-01-04 | Stop reason: HOSPADM

## 2023-12-28 RX ORDER — IPRATROPIUM BROMIDE AND ALBUTEROL SULFATE 2.5; .5 MG/3ML; MG/3ML
SOLUTION RESPIRATORY (INHALATION)
Status: DISPENSED
Start: 2023-12-28 | End: 2023-12-29

## 2023-12-28 RX ADMIN — IPRATROPIUM BROMIDE AND ALBUTEROL SULFATE 1 DOSE: 2.5; .5 SOLUTION RESPIRATORY (INHALATION) at 08:39

## 2023-12-28 RX ADMIN — DEXTROSE MONOHYDRATE 150 MG: 50 INJECTION, SOLUTION INTRAVENOUS at 21:31

## 2023-12-28 RX ADMIN — IPRATROPIUM BROMIDE AND ALBUTEROL SULFATE 1 DOSE: .5; 3 SOLUTION RESPIRATORY (INHALATION) at 21:00

## 2023-12-28 RX ADMIN — SACUBITRIL AND VALSARTAN 0.5 TABLET: 24; 26 TABLET, FILM COATED ORAL at 15:03

## 2023-12-28 RX ADMIN — IPRATROPIUM BROMIDE AND ALBUTEROL SULFATE 1 DOSE: .5; 3 SOLUTION RESPIRATORY (INHALATION) at 08:39

## 2023-12-28 RX ADMIN — MIDODRINE HYDROCHLORIDE 2.5 MG: 5 TABLET ORAL at 16:53

## 2023-12-28 RX ADMIN — METOPROLOL TARTRATE 5 MG: 5 INJECTION INTRAVENOUS at 18:34

## 2023-12-28 RX ADMIN — LEVOTHYROXINE SODIUM 75 MCG: 0.07 TABLET ORAL at 15:03

## 2023-12-28 RX ADMIN — GUAIFENESIN 600 MG: 600 TABLET, EXTENDED RELEASE ORAL at 20:15

## 2023-12-28 RX ADMIN — SACUBITRIL AND VALSARTAN 0.5 TABLET: 24; 26 TABLET, FILM COATED ORAL at 20:15

## 2023-12-28 RX ADMIN — GUAIFENESIN 600 MG: 600 TABLET, EXTENDED RELEASE ORAL at 15:03

## 2023-12-28 RX ADMIN — AZITHROMYCIN MONOHYDRATE 500 MG: 500 INJECTION, POWDER, LYOPHILIZED, FOR SOLUTION INTRAVENOUS at 11:06

## 2023-12-28 RX ADMIN — MIDODRINE HYDROCHLORIDE 2.5 MG: 5 TABLET ORAL at 15:03

## 2023-12-28 RX ADMIN — ARIPIPRAZOLE 10 MG: 5 TABLET ORAL at 15:03

## 2023-12-28 RX ADMIN — APIXABAN 5 MG: 5 TABLET, FILM COATED ORAL at 20:15

## 2023-12-28 RX ADMIN — ALBUTEROL SULFATE 2.5 MG: 2.5 SOLUTION RESPIRATORY (INHALATION) at 10:28

## 2023-12-28 RX ADMIN — IPRATROPIUM BROMIDE AND ALBUTEROL SULFATE 1 DOSE: .5; 3 SOLUTION RESPIRATORY (INHALATION) at 16:10

## 2023-12-28 RX ADMIN — APIXABAN 5 MG: 5 TABLET, FILM COATED ORAL at 15:03

## 2023-12-28 RX ADMIN — CEFTRIAXONE SODIUM 2000 MG: 2 INJECTION, POWDER, FOR SOLUTION INTRAMUSCULAR; INTRAVENOUS at 10:31

## 2023-12-28 RX ADMIN — WATER 60 MG: 1 INJECTION INTRAMUSCULAR; INTRAVENOUS; SUBCUTANEOUS at 08:26

## 2023-12-28 RX ADMIN — TIOTROPIUM BROMIDE INHALATION SPRAY 2 PUFF: 3.12 SPRAY, METERED RESPIRATORY (INHALATION) at 16:11

## 2023-12-28 RX ADMIN — Medication 2 PUFF: at 21:01

## 2023-12-28 RX ADMIN — Medication 200 MG: at 20:15

## 2023-12-28 RX ADMIN — FUROSEMIDE 20 MG: 10 INJECTION, SOLUTION INTRAMUSCULAR; INTRAVENOUS at 10:28

## 2023-12-28 RX ADMIN — Medication 2 PUFF: at 16:12

## 2023-12-28 RX ADMIN — SODIUM CHLORIDE 500 ML: 9 INJECTION, SOLUTION INTRAVENOUS at 18:34

## 2023-12-28 ASSESSMENT — LIFESTYLE VARIABLES
HOW MANY STANDARD DRINKS CONTAINING ALCOHOL DO YOU HAVE ON A TYPICAL DAY: PATIENT DOES NOT DRINK
HOW OFTEN DO YOU HAVE A DRINK CONTAINING ALCOHOL: 4 OR MORE TIMES A WEEK
HOW MANY STANDARD DRINKS CONTAINING ALCOHOL DO YOU HAVE ON A TYPICAL DAY: 3 OR 4
HOW OFTEN DO YOU HAVE A DRINK CONTAINING ALCOHOL: NEVER

## 2023-12-28 NOTE — ED NOTES
ED TO INPATIENT SBAR HANDOFF    Patient Name: Ricardo Stevenson   :  1950  73 y.o.   MRN:  [id number]  ED Room #:  ED-0002/02  Family/Caregiver Present yes   Restraints no   Sitter no   Sepsis Risk Score Sepsis Risk Score: 6.33    Situation  Code Status: Prior No additional code details.    Allergies: Codeine  Weight: Patient Vitals for the past 96 hrs (Last 3 readings):   Weight   23 0805 56.2 kg (124 lb)     Arrived from: home  Chief Complaint:   Chief Complaint   Patient presents with    Shortness of Breath     Pt in from home via [facility] reporting SOB, pt has HX of CHF and COPD, 18g in right AC, pt had duoneb by EMS.      Hospital Problem/Diagnosis:  Principal Problem:    COPD exacerbation (HCC)  Resolved Problems:    * No resolved hospital problems. *    Imaging:   XR CHEST PORTABLE   Final Result   1. Patchy opacities in the lower left lung, new since the prior study, may   represent developing pneumonia.   2. Emphysematous changes of the lungs.           Abnormal labs:   Abnormal Labs Reviewed   CBC WITH AUTO DIFFERENTIAL - Abnormal; Notable for the following components:       Result Value    WBC 17.0 (*)     RBC 3.75 (*)     Hemoglobin 11.8 (*)     Hematocrit 35.2 (*)     Neutrophils Absolute 15.2 (*)     Lymphocytes Absolute 0.6 (*)     All other components within normal limits   COMPREHENSIVE METABOLIC PANEL W/ REFLEX TO MG FOR LOW K - Abnormal; Notable for the following components:    Sodium 132 (*)     Potassium reflex Magnesium 5.7 (*)     Chloride 86 (*)     CO2 43 (*)     Glucose 114 (*)     Creatinine 0.7 (*)     AST 60 (*)     All other components within normal limits   BLOOD GAS, VENOUS - Abnormal; Notable for the following components:    pH, Shun 7.329 (*)     pCO2, Shun 86.2 (*)     pO2, Shun 55.4 (*)     HCO3, Venous 45.3 (*)     Base Excess, Shun 15.6 (*)     Carboxyhemoglobin 5.0 (*)     All other components within normal limits   TROPONIN - Abnormal; Notable for the following

## 2023-12-28 NOTE — ED PROVIDER NOTES
EMERGENCY DEPARTMENT PROVIDER NOTE    Patient Identification  Pt Name: Ricardo Stevenson  MRN: [id number]  Birthdate 1950  Date of evaluation: 12/28/2023  Provider: William Mcbride DO  PCP: Damion Siu DO    Chief Complaint  Shortness of Breath (Pt in from home via [facility] reporting SOB, pt has HX of CHF and COPD, 18g in right AC, pt had duoneb by EMS. )      HPI  (History provided by patient and EMS)  This is a 73 y.o. male with pertinent past medical history of CHF, COPD with chronic hypoxic respiratory failure on 3 L supplemental oxygen at baseline who was brought in by EMS transportation for shortness of breath.  Patient reports shortness of breath began about 6 hours prior to arrival and has been gradually worsening, acutely worsened with exertion.  On EMS arrival, patient was noted to be hypoxic on his baseline 3 L and his supplemental oxygen was increased to 5 L and patient received breathing treatment at around 2 improvement.  Has had some cough, however no significant change from his typical.  He denies any fevers, chills, chest pain or abdominal pain.  He is anticoagulated on Eliquis and reports adherence.      I have reviewed the following nursing documentation:  Allergies: Codeine    Past medical history:   Past Medical History:   Diagnosis Date    Atrial fibrillation with tachycardic ventricular rate (HCC)     Bronchitis     CHF (congestive heart failure) (HCC)     Closed rib fracture 2005    Left - fell through a roof    COPD (chronic obstructive pulmonary disease) (HCC) 2016     Past surgical history:   Past Surgical History:   Procedure Laterality Date    CHOLECYSTECTOMY, LAPAROSCOPIC N/A 3/17/2021    LAPAROSCOPIC CHOLECYSTECTOMY WITH INTRAOPERATIVE CHOLANGIOGRAM performed by Austin Kuhn MD at [facility] OR    EYE SURGERY      bilateral cataracts    FEMORAL BYPASS Left 6/1/2021    LEFT FEMORAL TO POPLITEAL BYPASS GRAFT (91994) performed by Cory Go MD at [facility] OR    FRACTURE

## 2023-12-29 LAB
ANION GAP SERPL CALCULATED.3IONS-SCNC: 4 MMOL/L (ref 3–16)
BASOPHILS # BLD: 0 K/UL (ref 0–0.2)
BASOPHILS NFR BLD: 0.1 %
BUN SERPL-MCNC: 22 MG/DL (ref 7–20)
CALCIUM SERPL-MCNC: 8.6 MG/DL (ref 8.3–10.6)
CHLORIDE SERPL-SCNC: 86 MMOL/L (ref 99–110)
CO2 SERPL-SCNC: 41 MMOL/L (ref 21–32)
CREAT SERPL-MCNC: 0.9 MG/DL (ref 0.8–1.3)
DEPRECATED RDW RBC AUTO: 13.1 % (ref 12.4–15.4)
EOSINOPHIL # BLD: 0 K/UL (ref 0–0.6)
EOSINOPHIL NFR BLD: 0 %
GFR SERPLBLD CREATININE-BSD FMLA CKD-EPI: >60 ML/MIN/{1.73_M2}
GLUCOSE SERPL-MCNC: 124 MG/DL (ref 70–99)
HCT VFR BLD AUTO: 27.3 % (ref 40.5–52.5)
HGB BLD-MCNC: 9.1 G/DL (ref 13.5–17.5)
LEGIONELLA AG UR QL: NORMAL
LYMPHOCYTES # BLD: 1.1 K/UL (ref 1–5.1)
LYMPHOCYTES NFR BLD: 10.8 %
MAGNESIUM SERPL-MCNC: 2.1 MG/DL (ref 1.8–2.4)
MCH RBC QN AUTO: 31.3 PG (ref 26–34)
MCHC RBC AUTO-ENTMCNC: 33.5 G/DL (ref 31–36)
MCV RBC AUTO: 93.4 FL (ref 80–100)
MONOCYTES # BLD: 0.8 K/UL (ref 0–1.3)
MONOCYTES NFR BLD: 7.7 %
NEUTROPHILS # BLD: 8.1 K/UL (ref 1.7–7.7)
NEUTROPHILS NFR BLD: 81.4 %
PLATELET # BLD AUTO: 140 K/UL (ref 135–450)
PMV BLD AUTO: 8.1 FL (ref 5–10.5)
POTASSIUM SERPL-SCNC: 3.7 MMOL/L (ref 3.5–5.1)
RBC # BLD AUTO: 2.92 M/UL (ref 4.2–5.9)
S PNEUM AG UR QL: NORMAL
SODIUM SERPL-SCNC: 131 MMOL/L (ref 136–145)
WBC # BLD AUTO: 10 K/UL (ref 4–11)

## 2023-12-29 PROCEDURE — 2580000003 HC RX 258: Performed by: STUDENT IN AN ORGANIZED HEALTH CARE EDUCATION/TRAINING PROGRAM

## 2023-12-29 PROCEDURE — 97162 PT EVAL MOD COMPLEX 30 MIN: CPT

## 2023-12-29 PROCEDURE — 1200000000 HC SEMI PRIVATE

## 2023-12-29 PROCEDURE — 2580000003 HC RX 258: Performed by: NURSE PRACTITIONER

## 2023-12-29 PROCEDURE — 97116 GAIT TRAINING THERAPY: CPT

## 2023-12-29 PROCEDURE — 99222 1ST HOSP IP/OBS MODERATE 55: CPT | Performed by: INTERNAL MEDICINE

## 2023-12-29 PROCEDURE — 2500000003 HC RX 250 WO HCPCS: Performed by: NURSE PRACTITIONER

## 2023-12-29 PROCEDURE — 94640 AIRWAY INHALATION TREATMENT: CPT

## 2023-12-29 PROCEDURE — 97166 OT EVAL MOD COMPLEX 45 MIN: CPT

## 2023-12-29 PROCEDURE — 6360000002 HC RX W HCPCS: Performed by: STUDENT IN AN ORGANIZED HEALTH CARE EDUCATION/TRAINING PROGRAM

## 2023-12-29 PROCEDURE — 83735 ASSAY OF MAGNESIUM: CPT

## 2023-12-29 PROCEDURE — 85025 COMPLETE CBC W/AUTO DIFF WBC: CPT

## 2023-12-29 PROCEDURE — 6370000000 HC RX 637 (ALT 250 FOR IP): Performed by: STUDENT IN AN ORGANIZED HEALTH CARE EDUCATION/TRAINING PROGRAM

## 2023-12-29 PROCEDURE — 36415 COLL VENOUS BLD VENIPUNCTURE: CPT

## 2023-12-29 PROCEDURE — 94761 N-INVAS EAR/PLS OXIMETRY MLT: CPT

## 2023-12-29 PROCEDURE — 2700000000 HC OXYGEN THERAPY PER DAY

## 2023-12-29 PROCEDURE — 80048 BASIC METABOLIC PNL TOTAL CA: CPT

## 2023-12-29 PROCEDURE — 97530 THERAPEUTIC ACTIVITIES: CPT

## 2023-12-29 PROCEDURE — 5A09457 ASSISTANCE WITH RESPIRATORY VENTILATION, 24-96 CONSECUTIVE HOURS, CONTINUOUS POSITIVE AIRWAY PRESSURE: ICD-10-PCS | Performed by: INTERNAL MEDICINE

## 2023-12-29 RX ORDER — SODIUM CHLORIDE FOR INHALATION 3 %
3 VIAL, NEBULIZER (ML) INHALATION 2 TIMES DAILY
Status: DISCONTINUED | OUTPATIENT
Start: 2023-12-29 | End: 2024-01-04 | Stop reason: HOSPADM

## 2023-12-29 RX ORDER — METOPROLOL TARTRATE 1 MG/ML
5 INJECTION, SOLUTION INTRAVENOUS ONCE
Status: COMPLETED | OUTPATIENT
Start: 2023-12-29 | End: 2023-12-29

## 2023-12-29 RX ORDER — IPRATROPIUM BROMIDE AND ALBUTEROL SULFATE 2.5; .5 MG/3ML; MG/3ML
1 SOLUTION RESPIRATORY (INHALATION)
Status: DISCONTINUED | OUTPATIENT
Start: 2023-12-30 | End: 2024-01-04 | Stop reason: HOSPADM

## 2023-12-29 RX ADMIN — MIDODRINE HYDROCHLORIDE 2.5 MG: 5 TABLET ORAL at 17:47

## 2023-12-29 RX ADMIN — AZITHROMYCIN MONOHYDRATE 500 MG: 500 INJECTION, POWDER, LYOPHILIZED, FOR SOLUTION INTRAVENOUS at 12:59

## 2023-12-29 RX ADMIN — METOPROLOL TARTRATE 25 MG: 25 TABLET, FILM COATED ORAL at 09:00

## 2023-12-29 RX ADMIN — Medication 3 ML: at 15:23

## 2023-12-29 RX ADMIN — IPRATROPIUM BROMIDE AND ALBUTEROL SULFATE 1 DOSE: .5; 3 SOLUTION RESPIRATORY (INHALATION) at 20:56

## 2023-12-29 RX ADMIN — METOPROLOL TARTRATE 25 MG: 25 TABLET, FILM COATED ORAL at 23:02

## 2023-12-29 RX ADMIN — WATER 40 MG: 1 INJECTION INTRAMUSCULAR; INTRAVENOUS; SUBCUTANEOUS at 17:47

## 2023-12-29 RX ADMIN — MIDODRINE HYDROCHLORIDE 2.5 MG: 5 TABLET ORAL at 13:00

## 2023-12-29 RX ADMIN — Medication 10 ML: at 23:02

## 2023-12-29 RX ADMIN — IPRATROPIUM BROMIDE AND ALBUTEROL SULFATE 1 DOSE: .5; 3 SOLUTION RESPIRATORY (INHALATION) at 11:59

## 2023-12-29 RX ADMIN — Medication 10 ML: at 09:01

## 2023-12-29 RX ADMIN — MIDODRINE HYDROCHLORIDE 2.5 MG: 5 TABLET ORAL at 09:00

## 2023-12-29 RX ADMIN — TIOTROPIUM BROMIDE INHALATION SPRAY 2 PUFF: 3.12 SPRAY, METERED RESPIRATORY (INHALATION) at 08:14

## 2023-12-29 RX ADMIN — IPRATROPIUM BROMIDE AND ALBUTEROL SULFATE 1 DOSE: .5; 3 SOLUTION RESPIRATORY (INHALATION) at 15:22

## 2023-12-29 RX ADMIN — IPRATROPIUM BROMIDE AND ALBUTEROL SULFATE 1 DOSE: .5; 3 SOLUTION RESPIRATORY (INHALATION) at 01:12

## 2023-12-29 RX ADMIN — Medication 2 PUFF: at 08:14

## 2023-12-29 RX ADMIN — GUAIFENESIN 600 MG: 600 TABLET, EXTENDED RELEASE ORAL at 23:01

## 2023-12-29 RX ADMIN — WATER 40 MG: 1 INJECTION INTRAMUSCULAR; INTRAVENOUS; SUBCUTANEOUS at 23:02

## 2023-12-29 RX ADMIN — Medication 200 MG: at 23:00

## 2023-12-29 RX ADMIN — IPRATROPIUM BROMIDE AND ALBUTEROL SULFATE 1 DOSE: .5; 3 SOLUTION RESPIRATORY (INHALATION) at 08:14

## 2023-12-29 RX ADMIN — Medication 2 PUFF: at 20:57

## 2023-12-29 RX ADMIN — APIXABAN 5 MG: 5 TABLET, FILM COATED ORAL at 09:00

## 2023-12-29 RX ADMIN — ARIPIPRAZOLE 10 MG: 5 TABLET ORAL at 08:59

## 2023-12-29 RX ADMIN — IPRATROPIUM BROMIDE AND ALBUTEROL SULFATE 1 DOSE: .5; 3 SOLUTION RESPIRATORY (INHALATION) at 05:15

## 2023-12-29 RX ADMIN — SODIUM CHLORIDE, PRESERVATIVE FREE 10 ML: 5 INJECTION INTRAVENOUS at 00:45

## 2023-12-29 RX ADMIN — Medication 3 ML: at 20:56

## 2023-12-29 RX ADMIN — GUAIFENESIN 600 MG: 600 TABLET, EXTENDED RELEASE ORAL at 09:00

## 2023-12-29 RX ADMIN — SODIUM CHLORIDE: 9 INJECTION, SOLUTION INTRAVENOUS at 05:03

## 2023-12-29 RX ADMIN — METOPROLOL TARTRATE 5 MG: 5 INJECTION INTRAVENOUS at 00:45

## 2023-12-29 RX ADMIN — APIXABAN 5 MG: 5 TABLET, FILM COATED ORAL at 23:00

## 2023-12-29 RX ADMIN — LEVOTHYROXINE SODIUM 75 MCG: 0.07 TABLET ORAL at 05:13

## 2023-12-29 NOTE — ACP (ADVANCE CARE PLANNING)
Advanced Care Planning Note.    Purpose of Encounter: Advanced care planning in light of SOB, COPD exacerbation  Parties In Attendance: Patient, Carson (POA)  Decisional Capacity: Yes  Subjective: Patient has worsening SOB, cough  Objective: Cr 0.7  Goals of Care Determination: Patient wants DNR-CC; comfort care measures if he has an arrest  Plan:  Steroids, Azithro, Duonebs, resp cx  Code Status: DNR-CC   Time spent on Advanced care Plannin minutes  Advanced Care Planning Documents: Completed advanced directives on chart, Carson, wife, is the POA.    Michi Nair MD  2023 10:01 PM

## 2023-12-29 NOTE — CARE COORDINATION
[facility]    Patient active with [facility] prior to  hospitalization. Current services include SN, PT, OT. Will continue to follow for disposition at discharge.Electronically signed by Cachorro Good LPN on 12/29/23 at 7:33 AM LINDSAY Good LPN  [facility] Care Transition Nurse  [phone]

## 2023-12-29 NOTE — H&P
roof    COPD (chronic obstructive pulmonary disease) (HCC) 2016       Past Surgical History:        Procedure Laterality Date    CHOLECYSTECTOMY, LAPAROSCOPIC N/A 3/17/2021    LAPAROSCOPIC CHOLECYSTECTOMY WITH INTRAOPERATIVE CHOLANGIOGRAM performed by Austin Kuhn MD at [facility] OR    EYE SURGERY      bilateral cataracts    FEMORAL BYPASS Left 6/1/2021    LEFT FEMORAL TO POPLITEAL BYPASS GRAFT (86933) performed by Cory Go MD at [facility] OR    FRACTURE SURGERY      LT elbow    TONSILLECTOMY         Medications Priorto Admission:    Medications Prior to Admission: levothyroxine (SYNTHROID) 75 MCG tablet, Take 1 tablet by mouth Daily  ARIPiprazole (ABILIFY) 10 MG tablet, Take 1 tablet by mouth daily  polyethylene glycol (MIRALAX) 17 GM/SCOOP POWD powder, Take 17 g by mouth daily as needed  furosemide (LASIX) 40 MG tablet, TAKE ONE TABLET BY MOUTH THREE DAYS A WEEK  metoprolol succinate (TOPROL XL) 100 MG extended release tablet, Take 1 tablet by mouth daily  sacubitril-valsartan (ENTRESTO) 24-26 MG per tablet, Take 0.5 tablets by mouth 2 times daily  midodrine (PROAMATINE) 2.5 MG tablet, If BP <120  apixaban (ELIQUIS) 5 MG TABS tablet, Take 1 tablet by mouth 2 times daily  FLUoxetine (PROZAC) 40 MG capsule, Take 1 capsule by mouth daily (Patient not taking: Reported on 12/28/2023)  ipratropium 0.5 mg-albuterol 2.5 mg (DUONEB) 0.5-2.5 (3) MG/3ML SOLN nebulizer solution, Inhale 3 mLs into the lungs every 6 hours as needed for Shortness of Breath  Multiple Vitamin (MULTIVITAMIN ADULT PO), Take by mouth  Budeson-Glycopyrrol-Formoterol (BREZTRI AEROSPHERE) 160-9-4.8 MCG/ACT AERO, Inhale 2 puffs into the lungs 2 times daily  levalbuterol (XOPENEX HFA) 45 MCG/ACT inhaler, Inhale 1 puff into the lungs every 4 hours as needed for Wheezing (Patient not taking: Reported on 12/28/2023)  Magnesium 400 MG TABS, Take 200 mg by mouth nightly  tamsulosin (FLOMAX) 0.4 MG capsule, Take 1 capsule by mouth nightly (Patient not

## 2023-12-29 NOTE — PLAN OF CARE
Problem: Discharge Planning  Goal: Discharge to home or other facility with appropriate resources  Outcome: Progressing     Problem: Safety - Adult  Goal: Free from fall injury  Outcome: Progressing     Problem: Chronic Conditions and Co-morbidities  Goal: Patient's chronic conditions and co-morbidity symptoms are monitored and maintained or improved  Outcome: Progressing

## 2023-12-29 NOTE — DISCHARGE INSTR - COC
Summary (Last 24 hours) at 2023 0734  Last data filed at 2023 0650  Gross per 24 hour   Intake 1251.51 ml   Output 580 ml   Net 671.51 ml     I/O last 3 completed shifts:  In: 1251.5 [P.O.:420; I.V.:583; IV Piggyback:248.6]  Out: 580 [Urine:580]    Safety Concerns:     {[facility] Safety Concerns:305315958}    Impairments/Disabilities:      {[facility] Impairments/Disabilities:060159508}    Nutrition Therapy:  Current Nutrition Therapy:   { [facility] Diet List:622817010}    Routes of Feeding: {[facility] DME Other Feedings:965366831}  Liquids: {Slp liquid thickness:86476}  Daily Fluid Restriction: {[facility] D[facility] Yes amt example:273734301}  Last Modified Barium Swallow with Video (Video Swallowing Test): {Done Not Done Date:}    Treatments at the Time of Hospital Discharge:   Respiratory Treatments: ***  Oxygen Therapy:  {Therapy; copd oxygen:17845}  Ventilator:    {[facility] Vent List:879167732}    Rehab Therapies: SN,PT,OT,MSW,HHA  Weight Bearing Status/Restrictions: {[facility] Weight Bearin}  Other Medical Equipment (for information only, NOT a DME order):  {EQUIPMENT:708718941}  Other Treatments: HOME HEALTH CARE: LEVEL 3 SAFETY       -Initial home health evaluation to occur within 24-48 hours, in patient home   -Home health agency to establish plan of care for patient over 60 day period   -Medication Reconciliation   -PT/OT/Speech evaluations in home within 24-48 hours of discharge; including  -DME and home safety   -Frontload therapy 5 days, then 3x a week   -OT to evaluate if patient has Home Health Aide needs for personal care   - evaluation within 24-48 hours, includes evaluation of resources   and insurance to determine [place], [place], LTC, and Medicaid options   -PCP Visit scheduled within three to seven days of discharge      Patient's personal belongings (please select all that are sent with patient):  {[facility] D[facility] Belongings:373234730}    RN SIGNATURE:  {Esignature:746190548}    CASE MANAGEMENT/SOCIAL

## 2023-12-30 LAB
ANION GAP SERPL CALCULATED.3IONS-SCNC: 3 MMOL/L (ref 3–16)
BACTERIA SPEC RESP CULT: NORMAL
BASOPHILS # BLD: 0 K/UL (ref 0–0.2)
BASOPHILS NFR BLD: 0.1 %
BUN SERPL-MCNC: 17 MG/DL (ref 7–20)
CALCIUM SERPL-MCNC: 8.9 MG/DL (ref 8.3–10.6)
CHLORIDE SERPL-SCNC: 83 MMOL/L (ref 99–110)
CO2 SERPL-SCNC: 42 MMOL/L (ref 21–32)
CREAT SERPL-MCNC: 0.7 MG/DL (ref 0.8–1.3)
DEPRECATED RDW RBC AUTO: 13 % (ref 12.4–15.4)
EKG ATRIAL RATE: 133 BPM
EKG DIAGNOSIS: NORMAL
EKG Q-T INTERVAL: 258 MS
EKG QRS DURATION: 86 MS
EKG QTC CALCULATION (BAZETT): 373 MS
EKG R AXIS: 68 DEGREES
EKG T AXIS: 81 DEGREES
EKG VENTRICULAR RATE: 126 BPM
EOSINOPHIL # BLD: 0 K/UL (ref 0–0.6)
EOSINOPHIL NFR BLD: 0 %
GFR SERPLBLD CREATININE-BSD FMLA CKD-EPI: >60 ML/MIN/{1.73_M2}
GLUCOSE SERPL-MCNC: 186 MG/DL (ref 70–99)
GRAM STN SPEC: NORMAL
HCT VFR BLD AUTO: 27.2 % (ref 40.5–52.5)
HGB BLD-MCNC: 9.1 G/DL (ref 13.5–17.5)
LYMPHOCYTES # BLD: 0.3 K/UL (ref 1–5.1)
LYMPHOCYTES NFR BLD: 5.7 %
MAGNESIUM SERPL-MCNC: 2.1 MG/DL (ref 1.8–2.4)
MCH RBC QN AUTO: 31 PG (ref 26–34)
MCHC RBC AUTO-ENTMCNC: 33.4 G/DL (ref 31–36)
MCV RBC AUTO: 93 FL (ref 80–100)
MONOCYTES # BLD: 0.1 K/UL (ref 0–1.3)
MONOCYTES NFR BLD: 2.2 %
NEUTROPHILS # BLD: 4.8 K/UL (ref 1.7–7.7)
NEUTROPHILS NFR BLD: 92 %
PLATELET # BLD AUTO: 150 K/UL (ref 135–450)
PMV BLD AUTO: 8 FL (ref 5–10.5)
POTASSIUM SERPL-SCNC: 4.4 MMOL/L (ref 3.5–5.1)
RBC # BLD AUTO: 2.93 M/UL (ref 4.2–5.9)
SODIUM SERPL-SCNC: 128 MMOL/L (ref 136–145)
WBC # BLD AUTO: 5.2 K/UL (ref 4–11)

## 2023-12-30 PROCEDURE — 93010 ELECTROCARDIOGRAM REPORT: CPT | Performed by: INTERNAL MEDICINE

## 2023-12-30 PROCEDURE — 6370000000 HC RX 637 (ALT 250 FOR IP): Performed by: NURSE PRACTITIONER

## 2023-12-30 PROCEDURE — 36415 COLL VENOUS BLD VENIPUNCTURE: CPT

## 2023-12-30 PROCEDURE — 94761 N-INVAS EAR/PLS OXIMETRY MLT: CPT

## 2023-12-30 PROCEDURE — 94660 CPAP INITIATION&MGMT: CPT

## 2023-12-30 PROCEDURE — 2580000003 HC RX 258: Performed by: STUDENT IN AN ORGANIZED HEALTH CARE EDUCATION/TRAINING PROGRAM

## 2023-12-30 PROCEDURE — 1200000000 HC SEMI PRIVATE

## 2023-12-30 PROCEDURE — 84300 ASSAY OF URINE SODIUM: CPT

## 2023-12-30 PROCEDURE — 94640 AIRWAY INHALATION TREATMENT: CPT

## 2023-12-30 PROCEDURE — 83935 ASSAY OF URINE OSMOLALITY: CPT

## 2023-12-30 PROCEDURE — 6370000000 HC RX 637 (ALT 250 FOR IP): Performed by: STUDENT IN AN ORGANIZED HEALTH CARE EDUCATION/TRAINING PROGRAM

## 2023-12-30 PROCEDURE — 83735 ASSAY OF MAGNESIUM: CPT

## 2023-12-30 PROCEDURE — 80048 BASIC METABOLIC PNL TOTAL CA: CPT

## 2023-12-30 PROCEDURE — 2700000000 HC OXYGEN THERAPY PER DAY

## 2023-12-30 PROCEDURE — 85025 COMPLETE CBC W/AUTO DIFF WBC: CPT

## 2023-12-30 PROCEDURE — 6360000002 HC RX W HCPCS: Performed by: STUDENT IN AN ORGANIZED HEALTH CARE EDUCATION/TRAINING PROGRAM

## 2023-12-30 RX ORDER — LORAZEPAM 0.5 MG/1
0.5 TABLET ORAL
Status: COMPLETED | OUTPATIENT
Start: 2023-12-30 | End: 2023-12-30

## 2023-12-30 RX ORDER — LANOLIN ALCOHOL/MO/W.PET/CERES
3 CREAM (GRAM) TOPICAL NIGHTLY PRN
Status: DISCONTINUED | OUTPATIENT
Start: 2023-12-30 | End: 2024-01-04 | Stop reason: HOSPADM

## 2023-12-30 RX ADMIN — WATER 40 MG: 1 INJECTION INTRAMUSCULAR; INTRAVENOUS; SUBCUTANEOUS at 16:46

## 2023-12-30 RX ADMIN — APIXABAN 5 MG: 5 TABLET, FILM COATED ORAL at 10:04

## 2023-12-30 RX ADMIN — MIDODRINE HYDROCHLORIDE 2.5 MG: 5 TABLET ORAL at 10:04

## 2023-12-30 RX ADMIN — Medication 10 ML: at 10:05

## 2023-12-30 RX ADMIN — Medication 3 ML: at 08:31

## 2023-12-30 RX ADMIN — IPRATROPIUM BROMIDE AND ALBUTEROL SULFATE 1 DOSE: .5; 3 SOLUTION RESPIRATORY (INHALATION) at 13:12

## 2023-12-30 RX ADMIN — GUAIFENESIN 600 MG: 600 TABLET, EXTENDED RELEASE ORAL at 22:07

## 2023-12-30 RX ADMIN — MIDODRINE HYDROCHLORIDE 2.5 MG: 5 TABLET ORAL at 16:45

## 2023-12-30 RX ADMIN — Medication 200 MG: at 22:07

## 2023-12-30 RX ADMIN — TIOTROPIUM BROMIDE INHALATION SPRAY 2 PUFF: 3.12 SPRAY, METERED RESPIRATORY (INHALATION) at 08:31

## 2023-12-30 RX ADMIN — IPRATROPIUM BROMIDE AND ALBUTEROL SULFATE 1 DOSE: .5; 3 SOLUTION RESPIRATORY (INHALATION) at 08:29

## 2023-12-30 RX ADMIN — ARIPIPRAZOLE 10 MG: 5 TABLET ORAL at 10:04

## 2023-12-30 RX ADMIN — METOPROLOL TARTRATE 25 MG: 25 TABLET, FILM COATED ORAL at 22:07

## 2023-12-30 RX ADMIN — MELATONIN TAB 3 MG 3 MG: 3 TAB at 22:07

## 2023-12-30 RX ADMIN — WATER 40 MG: 1 INJECTION INTRAMUSCULAR; INTRAVENOUS; SUBCUTANEOUS at 10:04

## 2023-12-30 RX ADMIN — Medication 2 PUFF: at 20:17

## 2023-12-30 RX ADMIN — LORAZEPAM 0.5 MG: 0.5 TABLET ORAL at 23:34

## 2023-12-30 RX ADMIN — LEVOTHYROXINE SODIUM 75 MCG: 0.07 TABLET ORAL at 06:49

## 2023-12-30 RX ADMIN — METOPROLOL TARTRATE 25 MG: 25 TABLET, FILM COATED ORAL at 10:04

## 2023-12-30 RX ADMIN — APIXABAN 5 MG: 5 TABLET, FILM COATED ORAL at 22:07

## 2023-12-30 RX ADMIN — IPRATROPIUM BROMIDE AND ALBUTEROL SULFATE 1 DOSE: .5; 3 SOLUTION RESPIRATORY (INHALATION) at 20:17

## 2023-12-30 RX ADMIN — Medication 10 ML: at 22:09

## 2023-12-30 RX ADMIN — IPRATROPIUM BROMIDE AND ALBUTEROL SULFATE 1 DOSE: .5; 3 SOLUTION RESPIRATORY (INHALATION) at 00:08

## 2023-12-30 RX ADMIN — GUAIFENESIN 600 MG: 600 TABLET, EXTENDED RELEASE ORAL at 10:04

## 2023-12-30 RX ADMIN — SODIUM CHLORIDE, PRESERVATIVE FREE 10 ML: 5 INJECTION INTRAVENOUS at 23:34

## 2023-12-30 RX ADMIN — Medication 3 ML: at 20:17

## 2023-12-30 RX ADMIN — Medication 2 PUFF: at 08:32

## 2023-12-30 RX ADMIN — CEFTRIAXONE SODIUM 1000 MG: 1 INJECTION, POWDER, FOR SOLUTION INTRAMUSCULAR; INTRAVENOUS at 10:51

## 2023-12-30 RX ADMIN — AZITHROMYCIN MONOHYDRATE 500 MG: 500 INJECTION, POWDER, LYOPHILIZED, FOR SOLUTION INTRAVENOUS at 12:34

## 2023-12-30 RX ADMIN — WATER 40 MG: 1 INJECTION INTRAMUSCULAR; INTRAVENOUS; SUBCUTANEOUS at 23:34

## 2023-12-31 LAB
ANION GAP SERPL CALCULATED.3IONS-SCNC: 1 MMOL/L (ref 3–16)
BASE EXCESS BLDA CALC-SCNC: 17 MMOL/L (ref -3–3)
BASOPHILS # BLD: 0 K/UL (ref 0–0.2)
BASOPHILS NFR BLD: 0 %
BUN SERPL-MCNC: 15 MG/DL (ref 7–20)
CA-I BLD-SCNC: 1.17 MMOL/L (ref 1.12–1.32)
CALCIUM SERPL-MCNC: 8.9 MG/DL (ref 8.3–10.6)
CHLORIDE SERPL-SCNC: 85 MMOL/L (ref 99–110)
CO2 BLDA-SCNC: 42 MMOL/L
CO2 SERPL-SCNC: 44 MMOL/L (ref 21–32)
CREAT SERPL-MCNC: 0.7 MG/DL (ref 0.8–1.3)
DEPRECATED RDW RBC AUTO: 13.2 % (ref 12.4–15.4)
EKG ATRIAL RATE: 131 BPM
EKG DIAGNOSIS: NORMAL
EKG Q-T INTERVAL: 270 MS
EKG QRS DURATION: 90 MS
EKG QTC CALCULATION (BAZETT): 405 MS
EKG R AXIS: 74 DEGREES
EKG T AXIS: -78 DEGREES
EKG VENTRICULAR RATE: 135 BPM
EOSINOPHIL # BLD: 0 K/UL (ref 0–0.6)
EOSINOPHIL NFR BLD: 0 %
GFR SERPLBLD CREATININE-BSD FMLA CKD-EPI: >60 ML/MIN/{1.73_M2}
GLUCOSE BLD-MCNC: 118 MG/DL (ref 70–99)
GLUCOSE SERPL-MCNC: 132 MG/DL (ref 70–99)
HCO3 BLDA-SCNC: 40.1 MMOL/L (ref 21–29)
HCT VFR BLD AUTO: 28 % (ref 40.5–52.5)
HCT VFR BLD AUTO: 28.5 % (ref 40.5–52.5)
HGB BLD CALC-MCNC: 9.5 GM/DL (ref 13.5–17.5)
HGB BLD-MCNC: 9.6 G/DL (ref 13.5–17.5)
LACTATE BLD-SCNC: 0.91 MMOL/L (ref 0.4–2)
LYMPHOCYTES # BLD: 0.3 K/UL (ref 1–5.1)
LYMPHOCYTES NFR BLD: 4 %
MAGNESIUM SERPL-MCNC: 2.2 MG/DL (ref 1.8–2.4)
MCH RBC QN AUTO: 31.3 PG (ref 26–34)
MCHC RBC AUTO-ENTMCNC: 33.7 G/DL (ref 31–36)
MCV RBC AUTO: 92.8 FL (ref 80–100)
MONOCYTES # BLD: 0.3 K/UL (ref 0–1.3)
MONOCYTES NFR BLD: 3.1 %
NEUTROPHILS # BLD: 7.8 K/UL (ref 1.7–7.7)
NEUTROPHILS NFR BLD: 92.9 %
OSMOLALITY UR: 425 MOSM/KG (ref 390–1070)
PCO2 BLDA: 52.6 MM HG (ref 35–45)
PERFORMED ON: ABNORMAL
PH BLDA: 7.49 [PH] (ref 7.35–7.45)
PLATELET # BLD AUTO: 156 K/UL (ref 135–450)
PMV BLD AUTO: 8.2 FL (ref 5–10.5)
PO2 BLDA: 120.2 MM HG (ref 75–108)
POC SAMPLE TYPE: ABNORMAL
POTASSIUM BLD-SCNC: 4.3 MMOL/L (ref 3.5–5.1)
POTASSIUM SERPL-SCNC: 4.8 MMOL/L (ref 3.5–5.1)
RBC # BLD AUTO: 3.07 M/UL (ref 4.2–5.9)
SAO2 % BLDA: 99 % (ref 93–100)
SODIUM BLD-SCNC: 126 MMOL/L (ref 136–145)
SODIUM SERPL-SCNC: 130 MMOL/L (ref 136–145)
SODIUM UR-SCNC: <20 MMOL/L
WBC # BLD AUTO: 8.4 K/UL (ref 4–11)

## 2023-12-31 PROCEDURE — 80048 BASIC METABOLIC PNL TOTAL CA: CPT

## 2023-12-31 PROCEDURE — 2580000003 HC RX 258: Performed by: STUDENT IN AN ORGANIZED HEALTH CARE EDUCATION/TRAINING PROGRAM

## 2023-12-31 PROCEDURE — 6360000002 HC RX W HCPCS: Performed by: STUDENT IN AN ORGANIZED HEALTH CARE EDUCATION/TRAINING PROGRAM

## 2023-12-31 PROCEDURE — 93010 ELECTROCARDIOGRAM REPORT: CPT | Performed by: INTERNAL MEDICINE

## 2023-12-31 PROCEDURE — 84295 ASSAY OF SERUM SODIUM: CPT

## 2023-12-31 PROCEDURE — 82803 BLOOD GASES ANY COMBINATION: CPT

## 2023-12-31 PROCEDURE — 99233 SBSQ HOSP IP/OBS HIGH 50: CPT | Performed by: STUDENT IN AN ORGANIZED HEALTH CARE EDUCATION/TRAINING PROGRAM

## 2023-12-31 PROCEDURE — 1200000000 HC SEMI PRIVATE

## 2023-12-31 PROCEDURE — 93005 ELECTROCARDIOGRAM TRACING: CPT | Performed by: INTERNAL MEDICINE

## 2023-12-31 PROCEDURE — 6370000000 HC RX 637 (ALT 250 FOR IP): Performed by: STUDENT IN AN ORGANIZED HEALTH CARE EDUCATION/TRAINING PROGRAM

## 2023-12-31 PROCEDURE — A4216 STERILE WATER/SALINE, 10 ML: HCPCS | Performed by: STUDENT IN AN ORGANIZED HEALTH CARE EDUCATION/TRAINING PROGRAM

## 2023-12-31 PROCEDURE — 85025 COMPLETE CBC W/AUTO DIFF WBC: CPT

## 2023-12-31 PROCEDURE — 83605 ASSAY OF LACTIC ACID: CPT

## 2023-12-31 PROCEDURE — 83735 ASSAY OF MAGNESIUM: CPT

## 2023-12-31 PROCEDURE — 94761 N-INVAS EAR/PLS OXIMETRY MLT: CPT

## 2023-12-31 PROCEDURE — 36415 COLL VENOUS BLD VENIPUNCTURE: CPT

## 2023-12-31 PROCEDURE — 82947 ASSAY GLUCOSE BLOOD QUANT: CPT

## 2023-12-31 PROCEDURE — 2500000003 HC RX 250 WO HCPCS: Performed by: INTERNAL MEDICINE

## 2023-12-31 PROCEDURE — 36600 WITHDRAWAL OF ARTERIAL BLOOD: CPT

## 2023-12-31 PROCEDURE — 2500000003 HC RX 250 WO HCPCS: Performed by: STUDENT IN AN ORGANIZED HEALTH CARE EDUCATION/TRAINING PROGRAM

## 2023-12-31 PROCEDURE — 85014 HEMATOCRIT: CPT

## 2023-12-31 PROCEDURE — 94640 AIRWAY INHALATION TREATMENT: CPT

## 2023-12-31 PROCEDURE — 94660 CPAP INITIATION&MGMT: CPT

## 2023-12-31 PROCEDURE — 6370000000 HC RX 637 (ALT 250 FOR IP): Performed by: NURSE PRACTITIONER

## 2023-12-31 PROCEDURE — 2700000000 HC OXYGEN THERAPY PER DAY

## 2023-12-31 PROCEDURE — 82330 ASSAY OF CALCIUM: CPT

## 2023-12-31 PROCEDURE — 84132 ASSAY OF SERUM POTASSIUM: CPT

## 2023-12-31 RX ORDER — BUDESONIDE 0.5 MG/2ML
0.5 INHALANT ORAL
Status: DISCONTINUED | OUTPATIENT
Start: 2023-12-31 | End: 2024-01-04 | Stop reason: HOSPADM

## 2023-12-31 RX ORDER — DILTIAZEM HYDROCHLORIDE 5 MG/ML
20 INJECTION INTRAVENOUS ONCE
Status: COMPLETED | OUTPATIENT
Start: 2023-12-31 | End: 2023-12-31

## 2023-12-31 RX ORDER — LORAZEPAM 0.5 MG/1
0.5 TABLET ORAL NIGHTLY PRN
Status: DISCONTINUED | OUTPATIENT
Start: 2023-12-31 | End: 2024-01-04 | Stop reason: HOSPADM

## 2023-12-31 RX ORDER — ARFORMOTEROL TARTRATE 15 UG/2ML
15 SOLUTION RESPIRATORY (INHALATION)
Status: DISCONTINUED | OUTPATIENT
Start: 2023-12-31 | End: 2024-01-04 | Stop reason: HOSPADM

## 2023-12-31 RX ORDER — DILTIAZEM HYDROCHLORIDE 5 MG/ML
INJECTION INTRAVENOUS
Status: DISCONTINUED
Start: 2023-12-31 | End: 2023-12-31

## 2023-12-31 RX ORDER — METOPROLOL TARTRATE 50 MG/1
50 TABLET, FILM COATED ORAL 2 TIMES DAILY
Status: DISCONTINUED | OUTPATIENT
Start: 2023-12-31 | End: 2024-01-04 | Stop reason: HOSPADM

## 2023-12-31 RX ADMIN — DOXYCYCLINE 100 MG: 100 INJECTION, POWDER, LYOPHILIZED, FOR SOLUTION INTRAVENOUS at 16:45

## 2023-12-31 RX ADMIN — Medication 2 PUFF: at 08:27

## 2023-12-31 RX ADMIN — Medication 3 ML: at 21:22

## 2023-12-31 RX ADMIN — APIXABAN 5 MG: 5 TABLET, FILM COATED ORAL at 22:15

## 2023-12-31 RX ADMIN — ARFORMOTEROL TARTRATE 15 MCG: 15 SOLUTION RESPIRATORY (INHALATION) at 21:22

## 2023-12-31 RX ADMIN — WATER 40 MG: 1 INJECTION INTRAMUSCULAR; INTRAVENOUS; SUBCUTANEOUS at 16:45

## 2023-12-31 RX ADMIN — METOPROLOL TARTRATE 25 MG: 25 TABLET, FILM COATED ORAL at 08:57

## 2023-12-31 RX ADMIN — Medication 10 ML: at 22:15

## 2023-12-31 RX ADMIN — BUDESONIDE 500 MCG: 0.5 SUSPENSION RESPIRATORY (INHALATION) at 21:22

## 2023-12-31 RX ADMIN — LEVOTHYROXINE SODIUM 75 MCG: 0.07 TABLET ORAL at 05:49

## 2023-12-31 RX ADMIN — GUAIFENESIN 600 MG: 600 TABLET, EXTENDED RELEASE ORAL at 08:57

## 2023-12-31 RX ADMIN — TIOTROPIUM BROMIDE INHALATION SPRAY 2 PUFF: 3.12 SPRAY, METERED RESPIRATORY (INHALATION) at 08:27

## 2023-12-31 RX ADMIN — Medication 10 ML: at 09:52

## 2023-12-31 RX ADMIN — MIDODRINE HYDROCHLORIDE 2.5 MG: 5 TABLET ORAL at 09:39

## 2023-12-31 RX ADMIN — CEFTRIAXONE SODIUM 1000 MG: 1 INJECTION, POWDER, FOR SOLUTION INTRAMUSCULAR; INTRAVENOUS at 09:50

## 2023-12-31 RX ADMIN — SODIUM CHLORIDE, PRESERVATIVE FREE 20 MG: 5 INJECTION INTRAVENOUS at 09:38

## 2023-12-31 RX ADMIN — GUAIFENESIN 600 MG: 600 TABLET, EXTENDED RELEASE ORAL at 22:15

## 2023-12-31 RX ADMIN — METOPROLOL TARTRATE 50 MG: 50 TABLET ORAL at 22:15

## 2023-12-31 RX ADMIN — WATER 40 MG: 1 INJECTION INTRAMUSCULAR; INTRAVENOUS; SUBCUTANEOUS at 09:38

## 2023-12-31 RX ADMIN — SODIUM CHLORIDE 50 ML: 9 INJECTION, SOLUTION INTRAVENOUS at 09:47

## 2023-12-31 RX ADMIN — IPRATROPIUM BROMIDE AND ALBUTEROL SULFATE 1 DOSE: .5; 3 SOLUTION RESPIRATORY (INHALATION) at 21:22

## 2023-12-31 RX ADMIN — MELATONIN TAB 3 MG 3 MG: 3 TAB at 22:15

## 2023-12-31 RX ADMIN — LORAZEPAM 0.5 MG: 0.5 TABLET ORAL at 23:28

## 2023-12-31 RX ADMIN — Medication 200 MG: at 22:15

## 2023-12-31 RX ADMIN — DILTIAZEM HYDROCHLORIDE 20 MG: 5 INJECTION INTRAVENOUS at 08:45

## 2023-12-31 RX ADMIN — IPRATROPIUM BROMIDE AND ALBUTEROL SULFATE 1 DOSE: .5; 3 SOLUTION RESPIRATORY (INHALATION) at 08:27

## 2023-12-31 RX ADMIN — IPRATROPIUM BROMIDE AND ALBUTEROL SULFATE 1 DOSE: .5; 3 SOLUTION RESPIRATORY (INHALATION) at 12:13

## 2023-12-31 RX ADMIN — ARIPIPRAZOLE 10 MG: 5 TABLET ORAL at 08:58

## 2023-12-31 RX ADMIN — APIXABAN 5 MG: 5 TABLET, FILM COATED ORAL at 08:57

## 2023-12-31 RX ADMIN — Medication 3 ML: at 08:28

## 2023-12-31 RX ADMIN — SODIUM CHLORIDE, PRESERVATIVE FREE 20 MG: 5 INJECTION INTRAVENOUS at 22:14

## 2023-12-31 RX ADMIN — IPRATROPIUM BROMIDE AND ALBUTEROL SULFATE 1 DOSE: .5; 3 SOLUTION RESPIRATORY (INHALATION) at 16:11

## 2023-12-31 ASSESSMENT — PAIN SCALES - GENERAL
PAINLEVEL_OUTOF10: 0
PAINLEVEL_OUTOF10: 0

## 2023-12-31 NOTE — PLAN OF CARE
Problem: Discharge Planning  Goal: Discharge to home or other facility with appropriate resources  12/30/2023 2301 by Abdiel Souza RN  Outcome: Progressing  12/30/2023 2301 by Abdiel Souza RN  Outcome: Progressing     Problem: Safety - Adult  Goal: Free from fall injury  12/30/2023 2301 by Abdiel Souza RN  Outcome: Progressing  12/30/2023 2301 by Abdiel Souza RN  Outcome: Progressing     Problem: Chronic Conditions and Co-morbidities  Goal: Patient's chronic conditions and co-morbidity symptoms are monitored and maintained or improved  Outcome: Progressing     Problem: Respiratory - Adult  Goal: Achieves optimal ventilation and oxygenation  Outcome: Progressing     Problem: Cardiovascular - Adult  Goal: Maintains optimal cardiac output and hemodynamic stability  Outcome: Progressing  Goal: Absence of cardiac dysrhythmias or at baseline  Outcome: Progressing

## 2024-01-01 LAB
ANION GAP SERPL CALCULATED.3IONS-SCNC: 1 MMOL/L (ref 3–16)
BACTERIA BLD CULT ORG #2: NORMAL
BACTERIA BLD CULT: NORMAL
BASOPHILS # BLD: 0 K/UL (ref 0–0.2)
BASOPHILS NFR BLD: 0.1 %
BUN SERPL-MCNC: 20 MG/DL (ref 7–20)
CALCIUM SERPL-MCNC: 9.2 MG/DL (ref 8.3–10.6)
CHLORIDE SERPL-SCNC: 88 MMOL/L (ref 99–110)
CO2 SERPL-SCNC: 42 MMOL/L (ref 21–32)
CREAT SERPL-MCNC: 0.7 MG/DL (ref 0.8–1.3)
DEPRECATED RDW RBC AUTO: 13.1 % (ref 12.4–15.4)
EOSINOPHIL # BLD: 0 K/UL (ref 0–0.6)
EOSINOPHIL NFR BLD: 0 %
GFR SERPLBLD CREATININE-BSD FMLA CKD-EPI: >60 ML/MIN/{1.73_M2}
GLUCOSE SERPL-MCNC: 152 MG/DL (ref 70–99)
HCT VFR BLD AUTO: 29.9 % (ref 40.5–52.5)
HGB BLD-MCNC: 9.8 G/DL (ref 13.5–17.5)
LYMPHOCYTES # BLD: 0.4 K/UL (ref 1–5.1)
LYMPHOCYTES NFR BLD: 5 %
MAGNESIUM SERPL-MCNC: 2.2 MG/DL (ref 1.8–2.4)
MCH RBC QN AUTO: 30.5 PG (ref 26–34)
MCHC RBC AUTO-ENTMCNC: 32.7 G/DL (ref 31–36)
MCV RBC AUTO: 93.4 FL (ref 80–100)
MONOCYTES # BLD: 0.3 K/UL (ref 0–1.3)
MONOCYTES NFR BLD: 3.5 %
NEUTROPHILS # BLD: 7.6 K/UL (ref 1.7–7.7)
NEUTROPHILS NFR BLD: 91.4 %
PLATELET # BLD AUTO: 167 K/UL (ref 135–450)
PMV BLD AUTO: 8 FL (ref 5–10.5)
POTASSIUM SERPL-SCNC: 4.6 MMOL/L (ref 3.5–5.1)
RBC # BLD AUTO: 3.2 M/UL (ref 4.2–5.9)
SODIUM SERPL-SCNC: 131 MMOL/L (ref 136–145)
WBC # BLD AUTO: 8.3 K/UL (ref 4–11)

## 2024-01-01 PROCEDURE — 1200000000 HC SEMI PRIVATE

## 2024-01-01 PROCEDURE — 83735 ASSAY OF MAGNESIUM: CPT

## 2024-01-01 PROCEDURE — 6370000000 HC RX 637 (ALT 250 FOR IP): Performed by: STUDENT IN AN ORGANIZED HEALTH CARE EDUCATION/TRAINING PROGRAM

## 2024-01-01 PROCEDURE — 2500000003 HC RX 250 WO HCPCS: Performed by: NURSE PRACTITIONER

## 2024-01-01 PROCEDURE — 2580000003 HC RX 258: Performed by: STUDENT IN AN ORGANIZED HEALTH CARE EDUCATION/TRAINING PROGRAM

## 2024-01-01 PROCEDURE — 2500000003 HC RX 250 WO HCPCS: Performed by: STUDENT IN AN ORGANIZED HEALTH CARE EDUCATION/TRAINING PROGRAM

## 2024-01-01 PROCEDURE — 85025 COMPLETE CBC W/AUTO DIFF WBC: CPT

## 2024-01-01 PROCEDURE — A4216 STERILE WATER/SALINE, 10 ML: HCPCS | Performed by: STUDENT IN AN ORGANIZED HEALTH CARE EDUCATION/TRAINING PROGRAM

## 2024-01-01 PROCEDURE — 94760 N-INVAS EAR/PLS OXIMETRY 1: CPT

## 2024-01-01 PROCEDURE — 6360000002 HC RX W HCPCS: Performed by: STUDENT IN AN ORGANIZED HEALTH CARE EDUCATION/TRAINING PROGRAM

## 2024-01-01 PROCEDURE — 94640 AIRWAY INHALATION TREATMENT: CPT

## 2024-01-01 PROCEDURE — 2700000000 HC OXYGEN THERAPY PER DAY

## 2024-01-01 PROCEDURE — 80048 BASIC METABOLIC PNL TOTAL CA: CPT

## 2024-01-01 PROCEDURE — 6370000000 HC RX 637 (ALT 250 FOR IP): Performed by: NURSE PRACTITIONER

## 2024-01-01 PROCEDURE — 93005 ELECTROCARDIOGRAM TRACING: CPT | Performed by: NURSE PRACTITIONER

## 2024-01-01 PROCEDURE — 94761 N-INVAS EAR/PLS OXIMETRY MLT: CPT

## 2024-01-01 PROCEDURE — 99233 SBSQ HOSP IP/OBS HIGH 50: CPT | Performed by: STUDENT IN AN ORGANIZED HEALTH CARE EDUCATION/TRAINING PROGRAM

## 2024-01-01 RX ORDER — METOPROLOL TARTRATE 1 MG/ML
5 INJECTION, SOLUTION INTRAVENOUS ONCE
Status: COMPLETED | OUTPATIENT
Start: 2024-01-01 | End: 2024-01-01

## 2024-01-01 RX ORDER — LEVALBUTEROL INHALATION SOLUTION 0.63 MG/3ML
0.63 SOLUTION RESPIRATORY (INHALATION) EVERY 8 HOURS PRN
Status: DISCONTINUED | OUTPATIENT
Start: 2024-01-01 | End: 2024-01-04 | Stop reason: HOSPADM

## 2024-01-01 RX ORDER — DILTIAZEM HYDROCHLORIDE 5 MG/ML
10 INJECTION INTRAVENOUS ONCE
Status: COMPLETED | OUTPATIENT
Start: 2024-01-02 | End: 2024-01-02

## 2024-01-01 RX ADMIN — MELATONIN TAB 3 MG 3 MG: 3 TAB at 21:17

## 2024-01-01 RX ADMIN — CEFTRIAXONE SODIUM 1000 MG: 1 INJECTION, POWDER, FOR SOLUTION INTRAMUSCULAR; INTRAVENOUS at 09:52

## 2024-01-01 RX ADMIN — DOXYCYCLINE 100 MG: 100 INJECTION, POWDER, LYOPHILIZED, FOR SOLUTION INTRAVENOUS at 03:13

## 2024-01-01 RX ADMIN — Medication 10 ML: at 21:18

## 2024-01-01 RX ADMIN — APIXABAN 5 MG: 5 TABLET, FILM COATED ORAL at 09:40

## 2024-01-01 RX ADMIN — SODIUM CHLORIDE, PRESERVATIVE FREE 10 ML: 5 INJECTION INTRAVENOUS at 04:41

## 2024-01-01 RX ADMIN — BUDESONIDE 500 MCG: 0.5 SUSPENSION RESPIRATORY (INHALATION) at 20:49

## 2024-01-01 RX ADMIN — TIOTROPIUM BROMIDE INHALATION SPRAY 2 PUFF: 3.12 SPRAY, METERED RESPIRATORY (INHALATION) at 09:30

## 2024-01-01 RX ADMIN — BUDESONIDE 500 MCG: 0.5 SUSPENSION RESPIRATORY (INHALATION) at 09:03

## 2024-01-01 RX ADMIN — SODIUM CHLORIDE, PRESERVATIVE FREE 20 MG: 5 INJECTION INTRAVENOUS at 21:17

## 2024-01-01 RX ADMIN — WATER 40 MG: 1 INJECTION INTRAMUSCULAR; INTRAVENOUS; SUBCUTANEOUS at 17:29

## 2024-01-01 RX ADMIN — WATER 40 MG: 1 INJECTION INTRAMUSCULAR; INTRAVENOUS; SUBCUTANEOUS at 04:41

## 2024-01-01 RX ADMIN — METOPROLOL TARTRATE 5 MG: 5 INJECTION INTRAVENOUS at 22:46

## 2024-01-01 RX ADMIN — APIXABAN 5 MG: 5 TABLET, FILM COATED ORAL at 21:16

## 2024-01-01 RX ADMIN — ARFORMOTEROL TARTRATE 15 MCG: 15 SOLUTION RESPIRATORY (INHALATION) at 20:49

## 2024-01-01 RX ADMIN — METOPROLOL TARTRATE 50 MG: 50 TABLET ORAL at 09:41

## 2024-01-01 RX ADMIN — METOPROLOL TARTRATE 50 MG: 50 TABLET ORAL at 21:17

## 2024-01-01 RX ADMIN — IPRATROPIUM BROMIDE AND ALBUTEROL SULFATE 1 DOSE: .5; 3 SOLUTION RESPIRATORY (INHALATION) at 15:58

## 2024-01-01 RX ADMIN — ARFORMOTEROL TARTRATE 15 MCG: 15 SOLUTION RESPIRATORY (INHALATION) at 09:03

## 2024-01-01 RX ADMIN — Medication 200 MG: at 21:17

## 2024-01-01 RX ADMIN — LEVOTHYROXINE SODIUM 75 MCG: 0.07 TABLET ORAL at 05:05

## 2024-01-01 RX ADMIN — ARIPIPRAZOLE 10 MG: 5 TABLET ORAL at 09:41

## 2024-01-01 RX ADMIN — SODIUM CHLORIDE, PRESERVATIVE FREE 10 ML: 5 INJECTION INTRAVENOUS at 22:46

## 2024-01-01 RX ADMIN — IPRATROPIUM BROMIDE AND ALBUTEROL SULFATE 1 DOSE: .5; 3 SOLUTION RESPIRATORY (INHALATION) at 12:46

## 2024-01-01 RX ADMIN — Medication 10 ML: at 09:44

## 2024-01-01 RX ADMIN — DOXYCYCLINE 100 MG: 100 INJECTION, POWDER, LYOPHILIZED, FOR SOLUTION INTRAVENOUS at 17:35

## 2024-01-01 RX ADMIN — IPRATROPIUM BROMIDE AND ALBUTEROL SULFATE 1 DOSE: .5; 3 SOLUTION RESPIRATORY (INHALATION) at 20:48

## 2024-01-01 RX ADMIN — GUAIFENESIN 600 MG: 600 TABLET, EXTENDED RELEASE ORAL at 09:41

## 2024-01-01 RX ADMIN — Medication 3 ML: at 09:03

## 2024-01-01 RX ADMIN — GUAIFENESIN 600 MG: 600 TABLET, EXTENDED RELEASE ORAL at 21:17

## 2024-01-01 RX ADMIN — SODIUM CHLORIDE, PRESERVATIVE FREE 20 MG: 5 INJECTION INTRAVENOUS at 09:41

## 2024-01-01 RX ADMIN — IPRATROPIUM BROMIDE AND ALBUTEROL SULFATE 1 DOSE: .5; 3 SOLUTION RESPIRATORY (INHALATION) at 09:03

## 2024-01-01 NOTE — PLAN OF CARE
Problem: Discharge Planning  Goal: Discharge to home or other facility with appropriate resources  Outcome: Progressing     Problem: Safety - Adult  Goal: Free from fall injury  Outcome: Progressing     Problem: Chronic Conditions and Co-morbidities  Goal: Patient's chronic conditions and co-morbidity symptoms are monitored and maintained or improved  Outcome: Progressing     Problem: Respiratory - Adult  Goal: Achieves optimal ventilation and oxygenation  Outcome: Progressing     Problem: Cardiovascular - Adult  Goal: Maintains optimal cardiac output and hemodynamic stability  Outcome: Progressing  Goal: Absence of cardiac dysrhythmias or at baseline  Outcome: Progressing     Problem: Pain  Goal: Verbalizes/displays adequate comfort level or baseline comfort level  Outcome: Progressing

## 2024-01-01 NOTE — PLAN OF CARE
Problem: Discharge Planning  Goal: Discharge to home or other facility with appropriate resources  Outcome: Progressing     Problem: Safety - Adult  Goal: Free from fall injury  Outcome: Progressing     Problem: Chronic Conditions and Co-morbidities  Goal: Patient's chronic conditions and co-morbidity symptoms are monitored and maintained or improved  Outcome: Progressing     Problem: Respiratory - Adult  Goal: Achieves optimal ventilation and oxygenation  Outcome: Progressing     Problem: Cardiovascular - Adult  Goal: Maintains optimal cardiac output and hemodynamic stability  Outcome: Progressing  Goal: Absence of cardiac dysrhythmias or at baseline  Outcome: Progressing

## 2024-01-02 LAB
ANION GAP SERPL CALCULATED.3IONS-SCNC: -1 MMOL/L (ref 3–16)
BASE EXCESS BLDV CALC-SCNC: 13.3 MMOL/L (ref -3–3)
BASOPHILS # BLD: 0 K/UL (ref 0–0.2)
BASOPHILS NFR BLD: 0 %
BUN SERPL-MCNC: 24 MG/DL (ref 7–20)
CALCIUM SERPL-MCNC: 9.3 MG/DL (ref 8.3–10.6)
CHLORIDE SERPL-SCNC: 91 MMOL/L (ref 99–110)
CO2 BLDV-SCNC: 101 MMOL/L
CO2 SERPL-SCNC: 45 MMOL/L (ref 21–32)
COHGB MFR BLDV: 3.7 % (ref 0–1.5)
CREAT SERPL-MCNC: 0.7 MG/DL (ref 0.8–1.3)
DEPRECATED RDW RBC AUTO: 13 % (ref 12.4–15.4)
EKG ATRIAL RATE: 174 BPM
EKG DIAGNOSIS: NORMAL
EKG Q-T INTERVAL: 288 MS
EKG QRS DURATION: 88 MS
EKG QTC CALCULATION (BAZETT): 436 MS
EKG R AXIS: 66 DEGREES
EKG T AXIS: 83 DEGREES
EKG VENTRICULAR RATE: 138 BPM
EOSINOPHIL # BLD: 0 K/UL (ref 0–0.6)
EOSINOPHIL NFR BLD: 0 %
GFR SERPLBLD CREATININE-BSD FMLA CKD-EPI: >60 ML/MIN/{1.73_M2}
GLUCOSE SERPL-MCNC: 127 MG/DL (ref 70–99)
HCO3 BLDV-SCNC: 42.6 MMOL/L (ref 23–29)
HCT VFR BLD AUTO: 32.2 % (ref 40.5–52.5)
HGB BLD-MCNC: 10.7 G/DL (ref 13.5–17.5)
LYMPHOCYTES # BLD: 0.3 K/UL (ref 1–5.1)
LYMPHOCYTES NFR BLD: 3.5 %
MAGNESIUM SERPL-MCNC: 2.2 MG/DL (ref 1.8–2.4)
MCH RBC QN AUTO: 31.1 PG (ref 26–34)
MCHC RBC AUTO-ENTMCNC: 33.2 G/DL (ref 31–36)
MCV RBC AUTO: 93.8 FL (ref 80–100)
METHGB MFR BLDV: 1 %
MONOCYTES # BLD: 0.3 K/UL (ref 0–1.3)
MONOCYTES NFR BLD: 3.4 %
NEUTROPHILS # BLD: 8.8 K/UL (ref 1.7–7.7)
NEUTROPHILS NFR BLD: 93.1 %
O2 CT VFR BLDV CALC: 15 VOL %
O2 THERAPY: ABNORMAL
PCO2 BLDV: 84.1 MMHG (ref 40–50)
PH BLDV: 7.31 [PH] (ref 7.35–7.45)
PLATELET # BLD AUTO: 188 K/UL (ref 135–450)
PMV BLD AUTO: 8.4 FL (ref 5–10.5)
PO2 BLDV: 100 MMHG (ref 25–40)
POTASSIUM SERPL-SCNC: 5.1 MMOL/L (ref 3.5–5.1)
RBC # BLD AUTO: 3.43 M/UL (ref 4.2–5.9)
SAO2 % BLDV: 99 %
SODIUM SERPL-SCNC: 135 MMOL/L (ref 136–145)
WBC # BLD AUTO: 9.4 K/UL (ref 4–11)

## 2024-01-02 PROCEDURE — 2500000003 HC RX 250 WO HCPCS: Performed by: STUDENT IN AN ORGANIZED HEALTH CARE EDUCATION/TRAINING PROGRAM

## 2024-01-02 PROCEDURE — 93010 ELECTROCARDIOGRAM REPORT: CPT | Performed by: INTERNAL MEDICINE

## 2024-01-02 PROCEDURE — 94640 AIRWAY INHALATION TREATMENT: CPT

## 2024-01-02 PROCEDURE — 83735 ASSAY OF MAGNESIUM: CPT

## 2024-01-02 PROCEDURE — 82803 BLOOD GASES ANY COMBINATION: CPT

## 2024-01-02 PROCEDURE — 1200000000 HC SEMI PRIVATE

## 2024-01-02 PROCEDURE — 6360000002 HC RX W HCPCS: Performed by: STUDENT IN AN ORGANIZED HEALTH CARE EDUCATION/TRAINING PROGRAM

## 2024-01-02 PROCEDURE — 36415 COLL VENOUS BLD VENIPUNCTURE: CPT

## 2024-01-02 PROCEDURE — 6360000002 HC RX W HCPCS: Performed by: NURSE PRACTITIONER

## 2024-01-02 PROCEDURE — 2580000003 HC RX 258: Performed by: STUDENT IN AN ORGANIZED HEALTH CARE EDUCATION/TRAINING PROGRAM

## 2024-01-02 PROCEDURE — A4216 STERILE WATER/SALINE, 10 ML: HCPCS | Performed by: STUDENT IN AN ORGANIZED HEALTH CARE EDUCATION/TRAINING PROGRAM

## 2024-01-02 PROCEDURE — 94660 CPAP INITIATION&MGMT: CPT

## 2024-01-02 PROCEDURE — 80048 BASIC METABOLIC PNL TOTAL CA: CPT

## 2024-01-02 PROCEDURE — 99233 SBSQ HOSP IP/OBS HIGH 50: CPT | Performed by: STUDENT IN AN ORGANIZED HEALTH CARE EDUCATION/TRAINING PROGRAM

## 2024-01-02 PROCEDURE — 94761 N-INVAS EAR/PLS OXIMETRY MLT: CPT

## 2024-01-02 PROCEDURE — 85025 COMPLETE CBC W/AUTO DIFF WBC: CPT

## 2024-01-02 PROCEDURE — 6370000000 HC RX 637 (ALT 250 FOR IP): Performed by: STUDENT IN AN ORGANIZED HEALTH CARE EDUCATION/TRAINING PROGRAM

## 2024-01-02 PROCEDURE — 2500000003 HC RX 250 WO HCPCS: Performed by: NURSE PRACTITIONER

## 2024-01-02 PROCEDURE — 6370000000 HC RX 637 (ALT 250 FOR IP): Performed by: NURSE PRACTITIONER

## 2024-01-02 PROCEDURE — 2700000000 HC OXYGEN THERAPY PER DAY

## 2024-01-02 PROCEDURE — 94762 N-INVAS EAR/PLS OXIMTRY CONT: CPT

## 2024-01-02 RX ORDER — DILTIAZEM HCL IN NACL,ISO-OSM 125 MG/125
2.5-15 PLASTIC BAG, INJECTION (ML) INTRAVENOUS CONTINUOUS
Status: DISCONTINUED | OUTPATIENT
Start: 2024-01-02 | End: 2024-01-02

## 2024-01-02 RX ORDER — PREDNISONE 20 MG/1
40 TABLET ORAL DAILY
Status: COMPLETED | OUTPATIENT
Start: 2024-01-03 | End: 2024-01-04

## 2024-01-02 RX ADMIN — SODIUM CHLORIDE, PRESERVATIVE FREE 20 MG: 5 INJECTION INTRAVENOUS at 09:03

## 2024-01-02 RX ADMIN — GUAIFENESIN 600 MG: 600 TABLET, EXTENDED RELEASE ORAL at 22:12

## 2024-01-02 RX ADMIN — DOXYCYCLINE 100 MG: 100 INJECTION, POWDER, LYOPHILIZED, FOR SOLUTION INTRAVENOUS at 16:24

## 2024-01-02 RX ADMIN — MELATONIN TAB 3 MG 3 MG: 3 TAB at 22:23

## 2024-01-02 RX ADMIN — METOPROLOL TARTRATE 50 MG: 50 TABLET ORAL at 22:12

## 2024-01-02 RX ADMIN — ARIPIPRAZOLE 10 MG: 5 TABLET ORAL at 09:03

## 2024-01-02 RX ADMIN — DILTIAZEM HYDROCHLORIDE 2.5 MG/HR: 5 INJECTION, SOLUTION INTRAVENOUS at 08:12

## 2024-01-02 RX ADMIN — DOXYCYCLINE 100 MG: 100 INJECTION, POWDER, LYOPHILIZED, FOR SOLUTION INTRAVENOUS at 03:08

## 2024-01-02 RX ADMIN — APIXABAN 5 MG: 5 TABLET, FILM COATED ORAL at 22:12

## 2024-01-02 RX ADMIN — TIOTROPIUM BROMIDE INHALATION SPRAY 2 PUFF: 3.12 SPRAY, METERED RESPIRATORY (INHALATION) at 07:46

## 2024-01-02 RX ADMIN — DILTIAZEM HYDROCHLORIDE 10 MG: 5 INJECTION, SOLUTION INTRAVENOUS at 00:31

## 2024-01-02 RX ADMIN — SODIUM CHLORIDE, PRESERVATIVE FREE 20 MG: 5 INJECTION INTRAVENOUS at 22:24

## 2024-01-02 RX ADMIN — WATER 40 MG: 1 INJECTION INTRAMUSCULAR; INTRAVENOUS; SUBCUTANEOUS at 04:06

## 2024-01-02 RX ADMIN — LEVOTHYROXINE SODIUM 75 MCG: 0.07 TABLET ORAL at 06:46

## 2024-01-02 RX ADMIN — GUAIFENESIN 600 MG: 600 TABLET, EXTENDED RELEASE ORAL at 09:03

## 2024-01-02 RX ADMIN — METOPROLOL TARTRATE 50 MG: 50 TABLET ORAL at 09:03

## 2024-01-02 RX ADMIN — LEVALBUTEROL 0.63 MG: 0.63 SOLUTION RESPIRATORY (INHALATION) at 07:46

## 2024-01-02 RX ADMIN — CEFTRIAXONE SODIUM 1000 MG: 1 INJECTION, POWDER, FOR SOLUTION INTRAMUSCULAR; INTRAVENOUS at 09:11

## 2024-01-02 RX ADMIN — Medication 200 MG: at 22:12

## 2024-01-02 RX ADMIN — APIXABAN 5 MG: 5 TABLET, FILM COATED ORAL at 09:03

## 2024-01-02 RX ADMIN — Medication 10 ML: at 09:03

## 2024-01-02 RX ADMIN — BUDESONIDE 500 MCG: 0.5 SUSPENSION RESPIRATORY (INHALATION) at 07:46

## 2024-01-02 RX ADMIN — Medication 10 ML: at 22:12

## 2024-01-02 RX ADMIN — LEVALBUTEROL 0.63 MG: 0.63 SOLUTION RESPIRATORY (INHALATION) at 22:51

## 2024-01-02 RX ADMIN — ARFORMOTEROL TARTRATE 15 MCG: 15 SOLUTION RESPIRATORY (INHALATION) at 07:46

## 2024-01-02 ASSESSMENT — PAIN SCALES - GENERAL: PAINLEVEL_OUTOF10: 0

## 2024-01-02 NOTE — RT PROTOCOL NOTE
RT Inhaler-Nebulizer Bronchodilator Protocol Note    There is a bronchodilator order in the chart from a provider indicating to follow the RT Bronchodilator Protocol and there is an “Initiate RT Inhaler-Nebulizer Bronchodilator Protocol” order as well (see protocol at bottom of note).    CXR Findings:  XR CHEST PORTABLE    Result Date: 12/28/2023  1. Patchy opacities in the lower left lung, new since the prior study, may represent developing pneumonia. 2. Emphysematous changes of the lungs.       The findings from the last RT Protocol Assessment were as follows:   History Pulmonary Disease: Chronic pulmonary disease  Respiratory Pattern: Mild dyspnea at rest, irregular pattern, or RR 21-25 bpm  Breath Sounds: Intermittent or unilateral wheezes  Cough: Strong, spontaneous, non-productive  Indication for Bronchodilator Therapy: Decreased or absent breath sounds, On home bronchodilators  Bronchodilator Assessment Score: 10    Aerosolized bronchodilator medication orders have been revised according to the RT Inhaler-Nebulizer Bronchodilator Protocol below.    Respiratory Therapist to perform RT Therapy Protocol Assessment initially then follow the protocol.  Repeat RT Therapy Protocol Assessment PRN for score 0-3 or on second treatment, BID, and PRN for scores above 3.    No Indications - adjust the frequency to every 6 hours PRN wheezing or bronchospasm, if no treatments needed after 48 hours then discontinue using Per Protocol order mode.     If indication present, adjust the RT bronchodilator orders based on the Bronchodilator Assessment Score as indicated below.  Use Inhaler orders unless patient has one or more of the following: on home nebulizer, not able to hold breath for 10 seconds, is not alert and oriented, cannot activate and use MDI correctly, or respiratory rate 25 breaths per minute or more, then use the equivalent nebulizer order(s) with same Frequency and PRN reasons based on the score.  If a patient is 
RT Inhaler-Nebulizer Bronchodilator Protocol Note    There is a bronchodilator order in the chart from a provider indicating to follow the RT Bronchodilator Protocol and there is an “Initiate RT Inhaler-Nebulizer Bronchodilator Protocol” order as well (see protocol at bottom of note).    CXR Findings:  XR CHEST PORTABLE    Result Date: 12/28/2023  1. Patchy opacities in the lower left lung, new since the prior study, may represent developing pneumonia. 2. Emphysematous changes of the lungs.       The findings from the last RT Protocol Assessment were as follows:   History Pulmonary Disease: Chronic pulmonary disease  Respiratory Pattern: Mild dyspnea at rest, irregular pattern, or RR 21-25 bpm  Breath Sounds: Severe inspiratory and expiratory wheezing or severely diminished  Cough: Strong, spontaneous, non-productive  Indication for Bronchodilator Therapy: On home bronchodilators  Bronchodilator Assessment Score: 14    Aerosolized bronchodilator medication orders have been revised according to the RT Inhaler-Nebulizer Bronchodilator Protocol below.    Respiratory Therapist to perform RT Therapy Protocol Assessment initially then follow the protocol.  Repeat RT Therapy Protocol Assessment PRN for score 0-3 or on second treatment, BID, and PRN for scores above 3.    No Indications - adjust the frequency to every 6 hours PRN wheezing or bronchospasm, if no treatments needed after 48 hours then discontinue using Per Protocol order mode.     If indication present, adjust the RT bronchodilator orders based on the Bronchodilator Assessment Score as indicated below.  Use Inhaler orders unless patient has one or more of the following: on home nebulizer, not able to hold breath for 10 seconds, is not alert and oriented, cannot activate and use MDI correctly, or respiratory rate 25 breaths per minute or more, then use the equivalent nebulizer order(s) with same Frequency and PRN reasons based on the score.  If a patient is on 
RT Inhaler-Nebulizer Bronchodilator Protocol Note    There is a bronchodilator order in the chart from a provider indicating to follow the RT Bronchodilator Protocol and there is an “Initiate RT Inhaler-Nebulizer Bronchodilator Protocol” order as well (see protocol at bottom of note).    CXR Findings:  XR CHEST PORTABLE    Result Date: 12/28/2023  1. Patchy opacities in the lower left lung, new since the prior study, may represent developing pneumonia. 2. Emphysematous changes of the lungs.       The findings from the last RT Protocol Assessment were as follows:   History Pulmonary Disease: Chronic pulmonary disease  Respiratory Pattern: Mild dyspnea at rest, irregular pattern, or RR 21-25 bpm  Breath Sounds: Slightly diminished and/or crackles  Cough: Strong, spontaneous, non-productive  Indication for Bronchodilator Therapy: On home bronchodilators  Bronchodilator Assessment Score: 8    Aerosolized bronchodilator medication orders have been revised according to the RT Inhaler-Nebulizer Bronchodilator Protocol below.    Respiratory Therapist to perform RT Therapy Protocol Assessment initially then follow the protocol.  Repeat RT Therapy Protocol Assessment PRN for score 0-3 or on second treatment, BID, and PRN for scores above 3.    No Indications - adjust the frequency to every 6 hours PRN wheezing or bronchospasm, if no treatments needed after 48 hours then discontinue using Per Protocol order mode.     If indication present, adjust the RT bronchodilator orders based on the Bronchodilator Assessment Score as indicated below.  Use Inhaler orders unless patient has one or more of the following: on home nebulizer, not able to hold breath for 10 seconds, is not alert and oriented, cannot activate and use MDI correctly, or respiratory rate 25 breaths per minute or more, then use the equivalent nebulizer order(s) with same Frequency and PRN reasons based on the score.  If a patient is on this medication at home then do 
work of breathing using Per Protocol order mode.        4-6 - enter or revise RT Bronchodilator order(s) to two equivalent RT bronchodilator orders with one order with BID Frequency and one order with Frequency of every 4 hours PRN wheezing or increased work of breathing using Per Protocol order mode.        7-10 - enter or revise RT Bronchodilator order(s) to two equivalent RT bronchodilator orders with one order with TID Frequency and one order with Frequency of every 4 hours PRN wheezing or increased work of breathing using Per Protocol order mode.       11-13 - enter or revise RT Bronchodilator order(s) to one equivalent RT bronchodilator order with QID Frequency and an Albuterol order with Frequency of every 4 hours PRN wheezing or increased work of breathing using Per Protocol order mode.      Greater than 13 - enter or revise RT Bronchodilator order(s) to one equivalent RT bronchodilator order with every 4 hours Frequency and an Albuterol order with Frequency of every 2 hours PRN wheezing or increased work of breathing using Per Protocol order mode.     RT to enter RT Home Evaluation for COPD & MDI Assessment order using Per Protocol order mode.    Electronically signed by Liz Quintero RCP on 1/2/2024 at 12:01 AM

## 2024-01-02 NOTE — PLAN OF CARE
Problem: Discharge Planning  Goal: Discharge to home or other facility with appropriate resources  1/2/2024 0248 by Lazara Green RN  Outcome: Progressing     Problem: Safety - Adult  Goal: Free from fall injury  1/2/2024 0248 by Lazara Green RN  Outcome: Progressing     Problem: Chronic Conditions and Co-morbidities  Goal: Patient's chronic conditions and co-morbidity symptoms are monitored and maintained or improved  1/2/2024 0248 by Lazara Green RN  Outcome: Progressing     Problem: Respiratory - Adult  Goal: Achieves optimal ventilation and oxygenation  1/2/2024 0248 by Lazara Green RN  Outcome: Progressing     Problem: Cardiovascular - Adult  Goal: Maintains optimal cardiac output and hemodynamic stability  1/2/2024 0248 by Lazara Green RN  Outcome: Progressing  Goal: Absence of cardiac dysrhythmias or at baseline  1/2/2024 0248 by Lazara Green RN  Outcome: Progressing     Problem: Pain  Goal: Verbalizes/displays adequate comfort level or baseline comfort level  1/2/2024 0248 by Lazara Green RN  Outcome: Progressing

## 2024-01-02 NOTE — CARE COORDINATION
BONITA Eaton states she is going to put a palliative care consult in for the patient.    Electronically signed by Yari Irvin RN on 1/2/2024 at 11:20 AM

## 2024-01-02 NOTE — PLAN OF CARE
Problem: Discharge Planning  Goal: Discharge to home or other facility with appropriate resources  1/2/2024 1609 by Uma Monge RN  Outcome: Progressing  1/2/2024 0248 by Lazara Green RN  Outcome: Progressing     Problem: Safety - Adult  Goal: Free from fall injury  1/2/2024 1609 by Uma Monge RN  Outcome: Progressing  1/2/2024 0248 by Lazara Green RN  Outcome: Progressing     Problem: Chronic Conditions and Co-morbidities  Goal: Patient's chronic conditions and co-morbidity symptoms are monitored and maintained or improved  1/2/2024 0248 by Lazara Green RN  Outcome: Progressing     Problem: Respiratory - Adult  Goal: Achieves optimal ventilation and oxygenation  1/2/2024 1609 by Uma Monge RN  Outcome: Progressing  1/2/2024 0248 by Lazara Green RN  Outcome: Progressing

## 2024-01-03 LAB
ANION GAP SERPL CALCULATED.3IONS-SCNC: -2 MMOL/L (ref 3–16)
BASE EXCESS BLDV CALC-SCNC: 14.9 MMOL/L (ref -3–3)
BASOPHILS # BLD: 0 K/UL (ref 0–0.2)
BASOPHILS NFR BLD: 0.2 %
BUN SERPL-MCNC: 25 MG/DL (ref 7–20)
CALCIUM SERPL-MCNC: 9.1 MG/DL (ref 8.3–10.6)
CHLORIDE SERPL-SCNC: 94 MMOL/L (ref 99–110)
CO2 BLDV-SCNC: 101 MMOL/L
CO2 SERPL-SCNC: 45 MMOL/L (ref 21–32)
COHGB MFR BLDV: 4.1 % (ref 0–1.5)
CREAT SERPL-MCNC: 1 MG/DL (ref 0.8–1.3)
DEPRECATED RDW RBC AUTO: 12.9 % (ref 12.4–15.4)
DO-HGB MFR BLDV: 4 %
EOSINOPHIL # BLD: 0 K/UL (ref 0–0.6)
EOSINOPHIL NFR BLD: 0.1 %
GFR SERPLBLD CREATININE-BSD FMLA CKD-EPI: >60 ML/MIN/{1.73_M2}
GLUCOSE SERPL-MCNC: 79 MG/DL (ref 70–99)
HCO3 BLDV-SCNC: 42.8 MMOL/L (ref 23–29)
HCT VFR BLD AUTO: 32.3 % (ref 40.5–52.5)
HGB BLD-MCNC: 10.4 G/DL (ref 13.5–17.5)
LYMPHOCYTES # BLD: 1.1 K/UL (ref 1–5.1)
LYMPHOCYTES NFR BLD: 15.2 %
MCH RBC QN AUTO: 30.4 PG (ref 26–34)
MCHC RBC AUTO-ENTMCNC: 32.2 G/DL (ref 31–36)
MCV RBC AUTO: 94.3 FL (ref 80–100)
METHGB MFR BLDV: 0.3 %
MONOCYTES # BLD: 0.8 K/UL (ref 0–1.3)
MONOCYTES NFR BLD: 11.9 %
NEUTROPHILS # BLD: 5.1 K/UL (ref 1.7–7.7)
NEUTROPHILS NFR BLD: 72.6 %
O2 CT VFR BLDV CALC: 14 VOL %
O2 THERAPY: ABNORMAL
PCO2 BLDV: 73.1 MMHG (ref 40–50)
PH BLDV: 7.38 [PH] (ref 7.35–7.45)
PLATELET # BLD AUTO: 180 K/UL (ref 135–450)
PMV BLD AUTO: 7.8 FL (ref 5–10.5)
PO2 BLDV: 73 MMHG (ref 25–40)
POTASSIUM SERPL-SCNC: 5.4 MMOL/L (ref 3.5–5.1)
RBC # BLD AUTO: 3.42 M/UL (ref 4.2–5.9)
SAO2 % BLDV: 96 %
SODIUM SERPL-SCNC: 137 MMOL/L (ref 136–145)
WBC # BLD AUTO: 7 K/UL (ref 4–11)

## 2024-01-03 PROCEDURE — 2500000003 HC RX 250 WO HCPCS: Performed by: STUDENT IN AN ORGANIZED HEALTH CARE EDUCATION/TRAINING PROGRAM

## 2024-01-03 PROCEDURE — 94761 N-INVAS EAR/PLS OXIMETRY MLT: CPT

## 2024-01-03 PROCEDURE — 6360000002 HC RX W HCPCS: Performed by: STUDENT IN AN ORGANIZED HEALTH CARE EDUCATION/TRAINING PROGRAM

## 2024-01-03 PROCEDURE — 36415 COLL VENOUS BLD VENIPUNCTURE: CPT

## 2024-01-03 PROCEDURE — 6370000000 HC RX 637 (ALT 250 FOR IP): Performed by: STUDENT IN AN ORGANIZED HEALTH CARE EDUCATION/TRAINING PROGRAM

## 2024-01-03 PROCEDURE — 94640 AIRWAY INHALATION TREATMENT: CPT

## 2024-01-03 PROCEDURE — A4216 STERILE WATER/SALINE, 10 ML: HCPCS | Performed by: STUDENT IN AN ORGANIZED HEALTH CARE EDUCATION/TRAINING PROGRAM

## 2024-01-03 PROCEDURE — 94660 CPAP INITIATION&MGMT: CPT

## 2024-01-03 PROCEDURE — 6360000002 HC RX W HCPCS: Performed by: NURSE PRACTITIONER

## 2024-01-03 PROCEDURE — 2700000000 HC OXYGEN THERAPY PER DAY

## 2024-01-03 PROCEDURE — 2580000003 HC RX 258: Performed by: STUDENT IN AN ORGANIZED HEALTH CARE EDUCATION/TRAINING PROGRAM

## 2024-01-03 PROCEDURE — 1200000000 HC SEMI PRIVATE

## 2024-01-03 PROCEDURE — 80048 BASIC METABOLIC PNL TOTAL CA: CPT

## 2024-01-03 PROCEDURE — 85025 COMPLETE CBC W/AUTO DIFF WBC: CPT

## 2024-01-03 PROCEDURE — 82803 BLOOD GASES ANY COMBINATION: CPT

## 2024-01-03 RX ADMIN — DOXYCYCLINE 100 MG: 100 INJECTION, POWDER, LYOPHILIZED, FOR SOLUTION INTRAVENOUS at 15:02

## 2024-01-03 RX ADMIN — APIXABAN 5 MG: 5 TABLET, FILM COATED ORAL at 20:40

## 2024-01-03 RX ADMIN — METOPROLOL TARTRATE 50 MG: 50 TABLET ORAL at 08:33

## 2024-01-03 RX ADMIN — ARIPIPRAZOLE 10 MG: 5 TABLET ORAL at 08:32

## 2024-01-03 RX ADMIN — Medication 10 ML: at 08:34

## 2024-01-03 RX ADMIN — SODIUM CHLORIDE, PRESERVATIVE FREE 20 MG: 5 INJECTION INTRAVENOUS at 08:33

## 2024-01-03 RX ADMIN — Medication 200 MG: at 20:40

## 2024-01-03 RX ADMIN — SODIUM ZIRCONIUM CYCLOSILICATE 10 G: 10 POWDER, FOR SUSPENSION ORAL at 14:45

## 2024-01-03 RX ADMIN — Medication 10 ML: at 20:50

## 2024-01-03 RX ADMIN — PREDNISONE 40 MG: 20 TABLET ORAL at 08:32

## 2024-01-03 RX ADMIN — LEVOTHYROXINE SODIUM 75 MCG: 0.07 TABLET ORAL at 06:17

## 2024-01-03 RX ADMIN — SODIUM ZIRCONIUM CYCLOSILICATE 10 G: 10 POWDER, FOR SUSPENSION ORAL at 08:33

## 2024-01-03 RX ADMIN — METOPROLOL TARTRATE 50 MG: 50 TABLET ORAL at 20:41

## 2024-01-03 RX ADMIN — APIXABAN 5 MG: 5 TABLET, FILM COATED ORAL at 08:33

## 2024-01-03 RX ADMIN — TIOTROPIUM BROMIDE INHALATION SPRAY 2 PUFF: 3.12 SPRAY, METERED RESPIRATORY (INHALATION) at 08:03

## 2024-01-03 RX ADMIN — GUAIFENESIN 600 MG: 600 TABLET, EXTENDED RELEASE ORAL at 20:41

## 2024-01-03 RX ADMIN — GUAIFENESIN 600 MG: 600 TABLET, EXTENDED RELEASE ORAL at 08:33

## 2024-01-03 RX ADMIN — DOXYCYCLINE 100 MG: 100 INJECTION, POWDER, LYOPHILIZED, FOR SOLUTION INTRAVENOUS at 04:04

## 2024-01-03 RX ADMIN — LEVALBUTEROL 0.63 MG: 0.63 SOLUTION RESPIRATORY (INHALATION) at 07:56

## 2024-01-03 RX ADMIN — CEFTRIAXONE SODIUM 1000 MG: 1 INJECTION, POWDER, FOR SOLUTION INTRAMUSCULAR; INTRAVENOUS at 08:44

## 2024-01-03 RX ADMIN — SODIUM CHLORIDE, PRESERVATIVE FREE 20 MG: 5 INJECTION INTRAVENOUS at 20:46

## 2024-01-03 ASSESSMENT — PAIN SCALES - GENERAL
PAINLEVEL_OUTOF10: 0
PAINLEVEL_OUTOF10: 0

## 2024-01-03 NOTE — PLAN OF CARE
Problem: Discharge Planning  Goal: Discharge to home or other facility with appropriate resources  Outcome: Progressing     Problem: Safety - Adult  Goal: Free from fall injury  Outcome: Progressing     Problem: Chronic Conditions and Co-morbidities  Goal: Patient's chronic conditions and co-morbidity symptoms are monitored and maintained or improved  Outcome: Progressing     Problem: Respiratory - Adult  Goal: Achieves optimal ventilation and oxygenation  Outcome: Progressing     Problem: Cardiovascular - Adult  Goal: Maintains optimal cardiac output and hemodynamic stability  Outcome: Progressing  Goal: Absence of cardiac dysrhythmias or at baseline  Outcome: Progressing     Problem: Pain  Goal: Verbalizes/displays adequate comfort level or baseline comfort level  Outcome: Progressing  Flowsheets (Taken 1/3/2024 1600)  Verbalizes/displays adequate comfort level or baseline comfort level: Assess pain using appropriate pain scale

## 2024-01-04 VITALS
TEMPERATURE: 98.3 F | RESPIRATION RATE: 20 BRPM | HEART RATE: 84 BPM | BODY MASS INDEX: 16.75 KG/M2 | SYSTOLIC BLOOD PRESSURE: 170 MMHG | OXYGEN SATURATION: 92 % | HEIGHT: 70 IN | DIASTOLIC BLOOD PRESSURE: 64 MMHG | WEIGHT: 117 LBS

## 2024-01-04 PROBLEM — E44.0 MODERATE MALNUTRITION (HCC): Status: ACTIVE | Noted: 2024-01-04

## 2024-01-04 LAB
ANION GAP SERPL CALCULATED.3IONS-SCNC: -3 MMOL/L (ref 3–16)
BASE EXCESS BLDV CALC-SCNC: 15.4 MMOL/L (ref -3–3)
BASOPHILS # BLD: 0 K/UL (ref 0–0.2)
BASOPHILS NFR BLD: 0.1 %
BUN SERPL-MCNC: 20 MG/DL (ref 7–20)
CALCIUM SERPL-MCNC: 8.7 MG/DL (ref 8.3–10.6)
CHLORIDE SERPL-SCNC: 91 MMOL/L (ref 99–110)
CO2 BLDV-SCNC: 101 MMOL/L
CO2 SERPL-SCNC: 44 MMOL/L (ref 21–32)
COHGB MFR BLDV: 4.7 % (ref 0–1.5)
CREAT SERPL-MCNC: 0.8 MG/DL (ref 0.8–1.3)
DEPRECATED RDW RBC AUTO: 12.9 % (ref 12.4–15.4)
EOSINOPHIL # BLD: 0 K/UL (ref 0–0.6)
EOSINOPHIL NFR BLD: 0.2 %
GFR SERPLBLD CREATININE-BSD FMLA CKD-EPI: >60 ML/MIN/{1.73_M2}
GLUCOSE SERPL-MCNC: 89 MG/DL (ref 70–99)
HCO3 BLDV-SCNC: 42.8 MMOL/L (ref 23–29)
HCT VFR BLD AUTO: 30.1 % (ref 40.5–52.5)
HGB BLD-MCNC: 10 G/DL (ref 13.5–17.5)
LYMPHOCYTES # BLD: 1 K/UL (ref 1–5.1)
LYMPHOCYTES NFR BLD: 13.7 %
MAGNESIUM SERPL-MCNC: 2 MG/DL (ref 1.8–2.4)
MCH RBC QN AUTO: 30.8 PG (ref 26–34)
MCHC RBC AUTO-ENTMCNC: 33.1 G/DL (ref 31–36)
MCV RBC AUTO: 92.9 FL (ref 80–100)
METHGB MFR BLDV: 0.1 %
MONOCYTES # BLD: 0.6 K/UL (ref 0–1.3)
MONOCYTES NFR BLD: 8.5 %
NEUTROPHILS # BLD: 5.4 K/UL (ref 1.7–7.7)
NEUTROPHILS NFR BLD: 77.5 %
O2 CT VFR BLDV CALC: 16 VOL %
O2 THERAPY: ABNORMAL
PCO2 BLDV: 66.6 MMHG (ref 40–50)
PH BLDV: 7.42 [PH] (ref 7.35–7.45)
PLATELET # BLD AUTO: 184 K/UL (ref 135–450)
PMV BLD AUTO: 7.5 FL (ref 5–10.5)
PO2 BLDV: 143 MMHG (ref 25–40)
POTASSIUM SERPL-SCNC: 4.3 MMOL/L (ref 3.5–5.1)
RBC # BLD AUTO: 3.24 M/UL (ref 4.2–5.9)
SAO2 % BLDV: 100 %
SODIUM SERPL-SCNC: 132 MMOL/L (ref 136–145)
WBC # BLD AUTO: 7 K/UL (ref 4–11)

## 2024-01-04 PROCEDURE — 2580000003 HC RX 258: Performed by: STUDENT IN AN ORGANIZED HEALTH CARE EDUCATION/TRAINING PROGRAM

## 2024-01-04 PROCEDURE — A4216 STERILE WATER/SALINE, 10 ML: HCPCS | Performed by: STUDENT IN AN ORGANIZED HEALTH CARE EDUCATION/TRAINING PROGRAM

## 2024-01-04 PROCEDURE — 82803 BLOOD GASES ANY COMBINATION: CPT

## 2024-01-04 PROCEDURE — 6360000002 HC RX W HCPCS: Performed by: STUDENT IN AN ORGANIZED HEALTH CARE EDUCATION/TRAINING PROGRAM

## 2024-01-04 PROCEDURE — 36415 COLL VENOUS BLD VENIPUNCTURE: CPT

## 2024-01-04 PROCEDURE — 2500000003 HC RX 250 WO HCPCS: Performed by: STUDENT IN AN ORGANIZED HEALTH CARE EDUCATION/TRAINING PROGRAM

## 2024-01-04 PROCEDURE — 94640 AIRWAY INHALATION TREATMENT: CPT

## 2024-01-04 PROCEDURE — 6370000000 HC RX 637 (ALT 250 FOR IP): Performed by: STUDENT IN AN ORGANIZED HEALTH CARE EDUCATION/TRAINING PROGRAM

## 2024-01-04 PROCEDURE — 6360000002 HC RX W HCPCS: Performed by: NURSE PRACTITIONER

## 2024-01-04 PROCEDURE — 93970 EXTREMITY STUDY: CPT

## 2024-01-04 PROCEDURE — 80048 BASIC METABOLIC PNL TOTAL CA: CPT

## 2024-01-04 PROCEDURE — 6370000000 HC RX 637 (ALT 250 FOR IP): Performed by: NURSE PRACTITIONER

## 2024-01-04 PROCEDURE — 83735 ASSAY OF MAGNESIUM: CPT

## 2024-01-04 PROCEDURE — 85025 COMPLETE CBC W/AUTO DIFF WBC: CPT

## 2024-01-04 PROCEDURE — 94660 CPAP INITIATION&MGMT: CPT

## 2024-01-04 RX ORDER — PREDNISONE 10 MG/1
TABLET ORAL
Qty: 10 TABLET | Refills: 0 | Status: SHIPPED | OUTPATIENT
Start: 2024-01-04 | End: 2024-01-12

## 2024-01-04 RX ORDER — CEFDINIR 300 MG/1
300 CAPSULE ORAL 2 TIMES DAILY
Qty: 4 CAPSULE | Refills: 0 | Status: SHIPPED | OUTPATIENT
Start: 2024-01-04 | End: 2024-01-06

## 2024-01-04 RX ORDER — DOXYCYCLINE HYCLATE 100 MG
100 TABLET ORAL 2 TIMES DAILY
Qty: 4 TABLET | Refills: 0 | Status: SHIPPED | OUTPATIENT
Start: 2024-01-04 | End: 2024-01-06

## 2024-01-04 RX ADMIN — GUAIFENESIN 600 MG: 600 TABLET, EXTENDED RELEASE ORAL at 08:50

## 2024-01-04 RX ADMIN — CEFTRIAXONE SODIUM 1000 MG: 1 INJECTION, POWDER, FOR SOLUTION INTRAMUSCULAR; INTRAVENOUS at 08:52

## 2024-01-04 RX ADMIN — LORAZEPAM 0.5 MG: 0.5 TABLET ORAL at 18:20

## 2024-01-04 RX ADMIN — LEVALBUTEROL 0.63 MG: 0.63 SOLUTION RESPIRATORY (INHALATION) at 17:02

## 2024-01-04 RX ADMIN — LEVOTHYROXINE SODIUM 75 MCG: 0.07 TABLET ORAL at 06:44

## 2024-01-04 RX ADMIN — ARIPIPRAZOLE 10 MG: 5 TABLET ORAL at 08:50

## 2024-01-04 RX ADMIN — SODIUM CHLORIDE, PRESERVATIVE FREE 20 MG: 5 INJECTION INTRAVENOUS at 08:51

## 2024-01-04 RX ADMIN — Medication 10 ML: at 10:01

## 2024-01-04 RX ADMIN — METOPROLOL TARTRATE 50 MG: 50 TABLET ORAL at 08:50

## 2024-01-04 RX ADMIN — LEVALBUTEROL 0.63 MG: 0.63 SOLUTION RESPIRATORY (INHALATION) at 12:07

## 2024-01-04 RX ADMIN — PREDNISONE 40 MG: 20 TABLET ORAL at 08:50

## 2024-01-04 RX ADMIN — APIXABAN 5 MG: 5 TABLET, FILM COATED ORAL at 08:50

## 2024-01-04 RX ADMIN — DOXYCYCLINE 100 MG: 100 INJECTION, POWDER, LYOPHILIZED, FOR SOLUTION INTRAVENOUS at 03:20

## 2024-01-04 RX ADMIN — TIOTROPIUM BROMIDE INHALATION SPRAY 2 PUFF: 3.12 SPRAY, METERED RESPIRATORY (INHALATION) at 07:38

## 2024-01-04 ASSESSMENT — ENCOUNTER SYMPTOMS
ALLERGIC/IMMUNOLOGIC NEGATIVE: 1
TROUBLE SWALLOWING: 1
SHORTNESS OF BREATH: 1
COUGH: 1
EYES NEGATIVE: 1
CHEST TIGHTNESS: 1

## 2024-01-04 NOTE — PLAN OF CARE
Problem: Discharge Planning  Goal: Discharge to home or other facility with appropriate resources  1/4/2024 0345 by Tiffany Herman RN  Outcome: Progressing  Flowsheets (Taken 1/3/2024 1619 by Nicko Salmeron RN)  Discharge to home or other facility with appropriate resources: Arrange for needed discharge resources and transportation as appropriate  1/3/2024 1618 by Nicko Salmeron RN  Outcome: Progressing     Problem: Safety - Adult  Goal: Free from fall injury  1/4/2024 0345 by Tiffany Herman RN  Outcome: Progressing  1/3/2024 1618 by Nicko Salmeron RN  Outcome: Progressing     Problem: Chronic Conditions and Co-morbidities  Goal: Patient's chronic conditions and co-morbidity symptoms are monitored and maintained or improved  1/4/2024 0345 by Tiffany Herman RN  Outcome: Progressing  Flowsheets (Taken 1/3/2024 1619 by Nicko Salmeron RN)  Care Plan - Patient's Chronic Conditions and Co-Morbidity Symptoms are Monitored and Maintained or Improved: Monitor and assess patient's chronic conditions and comorbid symptoms for stability, deterioration, or improvement  1/3/2024 1618 by Nicko Salmeron RN  Outcome: Progressing     Problem: Respiratory - Adult  Goal: Achieves optimal ventilation and oxygenation  1/4/2024 0345 by Tiffany Herman RN  Outcome: Progressing  Flowsheets (Taken 1/3/2024 1619 by Nicko Salmeron RN)  Achieves optimal ventilation and oxygenation: Assess for changes in respiratory status  1/3/2024 1618 by Nicko Salmeron RN  Outcome: Progressing     Problem: Cardiovascular - Adult  Goal: Maintains optimal cardiac output and hemodynamic stability  1/4/2024 0345 by Tiffany Herman RN  Outcome: Progressing  1/3/2024 1618 by Nicko Salmeron RN  Outcome: Progressing  Goal: Absence of cardiac dysrhythmias or at baseline  1/4/2024 0345 by Tiffany Herman RN  Outcome: Progressing  1/3/2024 1618 by Nicko Salmeron RN  Outcome: Progressing     Problem:

## 2024-01-04 NOTE — DISCHARGE SUMMARY
Hospital Medicine Discharge Summary    Patient ID: Ricardo Stevenson      Patient's PCP: Damion Siu DO    Admit Date: 12/28/2023     Discharge Date:     Admitting Physician: Michi Nair MD     Discharge Physician: Bethany Guerra MD     Discharge Diagnoses:    Principal Problem:    COPD exacerbation (HCC)  Active Problems:    Moderate malnutrition (HCC)  Resolved Problems:    * No resolved hospital problems. *        The patient was seen and examined on day of discharge and this discharge summary is in conjunction with any daily progress note from day of discharge.    Hospital Course:     Ricardo Stevenson is a 73 y.o. male who was admitted for shortness of breath.     Patient is a 73 year old male with PMHx of HTN, A. Fib on Eliquis, Depression, COPD on 3L O2, HLD, HFrEF (last echo 8/2023 EF 45-50%) who presented to the hospital with worsening SOB and need to increase his O2. Patient endorsed that he has also had a cough with clear sputum production. He rarely has a cough at baseline. Patient claims that his functional capacity decreased to the point where he couldn't walk to the bathroom without getting severely SOB. Patient denied any associated chest pain or pressure, PND or orthopnea. He endorsed being complaint with his medications and diet in terms of his HF. As his symptoms did not get any better, he decided to come to the hospital for further evaluation. Patient denied any recent sick contacts, fever, chills, nausea, vomiting, abdominal pain, loss of taste or smell, diarrhea, dysuria, urinary urgency or frequency.    Acute on chronic COPD Exacerbation:  Patient presented with worsening cough with sputum production. He also had an increase in his baseline 3L O2 requirement. He  was also progressively SOB with decreased functional capacity. Procal was negative and CXR showed patchy opacities in the lower left lung, new since the prior study, may represent developing pneumonia. Started on IV

## 2024-01-04 NOTE — PLAN OF CARE
Problem: Discharge Planning  Goal: Discharge to home or other facility with appropriate resources  1/4/2024 0959 by Uma Monge RN  Outcome: Progressing  1/4/2024 0345 by Tiffany Herman RN  Outcome: Progressing  Flowsheets (Taken 1/3/2024 1619 by Nicko Salmeron RN)  Discharge to home or other facility with appropriate resources: Arrange for needed discharge resources and transportation as appropriate     Problem: Safety - Adult  Goal: Free from fall injury  1/4/2024 0959 by Uma Monge RN  Outcome: Progressing  1/4/2024 0345 by Tiffany Herman RN  Outcome: Progressing     Problem: Chronic Conditions and Co-morbidities  Goal: Patient's chronic conditions and co-morbidity symptoms are monitored and maintained or improved  1/4/2024 0345 by Tiffany Herman RN  Outcome: Progressing  Flowsheets (Taken 1/3/2024 1619 by Nicko Salmeron RN)  Care Plan - Patient's Chronic Conditions and Co-Morbidity Symptoms are Monitored and Maintained or Improved: Monitor and assess patient's chronic conditions and comorbid symptoms for stability, deterioration, or improvement     Problem: Respiratory - Adult  Goal: Achieves optimal ventilation and oxygenation  1/4/2024 0959 by Uma Monge RN  Outcome: Progressing  1/4/2024 0345 by Tiffany Herman RN  Outcome: Progressing  Flowsheets (Taken 1/3/2024 1619 by Nicko Salmeron RN)  Achieves optimal ventilation and oxygenation: Assess for changes in respiratory status

## 2024-01-04 NOTE — CARE COORDINATION
Received a call from Dr. Guerra. He states he is going to place SW consult for HHC.    Arlette MESA called Nay with [facility] to ask if they have a SW available to speak with pt as a part of their HHC service. Pt is considering Hospice.    Per Arlette, she informed [facility] that patient will discharge, today.    Name:  Samir ([facility]) Medical  Phone:  [phone]  Fax:      [phone]   Peter states that his RT will deliver oxygen to the patient's room prior to discharge.    Electronically signed by Yari Irvin RN on 1/4/2024 at 2:41 PM

## 2024-01-04 NOTE — DISCHARGE INSTRUCTIONS
- Follow up with your pulmonologist in 2 to 3 weeks.  -Call to schedule an appointment with your PCP in 1 to 2 weeks.  -Wear BiPAP at night and intermittently during the day.

## 2024-01-04 NOTE — CARE COORDINATION
Case Management Assessment  Initial Evaluation    Date/Time of Evaluation: 1/4/2024 3:19 PM  Assessment Completed by: Yari Irvin RN    If patient is discharged prior to next notation, then this note serves as note for discharge by case management.    Patient Name: Ricardo Stevenson                   YOB: 1950  Diagnosis: COPD exacerbation (HCC) [J44.1]  COPD with acute exacerbation (HCC) [J44.1]  Acute on chronic respiratory failure with hypoxia and hypercapnia (HCC) [J96.21, J96.22]  Community acquired pneumonia, unspecified laterality [J18.9]  Congestive heart failure, unspecified HF chronicity, unspecified heart failure type (HCC) [I50.9]                   Date / Time: 12/28/2023  7:59 AM    Patient Admission Status: Inpatient   Readmission Risk (Low < 19, Mod (19-27), High > 27): Readmission Risk Score: 16.8    Current PCP: Damion Siu, DO  PCP verified by CM? Yes    Chart Reviewed: Yes      History Provided by: Significant Other  Patient Orientation: Alert and Oriented    Patient Cognition: Alert    Hospitalization in the last 30 days (Readmission):  No    If yes, Readmission Assessment in CM Navigator will be completed.    Advance Directives:      Code Status: DNR-CC   Patient's Primary Decision Maker is: Legal Next of Kin    Primary Decision Maker: Regine Stevenson \"Carson\" - Spouse - [phone]    Discharge Planning:    Patient lives with: Spouse/Significant Other Type of Home: House  Primary Care Giver: Spouse  Patient Support Systems include: Spouse/Significant Other   Current Financial resources: Medicare  Current community resources: None  Current services prior to admission: Home Care, Oxygen Therapy ([facility]; [employer] oxygen)            Current DME:  walker, oxygen and concentrator, [employer]            Type of Home Care services:  OT, PT, Hospice, Skilled Therapy    ADLS  Prior functional level: Assistance with the following:, Cooking, Housework, Shopping, Mobility (walker)  Current

## 2024-01-04 NOTE — CARE COORDINATION
01/04/24 1548   IMM Letter   IMM Letter given to Patient/Family/Significant other/Guardian/POA/by: Letter given to Ricardo Stevenson by JAYLON Dickson RN. Pt is agreeable to recieving the letter less than four hours from discharge.   IMM Letter date given: 01/04/24   IMM Letter time given: 1548     Electronically signed by Yari Irvin RN on 1/4/2024 at 3:49 PM

## 2024-01-04 NOTE — CARE COORDINATION
[facility] is able to provide a  for this patient at discharge.Discharge planner notified.

## 2024-01-04 NOTE — CONSULTS
DNR-CC      Interactive exchange regarding medications,tests and procedures with: patient, floor RN, Dr Guerra   Thank you for allowing us to participate in the care of this patient.      HISTORY     CC: dyspnea   HPI: The patient is a 73 y.o. male with end-stage COPD, on 3 L O2 at baseline.  Presents with 1 week history of cough with clear phlegm.  States that he becomes extremely short of breath even with short distances and activities of daily living.  Denies fevers or chills.  Pulmonary consultation has been requested for evaluation of COPD. History of very severe COPD, remote PFT from 2017 showed FEV1 of 1.17 L [33% predicted].  Follows with Dr. Murray, on [facility].  Patient also has history of nonischemic cardiomyopathy-EF 45 to 50%.  History of A-fib with RVR.    Palliative Medicine SymptomScreening/ROS:    Review of Systems   Constitutional:  Positive for activity change, fatigue and unexpected weight change.   HENT:  Positive for trouble swallowing.    Eyes: Negative.    Respiratory:  Positive for cough, chest tightness and shortness of breath.    Cardiovascular:  Positive for leg swelling.   Endocrine: Negative.    Genitourinary: Negative.    Musculoskeletal:  Positive for arthralgias.   Skin:  Positive for pallor.   Allergic/Immunologic: Negative.    Neurological:  Positive for weakness.   Hematological:  Bruises/bleeds easily.   Psychiatric/Behavioral:  Negative for confusion.              Palliative Performance Scale:     [] 60%  Amb reduced; Sig dz. Can't do hobbies/housework; Intake normal or reduced, Occasional assist; LOC full/confusion   [x] 50%  Mainly sit/lie; Extensive disease. Mainly assist, Intake normal or reduced; Occasional assist; LOC full/confusion   [] 40%  Mainly in bed; Extensive disease; Mainly assist; Intake normal or reduced; Occasional assist; LOC full/confusion   [] 30%  Bed bound, Extensive disease; Total care; Intake reduced; LOC full/confusion   [] 20%  Bed bound; Extensive 
smoking about 37 years ago. He has a 30.0 pack-year smoking history. He has never used smokeless tobacco.  ETOH:   reports current alcohol use of about 1.0 - 3.0 standard drink of alcohol per week.  Patient currently lives independently    Family History:       Problem Relation Age of Onset    Other Mother         rheumatic fever as a child    Heart Disease Mother     No Known Problems Father        REVIEW OF SYSTEMS:    Constitutional: Fatigue and malaise  Ears, nose, mouth, throat: negative for ear drainage, epistaxis, hoarseness, nasal congestion, sore throat and voice change  Respiratory: negative except for cough, dyspnea on exertion, shortness of breath, sputum, and wheezing  Cardiovascular: negative for chest pain, chest pressure/discomfort, irregular heart beat, lower extremity edema and palpitations  Gastrointestinal: negative for abdominal pain, constipation, diarrhea, jaundice, melena, odynophagia, reflux symptoms and vomiting  Hematologic/lymphatic: negative for bleeding, easy bruising, lymphadenopathy and petechiae  Musculoskeletal:negative for arthralgias, bone pain, muscle weakness, neck pain and stiff joints  Neurological: negative for dizziness, gait problems, headaches, seizures, speech problems, tremors and weakness  Behavioral/Psych: negative for anxiety, behavior problems, depression, fatigue and sleep disturbance  Endocrine: negative for diabetic symptoms including none, neuropathy, polyphagia, polyuria, polydipsia, vomiting and diarrhea and temperature intolerance  Allergic/Immunologic: negative for anaphylaxis, angioedema, hay fever and urticaria      Objective:   Patient Vitals for the past 8 hrs:   SpO2   12/29/23 1524 97 %   12/29/23 1200 97 %     I/O last 3 completed shifts:  In: 1251.5 [P.O.:420; I.V.:583; IV Piggyback:248.6]  Out: 580 [Urine:580]  No intake/output data recorded.    Physical Exam:  General Appearance: alert, cachectic, in no acute distress  Skin: warm and dry, no rash or

## 2024-01-04 NOTE — CARE COORDINATION
Aware of discharge with home care. [facility] Discharge planner notified.

## 2024-01-05 NOTE — CARE COORDINATION
RASHID contacted HOC representative, Ines, who confirmed pt has chosen to go with inpatient hospice.  Stated transport is coming this evening at 7:30pm to transport patient to their [place] location.  No other discharge needs from case management at this time.    Electronically signed by OSCAR Luna, DYANW on 1/4/2024 at 7:45 PM

## 2024-01-05 NOTE — PROGRESS NOTES
Admit Date: 12/28/2023  Diet: ADULT DIET; Regular; Low Fat/Low Chol/High Fiber/2 gm Na; 1800 ml    CC: SOB    Interval history:   Overnight, patient went into A. Fib with RVR. He was given 150 mcg of Amio which abated the A. Fib.      Patient was seen this morning in bed. He was wondering when he could go home. Patient worked with PT/OT and desatted to 64% immediately. He was able to recover sitting down. He was understable about staying and being evaluated by pulm prior to discharge.   Patient denies fevers, chills, nausea, vomiting, chest pain, shortness of breath, diarrhea, constipation, dysuria, urinary frequency or urgency.     Plan:     -Solumedrol 40mg IV q8H  -Azithromycin 500mg for 3 days  -Lexis    Assessment:   Ricardo Stevenson is a 73 y.o. male with PMH of HTN, A. Fib on Eliquis, Depression, COPD on 3L O2, HLD, HFpEF  who was admitted with COPD exacerbation     Acute on chronic COPD Exacerbation  Patient presented with worsening cough with sputum production. He also had an increase in his baseline 3L O2 requirement. He was also progressively SOB with decreased functional capacity. His VBG showed a pH 7.32/86.2 from a baseline of 7.43/60. On exam, he has diminished breath sounds with some mild wheezing. He is s/p IV solumedrol and azithromycin. Procal was negative and CXR showed patchy opacities in the lower left lung, new since the prior study, may represent developing pneumonia.Patient denied any fever.  -Pulm consulted, appreciate recs      A. Fib  w/ RVR  [id number] Score = 4. Patient on admission was in NSR. At home patient is on Metoprolol and Eliquis     HLD  At home, patient is on      HFrEF - not in acute exacerbation  Patient's last echo from 8/2023 showed an EF of 45-50%. At home, patient is on Entresto, Lasix three times weekly, metoprolol     Depression  At home, patient is on aripiprazole    Code Status: DNR-CC   FEN: ADULT DIET; Regular; Low Fat/Low Chol/High Fiber/2 gm Na; 1800 
      Admit Date: 12/28/2023  Diet: ADULT DIET; Regular; Low Fat/Low Chol/High Fiber/2 gm Na; 1800 ml    CC: SOB    Interval history:   Overnight,there were no acute events. Patient's vitals were stable. There were no further episodes of RVR    Patient was seen this morning in bed. He claims that since he has been hospitalized, he has not really moved around or done much and that's why he couldn't not participate well with PT/OT and that he needs to, \"work his way into it\". Patient edorses that he overall is improving. Patient denies fevers, chills, nausea, vomiting, chest pain, diarrhea, constipation, dysuria, urinary frequency or urgency.     Plan:     -Solumedrol 40mg IV q8H  -Azithromycin 500mg for 3 days  -Add Rocephin to cover for possible PNA per pulmonology  -ABG to assess for resolution of hypercapnia post-BiPAP use overnight  -Duonebs  -BiPAP    Assessment:   Ricardo Stevenson is a 73 y.o. male with PMH of HTN, A. Fib on Eliquis, Depression, COPD on 3L O2, HLD, HFpEF  who was admitted with COPD exacerbation     Acute on chronic COPD Exacerbation  Patient presented with worsening cough with sputum production. He also had an increase in his baseline 3L O2 requirement. He was also progressively SOB with decreased functional capacity. His VBG showed a pH 7.32/86.2 from a baseline of 7.43/60. On exam, he has diminished breath sounds with some mild wheezing. He is s/p IV solumedrol and azithromycin. Procal was negative and CXR showed patchy opacities in the lower left lung, new since the prior study, may represent developing pneumonia.Patient denied any fever.  -Pulm consulted, appreciate recs     Acute Hyponatremia  Patient's Na dropped from 131>128. Urine studies has been sent. For now, plan to monitor. Patient denies any diarrhea or volume loss. He is not on any diuretics.      A. Fib  w/ RVR - resolved  [id number] Score = 4. Patient on admission was in NSR. At home patient is on Metoprolol and Eliquis. 
    Date of Admission: 12/28/2023. Hospital Day: 4       Assessment/Plan:    Active Hospital Problems    Diagnosis     COPD exacerbation (HCC) [J44.1]      Priority: Medium   73 y.o. male with PMH of HTN, A. Fib on Eliquis, Depression, COPD on 3L O2, HLD, HFpEF  who was admitted with COPD exacerbation     A-fib RVR  -Response called this morning due to RVR, heart rate in 140s to 160s patient is visibly in distress  -Received IV Cardizem 20 mg followed by oral metoprolol and converted to sinus rhythm  -Continue home metoprolol, increase to 50 mg twice daily    Acute on chronic COPD Exacerbation  Patient presented with worsening cough with sputum production. He also had an increase in his baseline 3L O2 requirement. He was also progressively SOB with decreased functional capacity. His VBG showed a pH 7.32/86.2 from a baseline of 7.43/60. On exam, he has diminished breath sounds with some mild wheezing. He is s/p IV solumedrol and azithromycin. Procal was negative and CXR showed patchy opacities in the lower left lung, new since the prior study, may represent developing pneumonia.Patient denied any fever.  -Pulm consulted, appreciate recs      Acute Hyponatremia  Patient's Na dropped from 131>128- 130 . Urine studies has been sent. For now, plan to monitor. Patient denies any diarrhea or volume loss. He is not on any diuretics.             DVT Prophylaxis:    Diet: ADULT DIET; Regular; Low Fat/Low Chol/High Fiber/2 gm Na; 1800 ml  Code Status: DNR-CC      Dispo - home     All  follow up labs and imaging personally reviewed      Chief Complaint:   Chief Complaint   Patient presents with    Shortness of Breath     Pt in from home via [facility] reporting SOB, pt has HX of CHF and COPD, 18g in right AC, pt had duoneb by EMS.          Interval  History:   Had A-fib RVR this morning, converted to sinus rhythm about 1 hour later, reports he was feeling much better with breathing this morning but after the event became more short 
    Date of Admission: 12/28/2023. Hospital Day: 5   8 2 male with advanced COPD and chronic hypoxic respiratory failure admitted for acute hypoxic and hypercapnic story failure, has very slow improvement, still very sob , has poor airflow and using accessory muscles for breathing.  Pulm   Trying  arrange for outpatient BiPAP. Expected discharge date TBD    Assessment/Plan:    Active Hospital Problems    Diagnosis     COPD exacerbation (HCC) [J44.1]      Priority: Medium   73 y.o. male with PMH of HTN, A. Fib on Eliquis, Depression, COPD on 3L O2, HLD, HFpEF  who was admitted with COPD exacerbation     A-fib RVR  -Heart rate controlled on home dose of oral metoprolol 25 twice daily, has reduced EF and not a good candidate for CCB although that may be more appropriate  d/t advanced COPD      Acute on chronic COPD Exacerbation : no Improvement  Patient presented with worsening cough with sputum production. He also had an increase in his baseline 3L O2 requirement. H  He  was also progressively SOB with decreased functional capacity. Procal was negative and CXR showed patchy opacities in the lower left lung, new since the prior study, may represent developing pneumonia.  -Continue IV antibiotics and IV steroid  -Continue BiPAP this morning and as needed for increased work of breathing and at bedtime  -Plan for  dc with Bipap as per pulm  -Pulm consulted, appreciate recs      Acute Hyponatremia : improved  Patient's Na dropped from 131>128- 130 . Urine studies has been sent. For now, plan to monitor. Patient denies any diarrhea or volume loss. He is not on any diuretics.     Chronic systolic heart failure  Continue home medications including Entresto, metoprolol, Lasix  Also on midodrine, unclear why but suspect for hypotension in the past but her right now his hypertensive, will discontinue midodrine          DVT Prophylaxis:    Diet: ADULT DIET; Regular; Low Fat/Low Chol/High Fiber/2 gm Na; 1800 ml  Code Status: 
    V2.0    [facility] – [place], [place] Progress Note      Name:  Ricardo Stevenson /Age/Sex: 1950  (73 y.o. male)   MRN & CSN:  [id number] & [id number] Encounter Date/Time: 1/3/2024 7:19 AM EST   Location:  [facility]3326/3326-01 PCP: Damion Siu DO     Attending:Bethany Guerra MD       Hospital Day: 7    Assessment and Recommendations   Ricardo Stevenson is a 73 y.o. male with pmh of COPD, A Fib, CHF who presents with COPD exacerbation (HCC)      Plan:   Acute on chronic COPD Exacerbation:  Patient presented with worsening cough with sputum production. He also had an increase in his baseline 3L O2 requirement. H  He  was also progressively SOB with decreased functional capacity. Procal was negative and CXR showed patchy opacities in the lower left lung, new since the prior study, may represent developing pneumonia.  -Continue IV antibiotics and IV steroid  -Continue BiPAP this morning and as needed for increased work of breathing and at bedtime  -Plan for dc with Bipap as per pulm  -Pulm consulted, appreciate recs      Acute Hyponatremia : improved  Patient's Na dropped from 131>128- 130 . Urine studies has been sent. For now, plan to monitor. Patient denies any diarrhea or volume loss. He is not on any diuretics.      Chronic systolic heart failure  Continue home medications including Entresto, metoprolol, Lasix  Also on midodrine, unclear why but suspect for hypotension in the past but her right now his hypertensive, will discontinue midodrine      Diet ADULT DIET; Regular; Low Fat/Low Chol/High Fiber/2 gm Na; 1800 ml   DVT Prophylaxis [] Lovenox, []  Heparin, [] SCDs, [] Ambulation,  [x] Eliquis, [] Xarelto  [] Coumadin   Code Status DNR-CC   Disposition From: home  Expected Disposition: home  Estimated Date of Discharge: 1-2 days  Patient requires continued admission due to COPD exacerbation.   Surrogate Decision Maker/ POA       Personally reviewed Lab Studies and Imaging     Discussed management of the case with 
   12/28/23 1733   RT Protocol   History Pulmonary Disease 2   Respiratory pattern 4   Breath sounds 4   Cough 0   Indications for Bronchodilator Therapy Decreased or absent breath sounds;On home bronchodilators   Bronchodilator Assessment Score 10       
  [facility] - Inpatient Rehabilitation Department   Phone: [phone]    Occupational Therapy    [x] Initial Evaluation            [] Daily Treatment Note         [] Discharge Summary      Patient: Ricardo Stevenson   : 1950   MRN: [id number]   Date of Service:  2023    Admitting Diagnosis:  COPD exacerbation (HCC)  Current Admission Summary: 73 year old male with PMHx of HTN, A. Fib on Eliquis, Depression, COPD on 3L O2, HLD, HFrEF (last echo 2023 EF 45-50%) who presented to the hospital with worsening SOB and need to increase his O2. Patient endorsed that he has also had a cough with clear sputum production. He rarely has a cough at baseline. Patient claims that his functional capacity decreased to the point where he couldn't walk to the bathroom without getting severely SOB. Patient denied any associated chest pain or pressure, PND or orthopnea. He endorsed being complaint with his medications and diet in terms of his HF. As his symptoms did not get any better, he decided to come to the hospital for further evaluation. Patient denied any recent sick contacts, fever, chills, nausea, vomiting, abdominal pain, loss of taste or smell, diarrhea, dysuria, urinary urgency or frequency.       Past Medical History:  has a past medical history of Atrial fibrillation with tachycardic ventricular rate (HCC), Bronchitis, CHF (congestive heart failure) (HCC), Closed rib fracture, and COPD (chronic obstructive pulmonary disease) (HCC).  Past Surgical History:  has a past surgical history that includes fracture surgery; Tonsillectomy; eye surgery; Cholecystectomy, laparoscopic (N/A, 3/17/2021); and femoral bypass (Left, 2021).    Discharge Recommendations: Ricardo Stevenson scored a 18/24 on the AM-PAC ADL Inpatient form. Current research shows that an AM-PAC score of 17 or less is typically not associated with a discharge to the patient's home setting. Based on the patient's AM-PAC score and their 
  [facility] - Inpatient Rehabilitation Department   Phone: [phone]    Physical Therapy    [x] Initial Evaluation            [] Daily Treatment Note         [] Discharge Summary      Patient: Ricardo Stevenson   : 1950   MRN: [id number]   Date of Service:  2023  Admitting Diagnosis: COPD exacerbation (HCC)    Current Admission Summary: Chief Complaint  Shortness of Breath (Pt in from home via [facility] reporting SOB, pt has HX of CHF and COPD, 18g in right AC, pt had duoneb by EMS. )        HPI  (History provided by patient and EMS)  This is a 73 y.o. male with pertinent past medical history of CHF, COPD with chronic hypoxic respiratory failure on 3 L supplemental oxygen at baseline who was brought in by EMS transportation for shortness of breath.  Patient reports shortness of breath began about 6 hours prior to arrival and has been gradually worsening, acutely worsened with exertion.  On EMS arrival, patient was noted to be hypoxic on his baseline 3 L and his supplemental oxygen was increased to 5 L and patient received breathing treatment at around 2 improvement.  Has had some cough, however no significant change from his typical.  He denies any fevers, chills, chest pain or abdominal pain.  He is anticoagulated on Eliquis and reports adherence.    Past Medical History:  has a past medical history of Atrial fibrillation with tachycardic ventricular rate (HCC), Bronchitis, CHF (congestive heart failure) (HCC), Closed rib fracture, and COPD (chronic obstructive pulmonary disease) (HCC).  Past Surgical History:  has a past surgical history that includes fracture surgery; Tonsillectomy; eye surgery; Cholecystectomy, laparoscopic (N/A, 3/17/2021); and femoral bypass (Left, 2021).    Discharge Recommendations: Ricardo Stevenson scored a 16/24 on the AM-PAC short mobility form. Current research shows that an AM-PAC score of 17 or less is typically not associated with a discharge to the 
  Physician Progress Note      PATIENT:               GUILLAUME SWEENEY  CSN #:                  [id number]  :                       1950  ADMIT DATE:       2023 7:59 AM  DISCH DATE:  RESPONDING  PROVIDER #:        William Mcbride DO          QUERY TEXT:    Patient admitted with COPD exacerbation, noted to have atrial fibrillation and   is maintained on Eliquis. If possible, please document in progress notes and   discharge summary if you are evaluating and/or treating any of the following:    The medical record reflects the following:  Risk Factors: CHF, advanced age, HTN  Clinical Indicators: [id number] Score = 4, increased risk for clots d/t afib  Treatment: Eliquis, supportive care    Thank you,  Alanna Wang RN, CDS  [email or web address]  Options provided:  -- Secondary hypercoagulable state in a patient with atrial fibrillation  -- Other - I will add my own diagnosis  -- Disagree - Not applicable / Not valid  -- Disagree - Clinically unable to determine / Unknown  -- Refer to Clinical Documentation Reviewer    PROVIDER RESPONSE TEXT:    Provider disagreed with this query.  Not admitting or discharging provider    Query created by: Alanna Wang on 2023 12:27 PM      Electronically signed by:  William Mcbride DO 2023 12:31 PM          
  Physician Progress Note      PATIENT:               GUILLAUME SWEENEY  CSN #:                  [id number]  :                       1950  ADMIT DATE:       2023 7:59 AM  DISCH DATE:  RESPONDING  PROVIDER #:        William Mcbride DO          QUERY TEXT:    Pt admitted with COPD exacerbation.  Pt noted to have baseline 3L O2   requirement. If possible, please document in the progress notes and discharge   summary if you are evaluating and/or treating any of the following:    The medical record reflects the following:  Risk Factors: COPD, CHF, afib  Clinical Indicators: baseline 3L O2 requirement  Treatment: continue baseline 3L O2 requirement, monitor O2 sat, supportive   care    Thank you,  Alanna Wang RN, CDS  [email or web address]  Options provided:  -- Chronic respiratory failure with hypoxia  -- Other - I will add my own diagnosis  -- Disagree - Not applicable / Not valid  -- Disagree - Clinically unable to determine / Unknown  -- Refer to Clinical Documentation Reviewer    PROVIDER RESPONSE TEXT:    Provider disagreed with this query.    Query created by: Alanna Wang on 2023 12:46 PM      Electronically signed by:  William Mcbride DO 2023 5:22 PM          
  Pulmonary and Critical Care Medicine    CC: f/u respiratory failure    Date: 2023  Admit Date:  2023  Consult Requesting Physician: Shaheed Ponce MD HPI     This is a 72 y/o M w/ a hx of COPD, CHF, hx of tobacco use, Chronic hypoxic respiratory failure, patient of my partner Dr. Murray, now following Pulmonary at [facility] admitted to [facility] on  with AE-COPD.     Overnight:   Today the patient is on 3 L NC   He does not feel much improved     ROS: negative except stated above    OBJECTIVE DATA       PHYSICAL EXAM:   Temp  Av.4 °F (36.9 °C)  Min: 98 °F (36.7 °C)  Max: 98.6 °F (37 °C)  Pulse  Av.6  Min: 74  Max: 158  BP  Min: 106/67  Max: 168/90  SpO2  Av.3 %  Min: 76 %  Max: 99 %  FiO2   Av %  Min: 40 %  Max: 40 %    General: NAD  Neuro: A0x3  Lung: very poor air entry   Heart: regular rate    MEDICATIONS: Reviewed  Scheduled Meds:   doxycycline (VIBRAMYCIN) IV  100 mg IntraVENous Q12H    metoprolol tartrate  50 mg Oral BID    cefTRIAXone (ROCEPHIN) IV  1,000 mg IntraVENous Q24H    famotidine (PEPCID) injection  20 mg IntraVENous BID    sodium chloride (Inhalant)  3 mL Nebulization BID    ipratropium 0.5 mg-albuterol 2.5 mg  1 Dose Inhalation Q4H WA RT    apixaban  5 mg Oral BID    ARIPiprazole  10 mg Oral Daily    [Held by provider] furosemide  40 mg Oral Once per day on     levothyroxine  75 mcg Oral Daily    magnesium oxide  200 mg Oral Nightly    midodrine  2.5 mg Oral TID WC    [Held by provider] sacubitril-valsartan  0.5 tablet Oral BID    sodium chloride flush  5-40 mL IntraVENous 2 times per day    guaiFENesin  600 mg Oral BID    predniSONE  40 mg Oral Daily    mometasone-formoterol  2 puff Inhalation BID RT    tiotropium  2 puff Inhalation Daily RT     Continuous Infusions:   sodium chloride 50 mL (23 0947)     PRN Meds:.melatonin, sodium chloride flush, sodium chloride, ondansetron **OR** ondansetron, polyethylene glycol, acetaminophen **OR** 
  Pulmonary and Critical Care Medicine    CC: f/u respiratory failure    Date: 2024  Admit Date:  2023  Consult Requesting Physician: Bethany Guerra MD     HPI     This is a 72 y/o M w/ a hx of COPD, CHF, hx of tobacco use, Chronic hypoxic respiratory failure, patient of my partner Dr. Murray, now following Pulmonary at [facility] admitted to [facility] on  with AE-COPD.     Overnight:   Yesterday had overnight pulse ox  Should qualify for home bipap  Today he is on 3L NC  Feels better  States bipap helps overall     ROS: negative except stated above    OBJECTIVE DATA       PHYSICAL EXAM:   Temp  Av.3 °F (36.8 °C)  Min: 98.1 °F (36.7 °C)  Max: 98.4 °F (36.9 °C)  Pulse  Av.6  Min: 86  Max: 167  BP  Min: 112/68  Max: 176/86  SpO2  Av.2 %  Min: 91 %  Max: 98 %  FiO2   Av %  Min: 50 %  Max: 50 %    General: NAD  Neuro: A0x3  Lung: better air movement today  Heart: regular rate    MEDICATIONS: Reviewed  Scheduled Meds:   doxycycline (VIBRAMYCIN) IV  100 mg IntraVENous Q12H    metoprolol tartrate  50 mg Oral BID    methylPREDNISolone  40 mg IntraVENous Q12H    arformoterol tartrate  15 mcg Nebulization BID RT    budesonide  0.5 mg Nebulization BID RT    cefTRIAXone (ROCEPHIN) IV  1,000 mg IntraVENous Q24H    famotidine (PEPCID) injection  20 mg IntraVENous BID    sodium chloride (Inhalant)  3 mL Nebulization BID    [Held by provider] ipratropium 0.5 mg-albuterol 2.5 mg  1 Dose Inhalation Q4H WA RT    apixaban  5 mg Oral BID    ARIPiprazole  10 mg Oral Daily    [Held by provider] furosemide  40 mg Oral Once per day on     levothyroxine  75 mcg Oral Daily    magnesium oxide  200 mg Oral Nightly    midodrine  2.5 mg Oral TID WC    [Held by provider] sacubitril-valsartan  0.5 tablet Oral BID    sodium chloride flush  5-40 mL IntraVENous 2 times per day    guaiFENesin  600 mg Oral BID    tiotropium  2 puff Inhalation Daily RT     Continuous Infusions:   sodium chloride 100 mL/hr at 
  Pulmonary and Critical Care Medicine    CC: f/u respiratory failure    Date: 2024  Admit Date:  2023  Consult Requesting Physician: Shaheed Ponce MD     HPI     This is a 72 y/o M w/ a hx of COPD, CHF, hx of tobacco use, Chronic hypoxic respiratory failure, patient of my partner Dr. Murray, now following Pulmonary at [facility] admitted to [facility] on  with AE-COPD.     Overnight:   Today the patient is on 3 L NC   He feels improved but still has SOB    ROS: negative except stated above    OBJECTIVE DATA       PHYSICAL EXAM:   Temp  Av.1 °F (36.7 °C)  Min: 97.9 °F (36.6 °C)  Max: 98.2 °F (36.8 °C)  Pulse  Av.3  Min: 77  Max: 110  BP  Min: 154/83  Max: 166/94  SpO2  Av %  Min: 93 %  Max: 98 %  FiO2   Av %  Min: 40 %  Max: 40 %    General: NAD  Neuro: A0x3  Lung: today better air entry, wheezing, equal   Heart: regular rate    MEDICATIONS: Reviewed  Scheduled Meds:   doxycycline (VIBRAMYCIN) IV  100 mg IntraVENous Q12H    metoprolol tartrate  50 mg Oral BID    methylPREDNISolone  40 mg IntraVENous Q12H    arformoterol tartrate  15 mcg Nebulization BID RT    budesonide  0.5 mg Nebulization BID RT    cefTRIAXone (ROCEPHIN) IV  1,000 mg IntraVENous Q24H    famotidine (PEPCID) injection  20 mg IntraVENous BID    sodium chloride (Inhalant)  3 mL Nebulization BID    ipratropium 0.5 mg-albuterol 2.5 mg  1 Dose Inhalation Q4H WA RT    apixaban  5 mg Oral BID    ARIPiprazole  10 mg Oral Daily    [Held by provider] furosemide  40 mg Oral Once per day on     levothyroxine  75 mcg Oral Daily    magnesium oxide  200 mg Oral Nightly    midodrine  2.5 mg Oral TID WC    [Held by provider] sacubitril-valsartan  0.5 tablet Oral BID    sodium chloride flush  5-40 mL IntraVENous 2 times per day    guaiFENesin  600 mg Oral BID    tiotropium  2 puff Inhalation Daily RT     Continuous Infusions:   sodium chloride 100 mL/hr at 24 0440     PRN Meds:.LORazepam, melatonin, sodium chloride 
APRN contacted by RN reporting patient went into A-fib RVR with heart rate 140s to 160s.  EKG obtained confirmed A-fib RVR with ventricular rate of 138.  Patient is on metoprolol 100 mg twice daily ordered metoprolol 5 mg IV x 1 dose.  Patient is currently hospitalized for exacerbation of COPD patient is on DuoNebs at home by however with exacerbation of COPD may be contributing to frequent heart rate changes into atrial fibrillation.  Contacted respiratory therapy onsite discussed patient and albuterol exacerbate in atrial fibrillation.  Patient is on Xopenex MDI as outpatient.  Will hold DuoNebs and changed to Xopenex breathing treatments and evaluate for effect.    -Addendum-'s current unit day due to to amiodarone drips.  Patient was reviewed with onsite attending , Dr. Mirza senior for 10 mg Cardizem bolus if no effect initiate Cardizem drip.    
APRN contacted by RN-patient in A-fib RVR EKG obtained.  Patient was given 500 mL bolus and metoprolol 5 mg IV by primary attending around 1830 with little effect.  Heart rate remains in the 130s to 140s still in A-fib RVR.  Patient has soft blood pressures with systolic in the 90s.  Patient discussed with primary attending Dr. Nair.  Will administer amiodarone 150 mg x 1 dose monitor for effect will consider starting amnio drip.  Cardizem was considered however with soft blood pressures we will continue with amiodarone.  
At 0834 Respiratory called out, pt's 02 76, HR' in 152.  Pt flipped into afib.  Rapid response called at 0835.  02 turned up to 15L, Dr Alanis and Dr Alexander arrived. Stat EKG ordered and done, confirmed atrial fibrillation.  20mg of Diltiazem ordered and given. HR came down 115-125.  PO metoprolol and Apixiban given.  Dr Alexander requested 02 turned down and remained in room to monitor.  Pt 96% on 4L NC.  All PO meds given except held midodrine for now.  Will continue to monitor.    
Checked monitors, pt now back in NSR with PVC's.  HR is in 70's.  
Current BP 76/47. Patient asymptomatic.     Update: continuous fluids ordered per night NP   
HR 120s; afib rvr; notified hospitalist; one time dose IV metoprolol ordered and given.   
MD at the bedside.  Orders received.  See MAR for details.  
Met with bedside nurse, who expressed concern about patient's ability to go home as had been planned since he is so short of breath, so weak, concern for his safety and ability to remain comfortable at home, even with [employer] Bipap which is at bedside. Went to bedside and met with patient and his wife, reviewed overall medical condition, expressed concern about him going home and then being set up with hospice at home since he is very dyspneic at rest and even more with conversation or trying to move around in bed. Discussed option of him going to [facility]OC IPU for symptom management before going home, will then have the ability to go home comfortable and succeed at home with ACLP in addition to HOC care. Patient and wife are in agreement with going to  IPU tonight for management of anxiety and dyspnea. F2F done with Ashely Bustamante NP.   Patient was able to sign his own consents, which were sent to be checked. Transport set with [facility] for 7:30 pickup. Coordinated care with bedside nurse, with JACOB Zuniga. Report faxed and called to [facility].   Plan is for him to go to [facility] with his [employer] Bipap and then HOC will switch out to ours, since patient is in imminent need of Bipap and we don't want to delay care.      Thank you for allowing HOC to be a part of the Graham family's care.    MARY Luz RN  [facility] Liaison  [email or web address]  Cell: [phone]  Main: [phone]   
Nutrition Note    RECOMMENDATIONS  PO Diet: Cardiac  ONS: pt declines     NUTRITION ASSESSMENT   Nutrition intervention triggered for LOS. Pt reports is eating well on a cardiac diet.  BMI is low & pt has had ongoing wt loss over past year.  Wts have fluctuated per hx ( lb, 128 lb x 3 weeks ago, 119 lb x 7 months ago, 125 lb x 1 year ago), likely d/t catabollic illness (COPD) vs fluids from CHF.  Pt declines need for ONS & expresses no nutrition needs at this time. Encouraged small, frequent meals to increase caloric intake & prevent further nutrition decline.  Pt meets criteria for moderate malnutrition, however declines nutrition intervention @ thisa time.     Nutrition Related Findings: +BS; no edema noted; Na 132  Wounds: None  Nutrition Education:  Education not indicated   Nutrition Goals: PO intake 75% or greater     MALNUTRITION ASSESSMENT   Chronic Illness  Malnutrition Status: Moderate malnutrition  Findings of the 6 clinical characteristics of malnutrition:  Energy Intake:  No significant decrease in energy intake  Weight Loss:  Mild weight loss (specify amount and time period) (variable wts per hx)     Body Fat Loss:  Severe body fat loss Fat Overlying Ribs   Muscle Mass Loss:  Severe muscle mass loss Clavicles (pectoralis & deltoids)  Fluid Accumulation:  No significant fluid accumulation     Strength:  Not Performed      NUTRITION DIAGNOSIS   Moderate malnutrition related to catabolic illness, increase demand for energy/nutrients as evidenced by weight loss, Criteria as identified in malnutrition assessment      CURRENT NUTRITION THERAPIES  ADULT DIET; Regular; Low Fat/Low Chol/High Fiber/2 gm Na; 1800 ml     PO Intake: 51-75%, %   PO Supplement Intake:Refusing to take    ANTHROPOMETRICS  Current Height: 177.8 cm (5' 10\")  Current Weight - Scale: 53.1 kg (117 lb)    Ideal Body Weight (IBW): 166 lbs  (75 kg)        BMI: 16.8      COMPARATIVE STANDARDS  Total Energy Requirements 
PM assessment complete and documented. Meds given per MAR. Bipap in room, pt wears intermittently as needed. Denies needs or concerns at present. Will continue to monitor.  
PM assessment complete and documented. Meds given per MAR. Pt resting in bed. HFNC in place. Pt states he will put his bipap on later or when respiratory comes around. No needs expressed. Urinal at bedside. Will continue to monitor.  
Patient alert and oriented, VS WNL, pt denies pain, assessment done, medication given per MAR, pt received breathing treatment by RT, safety precautions in place, call light within reach.   
Patient and spouse at bedside with representative from [facility].  They mutually agree on inpatient at [facility].  Transport will be at the bedside 1930.  
Patient converted to NSR at this time. HR 87  
Patient flipped to afib with heart rate in the 160's. All other vitals WNL, hosp notified. EKG and IV metoprolol ordered.   
Patient flipping info afib again but HR remains stable-low 100s.   
Patient has irregular heart rate in the 150's.  MD Korey marinelli served.  Awaiting orders.  
Patient heart rate still in the 150's after IV metoprolol, hosp notified. Cardizem bolus given. Patient HR below 100 now.   
Patient heart rate up again in the 160's. Hops notified. Will pass it down to incoming RN.  
Patient oriented to room.  Assessment completed.  All questions answered.  
Patient reading afib RVR on telemetry.  Notified hospitalist.      Update: IV amio bolus ordered and given by PCU RN.   
Patient seen in ED, room 02.  Admission completed with the following exceptions:  4 Eyes Assessment, Immunizations, Vaccines, Rights and Responsibilities, Orientation to room, Plan of Care, Education/Learning Assessment and Education Plan, white board, height and weight, pain assessment and head to toe assessment.  Patient is alert and oriented X 4.  Patient lives in a bi-level house with his wife and is being admitted for COPD exacerbation.  Home Medications as well as Outside Sources has been verbally reviewed with patient and updated as appropriate and is now Completed.      Pulmonologist had ordered Yupelri inhaler for patient to begin taking but it is too expensive and he did not begin this medication.      All questions answered.    Patient has Advance Directives at home but we do not have a copy on file.  His wife has been asked to bring in a copy.  He also has DNR-CC on file and wishes for this to be continued during this admission.    Monitor alarms have been adjusted to levels appropriate for patient condition.  Nurse, Perry roman.  
Patient verbalized understanding of care plan.  All questions answered at this time.  He remains short of breath at this time.  
Patients head to toe assessment completed. Vital signs WNL. Bed alarm engaged, call light within reach. Scheduled medications given per MAR. Patient denies any pain at the moment. Patient resting in bed.   
Placed patient on overnight POX 3lpm 02  
Pt had a few short episodes of accessory muscle when breathing, when pt coughs sounds like trying to expectorate thick mucus. Then breathing settles down and pt more comfortable. Lung sounds very diminished.   HR SR in 90's with frequent PVC's.  Sp02 stayed in 90's pm 3L for remainder of shift after rapid response this morning.    
Shift assessment completed, see flowsheets.  Medications administered, see MAR. Vitals signs stable. Plan of care discussed with patient. Fall precautions in place. Call light and bedside table within reach. Nonskid footwear on. Pt denies further needs at this time.  Will continue to monitor.  Norma Sky RN     
The patient has edema with tightness and pain to the left arm.  MD Guerra notified and orders received.  
Moraxella catarrhalis 01/07/2023 12:50 AM         Electronically signed by Bethany Guerra MD on 1/2/2024 at 7:19 AM

## 2024-01-08 LAB
EKG ATRIAL RATE: 70 BPM
EKG DIAGNOSIS: NORMAL
EKG Q-T INTERVAL: 318 MS
EKG QRS DURATION: 92 MS
EKG QTC CALCULATION (BAZETT): 462 MS
EKG R AXIS: 68 DEGREES
EKG T AXIS: 73 DEGREES
EKG VENTRICULAR RATE: 127 BPM

## 2024-01-23 ENCOUNTER — HOSPITAL ENCOUNTER (INPATIENT)
Age: 74
LOS: 1 days | Discharge: HOSPICE | DRG: 189 | End: 2024-01-24
Attending: EMERGENCY MEDICINE | Admitting: INTERNAL MEDICINE
Payer: MEDICARE

## 2024-01-23 ENCOUNTER — APPOINTMENT (OUTPATIENT)
Dept: GENERAL RADIOLOGY | Age: 74
DRG: 189 | End: 2024-01-23
Payer: MEDICARE

## 2024-01-23 DIAGNOSIS — R06.89 HYPERCAPNIA: ICD-10-CM

## 2024-01-23 DIAGNOSIS — J96.21 ACUTE ON CHRONIC RESPIRATORY FAILURE WITH HYPOXIA (HCC): ICD-10-CM

## 2024-01-23 DIAGNOSIS — J44.1 COPD EXACERBATION (HCC): Primary | ICD-10-CM

## 2024-01-23 DIAGNOSIS — R55 SYNCOPE AND COLLAPSE: ICD-10-CM

## 2024-01-23 DIAGNOSIS — I48.91 ATRIAL FIBRILLATION WITH RVR (HCC): ICD-10-CM

## 2024-01-23 LAB
ALBUMIN SERPL-MCNC: 4.3 G/DL (ref 3.4–5)
ALBUMIN/GLOB SERPL: 1.6 {RATIO} (ref 1.1–2.2)
ALP SERPL-CCNC: 91 U/L (ref 40–129)
ALT SERPL-CCNC: 35 U/L (ref 10–40)
ANION GAP SERPL CALCULATED.3IONS-SCNC: 5 MMOL/L (ref 3–16)
APTT BLD: 28.3 SEC (ref 22.7–35.9)
AST SERPL-CCNC: 36 U/L (ref 15–37)
BASE EXCESS BLDA CALC-SCNC: 8.6 MMOL/L (ref -3–3)
BASE EXCESS BLDV CALC-SCNC: 7.2 MMOL/L (ref -3–3)
BASOPHILS # BLD: 0 K/UL (ref 0–0.2)
BASOPHILS NFR BLD: 0.4 %
BILIRUB SERPL-MCNC: <0.2 MG/DL (ref 0–1)
BUN SERPL-MCNC: 16 MG/DL (ref 7–20)
CALCIUM SERPL-MCNC: 9.3 MG/DL (ref 8.3–10.6)
CHLORIDE SERPL-SCNC: 90 MMOL/L (ref 99–110)
CO2 BLDA-SCNC: 93.8 MMOL/L
CO2 BLDV-SCNC: 97 MMOL/L
CO2 SERPL-SCNC: 39 MMOL/L (ref 21–32)
COHGB MFR BLDA: 1.1 % (ref 0–1.5)
COHGB MFR BLDV: 2 % (ref 0–1.5)
CREAT SERPL-MCNC: 1 MG/DL (ref 0.8–1.3)
DEPRECATED RDW RBC AUTO: 13.4 % (ref 12.4–15.4)
DO-HGB MFR BLDV: 35 %
EOSINOPHIL # BLD: 0 K/UL (ref 0–0.6)
EOSINOPHIL NFR BLD: 0.4 %
FLUAV RNA RESP QL NAA+PROBE: NOT DETECTED
FLUBV RNA RESP QL NAA+PROBE: NOT DETECTED
GFR SERPLBLD CREATININE-BSD FMLA CKD-EPI: >60 ML/MIN/{1.73_M2}
GLUCOSE SERPL-MCNC: 158 MG/DL (ref 70–99)
HCO3 BLDA-SCNC: 38.9 MMOL/L (ref 21–29)
HCO3 BLDV-SCNC: 39.8 MMOL/L (ref 23–29)
HCT VFR BLD AUTO: 34.4 % (ref 40.5–52.5)
HGB BLD-MCNC: 11.3 G/DL (ref 13.5–17.5)
HGB BLDA-MCNC: 10 G/DL (ref 13.5–17.5)
INR PPP: 1.02 (ref 0.84–1.16)
LACTATE BLDV-SCNC: 1.3 MMOL/L (ref 0.4–1.9)
LYMPHOCYTES # BLD: 1.6 K/UL (ref 1–5.1)
LYMPHOCYTES NFR BLD: 25.4 %
MCH RBC QN AUTO: 30.9 PG (ref 26–34)
MCHC RBC AUTO-ENTMCNC: 32.9 G/DL (ref 31–36)
MCV RBC AUTO: 93.9 FL (ref 80–100)
METHGB MFR BLDA: 0.4 %
METHGB MFR BLDV: 0.4 %
MONOCYTES # BLD: 0.3 K/UL (ref 0–1.3)
MONOCYTES NFR BLD: 4.7 %
NEUTROPHILS # BLD: 4.4 K/UL (ref 1.7–7.7)
NEUTROPHILS NFR BLD: 69.1 %
NT-PROBNP SERPL-MCNC: ABNORMAL PG/ML (ref 0–124)
O2 CT VFR BLDV CALC: 10 VOL %
O2 THERAPY: ABNORMAL
O2 THERAPY: ABNORMAL
PCO2 BLDA: 96.8 MMHG (ref 35–45)
PCO2 BLDV: >98.3 MMHG (ref 40–50)
PH BLDA: 7.21 [PH] (ref 7.35–7.45)
PH BLDV: 7.14 [PH] (ref 7.35–7.45)
PLATELET # BLD AUTO: 283 K/UL (ref 135–450)
PMV BLD AUTO: 8.4 FL (ref 5–10.5)
PO2 BLDA: 205 MMHG (ref 75–108)
PO2 BLDV: 40.8 MMHG (ref 25–40)
POTASSIUM SERPL-SCNC: 5 MMOL/L (ref 3.5–5.1)
PROT SERPL-MCNC: 7 G/DL (ref 6.4–8.2)
PROTHROMBIN TIME: 13.4 SEC (ref 11.5–14.8)
RBC # BLD AUTO: 3.66 M/UL (ref 4.2–5.9)
SAO2 % BLDA: 100 %
SAO2 % BLDV: 64 %
SARS-COV-2 RNA RESP QL NAA+PROBE: NOT DETECTED
SODIUM SERPL-SCNC: 134 MMOL/L (ref 136–145)
TROPONIN, HIGH SENSITIVITY: 74 NG/L (ref 0–22)
TROPONIN, HIGH SENSITIVITY: 82 NG/L (ref 0–22)
WBC # BLD AUTO: 6.4 K/UL (ref 4–11)

## 2024-01-23 PROCEDURE — 36600 WITHDRAWAL OF ARTERIAL BLOOD: CPT

## 2024-01-23 PROCEDURE — 96366 THER/PROPH/DIAG IV INF ADDON: CPT

## 2024-01-23 PROCEDURE — 85610 PROTHROMBIN TIME: CPT

## 2024-01-23 PROCEDURE — 96375 TX/PRO/DX INJ NEW DRUG ADDON: CPT

## 2024-01-23 PROCEDURE — 6370000000 HC RX 637 (ALT 250 FOR IP): Performed by: INTERNAL MEDICINE

## 2024-01-23 PROCEDURE — 83880 ASSAY OF NATRIURETIC PEPTIDE: CPT

## 2024-01-23 PROCEDURE — 87040 BLOOD CULTURE FOR BACTERIA: CPT

## 2024-01-23 PROCEDURE — 94761 N-INVAS EAR/PLS OXIMETRY MLT: CPT

## 2024-01-23 PROCEDURE — 2580000003 HC RX 258: Performed by: NURSE PRACTITIONER

## 2024-01-23 PROCEDURE — G0378 HOSPITAL OBSERVATION PER HR: HCPCS

## 2024-01-23 PROCEDURE — 84484 ASSAY OF TROPONIN QUANT: CPT

## 2024-01-23 PROCEDURE — 94640 AIRWAY INHALATION TREATMENT: CPT

## 2024-01-23 PROCEDURE — 6360000002 HC RX W HCPCS: Performed by: PHYSICIAN ASSISTANT

## 2024-01-23 PROCEDURE — 96365 THER/PROPH/DIAG IV INF INIT: CPT

## 2024-01-23 PROCEDURE — 2500000003 HC RX 250 WO HCPCS: Performed by: INTERNAL MEDICINE

## 2024-01-23 PROCEDURE — 85025 COMPLETE CBC W/AUTO DIFF WBC: CPT

## 2024-01-23 PROCEDURE — 2500000003 HC RX 250 WO HCPCS: Performed by: PHYSICIAN ASSISTANT

## 2024-01-23 PROCEDURE — 96376 TX/PRO/DX INJ SAME DRUG ADON: CPT

## 2024-01-23 PROCEDURE — 82803 BLOOD GASES ANY COMBINATION: CPT

## 2024-01-23 PROCEDURE — 94660 CPAP INITIATION&MGMT: CPT

## 2024-01-23 PROCEDURE — 93005 ELECTROCARDIOGRAM TRACING: CPT | Performed by: EMERGENCY MEDICINE

## 2024-01-23 PROCEDURE — A4216 STERILE WATER/SALINE, 10 ML: HCPCS | Performed by: NURSE PRACTITIONER

## 2024-01-23 PROCEDURE — 2580000003 HC RX 258: Performed by: INTERNAL MEDICINE

## 2024-01-23 PROCEDURE — 6360000002 HC RX W HCPCS: Performed by: NURSE PRACTITIONER

## 2024-01-23 PROCEDURE — 80053 COMPREHEN METABOLIC PANEL: CPT

## 2024-01-23 PROCEDURE — 87636 SARSCOV2 & INF A&B AMP PRB: CPT

## 2024-01-23 PROCEDURE — 2580000003 HC RX 258: Performed by: PHYSICIAN ASSISTANT

## 2024-01-23 PROCEDURE — 6370000000 HC RX 637 (ALT 250 FOR IP): Performed by: PHYSICIAN ASSISTANT

## 2024-01-23 PROCEDURE — 5A09357 ASSISTANCE WITH RESPIRATORY VENTILATION, LESS THAN 24 CONSECUTIVE HOURS, CONTINUOUS POSITIVE AIRWAY PRESSURE: ICD-10-PCS | Performed by: INTERNAL MEDICINE

## 2024-01-23 PROCEDURE — 83605 ASSAY OF LACTIC ACID: CPT

## 2024-01-23 PROCEDURE — 2700000000 HC OXYGEN THERAPY PER DAY

## 2024-01-23 PROCEDURE — 99291 CRITICAL CARE FIRST HOUR: CPT

## 2024-01-23 PROCEDURE — 71045 X-RAY EXAM CHEST 1 VIEW: CPT

## 2024-01-23 PROCEDURE — 2060000000 HC ICU INTERMEDIATE R&B

## 2024-01-23 PROCEDURE — 85730 THROMBOPLASTIN TIME PARTIAL: CPT

## 2024-01-23 RX ORDER — LEVOTHYROXINE SODIUM 0.07 MG/1
75 TABLET ORAL
Status: DISCONTINUED | OUTPATIENT
Start: 2024-01-24 | End: 2024-01-24 | Stop reason: HOSPADM

## 2024-01-23 RX ORDER — PREDNISONE 10 MG/1
10 TABLET ORAL DAILY
COMMUNITY
Start: 2024-01-19 | End: 2024-07-17

## 2024-01-23 RX ORDER — TAMSULOSIN HYDROCHLORIDE 0.4 MG/1
0.4 CAPSULE ORAL NIGHTLY
Status: DISCONTINUED | OUTPATIENT
Start: 2024-01-23 | End: 2024-01-24 | Stop reason: HOSPADM

## 2024-01-23 RX ORDER — ACETAMINOPHEN 650 MG/1
650 SUPPOSITORY RECTAL EVERY 6 HOURS PRN
Status: DISCONTINUED | OUTPATIENT
Start: 2024-01-23 | End: 2024-01-24 | Stop reason: HOSPADM

## 2024-01-23 RX ORDER — SODIUM CHLORIDE 9 MG/ML
INJECTION, SOLUTION INTRAVENOUS PRN
Status: DISCONTINUED | OUTPATIENT
Start: 2024-01-23 | End: 2024-01-24 | Stop reason: HOSPADM

## 2024-01-23 RX ORDER — POTASSIUM CHLORIDE 20 MEQ/1
40 TABLET, EXTENDED RELEASE ORAL PRN
Status: DISCONTINUED | OUTPATIENT
Start: 2024-01-23 | End: 2024-01-24 | Stop reason: HOSPADM

## 2024-01-23 RX ORDER — SODIUM CHLORIDE 0.9 % (FLUSH) 0.9 %
5-40 SYRINGE (ML) INJECTION EVERY 12 HOURS SCHEDULED
Status: DISCONTINUED | OUTPATIENT
Start: 2024-01-23 | End: 2024-01-24 | Stop reason: HOSPADM

## 2024-01-23 RX ORDER — FUROSEMIDE 20 MG/1
20 TABLET ORAL DAILY
COMMUNITY
Start: 2024-01-19 | End: 2024-07-17

## 2024-01-23 RX ORDER — SODIUM CHLORIDE 9 MG/ML
INJECTION, SOLUTION INTRAVENOUS CONTINUOUS
Status: DISCONTINUED | OUTPATIENT
Start: 2024-01-23 | End: 2024-01-24

## 2024-01-23 RX ORDER — IPRATROPIUM BROMIDE AND ALBUTEROL SULFATE 2.5; .5 MG/3ML; MG/3ML
1 SOLUTION RESPIRATORY (INHALATION)
Status: DISCONTINUED | OUTPATIENT
Start: 2024-01-23 | End: 2024-01-24 | Stop reason: HOSPADM

## 2024-01-23 RX ORDER — 0.9 % SODIUM CHLORIDE 0.9 %
500 INTRAVENOUS SOLUTION INTRAVENOUS ONCE
Status: COMPLETED | OUTPATIENT
Start: 2024-01-23 | End: 2024-01-23

## 2024-01-23 RX ORDER — MIDODRINE HYDROCHLORIDE 5 MG/1
2.5 TABLET ORAL 2 TIMES DAILY WITH MEALS
Status: DISCONTINUED | OUTPATIENT
Start: 2024-01-23 | End: 2024-01-24 | Stop reason: HOSPADM

## 2024-01-23 RX ORDER — ONDANSETRON 2 MG/ML
4 INJECTION INTRAMUSCULAR; INTRAVENOUS EVERY 6 HOURS PRN
Status: DISCONTINUED | OUTPATIENT
Start: 2024-01-23 | End: 2024-01-24 | Stop reason: HOSPADM

## 2024-01-23 RX ORDER — DILTIAZEM HCL IN NACL,ISO-OSM 125 MG/125
2.5-15 PLASTIC BAG, INJECTION (ML) INTRAVENOUS CONTINUOUS
Status: DISCONTINUED | OUTPATIENT
Start: 2024-01-23 | End: 2024-01-23 | Stop reason: HOSPADM

## 2024-01-23 RX ORDER — SODIUM CHLORIDE 0.9 % (FLUSH) 0.9 %
5-40 SYRINGE (ML) INJECTION PRN
Status: DISCONTINUED | OUTPATIENT
Start: 2024-01-23 | End: 2024-01-24 | Stop reason: HOSPADM

## 2024-01-23 RX ORDER — IPRATROPIUM BROMIDE AND ALBUTEROL SULFATE 2.5; .5 MG/3ML; MG/3ML
3 SOLUTION RESPIRATORY (INHALATION) ONCE
Status: COMPLETED | OUTPATIENT
Start: 2024-01-23 | End: 2024-01-23

## 2024-01-23 RX ORDER — METOPROLOL SUCCINATE 50 MG/1
100 TABLET, EXTENDED RELEASE ORAL DAILY
Status: DISCONTINUED | OUTPATIENT
Start: 2024-01-23 | End: 2024-01-24 | Stop reason: HOSPADM

## 2024-01-23 RX ORDER — LORAZEPAM 0.5 MG/1
.5-1 TABLET ORAL
COMMUNITY
Start: 2024-01-19 | End: 2024-07-17

## 2024-01-23 RX ORDER — ACETAMINOPHEN 325 MG/1
650 TABLET ORAL EVERY 6 HOURS PRN
Status: DISCONTINUED | OUTPATIENT
Start: 2024-01-23 | End: 2024-01-24 | Stop reason: HOSPADM

## 2024-01-23 RX ORDER — POTASSIUM CHLORIDE 7.45 MG/ML
10 INJECTION INTRAVENOUS PRN
Status: DISCONTINUED | OUTPATIENT
Start: 2024-01-23 | End: 2024-01-24 | Stop reason: HOSPADM

## 2024-01-23 RX ORDER — ARIPIPRAZOLE 5 MG/1
10 TABLET ORAL NIGHTLY
Status: DISCONTINUED | OUTPATIENT
Start: 2024-01-23 | End: 2024-01-24 | Stop reason: HOSPADM

## 2024-01-23 RX ORDER — MORPHINE SULFATE 100 MG/5ML
5-10 SOLUTION ORAL EVERY 4 HOURS PRN
COMMUNITY
Start: 2024-01-19 | End: 2024-03-19

## 2024-01-23 RX ORDER — IPRATROPIUM BROMIDE AND ALBUTEROL SULFATE 2.5; .5 MG/3ML; MG/3ML
SOLUTION RESPIRATORY (INHALATION)
Status: DISPENSED
Start: 2024-01-23 | End: 2024-01-24

## 2024-01-23 RX ORDER — ENOXAPARIN SODIUM 100 MG/ML
40 INJECTION SUBCUTANEOUS DAILY
Status: DISCONTINUED | OUTPATIENT
Start: 2024-01-23 | End: 2024-01-23 | Stop reason: ALTCHOICE

## 2024-01-23 RX ORDER — LANOLIN ALCOHOL/MO/W.PET/CERES
200 CREAM (GRAM) TOPICAL NIGHTLY
Status: DISCONTINUED | OUTPATIENT
Start: 2024-01-23 | End: 2024-01-24 | Stop reason: HOSPADM

## 2024-01-23 RX ORDER — ONDANSETRON 4 MG/1
4 TABLET, ORALLY DISINTEGRATING ORAL EVERY 8 HOURS PRN
Status: DISCONTINUED | OUTPATIENT
Start: 2024-01-23 | End: 2024-01-24 | Stop reason: HOSPADM

## 2024-01-23 RX ORDER — DILTIAZEM HCL IN NACL,ISO-OSM 125 MG/125
2.5-15 PLASTIC BAG, INJECTION (ML) INTRAVENOUS CONTINUOUS
Status: DISCONTINUED | OUTPATIENT
Start: 2024-01-23 | End: 2024-01-24

## 2024-01-23 RX ORDER — DILTIAZEM HYDROCHLORIDE 5 MG/ML
10 INJECTION INTRAVENOUS ONCE
Status: COMPLETED | OUTPATIENT
Start: 2024-01-23 | End: 2024-01-23

## 2024-01-23 RX ADMIN — SODIUM CHLORIDE: 9 INJECTION, SOLUTION INTRAVENOUS at 19:45

## 2024-01-23 RX ADMIN — APIXABAN 5 MG: 5 TABLET, FILM COATED ORAL at 20:12

## 2024-01-23 RX ADMIN — METOPROLOL SUCCINATE 100 MG: 50 TABLET, EXTENDED RELEASE ORAL at 19:56

## 2024-01-23 RX ADMIN — Medication 5 MG/HR: at 13:27

## 2024-01-23 RX ADMIN — Medication 10 MG/HR: at 19:53

## 2024-01-23 RX ADMIN — DILTIAZEM HYDROCHLORIDE 10 MG: 5 INJECTION, SOLUTION INTRAVENOUS at 13:24

## 2024-01-23 RX ADMIN — SACUBITRIL AND VALSARTAN 0.5 TABLET: 24; 26 TABLET, FILM COATED ORAL at 20:13

## 2024-01-23 RX ADMIN — SODIUM CHLORIDE 0.5 MG: 9 INJECTION INTRAMUSCULAR; INTRAVENOUS; SUBCUTANEOUS at 20:42

## 2024-01-23 RX ADMIN — ARIPIPRAZOLE 10 MG: 5 TABLET ORAL at 19:59

## 2024-01-23 RX ADMIN — IPRATROPIUM BROMIDE AND ALBUTEROL SULFATE 3 DOSE: .5; 2.5 SOLUTION RESPIRATORY (INHALATION) at 13:50

## 2024-01-23 RX ADMIN — SODIUM CHLORIDE 500 ML: 9 INJECTION, SOLUTION INTRAVENOUS at 13:02

## 2024-01-23 RX ADMIN — METHYLPREDNISOLONE SODIUM SUCCINATE 125 MG: 125 INJECTION INTRAMUSCULAR; INTRAVENOUS at 13:03

## 2024-01-23 RX ADMIN — TAMSULOSIN HYDROCHLORIDE 0.4 MG: 0.4 CAPSULE ORAL at 20:14

## 2024-01-23 RX ADMIN — Medication 2 PUFF: at 21:32

## 2024-01-23 RX ADMIN — IPRATROPIUM BROMIDE AND ALBUTEROL SULFATE 1 DOSE: .5; 3 SOLUTION RESPIRATORY (INHALATION) at 21:31

## 2024-01-23 ASSESSMENT — ENCOUNTER SYMPTOMS
RHINORRHEA: 0
COUGH: 0
ABDOMINAL PAIN: 0
SHORTNESS OF BREATH: 1
VOMITING: 0
NAUSEA: 0
DIARRHEA: 0
WHEEZING: 0

## 2024-01-23 NOTE — ED PROVIDER NOTES
01/23/24 1355 01/23/24 1358 01/23/24 1415   BP: 128/67 135/83     Pulse: (!) 105 (!) 115     Resp: 25 17     Temp:    97.8 °F (36.6 °C)   TempSrc:    Axillary   SpO2: 99% 100%     Weight:   54.4 kg (120 lb)        Patient was given the following medications:  Medications   ipratropium 0.5 mg-albuterol 2.5 mg (DUONEB) nebulizer solution 3 Dose (has no administration in time range)   dilTIAZem injection 10 mg (10 mg IntraVENous Given 1/23/24 1324)     Followed by   dilTIAZem HCl-Sodium Chloride 125 mg / 125 mL infusion (7.5 mg/hr IntraVENous Rate/Dose Change 1/23/24 1412)   methylPREDNISolone sodium succ (SOLU-MEDROL) 125 mg in sterile water 2 mL injection (125 mg IntraVENous Given 1/23/24 1303)   sodium chloride 0.9 % bolus 500 mL (0 mLs IntraVENous Stopped 1/23/24 1416)             Is this patient to be included in the SEP-1 Core Measure due to severe sepsis or septic shock?   No   Exclusion criteria - the patient is NOT to be included for SEP-1 Core Measure due to:  Infection is not suspected    Chronic Conditions affecting care:    has a past medical history of Atrial fibrillation with tachycardic ventricular rate (HCC), Bronchitis, CHF (congestive heart failure) ([facility]), Closed rib fracture (2005), and COPD (chronic obstructive pulmonary disease) ([facility]) (2016).    CONSULTS: (Who and What was discussed)  None      Social Determinants Significantly Affecting Health : None    Records Reviewed (External and Source) admission 1/6/24    CC/HPI Summary, DDx, ED Course, and Reassessment: Patient presents for evaluation of respiratory distress.  On exam, ill-appearing, tachypneic, tachycardic and on CPAP.  Fingertips are cyanotic.  Difficulty obtaining pulse ox.  Switched over to our BiPAP and given several DuoNeb breathing treatments and IV Solu-Medrol.  Now satting at 100%.  He had severe diminished aeration bilaterally.  Chest is nontender, abdomen is benign.  He is drowsy but arousable and oriented asking all questions.

## 2024-01-23 NOTE — H&P
BILATERAL.   Vascular Sonographer Report  Additional Indications:Left upper extremity pain and edema. Impressions Right Impression No evidence of deep or superficial vein thrombosis of the right upper extremity. Left Impression Acute totally occluded superficial venous thrombosis involving the left Left cephalic vein zone 3 to 5 . No other evidence of deep vein thrombosis or superficial vein thrombosis of the left upper extremity. Conclusions   Summary   Acute totally occluded superficial venous thrombosis involving the left Left  cephalic vein zone 3 to 5 .   Signature   ------------------------------------------------------------------  Electronically signed by Nathan Cespedes MD (Interpreting  physician) on 01/08/2024 at 01:11 PM  ------------------------------------------------------------------  Patient Status:Routine. Study Location:[facility] - Vascular Lab. Technical Quality:Adequate visualization. Risk Factors History +------------------+----------+------------------------------------------+ !Diagnosis         !Date      !Comments                                  ! +------------------+----------+------------------------------------------+ !Previous Procedure!06/01/2021!left femoral to AK pop insitu bypass graft! +------------------+----------+------------------------------------------+ Velocities are measured in cm/s ; Diameters are measured in mm Right UE Vein Measurements 2D Measurements +----------+----------+---------------+----------+ !Location  !Visualized!Compressibility!Thrombosis! +----------+----------+---------------+----------+ !IJV       !Yes       !Yes            !None      ! +----------+----------+---------------+----------+ !SCV       !Yes       !Yes            !None      ! +----------+----------+---------------+----------+ !Innominate!Yes       !Yes            !None      ! +----------+----------+---------------+----------+ !Axillary  !Yes       !Yes            !None      !  home BP meds reviewed and will be continued. IV Hydralazine ordered for control of extremely high blood pressures.   Will monitor labs to assess Creat/K for possible complications of medications.     COPD, very severe (HCC)  Plan: cont inhaled tx    Syncope -Established problem. Stable.  Syncopal episode at home  Plan: can consider echo if workup desired      Pt's admitting diagnosis (syncope, resp fail) is not his hospice diagnosis.  However, given his overall status, transition to inpatient hospice appears beneficial.  Will consult hospice and palliaitve care    Diagnoses as listed above, designated as new or established and plan outlined for each.      Case discussed with: lucrecia mendieta    MDM Determination    PROBLEM  High: life threatening unstable chronic illness (I.e. severe COPD, asthma) or acute illness (i.e. MI, PE, ESMER, stroke,severe resp distress, acute change in neurologic status, suicidal ideation)  PLUS  high: iv narcotics or other iv meds which require monitoring (e.g. digoxin, amiodarone, vancomycin, coumadin, heparin, antiepileptics, chemotherapy,insulin drip, procrit) and high: at least 2 of: personally interpret study, discuss with external specialist, review/order 3+ tests - cbc bmp ekg    OR TIME BASED  N/A - see problem based determination above       Total critical care time caring for this patient with life threatening illness, including direct patient contact, management of life support systems, review of data including imaging and labs, discussions with other team members and physicians at least 35 minutes today        The patient is being placed in inpatient status with the expectation of requiring a hospital stay spanning at least two midnights for care and treatment of the problems noted in the problem list.  This determination is also based on thepatients comorbidities and past medical history, the severity and timing of the signs and symptoms upon presentation.    (Please note that

## 2024-01-23 NOTE — PROGRESS NOTES
Pt admitted for syncope, resp fail    Full h+p to follow    Active Hospital Problems    Diagnosis Date Noted    Hospice care [Z51.5] 10/17/2022     Priority: Medium    Anxiety [F41.9] 08/01/2022     Priority: Medium    Acute respiratory failure with hypoxemia (HCC) [J96.01]      Priority: Medium    Syncope [R55] 01/23/2024    COPD, very severe (HCC) [J44.9] 12/18/2017    Essential hypertension [I10] 11/24/2016       Please use [employer] to contact me with any questions during the day.   The hospitalist service will provide cross-coverage for this patient from 7pm to 7am.    During those hours, contact the on-call hospitalist MD/CARO for questions.

## 2024-01-23 NOTE — ED NOTES
Pt arrived to ED via EMS from home. Pt's wife called 911 because pt passed out in the restroom and turned blue. EMS was unable to get a SaO2 reading on room air. Pt has hx of COPD and is currently in hospice care for COPD. wears 3L of oxygen at all time. EMS reports that pt was only having SaO2 readings in the 80%s on the non-rebreather mask en route. BG was 116 en route.     Patient alert and oriented x2.  GCS 14/15.  Pt appeared to be in respiratory distress.  Pt tachypneic, shallow, labored respirations.  Cardiac Rhythm a-fib RVR.      Mobility Level of assistance independent at baseline.      Pt placed on a continuous pulse oximetry and telemetry monitoring. Bedside Monitor on with Alarms audible and alarms set.  Pt on cycling blood pressure. Fall risk precautions in place, call light in reach, bed side table within reach, bed alarm on, will continue to monitor.

## 2024-01-23 NOTE — ED NOTES
ED TO INPATIENT SBAR HANDOFF    Patient Name: Ricardo Stevenson   :  1950  73 y.o.   MRN:  [id number]  Preferred Name  Ricardo  ED Room #:  ED-0003/03  Family/Caregiver Present yes   Restraints no   Sitter no   Sepsis Risk Score Sepsis Risk Score: 2.96    Situation  Code Status: Prior No additional code details.    Allergies: Codeine  Weight: Patient Vitals for the past 96 hrs (Last 3 readings):   Weight   24 1358 54.4 kg (120 lb)     Arrived from: home  Chief Complaint:   Chief Complaint   Patient presents with    Shortness of Breath     Pt came to ED via EMS. Pt passed out in restroom at his home. Per EMS, pt was blue and they placed him on a non-rebreather mask.      Hospital Problem/Diagnosis:  Principal Problem:    Acute respiratory failure with hypoxemia (HCC)  Active Problems:    Anxiety    Acute respiratory failure (HCC)    Hospice care    Essential hypertension    COPD, very severe (HCC)    Syncope  Resolved Problems:    * No resolved hospital problems. *    Imaging:   XR CHEST PORTABLE   Final Result   Increased pleural-parenchymal disease on the right.  There is slight improved   aeration of the left lung base.  There is a background of pulmonary emphysema           Abnormal labs:   Abnormal Labs Reviewed   CBC WITH AUTO DIFFERENTIAL - Abnormal; Notable for the following components:       Result Value    RBC 3.66 (*)     Hemoglobin 11.3 (*)     Hematocrit 34.4 (*)     All other components within normal limits   COMPREHENSIVE METABOLIC PANEL - Abnormal; Notable for the following components:    Sodium 134 (*)     Chloride 90 (*)     CO2 39 (*)     Glucose 158 (*)     All other components within normal limits   TROPONIN - Abnormal; Notable for the following components:    Troponin, High Sensitivity 82 (*)     All other components within normal limits   TROPONIN - Abnormal; Notable for the following components:    Troponin, High Sensitivity 74 (*)     All other components within normal limits

## 2024-01-23 NOTE — ED NOTES
Rn notified Pt's hospice RN Wendy Elder [phone] that Pt was at the ER and will more than likely be staying in the hospital.  Wendy stated that she would more than likely have a hospice RN come evaluate Pt to see if he needs to be released from hospice care while in the hospital.

## 2024-01-23 NOTE — PROGRESS NOTES
Patient seen in ED, room 03.  Admission completed through personal Belongings.  IP nurse will need to begin with Psychosocial review and complete the admission including the 4 Eyes Assessment, Immunizations, Vaccines, Rights and Responsibilities, Orientation to room, Plan of Care, Education/Learning Assessment and Education Plan, white board, height and weight, pain assessment and head to toe assessment.  Patient is alert and is being admitted for Acute Respiratory failure with hypoxemia.  Home Medications as well as Outside Sources have been verbally reviewed with patient and updated if appropriate.  Medication reconciliation is now Completed.  All questions answered.    Advance Directives and DNR status verbally verified with patient and both are appropriate per patient.

## 2024-01-23 NOTE — ED PROVIDER NOTES
In addition to the advanced practice provider, I personally saw Ricardo Stevenson and performed a substantive portion of the visit including all aspects of the medical decision making.    Medical Decision Making  Patient presented with acute respiratory distress and shortness of breath starting earlier today.  He has a history of severe COPD and is currently DNR-CC on hospice.  On arrival, he stated he wanted BiPAP, but did not want intubation.  When asked, he told me he still wanted a full workup, including testing and treatment for any conditions he has, other than being put on a ventilator, receiving CPR, or being defibrillated.  On him, patient had significantly tachycardic, irregular rhythm, consistent with atrial fibrillation.  While I examined him, his heart rate increased as high as 1 the 180s to 190s.  Patient was also in severe respiratory distress with impending respiratory failure.  We initiated BiPAP per the patient's request, which seemed to calm his breathing.  On auscultation of his lungs, no air movement at all, only noting chest rise on visualization.  I could not appreciate wheezes, rales, rhonchi, or any breath sounds.  Patient appeared mottled and toxic.    I personally saw the patient and independently provided 30 minutes of non-concurrent critical care out of the total shared critical care time provided.    EKG  The Ekg interpreted by me in the absence of a cardiologist shows.  atrial fibrillation with a rate of 155; PVCs present  Axis is   Normal  QTc is  normal  Intervals and Durations are unremarkable.      No specific ST-T wave changes appreciated.  No evidence of acute ischemia.   No significant change comparison to previous EKG from January 2, 2024        FINAL IMPRESSION  1. COPD exacerbation ([facility])    2. Acute on chronic respiratory failure with hypoxia ([facility])    3. Syncope and collapse    4. Hypercapnia    5. Atrial fibrillation with RVR (HCC)        Blood pressure 135/83, pulse (!) 115,  temperature 97.8 °F (36.6 °C), temperature source Axillary, resp. rate 17, weight 54.4 kg (120 lb), SpO2 100 %.     For further details of Ricardo Stevenson's emergency department encounter, please see documentation by advanced practice provider, GATO Acosta.        Melo Lindsay MD  01/26/24 1759

## 2024-01-24 VITALS
BODY MASS INDEX: 17.47 KG/M2 | SYSTOLIC BLOOD PRESSURE: 113 MMHG | RESPIRATION RATE: 26 BRPM | DIASTOLIC BLOOD PRESSURE: 77 MMHG | WEIGHT: 122 LBS | TEMPERATURE: 97.6 F | OXYGEN SATURATION: 92 % | HEIGHT: 70 IN | HEART RATE: 113 BPM

## 2024-01-24 PROBLEM — E44.0 MODERATE MALNUTRITION (HCC): Chronic | Status: ACTIVE | Noted: 2024-01-04

## 2024-01-24 LAB
BASE EXCESS BLDA CALC-SCNC: 10.2 MMOL/L (ref -3–3)
BASE EXCESS BLDA CALC-SCNC: 8.2 MMOL/L (ref -3–3)
BASOPHILS # BLD: 0 K/UL (ref 0–0.2)
BASOPHILS NFR BLD: 0.1 %
CO2 BLDA-SCNC: 85.8 MMOL/L
CO2 BLDA-SCNC: 86.2 MMOL/L
COHGB MFR BLDA: 1 % (ref 0–1.5)
COHGB MFR BLDA: 1.2 % (ref 0–1.5)
DEPRECATED RDW RBC AUTO: 13.2 % (ref 12.4–15.4)
EKG ATRIAL RATE: 131 BPM
EKG ATRIAL RATE: 91 BPM
EKG DIAGNOSIS: NORMAL
EKG DIAGNOSIS: NORMAL
EKG Q-T INTERVAL: 266 MS
EKG Q-T INTERVAL: 356 MS
EKG QRS DURATION: 88 MS
EKG QRS DURATION: 90 MS
EKG QTC CALCULATION (BAZETT): 427 MS
EKG QTC CALCULATION (BAZETT): 435 MS
EKG R AXIS: 73 DEGREES
EKG R AXIS: 77 DEGREES
EKG T AXIS: 252 DEGREES
EKG T AXIS: 66 DEGREES
EKG VENTRICULAR RATE: 155 BPM
EKG VENTRICULAR RATE: 90 BPM
EOSINOPHIL # BLD: 0 K/UL (ref 0–0.6)
EOSINOPHIL NFR BLD: 0 %
HCO3 BLDA-SCNC: 36.1 MMOL/L (ref 21–29)
HCO3 BLDA-SCNC: 36.6 MMOL/L (ref 21–29)
HCT VFR BLD AUTO: 29 % (ref 40.5–52.5)
HGB BLD-MCNC: 9.2 G/DL (ref 13.5–17.5)
HGB BLDA-MCNC: 8.8 G/DL (ref 13.5–17.5)
HGB BLDA-MCNC: 9.3 G/DL (ref 13.5–17.5)
LYMPHOCYTES # BLD: 0.4 K/UL (ref 1–5.1)
LYMPHOCYTES NFR BLD: 9 %
MCH RBC QN AUTO: 30.6 PG (ref 26–34)
MCHC RBC AUTO-ENTMCNC: 31.9 G/DL (ref 31–36)
MCV RBC AUTO: 96.1 FL (ref 80–100)
METHGB MFR BLDA: 0.3 %
METHGB MFR BLDA: 0.5 %
MONOCYTES # BLD: 0.2 K/UL (ref 0–1.3)
MONOCYTES NFR BLD: 3.7 %
NEUTROPHILS # BLD: 3.6 K/UL (ref 1.7–7.7)
NEUTROPHILS NFR BLD: 87.2 %
O2 THERAPY: ABNORMAL
O2 THERAPY: ABNORMAL
PCO2 BLDA: 60.3 MMHG (ref 35–45)
PCO2 BLDA: 71.7 MMHG (ref 35–45)
PH BLDA: 7.31 [PH] (ref 7.35–7.45)
PH BLDA: 7.39 [PH] (ref 7.35–7.45)
PLATELET # BLD AUTO: 180 K/UL (ref 135–450)
PMV BLD AUTO: 8.5 FL (ref 5–10.5)
PO2 BLDA: 109 MMHG (ref 75–108)
PO2 BLDA: 144 MMHG (ref 75–108)
RBC # BLD AUTO: 3.02 M/UL (ref 4.2–5.9)
SAO2 % BLDA: 100 %
SAO2 % BLDA: 99 %
WBC # BLD AUTO: 4.1 K/UL (ref 4–11)

## 2024-01-24 PROCEDURE — G0378 HOSPITAL OBSERVATION PER HR: HCPCS

## 2024-01-24 PROCEDURE — 6370000000 HC RX 637 (ALT 250 FOR IP): Performed by: INTERNAL MEDICINE

## 2024-01-24 PROCEDURE — 85025 COMPLETE CBC W/AUTO DIFF WBC: CPT

## 2024-01-24 PROCEDURE — 94640 AIRWAY INHALATION TREATMENT: CPT

## 2024-01-24 PROCEDURE — 2580000003 HC RX 258: Performed by: INTERNAL MEDICINE

## 2024-01-24 PROCEDURE — 93010 ELECTROCARDIOGRAM REPORT: CPT | Performed by: INTERNAL MEDICINE

## 2024-01-24 PROCEDURE — 36600 WITHDRAWAL OF ARTERIAL BLOOD: CPT

## 2024-01-24 PROCEDURE — 93005 ELECTROCARDIOGRAM TRACING: CPT | Performed by: NURSE PRACTITIONER

## 2024-01-24 PROCEDURE — 94761 N-INVAS EAR/PLS OXIMETRY MLT: CPT

## 2024-01-24 PROCEDURE — 96375 TX/PRO/DX INJ NEW DRUG ADDON: CPT

## 2024-01-24 PROCEDURE — 6360000002 HC RX W HCPCS: Performed by: INTERNAL MEDICINE

## 2024-01-24 PROCEDURE — 96366 THER/PROPH/DIAG IV INF ADDON: CPT

## 2024-01-24 PROCEDURE — 82803 BLOOD GASES ANY COMBINATION: CPT

## 2024-01-24 PROCEDURE — 96376 TX/PRO/DX INJ SAME DRUG ADON: CPT

## 2024-01-24 PROCEDURE — 2700000000 HC OXYGEN THERAPY PER DAY

## 2024-01-24 PROCEDURE — 36415 COLL VENOUS BLD VENIPUNCTURE: CPT

## 2024-01-24 PROCEDURE — 94660 CPAP INITIATION&MGMT: CPT

## 2024-01-24 RX ORDER — MORPHINE SULFATE 4 MG/ML
4 INJECTION, SOLUTION INTRAMUSCULAR; INTRAVENOUS ONCE
Status: COMPLETED | OUTPATIENT
Start: 2024-01-24 | End: 2024-01-24

## 2024-01-24 RX ORDER — MORPHINE SULFATE 2 MG/ML
2 INJECTION, SOLUTION INTRAMUSCULAR; INTRAVENOUS EVERY 4 HOURS PRN
Status: DISCONTINUED | OUTPATIENT
Start: 2024-01-24 | End: 2024-01-24 | Stop reason: HOSPADM

## 2024-01-24 RX ORDER — LORAZEPAM 0.5 MG/1
1 TABLET ORAL EVERY 4 HOURS PRN
Status: DISCONTINUED | OUTPATIENT
Start: 2024-01-24 | End: 2024-01-24 | Stop reason: HOSPADM

## 2024-01-24 RX ADMIN — IPRATROPIUM BROMIDE AND ALBUTEROL SULFATE 1 DOSE: .5; 3 SOLUTION RESPIRATORY (INHALATION) at 11:41

## 2024-01-24 RX ADMIN — SODIUM CHLORIDE: 9 INJECTION, SOLUTION INTRAVENOUS at 07:38

## 2024-01-24 RX ADMIN — SACUBITRIL AND VALSARTAN 0.5 TABLET: 24; 26 TABLET, FILM COATED ORAL at 07:34

## 2024-01-24 RX ADMIN — LORAZEPAM 1 MG: 0.5 TABLET ORAL at 14:15

## 2024-01-24 RX ADMIN — MIDODRINE HYDROCHLORIDE 2.5 MG: 5 TABLET ORAL at 07:34

## 2024-01-24 RX ADMIN — SODIUM CHLORIDE, PRESERVATIVE FREE 10 ML: 5 INJECTION INTRAVENOUS at 14:47

## 2024-01-24 RX ADMIN — IPRATROPIUM BROMIDE AND ALBUTEROL SULFATE 1 DOSE: .5; 3 SOLUTION RESPIRATORY (INHALATION) at 15:37

## 2024-01-24 RX ADMIN — MORPHINE SULFATE 4 MG: 4 INJECTION, SOLUTION INTRAMUSCULAR; INTRAVENOUS at 05:26

## 2024-01-24 RX ADMIN — MORPHINE SULFATE 2 MG: 2 INJECTION, SOLUTION INTRAMUSCULAR; INTRAVENOUS at 10:56

## 2024-01-24 RX ADMIN — Medication 2 PUFF: at 08:04

## 2024-01-24 RX ADMIN — METOPROLOL SUCCINATE 100 MG: 50 TABLET, EXTENDED RELEASE ORAL at 08:47

## 2024-01-24 RX ADMIN — IPRATROPIUM BROMIDE AND ALBUTEROL SULFATE 1 DOSE: .5; 3 SOLUTION RESPIRATORY (INHALATION) at 08:04

## 2024-01-24 RX ADMIN — LORAZEPAM 1 MG: 0.5 TABLET ORAL at 19:08

## 2024-01-24 RX ADMIN — IPRATROPIUM BROMIDE AND ALBUTEROL SULFATE 1 DOSE: .5; 3 SOLUTION RESPIRATORY (INHALATION) at 20:32

## 2024-01-24 RX ADMIN — SODIUM CHLORIDE, PRESERVATIVE FREE 10 ML: 5 INJECTION INTRAVENOUS at 07:35

## 2024-01-24 RX ADMIN — MORPHINE SULFATE 2 MG: 2 INJECTION, SOLUTION INTRAMUSCULAR; INTRAVENOUS at 19:05

## 2024-01-24 RX ADMIN — Medication 2 PUFF: at 20:32

## 2024-01-24 RX ADMIN — LEVOTHYROXINE SODIUM 75 MCG: 0.07 TABLET ORAL at 05:25

## 2024-01-24 RX ADMIN — MORPHINE SULFATE 2 MG: 2 INJECTION, SOLUTION INTRAMUSCULAR; INTRAVENOUS at 14:47

## 2024-01-24 RX ADMIN — APIXABAN 5 MG: 5 TABLET, FILM COATED ORAL at 07:34

## 2024-01-24 RX ADMIN — MIDODRINE HYDROCHLORIDE 2.5 MG: 5 TABLET ORAL at 16:07

## 2024-01-24 ASSESSMENT — PAIN DESCRIPTION - DESCRIPTORS: DESCRIPTORS: ACHING

## 2024-01-24 ASSESSMENT — ENCOUNTER SYMPTOMS
TROUBLE SWALLOWING: 1
COUGH: 1
ALLERGIC/IMMUNOLOGIC NEGATIVE: 1
ABDOMINAL DISTENTION: 1
SHORTNESS OF BREATH: 1
EYES NEGATIVE: 1

## 2024-01-24 ASSESSMENT — PAIN DESCRIPTION - LOCATION
LOCATION: GENERALIZED

## 2024-01-24 ASSESSMENT — PAIN SCALES - GENERAL
PAINLEVEL_OUTOF10: 8
PAINLEVEL_OUTOF10: 7
PAINLEVEL_OUTOF10: 9
PAINLEVEL_OUTOF10: 8

## 2024-01-24 NOTE — DISCHARGE INSTR - COC
Continuity of Care Form    Patient Name: Ricardo Stevenson   :  1950  MRN:  [id number]    Admit date:  2024  Discharge date:  ***    Code Status Order: DNR-CC   Advance Directives:     Admitting Physician:  Wesly Astorga MD  PCP: Damion Siu DO    Discharging Nurse: ***  Discharging Hospital Unit/Room#: 3TN-3383/3383-01  Discharging Unit Phone Number: ***    Emergency Contact:   Extended Emergency Contact Information  Primary Emergency Contact: GrahamRegine \"Carson\"  Address: [address] [place] [address]  Home Phone: [phone]  Mobile Phone: [phone]  Relation: Spouse    Past Surgical History:  Past Surgical History:   Procedure Laterality Date    CHOLECYSTECTOMY, LAPAROSCOPIC N/A 3/17/2021    LAPAROSCOPIC CHOLECYSTECTOMY WITH INTRAOPERATIVE CHOLANGIOGRAM performed by Austin Kuhn MD at [facility] OR    EYE SURGERY      bilateral cataracts    FEMORAL BYPASS Left 2021    LEFT FEMORAL TO POPLITEAL BYPASS GRAFT (25307) performed by Cory Go MD at [facility] OR    FRACTURE SURGERY      LT elbow    TONSILLECTOMY         Immunization History:   Immunization History   Administered Date(s) Administered    COVID-19, MODERNA BLUE border, Primary or Immunocompromised, (age 12y+), IM, 100 mcg/0.5mL 2021, 2021    Pneumococcal, PPSV23, PNEUMOVAX 23, (age 2y+), SC/IM, 0.5mL 2016       Active Problems:  Patient Active Problem List   Diagnosis Code    Chronic obstructive pulmonary disease (HCC) J44.9    Acute respiratory failure with hypoxemia ([facility]) J96.01    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

## 2024-01-24 NOTE — PROGRESS NOTES
Reviewed plan with patient to go to [facility].  Patient agreeable and consent was signed by patient.  Transport arranged with [facility] at 2015.  Please leave IV access in place. Please contact me with any questions.    Airam Wang RN  Hospital Liaison   [phone]

## 2024-01-24 NOTE — PLAN OF CARE
Problem: Discharge Planning  Goal: Discharge to home or other facility with appropriate resources  1/24/2024 0846 by Rhina Leal RN  Outcome: Progressing     Problem: Chronic Conditions and Co-morbidities  Goal: Patient's chronic conditions and co-morbidity symptoms are monitored and maintained or improved  1/24/2024 0846 by Rhina Leal, RN  Outcome: Progressing    Problem: Safety - Adult  Goal: Free from fall injury  1/24/2024 0846 by Rhina Leal RN  Outcome: Progressing    Problem: ABCDS Injury Assessment  Goal: Absence of physical injury  1/24/2024 0846 by Rhina Leal RN  Outcome: Progressing     Problem: Pain  Goal: Verbalizes/displays adequate comfort level or baseline comfort level  Outcome: Progressing

## 2024-01-24 NOTE — PLAN OF CARE
Problem: Discharge Planning  Goal: Discharge to home or other facility with appropriate resources  Outcome: Progressing     Problem: Chronic Conditions and Co-morbidities  Goal: Patient's chronic conditions and co-morbidity symptoms are monitored and maintained or improved  Outcome: Progressing     Problem: Safety - Adult  Goal: Free from fall injury  Outcome: Progressing     Problem: ABCDS Injury Assessment  Goal: Absence of physical injury  Outcome: Progressing

## 2024-01-24 NOTE — PROGRESS NOTES
Nutrition Note    RECOMMENDATIONS  PO Diet: Continue current diet   ONS: Offer Ensure Plus High Protein BID  Nutrition Support: None      NUTRITION ASSESSMENT   Pt triggered positive nursing nutrition screen for MST 2. Pt is moderately malnourished upon admission as evidenced by malnutrition assessment below. Pt reports appetite and PO intake have been good at home. He has been drinking OWYN protein drinks for additional nutrition. Weight has actually been stable overall since October 2023. PO intake during admission % of meals. Agreeable to receive Ensure BID.    Nutrition Related Findings: Refer to malnutrition assessment. Trace BLE edema.  Wounds: None  Nutrition Education:  Education not indicated   Nutrition Goals: PO intake 50% or greater, prior to discharge     MALNUTRITION ASSESSMENT   Chronic Illness  Malnutrition Status: Moderate malnutrition  Findings of the 6 clinical characteristics of malnutrition:  Energy Intake:  No significant decrease in energy intake  Weight Loss:  No significant weight loss     Body Fat Loss:  Severe body fat loss Fat Overlying Ribs, Triceps   Muscle Mass Loss:  Severe muscle mass loss Clavicles (pectoralis & deltoids)  Fluid Accumulation:  No significant fluid accumulation     Strength:  Not Performed    NUTRITION DIAGNOSIS   Moderate malnutrition related to catabolic illness as evidenced by Criteria as identified in malnutrition assessment    CURRENT NUTRITION THERAPIES  ADULT DIET; Regular  ADULT ORAL NUTRITION SUPPLEMENT; Breakfast, Lunch, Dinner; Standard High Calorie/High Protein Oral Supplement     PO Intake: %   PO Supplement Intake:Unable to assess    ANTHROPOMETRICS  Current Height: 177.8 cm (5' 10\")  Current Weight - Scale: 55.3 kg (122 lb)    Ideal Body Weight (IBW): 166 lbs  (75 kg)        BMI: 17.5    COMPARATIVE STANDARDS  Total Energy Requirements (kcals/day): 3901-4819     Protein (g):   grams       Fluid (mL/day):  9458-4269 mL    The

## 2024-01-24 NOTE — PROGRESS NOTES
person, place, time, and general circumstances    Head: Normocephalic, without obvious abnormality, atraumatic    Eyes:lids and lashes normal, conjunctivae and sclerae normal and pupils equal, round, reactive to light and accomodation    EMNT: external ears normal, nares midline    Neck: no carotid bruit, supple, symmetrical, trachea midline and thyroid not enlarged, symmetric, no tenderness/mass/nodules     Respiratory: crackles bilaterally with normal respiratory effort    Cardiovascular:irreg irreg , normal S1 and S2 and no murmurs    Gastrointestinal: soft, non-tender, non-distended, normal bowel sounds, no masses or organomegaly    Extremities: no clubbing, no edema    Skin:No rashes or nodules noted.    Neurologic:negative         Labs:  Lab Results   Component Value Date    WBC 4.1 01/24/2024    HGB 9.2 (L) 01/24/2024    HCT 29.0 (L) 01/24/2024     01/24/2024    CHOL 169 08/23/2023    TRIG 42 08/23/2023    HDL 83 (H) 08/23/2023    LDLDIRECT 105 (H) 10/15/2022    ALT 35 01/23/2024    AST 36 01/23/2024     (L) 01/23/2024    K 5.0 01/23/2024    CL 90 (L) 01/23/2024    CREATININE 1.0 01/23/2024    BUN 16 01/23/2024    CO2 39 (H) 01/23/2024    TSH 2.35 03/07/2023    INR 1.02 01/23/2024     Lab Results   Component Value Date    TROPONINI <0.01 01/06/2023       Recent Imaging Results are Reviewed:  XR CHEST PORTABLE    Result Date: 1/23/2024  EXAMINATION: ONE XRAY VIEW OF THE CHEST 1/23/2024 12:58 pm COMPARISON: December 2023 HISTORY: ORDERING SYSTEM PROVIDED HISTORY: SOB TECHNOLOGIST PROVIDED HISTORY: Reason for exam:->SOB Reason for Exam: SOB FINDINGS: Heart size is normal.  There is underlying emphysema.  Small right-sided pleural effusion is seen with adjacent right basilar consolidation Hazy opacity is seen at the left lung base, similar but decreased compared to prior.     Increased pleural-parenchymal disease on the right.  There is slight improved aeration of the left lung base.  There is a        !Yes       !Yes            !None      ! +----------+----------+---------------+----------+ !Innominate!Yes       !Yes            !None      ! +----------+----------+---------------+----------+ !Axillary  !Yes       !Yes            !None      ! +----------+----------+---------------+----------+ !Brachial  !Yes       !Yes            !None      ! +----------+----------+---------------+----------+ !Radial    !Yes       !Yes            !None      ! +----------+----------+---------------+----------+ !Ulnar     !Yes       !Yes            !None      ! +----------+----------+---------------+----------+ !Basilic   !Yes       !Yes            !None      ! +----------+----------+---------------+----------+ !Cephalic  !Yes       !No             !Acute     ! +----------+----------+---------------+----------+ Doppler Measurements +--------+------+------+ !Location!Signal!Reflux! +--------+------+------+ !IJV     !Phasic!      ! +--------+------+------+ !SCV     !Phasic!      ! +--------+------+------+ !Axillary!Phasic!      ! +--------+------+------+    XR CHEST PORTABLE    Result Date: 12/28/2023  EXAMINATION: ONE XRAY VIEW OF THE CHEST 12/28/2023 8:18 am COMPARISON: CXR dated 08/22/2023 HISTORY: ORDERING SYSTEM PROVIDED HISTORY: shortness of breath TECHNOLOGIST PROVIDED HISTORY: Reason for exam:->shortness of breath Reason for Exam: shortness of breath FINDINGS: Mediastinum/Heart: The mediastinal contours are unchanged compared to prior exam. The heart appears normal in size. Lungs: Patchy opacities in the lower left lung, new since the prior study, may represent developing pneumonia.  Scattered granulomas and emphysematous changes, unchanged. Pleura: No pleural effusion. No pneumothorax.     1. Patchy opacities in the lower left lung, new since the prior study, may represent developing pneumonia. 2. Emphysematous changes of the lungs.       Lab Results   Component Value Date/Time    GLUCOSE 158 01/23/2024 12:52 PM     Lab

## 2024-01-24 NOTE — CONSULTS
PALLIATIVE MEDICINE CONSULTATION     Patient name:Ricardo Stevenson   MRN:[id number]    :1950  Room/Bed:[facility]-3383/3383-01   LOS: 1 day         Date of consult:2024    Inpatient consult to Palliative Care  Consult performed by: Nadia Napoles APRN - CNP  Consult ordered by: Wesly Astorga MD  Reason for consult: GOC              ASSESSMENT/RECOMMENDATIONS     73 y.o. male with Dyspnea and end stage COPD       Symptom Management:  End stage COPD- pt active with Hospice he came to ED yesterday on advice of HOC nurse due to acute respiratory distress   Dyspnea- pt awake and alert today he states that his breathing is much improved    Goals of Care- DNRCC, talked to pt his goal remains comfort care and he did not want to come to the hospital yesterday but did on advice of HOC nurse. He is breathing better today and hopeful to return home. Ines RN to meet with pt today and work on home plan for respiratory crisis in the future.     Patient/Family Goals of Care :    DNRCC, talked to pt his goal remains comfort care and he did not want to come to the hospital yesterday but did on advice of HOC nurse. He is breathing better today and hopeful to return home. Ines RN to meet with pt today and work on home plan for respiratory crisis in the future.     Disposition/Discharge Plan:   pending    Advance Directives:      The patient has appointed the following active healthcare agents:    Primary Decision Maker: Regine Stevenson \"Carson\" - Spouse - [phone]    The Patient has the following current code status:    Code Status: DNR-CC      Interactive exchange regarding medications,tests and procedures with: patient, floor RN, Dr Astorga  Thank you for allowing us to participate in the care of this patient.      HISTORY     CC: Dyspnea  HPI: The patient is a 73 y.o. male with history of severe COPD and is currently DNR-CC on hospice.  On arrival, he stated he wanted BiPAP, but did not want intubation.

## 2024-01-24 NOTE — PROGRESS NOTES
Patient was not tolerating BiPAP, this RN messaged attending Jere NP to get an order for an ABG to assess if patient still needed continuous BiPAP. STAT ABG draw was ordered and based on results RT was okay to take patient off of BiPAP and placed on high flow nasal cannula.

## 2024-01-27 LAB
BACTERIA BLD CULT ORG #2: NORMAL
BACTERIA BLD CULT: NORMAL

## 2024-02-03 NOTE — DISCHARGE SUMMARY
Discharge instructions reviewed with patient by Hospice and day shift nurse. Patient verbalized understanding. Tele box returned to nurse's station. Patient taken by transport from [facility]   
    !Phasic!      ! +--------+------+------+ !Axillary!Phasic!      ! +--------+------+------+ Left UE Vein Measurements 2D Measurements +----------+----------+---------------+----------+ !Location  !Visualized!Compressibility!Thrombosis! +----------+----------+---------------+----------+ !IJV       !Yes       !Yes            !None      ! +----------+----------+---------------+----------+ !SCV       !Yes       !Yes            !None      ! +----------+----------+---------------+----------+ !Innominate!Yes       !Yes            !None      ! +----------+----------+---------------+----------+ !Axillary  !Yes       !Yes            !None      ! +----------+----------+---------------+----------+ !Brachial  !Yes       !Yes            !None      ! +----------+----------+---------------+----------+ !Radial    !Yes       !Yes            !None      ! +----------+----------+---------------+----------+ !Ulnar     !Yes       !Yes            !None      ! +----------+----------+---------------+----------+ !Basilic   !Yes       !Yes            !None      ! +----------+----------+---------------+----------+ !Cephalic  !Yes       !No             !Acute     ! +----------+----------+---------------+----------+ Doppler Measurements +--------+------+------+ !Location!Signal!Reflux! +--------+------+------+ !IJV     !Phasic!      ! +--------+------+------+ !SCV     !Phasic!      ! +--------+------+------+ !Axillary!Phasic!      ! +--------+------+------+        Discharge Exam:  See note from day of d/c    Disposition: inpatient hospice    Condition: stable    Discharge Medications:     Medication List        ASK your doctor about these medications      apixaban 5 MG Tabs tablet  Commonly known as: ELIQUIS     ARIPiprazole 10 MG tablet  Commonly known as: ABILIFY     Breztri Aerosphere 160-9-4.8 MCG/ACT Aero  Generic drug: Budeson-Glycopyrrol-Formoterol  Inhale 2 puffs into the lungs 2 times daily     dicyclomine 10 MG capsule  Commonly

## (undated) DEVICE — SUTURE NONABSORBABLE MONOFILAMENT 7-0 BV-1 1X24 IN PROLENE 8702H

## (undated) DEVICE — SUTURE BOOT: Brand: DEROYAL

## (undated) DEVICE — TROCAR: Brand: KII FIOS FIRST ENTRY

## (undated) DEVICE — HYPODERMIC SAFETY NEEDLE: Brand: MAGELLAN

## (undated) DEVICE — MERCY FAIRFIELD TURNOVER KIT: Brand: MEDLINE INDUSTRIES, INC.

## (undated) DEVICE — TTL1LYR 16FR10ML 100%SIL TMPST TR: Brand: MEDLINE

## (undated) DEVICE — VALVULOTOME ANGIOSCOPIC 91 CM CATH VEIN SL INTRO HD EZE-SIT

## (undated) DEVICE — TOWEL,OR,DSP,ST,WHITE,DLX,XR,4/PK,20PK/C: Brand: MEDLINE

## (undated) DEVICE — SPONGE LAP W18XL18IN WHT COT 4 PLY FLD STRUNG RADPQ DISP ST

## (undated) DEVICE — CORD ES L10FT MPLR LAP

## (undated) DEVICE — SUTURE VCRL SZ 3-0 L27IN ABSRB UD L26MM SH 1/2 CIR J416H

## (undated) DEVICE — X-RAY CASSETTE COVER: Brand: CONVERTORS

## (undated) DEVICE — APPLIER CLP L9.375IN APER 2.1MM CLS L3.8MM 20 SM TI CLP

## (undated) DEVICE — GLOVE SURG SZ 6.5 L11.2IN FNGR THK9.8MIL STRW LTX POLYMER

## (undated) DEVICE — LAPAROSCOPIC TROCAR SLEEVE/SINGLE USE: Brand: KII® LOW PROFILE OPTICAL ACCESS SYSTEM

## (undated) DEVICE — APPLICATOR MEDICATED 26 CC SOLUTION HI LT ORNG CHLORAPREP

## (undated) DEVICE — APPLIER CLP M/L SHFT DIA5MM 15 LIG LIGAMAX 5

## (undated) DEVICE — SUTURE VCRL SZ 3-0 L36IN ABSRB UD L36MM CT-1 1/2 CIR J944H

## (undated) DEVICE — GOWN SIRUS NONREIN LG W/TWL: Brand: MEDLINE INDUSTRIES, INC.

## (undated) DEVICE — GAUZE,SPONGE,4"X4",16PLY,XRAY,STRL,LF: Brand: MEDLINE

## (undated) DEVICE — SUTURE PROL SZ 5-0 L24IN NONABSORBABLE BLU L17MM RB-1 1/2 8555H

## (undated) DEVICE — BANDAGE COBAN 4 IN COMPR W4INXL5YD FOAM COHESIVE QUIK STK SELF ADH SFT

## (undated) DEVICE — INTENDED FOR TISSUE SEPARATION, AND OTHER PROCEDURES THAT REQUIRE A SHARP SURGICAL BLADE TO PUNCTURE OR CUT.: Brand: BARD-PARKER ® STAINLESS STEEL BLADES

## (undated) DEVICE — SUTURE MCRYL SZ 4-0 L27IN ABSRB UD L19MM PS-2 1/2 CIR PRIM Y426H

## (undated) DEVICE — Device

## (undated) DEVICE — CHLORAPREP 26ML ORANGE

## (undated) DEVICE — DRAPE,LAP,CHOLE,W/TROUGHS,STERILE: Brand: MEDLINE

## (undated) DEVICE — STERILE POLYISOPRENE POWDER-FREE SURGICAL GLOVES: Brand: PROTEXIS

## (undated) DEVICE — APPLIER CLP L9.38IN M LIG TI DISP STR RNG HNDL LIGACLP

## (undated) DEVICE — SCANLAN® VASCU-STATT® SINGLE-USE BULLDOG CLAMP - MIDI ANGLED 45° (WHITE), CLAMPING PRESSURE 25-30 G (2/STERILE PKG): Brand: SCANLAN® VASCU-STATT® SINGLE-USE BULLDOG CLAMP

## (undated) DEVICE — DRAPE C ARM UNIV W41XL74IN CLR PLAS XR VELC CLSR POLY STRP

## (undated) DEVICE — TROCAR SLEEVE: Brand: KII ® LOW PROFILE SLEEVE

## (undated) DEVICE — SHEET,DRAPE,53X77,STERILE: Brand: MEDLINE

## (undated) DEVICE — COVER LT HNDL BLU PLAS

## (undated) DEVICE — SUTURE PERMAHAND SZ 2-0 L12X18IN NONABSORBABLE BLK SILK A185H

## (undated) DEVICE — BANDAGE,GAUZE,BULKEE II,4.5"X4.1YD,STRL: Brand: MEDLINE

## (undated) DEVICE — BANDAGE COMPR W6INXL10YD ST M E WHITE/BEIGE

## (undated) DEVICE — INDICATED FOR USE DURING OPEN AND LAPAROSCOPIC CHOLECYSTECTOMY PROCEDURES TO INJECT RADIOPAQUE MEDIA THROUGH THE CYSTIC DUCT INTO THE BILIARY TREE.: Brand: AEROSTAT®

## (undated) DEVICE — SUTURE NONABSORBABLE MONOFILAMENT 5-0 C-1 1X24 IN PROLENE 8725H

## (undated) DEVICE — STAPLER SKIN H3.9MM WIRE DIA0.58MM CRWN 6.9MM 35 STPL ROT

## (undated) DEVICE — SUTURE PROL SZ 6 0 L24IN NONABSORBABLE BLU C 1 L13MM 3 8 CIR EP8726H

## (undated) DEVICE — GOWN,AURORA,NONREINF,RAGLAN,XXL,STERILE: Brand: MEDLINE

## (undated) DEVICE — DECANTER FLD 9IN ST BG FOR ASEP TRNSF OF FLD

## (undated) DEVICE — SYRINGE MED 30ML STD CLR PLAS LUERLOCK TIP N CTRL DISP

## (undated) DEVICE — TUBING INSUFFLATION 10FT HIGH FLOW LEUR

## (undated) DEVICE — INDICATED FOR SURGICAL CLAMPING DURING CARDIOVASCULAR PERIPHERAL VASCULAR, AND GENERAL SURGERY.: Brand: SOFT/FIBRA® SPRING CLIP

## (undated) DEVICE — SYRINGE, LUER LOCK, 10ML: Brand: MEDLINE

## (undated) DEVICE — SUTURE VCRL SZ 2-0 L36IN ABSRB UD L36MM CT-1 1/2 CIR J945H

## (undated) DEVICE — Device: Brand: MEDEX

## (undated) DEVICE — VI-DRAPE ISOLATION BAG: Brand: CONVERTORS

## (undated) DEVICE — GEL US 20GM NONIRRITATING OVERWRAPPED FILE PCH TRNSMIT

## (undated) DEVICE — LOOP,VESSEL,MAXI,BLUE,2/PK,STERILE: Brand: MEDLINE

## (undated) DEVICE — ADHESIVE SKIN CLSR 0.7ML TOP DERMBND ADV

## (undated) DEVICE — SOLUTION IV IRRIG POUR BRL 0.9% SODIUM CHL 2F7124

## (undated) DEVICE — SYRINGE MED 10ML TRNSLUC BRL PLUNG BLK MRK POLYPR CTRL

## (undated) DEVICE — SUTURE PDS II SZ 0 L36IN ABSRB VLT L36MM CT-1 1/2 CIR Z346H

## (undated) DEVICE — MAJOR SET UP PK

## (undated) DEVICE — LAPAROSCOPY PACK: Brand: MEDLINE INDUSTRIES, INC.

## (undated) DEVICE — 3M™ STERI-DRAPE™ INCISE DRAPE 1050 (60CM X 45CM): Brand: STERI-DRAPE™

## (undated) DEVICE — CATHETER CHOLANGIOGRAM 4.5 FRX3 IN W/ 20018M55 TAUT INTRO

## (undated) DEVICE — SUTURE PERMAHAND SZ 4-0 L18IN NONABSORBABLE BLK SILK BRAID A183H

## (undated) DEVICE — GOWN SIRUS NONREIN XL W/TWL: Brand: MEDLINE INDUSTRIES, INC.

## (undated) DEVICE — NEEDLE INSUF L150MM DIA2MM DISP FOR PNEUMOPERI ENDOPATH

## (undated) DEVICE — CATHETER CHOLGM 4.5FR L18IN W/ MTL SUPP TB

## (undated) DEVICE — INTENDED TO BE USED TO OCCLUDE, RETRACT AND IDENTIFY ARTERIES, VEINS, TENDONS AND NERVES IN SURGICAL PROCEDURES: Brand: STERION®  VESSEL LOOP

## (undated) DEVICE — ELECTRODE PT RET AD L9FT HI MOIST COND ADH HYDRGEL CORDED

## (undated) DEVICE — SUTURE PROL SZ 6-0 L24IN NONABSORBABLE BLU L13MM C-1 3/8 8726H

## (undated) DEVICE — DRAPE,REIN 53X77,STERILE: Brand: MEDLINE

## (undated) DEVICE — SUTURE PROL SZ 3-0 L36IN NONABSORBABLE BLU L17MM RB-1 1/2 8558H

## (undated) DEVICE — LIGHT HANDLE: Brand: DEVON

## (undated) DEVICE — SUTURE PROL SZ 6-0 L24IN NONABSORBABLE BLU L9.3MM BV-1 3/8 8805H

## (undated) DEVICE — TOWEL,OR,DSP,ST,BLUE,DLX,10/PK,8PK/CS: Brand: MEDLINE

## (undated) DEVICE — SUTURE VCRL SZ 4-0 L18IN ABSRB UD L19MM PS-2 3/8 CIR PRIM J496H

## (undated) DEVICE — PAD THRM M SPL TORSO